# Patient Record
Sex: FEMALE | Race: WHITE | Employment: FULL TIME | URBAN - METROPOLITAN AREA
[De-identification: names, ages, dates, MRNs, and addresses within clinical notes are randomized per-mention and may not be internally consistent; named-entity substitution may affect disease eponyms.]

---

## 2017-03-30 ENCOUNTER — OFFICE VISIT (OUTPATIENT)
Dept: INTERNAL MEDICINE CLINIC | Age: 42
End: 2017-03-30

## 2017-03-30 VITALS
SYSTOLIC BLOOD PRESSURE: 140 MMHG | HEART RATE: 79 BPM | HEIGHT: 62 IN | WEIGHT: 250 LBS | OXYGEN SATURATION: 98 % | BODY MASS INDEX: 46.01 KG/M2 | DIASTOLIC BLOOD PRESSURE: 96 MMHG

## 2017-03-30 DIAGNOSIS — E78.2 MIXED HYPERLIPIDEMIA: ICD-10-CM

## 2017-03-30 DIAGNOSIS — Z76.89 ENCOUNTER TO ESTABLISH CARE: ICD-10-CM

## 2017-03-30 DIAGNOSIS — Z23 NEED FOR STREPTOCOCCUS PNEUMONIAE AND INFLUENZA VACCINATION: ICD-10-CM

## 2017-03-30 DIAGNOSIS — J01.11 ACUTE RECURRENT FRONTAL SINUSITIS: Primary | ICD-10-CM

## 2017-03-30 DIAGNOSIS — Z13.9 SCREENING: ICD-10-CM

## 2017-03-30 DIAGNOSIS — R32 INCONTINENCE IN FEMALE: ICD-10-CM

## 2017-03-30 PROCEDURE — 99204 OFFICE O/P NEW MOD 45 MIN: CPT | Performed by: NURSE PRACTITIONER

## 2017-03-30 PROCEDURE — 90471 IMMUNIZATION ADMIN: CPT | Performed by: NURSE PRACTITIONER

## 2017-03-30 PROCEDURE — 90732 PPSV23 VACC 2 YRS+ SUBQ/IM: CPT | Performed by: NURSE PRACTITIONER

## 2017-03-30 RX ORDER — OXCARBAZEPINE 600 MG/1
600 TABLET, FILM COATED ORAL 2 TIMES DAILY
Qty: 60 TABLET | Refills: 2 | Status: SHIPPED | OUTPATIENT
Start: 2017-03-30 | End: 2019-02-04 | Stop reason: SDUPTHER

## 2017-03-30 RX ORDER — NAPROXEN 500 MG/1
500 TABLET ORAL 2 TIMES DAILY WITH MEALS
Qty: 30 TABLET | Refills: 0 | Status: SHIPPED | OUTPATIENT
Start: 2017-03-30 | End: 2017-05-02 | Stop reason: ALTCHOICE

## 2017-03-30 RX ORDER — DIVALPROEX SODIUM 500 MG/1
500 TABLET, EXTENDED RELEASE ORAL DAILY
Qty: 30 TABLET | Status: CANCELLED | OUTPATIENT
Start: 2017-03-30

## 2017-03-30 RX ORDER — LANOLIN ALCOHOL/MO/W.PET/CERES
3 CREAM (GRAM) TOPICAL DAILY
Qty: 30 TABLET | Refills: 1 | Status: SHIPPED | OUTPATIENT
Start: 2017-03-30 | End: 2017-04-10 | Stop reason: DRUGHIGH

## 2017-03-30 RX ORDER — OXCARBAZEPINE 600 MG/1
600 TABLET, FILM COATED ORAL 2 TIMES DAILY
COMMUNITY
End: 2017-03-30 | Stop reason: SDUPTHER

## 2017-03-30 RX ORDER — OXYBUTYNIN CHLORIDE 10 MG/1
10 TABLET, EXTENDED RELEASE ORAL DAILY
Qty: 30 TABLET | Refills: 2 | Status: SHIPPED | OUTPATIENT
Start: 2017-03-30 | End: 2018-09-13 | Stop reason: SDUPTHER

## 2017-03-30 RX ORDER — AZITHROMYCIN 250 MG/1
TABLET, FILM COATED ORAL
Qty: 1 PACKET | Refills: 0 | Status: SHIPPED | OUTPATIENT
Start: 2017-03-30 | End: 2017-04-09

## 2017-03-30 RX ORDER — SIMVASTATIN 20 MG
20 TABLET ORAL NIGHTLY
Qty: 30 TABLET | Refills: 0 | Status: SHIPPED | OUTPATIENT
Start: 2017-03-30 | End: 2017-04-27 | Stop reason: SDUPTHER

## 2017-03-30 RX ORDER — RISPERIDONE 4 MG/1
TABLET, FILM COATED ORAL
Qty: 30 TABLET | Refills: 3 | Status: SHIPPED | OUTPATIENT
Start: 2017-03-30 | End: 2019-02-04 | Stop reason: SDUPTHER

## 2017-03-30 ASSESSMENT — ENCOUNTER SYMPTOMS
SINUS COMPLAINT: 1
SINUS PRESSURE: 1
BACK PAIN: 1

## 2017-03-31 DIAGNOSIS — Z13.9 SCREENING: ICD-10-CM

## 2017-03-31 LAB
ALBUMIN SERPL-MCNC: 3.9 G/DL (ref 3.4–5)
ANION GAP SERPL CALCULATED.3IONS-SCNC: 16 MMOL/L (ref 3–16)
BUN BLDV-MCNC: 5 MG/DL (ref 7–20)
CALCIUM SERPL-MCNC: 8.9 MG/DL (ref 8.3–10.6)
CHLORIDE BLD-SCNC: 90 MMOL/L (ref 99–110)
CHOLESTEROL, TOTAL: 206 MG/DL (ref 0–199)
CO2: 23 MMOL/L (ref 21–32)
CREAT SERPL-MCNC: <0.5 MG/DL (ref 0.6–1.1)
GFR AFRICAN AMERICAN: >60
GFR NON-AFRICAN AMERICAN: >60
GLUCOSE BLD-MCNC: 80 MG/DL (ref 70–99)
HAV IGM SER IA-ACNC: NORMAL
HDLC SERPL-MCNC: 59 MG/DL (ref 40–60)
LDL CHOLESTEROL CALCULATED: 122 MG/DL
PHOSPHORUS: 3.8 MG/DL (ref 2.5–4.9)
POTASSIUM SERPL-SCNC: 4.4 MMOL/L (ref 3.5–5.1)
SODIUM BLD-SCNC: 129 MMOL/L (ref 136–145)
TRIGL SERPL-MCNC: 124 MG/DL (ref 0–150)
VITAMIN D 25-HYDROXY: 10 NG/ML
VLDLC SERPL CALC-MCNC: 25 MG/DL

## 2017-04-01 LAB
HEPATITIS B CORE IGM ANTIBODY: NORMAL
HEPATITIS C ANTIBODY INTERPRETATION: NORMAL

## 2017-04-03 DIAGNOSIS — E55.9 VITAMIN D DEFICIENCY: Primary | ICD-10-CM

## 2017-04-03 LAB — HIV-1 AND HIV-2 ANTIBODIES: NORMAL

## 2017-04-03 RX ORDER — ERGOCALCIFEROL (VITAMIN D2) 1250 MCG
50000 CAPSULE ORAL WEEKLY
Qty: 4 CAPSULE | Refills: 3 | Status: SHIPPED | OUTPATIENT
Start: 2017-04-03 | End: 2017-08-16 | Stop reason: SDUPTHER

## 2017-04-10 ENCOUNTER — OFFICE VISIT (OUTPATIENT)
Dept: INTERNAL MEDICINE CLINIC | Age: 42
End: 2017-04-10

## 2017-04-10 VITALS
SYSTOLIC BLOOD PRESSURE: 130 MMHG | TEMPERATURE: 98.5 F | BODY MASS INDEX: 46.27 KG/M2 | HEART RATE: 76 BPM | OXYGEN SATURATION: 98 % | DIASTOLIC BLOOD PRESSURE: 60 MMHG | RESPIRATION RATE: 18 BRPM | WEIGHT: 253 LBS

## 2017-04-10 DIAGNOSIS — J34.89 SINUS DRAINAGE: Primary | ICD-10-CM

## 2017-04-10 DIAGNOSIS — G47.00 INSOMNIA, UNSPECIFIED TYPE: ICD-10-CM

## 2017-04-10 DIAGNOSIS — J20.9 ACUTE BRONCHITIS, UNSPECIFIED ORGANISM: ICD-10-CM

## 2017-04-10 DIAGNOSIS — K21.9 GASTROESOPHAGEAL REFLUX DISEASE, ESOPHAGITIS PRESENCE NOT SPECIFIED: ICD-10-CM

## 2017-04-10 PROCEDURE — 99213 OFFICE O/P EST LOW 20 MIN: CPT | Performed by: FAMILY MEDICINE

## 2017-04-10 RX ORDER — PROMETHAZINE HYDROCHLORIDE AND CODEINE PHOSPHATE 6.25; 1 MG/5ML; MG/5ML
5 SYRUP ORAL NIGHTLY PRN
Qty: 240 ML | Refills: 0 | Status: SHIPPED | OUTPATIENT
Start: 2017-04-10 | End: 2017-04-17

## 2017-04-10 RX ORDER — INHALER, ASSIST DEVICES
SPACER (EA) MISCELLANEOUS
Qty: 1 EACH | Refills: 0 | Status: SHIPPED | OUTPATIENT
Start: 2017-04-10 | End: 2017-04-26 | Stop reason: ALTCHOICE

## 2017-04-10 RX ORDER — SAW/PYGEUM/BETA/HERB/D3/B6/ZN 30 MG-25MG
CAPSULE ORAL
Qty: 30 TABLET | Refills: 5 | Status: SHIPPED | OUTPATIENT
Start: 2017-04-10 | End: 2017-08-07 | Stop reason: SDUPTHER

## 2017-04-10 RX ORDER — OMEPRAZOLE 20 MG/1
20 CAPSULE, DELAYED RELEASE ORAL
Qty: 30 CAPSULE | Refills: 2 | Status: SHIPPED | OUTPATIENT
Start: 2017-04-10 | End: 2017-07-18 | Stop reason: SDUPTHER

## 2017-04-10 RX ORDER — CETIRIZINE HYDROCHLORIDE, PSEUDOEPHEDRINE HYDROCHLORIDE 5; 120 MG/1; MG/1
1 TABLET, FILM COATED, EXTENDED RELEASE ORAL 2 TIMES DAILY
Qty: 30 TABLET | Refills: 0 | Status: SHIPPED | OUTPATIENT
Start: 2017-04-10 | End: 2017-04-17

## 2017-04-10 RX ORDER — ALBUTEROL SULFATE 90 UG/1
2 AEROSOL, METERED RESPIRATORY (INHALATION) EVERY 6 HOURS PRN
Qty: 1 INHALER | Refills: 3 | Status: SHIPPED | OUTPATIENT
Start: 2017-04-10 | End: 2017-04-26 | Stop reason: ALTCHOICE

## 2017-04-13 ENCOUNTER — TELEPHONE (OUTPATIENT)
Dept: INTERNAL MEDICINE CLINIC | Age: 42
End: 2017-04-13

## 2017-04-16 ASSESSMENT — ENCOUNTER SYMPTOMS
SHORTNESS OF BREATH: 0
COUGH: 1

## 2017-04-20 ENCOUNTER — OFFICE VISIT (OUTPATIENT)
Dept: ENT CLINIC | Age: 42
End: 2017-04-20

## 2017-04-20 VITALS
DIASTOLIC BLOOD PRESSURE: 78 MMHG | HEART RATE: 78 BPM | SYSTOLIC BLOOD PRESSURE: 119 MMHG | HEIGHT: 62 IN | WEIGHT: 253 LBS | BODY MASS INDEX: 46.56 KG/M2

## 2017-04-20 DIAGNOSIS — J32.9 CHRONIC SINUSITIS, UNSPECIFIED LOCATION: Primary | ICD-10-CM

## 2017-04-20 PROCEDURE — 99203 OFFICE O/P NEW LOW 30 MIN: CPT | Performed by: OTOLARYNGOLOGY

## 2017-04-26 ENCOUNTER — HOSPITAL ENCOUNTER (OUTPATIENT)
Dept: OTHER | Age: 42
Discharge: HOME | End: 2017-04-26
Attending: OBSTETRICS & GYNECOLOGY | Admitting: OBSTETRICS & GYNECOLOGY

## 2017-04-26 VITALS
BODY MASS INDEX: 46.74 KG/M2 | HEIGHT: 62 IN | RESPIRATION RATE: 18 BRPM | HEART RATE: 86 BPM | TEMPERATURE: 97.5 F | WEIGHT: 254 LBS | SYSTOLIC BLOOD PRESSURE: 139 MMHG | DIASTOLIC BLOOD PRESSURE: 73 MMHG

## 2017-04-26 DIAGNOSIS — N39.41 URGE INCONTINENCE: Primary | ICD-10-CM

## 2017-04-26 RX ORDER — CITALOPRAM 40 MG/1
40 TABLET ORAL DAILY
COMMUNITY
End: 2019-02-04 | Stop reason: SDUPTHER

## 2017-04-26 RX ORDER — OXYBUTYNIN CHLORIDE 5 MG/1
15 TABLET, EXTENDED RELEASE ORAL DAILY
Qty: 30 TABLET | Refills: 3 | Status: SHIPPED | OUTPATIENT
Start: 2017-04-26 | End: 2017-08-07 | Stop reason: DRUGHIGH

## 2017-04-26 RX ORDER — OXYBUTYNIN CHLORIDE 5 MG/1
15 TABLET, EXTENDED RELEASE ORAL DAILY
Qty: 30 TABLET | Refills: 3 | Status: SHIPPED | OUTPATIENT
Start: 2017-04-26 | End: 2017-05-01 | Stop reason: SDUPTHER

## 2017-04-26 RX ORDER — OXYBUTYNIN CHLORIDE 5 MG/1
5 TABLET, EXTENDED RELEASE ORAL NIGHTLY
Status: DISCONTINUED | OUTPATIENT
Start: 2017-04-26 | End: 2017-04-26 | Stop reason: CLARIF

## 2017-04-27 DIAGNOSIS — E78.2 MIXED HYPERLIPIDEMIA: ICD-10-CM

## 2017-05-01 ENCOUNTER — OFFICE VISIT (OUTPATIENT)
Dept: ENT CLINIC | Age: 42
End: 2017-05-01

## 2017-05-01 ENCOUNTER — OFFICE VISIT (OUTPATIENT)
Dept: INTERNAL MEDICINE CLINIC | Age: 42
End: 2017-05-01

## 2017-05-01 VITALS
HEIGHT: 62 IN | DIASTOLIC BLOOD PRESSURE: 86 MMHG | SYSTOLIC BLOOD PRESSURE: 122 MMHG | BODY MASS INDEX: 46.19 KG/M2 | HEART RATE: 72 BPM | WEIGHT: 251 LBS

## 2017-05-01 VITALS
TEMPERATURE: 97.9 F | BODY MASS INDEX: 45.91 KG/M2 | OXYGEN SATURATION: 98 % | SYSTOLIC BLOOD PRESSURE: 118 MMHG | WEIGHT: 251 LBS | DIASTOLIC BLOOD PRESSURE: 80 MMHG | HEART RATE: 71 BPM

## 2017-05-01 DIAGNOSIS — R05.9 COUGH: Primary | ICD-10-CM

## 2017-05-01 DIAGNOSIS — J32.8 OTHER CHRONIC SINUSITIS: Primary | ICD-10-CM

## 2017-05-01 PROCEDURE — 99214 OFFICE O/P EST MOD 30 MIN: CPT | Performed by: NURSE PRACTITIONER

## 2017-05-01 PROCEDURE — 99213 OFFICE O/P EST LOW 20 MIN: CPT | Performed by: OTOLARYNGOLOGY

## 2017-05-01 RX ORDER — SIMVASTATIN 20 MG
TABLET ORAL
Qty: 30 TABLET | Refills: 0 | Status: SHIPPED | OUTPATIENT
Start: 2017-05-01 | End: 2017-05-29 | Stop reason: SDUPTHER

## 2017-05-01 ASSESSMENT — ENCOUNTER SYMPTOMS
COUGH: 1
SINUS PRESSURE: 1
SINUS COMPLAINT: 1

## 2017-05-02 ENCOUNTER — HOSPITAL ENCOUNTER (OUTPATIENT)
Dept: OTHER | Age: 42
Discharge: HOME | End: 2017-05-02
Attending: OBSTETRICS & GYNECOLOGY | Admitting: OBSTETRICS & GYNECOLOGY

## 2017-05-12 ENCOUNTER — HOSPITAL ENCOUNTER (OUTPATIENT)
Dept: OTHER | Age: 42
Discharge: HOME | End: 2017-05-12
Attending: NURSE PRACTITIONER | Admitting: NURSE PRACTITIONER

## 2017-05-12 DIAGNOSIS — R05.9 COUGH: ICD-10-CM

## 2017-05-13 ENCOUNTER — TELEPHONE (OUTPATIENT)
Dept: INTERNAL MEDICINE CLINIC | Age: 42
End: 2017-05-13

## 2017-05-13 DIAGNOSIS — J18.9 PNEUMONIA OF RIGHT LOWER LOBE DUE TO INFECTIOUS ORGANISM: Primary | ICD-10-CM

## 2017-05-13 RX ORDER — AZITHROMYCIN 250 MG/1
TABLET, FILM COATED ORAL
Qty: 6 TABLET | Refills: 0 | Status: SHIPPED | OUTPATIENT
Start: 2017-05-13 | End: 2017-05-17

## 2017-05-22 ENCOUNTER — HOSPITAL ENCOUNTER (OUTPATIENT)
Dept: OTHER | Age: 42
Discharge: HOME | End: 2017-05-22
Attending: FAMILY MEDICINE | Admitting: FAMILY MEDICINE

## 2017-05-22 ENCOUNTER — OFFICE VISIT (OUTPATIENT)
Dept: INTERNAL MEDICINE CLINIC | Age: 42
End: 2017-05-22

## 2017-05-22 ENCOUNTER — TELEPHONE (OUTPATIENT)
Dept: INTERNAL MEDICINE CLINIC | Age: 42
End: 2017-05-22

## 2017-05-22 VITALS
RESPIRATION RATE: 16 BRPM | BODY MASS INDEX: 45.18 KG/M2 | TEMPERATURE: 99.1 F | OXYGEN SATURATION: 96 % | HEART RATE: 75 BPM | DIASTOLIC BLOOD PRESSURE: 70 MMHG | SYSTOLIC BLOOD PRESSURE: 110 MMHG | HEIGHT: 63 IN | WEIGHT: 255 LBS

## 2017-05-22 DIAGNOSIS — Z23 NEED FOR TDAP VACCINATION: ICD-10-CM

## 2017-05-22 DIAGNOSIS — Z87.01 HISTORY OF PNEUMONIA: ICD-10-CM

## 2017-05-22 DIAGNOSIS — R06.02 SHORTNESS OF BREATH: Primary | ICD-10-CM

## 2017-05-22 DIAGNOSIS — R60.0 BILATERAL EDEMA OF LOWER EXTREMITY: ICD-10-CM

## 2017-05-22 PROCEDURE — 90471 IMMUNIZATION ADMIN: CPT | Performed by: FAMILY MEDICINE

## 2017-05-22 PROCEDURE — 99214 OFFICE O/P EST MOD 30 MIN: CPT | Performed by: FAMILY MEDICINE

## 2017-05-22 PROCEDURE — 90715 TDAP VACCINE 7 YRS/> IM: CPT | Performed by: FAMILY MEDICINE

## 2017-05-22 RX ORDER — FUROSEMIDE 20 MG/1
20 TABLET ORAL DAILY
Qty: 30 TABLET | Refills: 2 | Status: SHIPPED | OUTPATIENT
Start: 2017-05-22 | End: 2017-06-28 | Stop reason: DRUGHIGH

## 2017-05-22 ASSESSMENT — ENCOUNTER SYMPTOMS
COUGH: 1
SHORTNESS OF BREATH: 1

## 2017-05-23 ENCOUNTER — HOSPITAL ENCOUNTER (OUTPATIENT)
Dept: PULMONOLOGY | Age: 42
Discharge: HOME | End: 2017-05-23
Attending: FAMILY MEDICINE | Admitting: FAMILY MEDICINE

## 2017-05-23 VITALS — HEART RATE: 73 BPM | OXYGEN SATURATION: 98 %

## 2017-05-23 DIAGNOSIS — R06.02 SHORTNESS OF BREATH: ICD-10-CM

## 2017-05-23 RX ORDER — ALBUTEROL SULFATE 90 UG/1
4 AEROSOL, METERED RESPIRATORY (INHALATION) ONCE
Status: CANCELLED | OUTPATIENT
Start: 2017-05-23

## 2017-05-24 ENCOUNTER — TELEPHONE (OUTPATIENT)
Dept: INTERNAL MEDICINE CLINIC | Age: 42
End: 2017-05-24

## 2017-05-29 DIAGNOSIS — E78.2 MIXED HYPERLIPIDEMIA: ICD-10-CM

## 2017-05-30 ENCOUNTER — OFFICE VISIT (OUTPATIENT)
Dept: INTERNAL MEDICINE CLINIC | Age: 42
End: 2017-05-30

## 2017-05-30 ENCOUNTER — OFFICE VISIT (OUTPATIENT)
Dept: ENT CLINIC | Age: 42
End: 2017-05-30

## 2017-05-30 ENCOUNTER — HOSPITAL ENCOUNTER (OUTPATIENT)
Dept: MAMMOGRAPHY | Age: 42
Discharge: HOME | End: 2017-05-30

## 2017-05-30 VITALS
SYSTOLIC BLOOD PRESSURE: 121 MMHG | WEIGHT: 255 LBS | DIASTOLIC BLOOD PRESSURE: 71 MMHG | HEART RATE: 79 BPM | BODY MASS INDEX: 45.18 KG/M2 | HEIGHT: 63 IN

## 2017-05-30 VITALS
SYSTOLIC BLOOD PRESSURE: 130 MMHG | WEIGHT: 255 LBS | OXYGEN SATURATION: 99 % | HEART RATE: 64 BPM | DIASTOLIC BLOOD PRESSURE: 88 MMHG | BODY MASS INDEX: 45.47 KG/M2

## 2017-05-30 DIAGNOSIS — J32.4 CHRONIC PANSINUSITIS: Primary | ICD-10-CM

## 2017-05-30 DIAGNOSIS — R60.0 EDEMA EXTREMITIES: ICD-10-CM

## 2017-05-30 DIAGNOSIS — Z12.31 ENCOUNTER FOR SCREENING MAMMOGRAM FOR BREAST CANCER: ICD-10-CM

## 2017-05-30 DIAGNOSIS — Z01.419 ENCOUNTER FOR ROUTINE GYNECOLOGICAL EXAMINATION WITH PAPANICOLAOU SMEAR OF CERVIX: Primary | ICD-10-CM

## 2017-05-30 PROCEDURE — 99213 OFFICE O/P EST LOW 20 MIN: CPT | Performed by: OTOLARYNGOLOGY

## 2017-05-30 PROCEDURE — S0612 ANNUAL GYNECOLOGICAL EXAMINA: HCPCS | Performed by: NURSE PRACTITIONER

## 2017-05-30 PROCEDURE — 92567 TYMPANOMETRY: CPT | Performed by: AUDIOLOGIST

## 2017-05-30 RX ORDER — SIMVASTATIN 20 MG
TABLET ORAL
Qty: 30 TABLET | Refills: 0 | Status: SHIPPED | OUTPATIENT
Start: 2017-05-30 | End: 2017-07-11 | Stop reason: SDUPTHER

## 2017-06-05 ENCOUNTER — HOSPITAL ENCOUNTER (OUTPATIENT)
Dept: NON INVASIVE DIAGNOSTICS | Age: 42
Discharge: HOME | End: 2017-06-05
Attending: FAMILY MEDICINE | Admitting: FAMILY MEDICINE

## 2017-06-05 DIAGNOSIS — R06.02 SHORTNESS OF BREATH: ICD-10-CM

## 2017-06-05 LAB
LEFT VENTRICULAR EJECTION FRACTION HIGH VALUE: 55 %
LEFT VENTRICULAR EJECTION FRACTION MODE: NORMAL
LV EF: 55 %
LVEF MODALITY: NORMAL

## 2017-06-16 ENCOUNTER — TELEPHONE (OUTPATIENT)
Dept: INTERNAL MEDICINE CLINIC | Age: 42
End: 2017-06-16

## 2017-06-19 ENCOUNTER — HOSPITAL ENCOUNTER (OUTPATIENT)
Dept: CT IMAGING | Age: 42
Discharge: HOME | End: 2017-06-19
Attending: OTOLARYNGOLOGY | Admitting: OTOLARYNGOLOGY

## 2017-06-19 DIAGNOSIS — J32.4 PANSINUSITIS, UNSPECIFIED CHRONICITY: ICD-10-CM

## 2017-06-19 DIAGNOSIS — J32.4 CHRONIC PANSINUSITIS: ICD-10-CM

## 2017-06-20 ENCOUNTER — TELEPHONE (OUTPATIENT)
Dept: ENT CLINIC | Age: 42
End: 2017-06-20

## 2017-06-28 ENCOUNTER — OFFICE VISIT (OUTPATIENT)
Dept: INTERNAL MEDICINE CLINIC | Age: 42
End: 2017-06-28

## 2017-06-28 VITALS
SYSTOLIC BLOOD PRESSURE: 114 MMHG | BODY MASS INDEX: 45.72 KG/M2 | DIASTOLIC BLOOD PRESSURE: 80 MMHG | WEIGHT: 254 LBS | HEART RATE: 74 BPM | OXYGEN SATURATION: 98 %

## 2017-06-28 DIAGNOSIS — R60.1 GENERALIZED EDEMA: Primary | ICD-10-CM

## 2017-06-28 PROCEDURE — 99213 OFFICE O/P EST LOW 20 MIN: CPT | Performed by: NURSE PRACTITIONER

## 2017-06-28 RX ORDER — FUROSEMIDE 20 MG/1
20 TABLET ORAL 2 TIMES DAILY
Qty: 60 TABLET | Refills: 3 | Status: SHIPPED | OUTPATIENT
Start: 2017-06-28 | End: 2017-10-06

## 2017-06-28 ASSESSMENT — PATIENT HEALTH QUESTIONNAIRE - PHQ9
1. LITTLE INTEREST OR PLEASURE IN DOING THINGS: 0
SUM OF ALL RESPONSES TO PHQ9 QUESTIONS 1 & 2: 0
SUM OF ALL RESPONSES TO PHQ QUESTIONS 1-9: 0
2. FEELING DOWN, DEPRESSED OR HOPELESS: 0

## 2017-06-29 DIAGNOSIS — R60.1 GENERALIZED EDEMA: ICD-10-CM

## 2017-06-30 LAB
ALBUMIN SERPL-MCNC: 4.1 G/DL (ref 3.4–5)
ANION GAP SERPL CALCULATED.3IONS-SCNC: 17 MMOL/L (ref 3–16)
BUN BLDV-MCNC: 10 MG/DL (ref 7–20)
CALCIUM SERPL-MCNC: 9.1 MG/DL (ref 8.3–10.6)
CHLORIDE BLD-SCNC: 88 MMOL/L (ref 99–110)
CO2: 24 MMOL/L (ref 21–32)
CREAT SERPL-MCNC: <0.5 MG/DL (ref 0.6–1.1)
GFR AFRICAN AMERICAN: >60
GFR NON-AFRICAN AMERICAN: >60
GLUCOSE BLD-MCNC: 83 MG/DL (ref 70–99)
PHOSPHORUS: 4 MG/DL (ref 2.5–4.9)
POTASSIUM SERPL-SCNC: 4.3 MMOL/L (ref 3.5–5.1)
SODIUM BLD-SCNC: 129 MMOL/L (ref 136–145)

## 2017-07-11 DIAGNOSIS — E78.2 MIXED HYPERLIPIDEMIA: ICD-10-CM

## 2017-07-11 RX ORDER — SIMVASTATIN 20 MG
TABLET ORAL
Qty: 30 TABLET | Refills: 1 | Status: SHIPPED | OUTPATIENT
Start: 2017-07-11 | End: 2018-03-26 | Stop reason: SDUPTHER

## 2017-08-07 ENCOUNTER — OFFICE VISIT (OUTPATIENT)
Dept: INTERNAL MEDICINE CLINIC | Age: 42
End: 2017-08-07

## 2017-08-07 VITALS
OXYGEN SATURATION: 98 % | WEIGHT: 244.8 LBS | HEART RATE: 72 BPM | BODY MASS INDEX: 44.06 KG/M2 | TEMPERATURE: 98.4 F | DIASTOLIC BLOOD PRESSURE: 100 MMHG | SYSTOLIC BLOOD PRESSURE: 160 MMHG

## 2017-08-07 DIAGNOSIS — J34.89 SINUS DRAINAGE: ICD-10-CM

## 2017-08-07 DIAGNOSIS — G47.00 INSOMNIA, UNSPECIFIED TYPE: ICD-10-CM

## 2017-08-07 DIAGNOSIS — I10 ESSENTIAL HYPERTENSION: ICD-10-CM

## 2017-08-07 DIAGNOSIS — J32.4 CHRONIC PANSINUSITIS: Primary | ICD-10-CM

## 2017-08-07 PROCEDURE — 99213 OFFICE O/P EST LOW 20 MIN: CPT | Performed by: FAMILY MEDICINE

## 2017-08-07 RX ORDER — LISINOPRIL AND HYDROCHLOROTHIAZIDE 20; 12.5 MG/1; MG/1
1 TABLET ORAL DAILY
Qty: 30 TABLET | Refills: 2 | Status: SHIPPED | OUTPATIENT
Start: 2017-08-07 | End: 2017-11-01 | Stop reason: SDUPTHER

## 2017-08-07 RX ORDER — CHOLECALCIFEROL (VITAMIN D3) 125 MCG
2 CAPSULE ORAL DAILY
Qty: 60 TABLET | Refills: 5 | Status: SHIPPED | OUTPATIENT
Start: 2017-08-07 | End: 2017-08-16 | Stop reason: SDUPTHER

## 2017-08-07 RX ORDER — FLUTICASONE PROPIONATE 50 MCG
1 SPRAY, SUSPENSION (ML) NASAL DAILY
Qty: 1 BOTTLE | Refills: 5 | Status: SHIPPED | OUTPATIENT
Start: 2017-08-07 | End: 2018-05-25 | Stop reason: SDUPTHER

## 2017-08-07 RX ORDER — CETIRIZINE HYDROCHLORIDE 10 MG/1
10 TABLET ORAL DAILY
Qty: 30 TABLET | Refills: 2 | Status: SHIPPED | OUTPATIENT
Start: 2017-08-07 | End: 2017-10-06 | Stop reason: SINTOL

## 2017-08-07 RX ORDER — CETIRIZINE HYDROCHLORIDE, PSEUDOEPHEDRINE HYDROCHLORIDE 5; 120 MG/1; MG/1
1 TABLET, FILM COATED, EXTENDED RELEASE ORAL 2 TIMES DAILY
Qty: 60 TABLET | Refills: 0 | Status: SHIPPED | OUTPATIENT
Start: 2017-08-07 | End: 2017-08-07 | Stop reason: ALTCHOICE

## 2017-08-07 RX ORDER — SAW/PYGEUM/BETA/HERB/D3/B6/ZN 30 MG-25MG
CAPSULE ORAL
Qty: 30 TABLET | Refills: 5 | Status: SHIPPED | OUTPATIENT
Start: 2017-08-07 | End: 2017-08-07 | Stop reason: ALTCHOICE

## 2017-08-07 ASSESSMENT — ENCOUNTER SYMPTOMS: SINUS PRESSURE: 1

## 2017-08-09 ENCOUNTER — TELEPHONE (OUTPATIENT)
Dept: INTERNAL MEDICINE CLINIC | Age: 42
End: 2017-08-09

## 2017-08-09 DIAGNOSIS — R92.8 ABNORMAL MAMMOGRAM: Primary | ICD-10-CM

## 2017-08-09 NOTE — TELEPHONE ENCOUNTER
Pt's screening mammogram was abnormal. I saw her on Monday and forgot to discuss this with her. She is in need of further testing. Please call her to let her know. Orders have been placed.

## 2017-08-16 ENCOUNTER — OFFICE VISIT (OUTPATIENT)
Dept: INTERNAL MEDICINE CLINIC | Age: 42
End: 2017-08-16

## 2017-08-16 ENCOUNTER — TELEPHONE (OUTPATIENT)
Dept: INTERNAL MEDICINE CLINIC | Age: 42
End: 2017-08-16

## 2017-08-16 VITALS
WEIGHT: 238 LBS | TEMPERATURE: 98.8 F | HEIGHT: 62 IN | HEART RATE: 86 BPM | DIASTOLIC BLOOD PRESSURE: 70 MMHG | RESPIRATION RATE: 16 BRPM | OXYGEN SATURATION: 97 % | BODY MASS INDEX: 43.79 KG/M2 | SYSTOLIC BLOOD PRESSURE: 110 MMHG

## 2017-08-16 DIAGNOSIS — J01.00 SUBACUTE MAXILLARY SINUSITIS: Primary | ICD-10-CM

## 2017-08-16 DIAGNOSIS — R68.2 DRY MOUTH: ICD-10-CM

## 2017-08-16 DIAGNOSIS — G47.00 INSOMNIA, UNSPECIFIED TYPE: ICD-10-CM

## 2017-08-16 DIAGNOSIS — T50.2X5A DIURETICS CAUSING ADVERSE EFFECT IN THERAPEUTIC USE, INITIAL ENCOUNTER: ICD-10-CM

## 2017-08-16 DIAGNOSIS — E55.9 VITAMIN D DEFICIENCY: ICD-10-CM

## 2017-08-16 LAB
ANION GAP SERPL CALCULATED.3IONS-SCNC: 19 MMOL/L (ref 3–16)
BUN BLDV-MCNC: 5 MG/DL (ref 7–20)
CALCIUM SERPL-MCNC: 9.8 MG/DL (ref 8.3–10.6)
CHLORIDE BLD-SCNC: 91 MMOL/L (ref 99–110)
CO2: 25 MMOL/L (ref 21–32)
CREAT SERPL-MCNC: <0.5 MG/DL (ref 0.6–1.1)
GFR AFRICAN AMERICAN: >60
GFR NON-AFRICAN AMERICAN: >60
GLUCOSE BLD-MCNC: 86 MG/DL (ref 70–99)
POTASSIUM SERPL-SCNC: 4.1 MMOL/L (ref 3.5–5.1)
SODIUM BLD-SCNC: 135 MMOL/L (ref 136–145)

## 2017-08-16 PROCEDURE — 99213 OFFICE O/P EST LOW 20 MIN: CPT | Performed by: FAMILY MEDICINE

## 2017-08-16 RX ORDER — AMOXICILLIN AND CLAVULANATE POTASSIUM 875; 125 MG/1; MG/1
1 TABLET, FILM COATED ORAL 2 TIMES DAILY
Qty: 20 TABLET | Refills: 0 | Status: SHIPPED | OUTPATIENT
Start: 2017-08-16 | End: 2017-08-26

## 2017-08-16 RX ORDER — CHOLECALCIFEROL (VITAMIN D3) 125 MCG
2 CAPSULE ORAL DAILY
Qty: 60 TABLET | Refills: 5 | Status: SHIPPED | OUTPATIENT
Start: 2017-08-16 | End: 2017-10-06

## 2017-08-16 RX ORDER — ERGOCALCIFEROL (VITAMIN D2) 1250 MCG
50000 CAPSULE ORAL WEEKLY
Qty: 4 CAPSULE | Refills: 3 | Status: SHIPPED | OUTPATIENT
Start: 2017-08-16 | End: 2017-12-25 | Stop reason: SDUPTHER

## 2017-08-16 ASSESSMENT — ENCOUNTER SYMPTOMS
BACK PAIN: 1
SINUS PRESSURE: 1
COUGH: 1

## 2017-08-18 ENCOUNTER — TELEPHONE (OUTPATIENT)
Dept: INTERNAL MEDICINE CLINIC | Age: 42
End: 2017-08-18

## 2017-08-22 ENCOUNTER — HOSPITAL ENCOUNTER (OUTPATIENT)
Dept: MAMMOGRAPHY | Age: 42
Discharge: HOME | End: 2017-08-22
Attending: FAMILY MEDICINE | Admitting: FAMILY MEDICINE

## 2017-08-22 DIAGNOSIS — N63.10 BREAST MASS, RIGHT: ICD-10-CM

## 2017-08-22 DIAGNOSIS — R92.8 ABNORMAL MAMMOGRAM, UNSPECIFIED: ICD-10-CM

## 2017-08-22 DIAGNOSIS — R92.8 OTHER ABNORMAL AND INCONCLUSIVE FINDINGS ON DIAGNOSTIC IMAGING OF BREAST: ICD-10-CM

## 2017-09-07 ENCOUNTER — TELEPHONE (OUTPATIENT)
Dept: INTERNAL MEDICINE CLINIC | Age: 42
End: 2017-09-07

## 2017-09-13 ENCOUNTER — TELEPHONE (OUTPATIENT)
Dept: INTERNAL MEDICINE CLINIC | Age: 42
End: 2017-09-13

## 2017-09-13 ENCOUNTER — OFFICE VISIT (OUTPATIENT)
Dept: INTERNAL MEDICINE CLINIC | Age: 42
End: 2017-09-13

## 2017-09-13 VITALS
WEIGHT: 251 LBS | RESPIRATION RATE: 16 BRPM | SYSTOLIC BLOOD PRESSURE: 102 MMHG | TEMPERATURE: 98.1 F | OXYGEN SATURATION: 96 % | HEIGHT: 62 IN | HEART RATE: 75 BPM | BODY MASS INDEX: 46.19 KG/M2 | DIASTOLIC BLOOD PRESSURE: 60 MMHG

## 2017-09-13 DIAGNOSIS — R07.89 CHEST TIGHTNESS: ICD-10-CM

## 2017-09-13 DIAGNOSIS — J32.4 CHRONIC PANSINUSITIS: Primary | ICD-10-CM

## 2017-09-13 DIAGNOSIS — R68.2 DRY MOUTH: ICD-10-CM

## 2017-09-13 PROCEDURE — 99213 OFFICE O/P EST LOW 20 MIN: CPT | Performed by: FAMILY MEDICINE

## 2017-10-06 ENCOUNTER — OFFICE VISIT (OUTPATIENT)
Dept: ENT CLINIC | Age: 42
End: 2017-10-06

## 2017-10-06 VITALS
BODY MASS INDEX: 45.12 KG/M2 | HEART RATE: 83 BPM | DIASTOLIC BLOOD PRESSURE: 52 MMHG | WEIGHT: 245.2 LBS | HEIGHT: 62 IN | SYSTOLIC BLOOD PRESSURE: 90 MMHG

## 2017-10-06 DIAGNOSIS — J30.89 CHRONIC NONSEASONAL ALLERGIC RHINITIS DUE TO POLLEN: ICD-10-CM

## 2017-10-06 DIAGNOSIS — J32.4 CHRONIC PANSINUSITIS: Primary | ICD-10-CM

## 2017-10-06 PROCEDURE — G8427 DOCREV CUR MEDS BY ELIG CLIN: HCPCS | Performed by: OTOLARYNGOLOGY

## 2017-10-06 PROCEDURE — G8417 CALC BMI ABV UP PARAM F/U: HCPCS | Performed by: OTOLARYNGOLOGY

## 2017-10-06 PROCEDURE — 99214 OFFICE O/P EST MOD 30 MIN: CPT | Performed by: OTOLARYNGOLOGY

## 2017-10-06 PROCEDURE — 4004F PT TOBACCO SCREEN RCVD TLK: CPT | Performed by: OTOLARYNGOLOGY

## 2017-10-06 PROCEDURE — G8484 FLU IMMUNIZE NO ADMIN: HCPCS | Performed by: OTOLARYNGOLOGY

## 2017-10-06 RX ORDER — HYDROXYZINE PAMOATE 25 MG/1
CAPSULE ORAL
Refills: 3 | COMMUNITY
Start: 2017-09-21 | End: 2019-04-19

## 2017-10-06 RX ORDER — FEXOFENADINE HCL 180 MG/1
180 TABLET ORAL DAILY
Qty: 30 TABLET | Refills: 1 | Status: SHIPPED | OUTPATIENT
Start: 2017-10-06 | End: 2018-01-09

## 2017-10-06 RX ORDER — CIPROFLOXACIN 500 MG/1
500 TABLET, FILM COATED ORAL 2 TIMES DAILY
Qty: 42 TABLET | Refills: 0 | Status: SHIPPED | OUTPATIENT
Start: 2017-10-06 | End: 2017-10-27

## 2017-10-06 RX ORDER — METHYLPREDNISOLONE 4 MG/1
TABLET ORAL
Qty: 1 KIT | Refills: 0 | Status: SHIPPED | OUTPATIENT
Start: 2017-10-06 | End: 2018-01-09

## 2017-10-06 RX ORDER — M-VIT,TX,IRON,MINS/CALC/FOLIC 27MG-0.4MG
1 TABLET ORAL DAILY
COMMUNITY
End: 2019-12-09

## 2017-10-06 NOTE — PATIENT INSTRUCTIONS
HOME INSTRUCTIONS - RHINOSINUSITIS CARE  · Take Probiotic as prescribed while you are taking antibiotics, to prevent diarrhea, stomach upset, pseudomembranous colitis, and C. difficile diarrhea. This may be obtained at your pharmacy or health food store. Alternatively, you may eat one cup of yogurt with active or live cultures twice daily while taking the antibiotic. Continue for two to three days after completion of the antibiotic. · Use a 12 hour decongestant spray, oxymetazoline (e.g. Afrin, Duration, 4-Way). Spray each nostril twice three times a day for three days, then two times a day for 2 days, and then stop for two days and then repeat the cycle once. · Take one Mucinex-D (red orange box) maximum strength tablet each morning and one Mucinex (blue box) maximum strength tablet each evening, about 12 hours later, daily for the next ten days. · A steam inhaler mask device may be helpful. These can be purchased at your pharmacy. · Use a saline nasal/sinus irrigation system, e.g. NeilMed sinus rinse, twice daily to help to clear secretions and drainage. Use distilled water; DO NOT USE TAP WATER! This is because of the possibility of amoeba or other microorganisms in tap water, which can result in a fatal disease. Alternatively, you could use a blue bulb syringe and solution of 1/4 tsp of table salt in 8 ounces (one cup or 240 ml) of distilled water. · Use fluticasone as prescribed daily. · It may take several days to several weeks for your sinusitis to clear up. It is important to take all your medications as prescribed. Please continue your antibiotics as prescribed.     · Please call the office if your condition worsens or if symptoms persist after treatment is completed.        ===================================================================      ADVERSE AND SIDE EFFECTS OF MEDICATIONS:    Please be aware of the following possible adverse reactions, side effects, and complications of the

## 2017-10-06 NOTE — MR AVS SNAPSHOT
continue your antibiotics as prescribed. · Please call the office if your condition worsens or if symptoms persist after treatment is completed.        ===================================================================      ADVERSE AND SIDE EFFECTS OF MEDICATIONS:    Please be aware of the following possible adverse reactions, side effects, and complications of the following medications, including, but not limited to: allergic reaction, interactions with other medications, nausea, headache, diarrhea, persistent symptoms, failure to improve, and the following:     Cipro (antibiotic):  Achilles or shoulder or other tendon rupture, tendonitis, muscle and joint aches (I advised no exercise or strenuous physical exertion while taking Avelox),    diarrhea, colitis (severe infection and inflammation of the large intestine), pseudomembranous colitis (severe infection and inflammation of the colon, usually due to C. difficile bacteria)  yeast or fungal  infections, Moser-Ty syndrome (very rare necrotic skin reaction with peeling of skin, blisters and arthritis), persistent symptoms/infection, and failure to improve. Fluticasone:  nosebleed, burning sensation, atrophy of nasal mucosa, septal ulceration or perforation, nasal irritation, nasal yeast infection,  drowsiness, bad taste. Taking Probiotic while you are taking antibiotics, may help to prevent diarrhea, stomach upset, pseudomembranous colitis, and C. difficile diarrhea. This may be obtained at your pharmacy or health food store. Alternatively, you may eat one cup of yogurt with active or live cultures twice daily while taking the antibiotic. Continue for two to three days after completion of the antibiotic.    ~~~>>> Also please read all information given to you by the pharmacist.                 Today's Medication Changes          These changes are accurate as of: 10/6/17  4:39 PM.  If you have any questions, ask your nurse or doctor. fluticasone (FLONASE) 50 MCG/ACT nasal spray 1 spray by Nasal route daily    lisinopril-hydrochlorothiazide (PRINZIDE;ZESTORETIC) 20-12.5 MG per tablet Take 1 tablet by mouth daily    omeprazole (PRILOSEC) 20 MG delayed release capsule TAKE 1 CAPSULE BY MOUTH ONE TIME A DAY WITH BREAKFAST.     simvastatin (ZOCOR) 20 MG tablet TAKE 1 TABLET BY MOUTH AT BEDTIME    Compression Stockings MISC by Does not apply route Wear daily for leg swelling. Take off prior to going to bed.    citalopram (CELEXA) 40 MG tablet Take 40 mg by mouth daily    risperiDONE (RISPERDAL) 4 MG tablet One tab nightly    oxybutynin (DITROPAN-XL) 10 MG extended release tablet Take 1 tablet by mouth daily    OXcarbazepine (TRILEPTAL) 600 MG tablet Take 1 tablet by mouth 2 times daily    hydrOXYzine (VISTARIL) 25 MG capsule TAKE 1 CAPSULE BY MOUTH AT BEDTIME AS NEEDED 45 minutes before bedtime    discontinue melatonin and begin this medication      Allergies              Wellbutrin [Bupropion]     \"get high as a kite, then crash\"         Additional Information        Basic Information     Date Of Birth Sex Race Ethnicity Preferred Language    1975 Female White Non-/Non  English      Problem List as of 10/6/2017  Date Reviewed: 10/6/2017                Chronic pansinusitis    Chronic sinusitis    Smoking (Chronic)    Obesity due to excess calories (Chronic)    Major depressive disorder (Chronic)    Hypercholesterolemia    Seizures (HCC) (Chronic)    Closed fracture of distal end of radius      Immunizations as of 10/6/2017     Name Date    Influenza Whole 10/28/2015    Pneumococcal Polysaccharide ([id number]) 3/30/2017    Tdap (Boostrix, Adacel) 5/22/2017      Preventive Care        Date Due    Yearly Flu Vaccine (1) 9/1/2017    Pap Smear 5/30/2020    Cholesterol Screening 3/31/2022    Tetanus Combination Vaccine (2 - Td) 5/22/2027            MyChart Signup           Our records indicate that you have an active MyChart account. You can view your After Visit Summary by going to [email or web address] and logging in with your [employer] username and password. If you don't have a [employer] username and password but a parent or guardian has access to your record, the parent or guardian should login with their own [employer] username and password and access your record to view the After Visit Summary. Additional Information  If you have questions, please contact the physician practice where you receive care. Remember, [employer] is NOT to be used for urgent needs. For medical emergencies, dial 911. For questions regarding your [employer] account call [phone]. If you have a clinical question, please call your doctor's office.
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
## 2017-10-07 ENCOUNTER — HOSPITAL ENCOUNTER (OUTPATIENT)
Dept: OTHER | Age: 42
Discharge: HOME | End: 2017-10-07
Attending: OTOLARYNGOLOGY | Admitting: OTOLARYNGOLOGY

## 2017-10-09 LAB
2000687N OAK TREE IGE: <0.1 KU/L
ALLERGEN ASPERGILLUS ALTERNATA IGE: <0.1 KU/L
ALLERGEN ASPERGILLUS FUMIGATUS IGE: <0.1 KU/L
ALLERGEN BERMUDA GRASS IGE: <0.1 KU/L
ALLERGEN BIRCH IGE: <0.1 KU/L
ALLERGEN CAT DANDER IGE: <0.1 KU/L
ALLERGEN CLAMS IGE: <0.1 KU/L
ALLERGEN CODFISH IGE: <0.1 KU/L
ALLERGEN COMMON SHORT RAGWEED IGE: <0.1 KU/L
ALLERGEN CORN IGE: <0.1 KU/L
ALLERGEN COTTONWOOD: <0.1 KU/L
ALLERGEN COW MILK IGE: <0.1 KU/L
ALLERGEN DOG DANDER IGE: <0.1 KU/L
ALLERGEN EGG WHITE IGE: <0.1 KU/L
ALLERGEN ELM IGE: <0.1 KU/L
ALLERGEN FUNGI/MOLD M.RACEMOSUS IGE: <0.1 KU/L
ALLERGEN GERMAN COCKROACH IGE: <0.1 KU/L
ALLERGEN HORMODENDRUM HORDEI IGE: <0.1 KU/L
ALLERGEN MAPLE/BOX ELDER IGE: <0.1 KU/L
ALLERGEN MITE DUST FARINAE IGE: <0.1 KU/L
ALLERGEN MITE DUST PTERONYSSINUS IGE: <0.1 KU/L
ALLERGEN MOUNTAIN CEDAR: <0.1 KU/L
ALLERGEN MOUSE EPITHELIA IGE: <0.1 KU/L
ALLERGEN PEANUT (F13) IGE: <0.1 KU/L
ALLERGEN PECAN TREE IGE: <0.1 KU/L
ALLERGEN PENICILLIUM NOTATUM: <0.1 KU/L
ALLERGEN ROUGH PIGWEED (W14) IGE: <0.1 KU/L
ALLERGEN RUSSIAN THISTLE IGE: <0.1 KU/L
ALLERGEN SCALLOP IGE: <0.1 KU/L
ALLERGEN SEE NOTE: NORMAL
ALLERGEN SHEEP SORREL (W18) IGE: <0.1 KU/L
ALLERGEN SHRIMP IGE: <0.1 KU/L
ALLERGEN SOYBEAN IGE: <0.1 KU/L
ALLERGEN TIMOTHY GRASS: <0.1 KU/L
ALLERGEN TREE SYCAMORE: <0.1 KU/L
ALLERGEN WALNUT IGE: <0.1 KU/L
ALLERGEN WALNUT TREE IGE: <0.1 KU/L
ALLERGEN WHEAT IGE: <0.1 KU/L
ALLERGEN WHITE MULBERRY TREE, IGE: <0.1 KU/L
ALLERGEN, TREE, WHITE ASH IGE: <0.1 KU/L
IGE: 19 KU/L

## 2017-11-14 ENCOUNTER — OFFICE VISIT (OUTPATIENT)
Dept: INTERNAL MEDICINE CLINIC | Age: 42
End: 2017-11-14

## 2017-11-14 VITALS
DIASTOLIC BLOOD PRESSURE: 78 MMHG | SYSTOLIC BLOOD PRESSURE: 124 MMHG | WEIGHT: 257 LBS | OXYGEN SATURATION: 98 % | HEART RATE: 79 BPM | TEMPERATURE: 97.5 F | BODY MASS INDEX: 47.01 KG/M2

## 2017-11-14 DIAGNOSIS — J32.4 CHRONIC PANSINUSITIS: Primary | ICD-10-CM

## 2017-11-14 PROCEDURE — G8484 FLU IMMUNIZE NO ADMIN: HCPCS | Performed by: NURSE PRACTITIONER

## 2017-11-14 PROCEDURE — G8417 CALC BMI ABV UP PARAM F/U: HCPCS | Performed by: NURSE PRACTITIONER

## 2017-11-14 PROCEDURE — 99213 OFFICE O/P EST LOW 20 MIN: CPT | Performed by: NURSE PRACTITIONER

## 2017-11-14 PROCEDURE — 4004F PT TOBACCO SCREEN RCVD TLK: CPT | Performed by: NURSE PRACTITIONER

## 2017-11-14 PROCEDURE — G8427 DOCREV CUR MEDS BY ELIG CLIN: HCPCS | Performed by: NURSE PRACTITIONER

## 2017-11-14 RX ORDER — AZITHROMYCIN 250 MG/1
TABLET, FILM COATED ORAL
Qty: 1 PACKET | Refills: 0 | Status: SHIPPED | OUTPATIENT
Start: 2017-11-14 | End: 2017-11-24

## 2017-11-14 ASSESSMENT — ENCOUNTER SYMPTOMS: COUGH: 1

## 2017-11-14 NOTE — PATIENT INSTRUCTIONS
change your smoking behavior. However, do not smoke more cigarettes, inhale them more often or more deeply, or place your fingertips over the holes in the filters. All of these actions will increase your nicotine intake, and the idea is to get your body used to functioning without nicotine. Cut Down the Number of Cigarettes You Smoke   Smoke only half of each cigarette. Each day, postpone the lighting of your first cigarette by one hour. Decide you'll only smoke during odd or even hours of the day. Decide beforehand how many cigarettes you'll smoke during the day. For each additional cigarette, give a dollar to your favorite danilo. Change your eating habits to help you cut down. For example, drink milk, which many people consider incompatible with smoking. End meals or snacks with something that won't lead to a cigarette. Reach for a glass of juice instead of a cigarette for a \"pick-me-up. \"   Remember: Cutting down can help you quit, but it's not a substitute for quitting. If you're down to about seven cigarettes a day, it's time to set your target quit date, and get ready to stick to it. Don't Smoke \"Automatically\"   Smoke only those cigarettes you really want. Catch yourself before you light up a cigarette out of pure habit. Don't empty your ashtrays. This will remind you of how many cigarettes you've smoked each day, and the sight and the smell of stale cigarettes butts will be very unpleasant. Make yourself aware of each cigarette by using the opposite hand or putting cigarettes in an unfamiliar location or a different pocket to break the automatic reach. If you light up many times during the day without even thinking about it, try to look in a mirror each time you put a match to your cigarette. You may decide you don't need it. Make Smoking Inconvenient   Stop buying cigarettes by the carton. Wait until one pack is empty before you buy another.    Stop carrying cigarettes with you at home or at work. Make them difficult to get to. Make Smoking Unpleasant   Smoke only under circumstances that aren't especially pleasurable for you. If you like to smoke with others, smoke alone. Turn your chair to an empty corner and focus only on the cigarette you are smoking and all its many negative effects. Collect all your cigarette butts in one large glass container as a visual reminder of the filth made by smoking. Just Before Quitting   Practice going without cigarettes. Don't think of never smoking again. Think of quitting in terms of one day at a time . Tell yourself you won't smoke today, and then don't. Clean your clothes to rid them of the cigarette smell, which can linger a long time. On the Day You Quit   Throw away all your cigarettes and matches. Hide your lighters and ashtrays. Visit the dentist and have your teeth cleaned to get rid of tobacco stains. Notice how nice they look and resolve to keep them that way. Make a list of things you'd like to buy for yourself or someone else. Estimate the cost in terms of packs of cigarettes, and put the money aside to buy these presents. Keep very busy on the big day. Go to the movies, exercise, take long walks, or go bike riding. Remind your family and friends that this is your quit date, and ask them to help you over the rough spots of the first couple of days and weeks. Buy yourself a treat or do something special to celebrate. Telephone and Internet Support   Telephone, web-, and computer-based programs can offer you the support that you need to quit and to stay smoke-free. You can find many programs online, like the American Lung Association's [place] from Smoking . Immediately After Quitting   Develop a clean, fresh, nonsmoking environment around yourselfat work and at home. Buy yourself flowersyou may be surprised how much you can enjoy their scent now.    The first few days after you quit, spend as much free time as possible in places where smoking isn't allowed, such as [address] [place] [address], museums, theaters, department stores, and churches. Drink large quantities of water and fruit juice (but avoid sodas that contain caffeine). Try to avoid alcohol, coffee, and other beverages that you associate with cigarette smoking. Strike up conversation instead of a match for a cigarette. If you miss the sensation of having a cigarette in your hand, play with something elsea pencil, a paper clip, a marble. If you miss having something in your mouth, try toothpicks or a fake cigarette. Here are some useful phone numbers and resources for you to help your smoking cessation. [address] [place] [address] Street: [phone]  Smoking cessation programs in [facility]: [phone]  \"Freshstart\" a 4-session program for smoking cessation - group sessions    [facility] Use Prevention and Control Foundation: 1800-QUIT-NOW     [facility]: 193-851-XUVC  \"Freshstart' - 4-session program for smoking cessation - group sessions    [facility]: [phone]  Personal Smoking cessation consultations - teens and adults  [place] By appointment $807    [facility]: [phone]  Offers individual hypnosis, behavior modifications, and counseling in this program. Three sessions over 2-week period  $155 (total cost)    Nicotine Anonymous: [phone]  Open group cessation - everyone welcome     American Cancer Society: 223-616-0955    American Lung Association: 1-800-LUNG-[place]    On-line help:  www.cancer. org  www.quitnet. org  www. whyquit. com  www.stopsmokingsuppport. com  www. ffsonline. org

## 2017-11-15 ENCOUNTER — HOSPITAL ENCOUNTER (OUTPATIENT)
Dept: CT IMAGING | Age: 42
Discharge: HOME | End: 2017-11-15

## 2017-11-15 DIAGNOSIS — J32.4 CHRONIC PANSINUSITIS: ICD-10-CM

## 2017-11-28 ENCOUNTER — OFFICE VISIT (OUTPATIENT)
Dept: ENT CLINIC | Age: 42
End: 2017-11-28

## 2017-11-28 VITALS
BODY MASS INDEX: 47.84 KG/M2 | DIASTOLIC BLOOD PRESSURE: 74 MMHG | SYSTOLIC BLOOD PRESSURE: 121 MMHG | WEIGHT: 260 LBS | HEART RATE: 66 BPM | HEIGHT: 62 IN

## 2017-11-28 DIAGNOSIS — J30.0 CHRONIC VASOMOTOR RHINITIS: ICD-10-CM

## 2017-11-28 DIAGNOSIS — J01.01 ACUTE RECURRENT MAXILLARY SINUSITIS: Primary | ICD-10-CM

## 2017-11-28 PROCEDURE — 31231 NASAL ENDOSCOPY DX: CPT | Performed by: OTOLARYNGOLOGY

## 2017-11-28 RX ORDER — AZELASTINE 1 MG/ML
SPRAY, METERED NASAL
Qty: 1 BOTTLE | Refills: 2 | Status: SHIPPED | OUTPATIENT
Start: 2017-11-28 | End: 2018-02-10 | Stop reason: SDUPTHER

## 2017-11-28 RX ORDER — ASCORBIC ACID 500 MG
500 TABLET ORAL DAILY
COMMUNITY
End: 2019-05-08

## 2017-11-28 NOTE — PROGRESS NOTES
PROVIDED HISTORY: Chronic pansinusitis   TECHNOLOGIST PROVIDED HISTORY:   Technologist Provided Reason for Exam: Chronic pansinusitis   Acuity: Chronic   Type of Encounter: Ongoing       FINDINGS:   SINUSES:  The maxillary, sphenoid, ethmoid and frontal sinuses are clear. The bilateral ostiomeatal units are patent.  Ethmoid roofs are symmetric.       MASTOIDS:  The visualized mastoid air cells are well aerated.       SOFT TISSUES:  Visualized soft tissues demonstrate no acute abnormality.  The   visualized portion of the intracranial contents demonstrate no gross acute   abnormality.           Impression   No evidence of sinusitis.                 LABORATORY  All invitro allergy testing was negative. See lab report. IMPRESSION / Josse Perez / China Veras / PROCEDURES:       Martin Taylor was seen today for sinusitis. Diagnoses and all orders for this visit:    Acute recurrent maxillary sinusitis    Chronic vasomotor rhinitis  -     azelastine (ASTELIN) 0.1 % nasal spray; Use 2 sprays in each nostril 2 times daily. Use fluticasone 15 minutes after the morning dose of astelin. RECOMMENDATIONS / PLAN:             1. Balloon sinuplasty of all 3 sinuses bilaterally. Patient stated she will proceed if approved by Jessica Patel but if not she will continue treatment as needed. 2. Return in about 6 weeks (around 1/9/2018) for recheck/follow-up, and sooner if condition worsens.

## 2018-01-09 ENCOUNTER — OFFICE VISIT (OUTPATIENT)
Dept: ENT CLINIC | Age: 43
End: 2018-01-09

## 2018-01-09 VITALS
WEIGHT: 265 LBS | BODY MASS INDEX: 48.76 KG/M2 | DIASTOLIC BLOOD PRESSURE: 75 MMHG | SYSTOLIC BLOOD PRESSURE: 122 MMHG | HEIGHT: 62 IN | HEART RATE: 76 BPM

## 2018-01-09 DIAGNOSIS — J01.01 ACUTE RECURRENT MAXILLARY SINUSITIS: Primary | ICD-10-CM

## 2018-01-09 DIAGNOSIS — J30.0 CHRONIC VASOMOTOR RHINITIS: ICD-10-CM

## 2018-01-09 DIAGNOSIS — J01.11 ACUTE RECURRENT FRONTAL SINUSITIS: ICD-10-CM

## 2018-01-09 PROCEDURE — G8417 CALC BMI ABV UP PARAM F/U: HCPCS | Performed by: OTOLARYNGOLOGY

## 2018-01-09 PROCEDURE — 99214 OFFICE O/P EST MOD 30 MIN: CPT | Performed by: OTOLARYNGOLOGY

## 2018-01-09 PROCEDURE — G8427 DOCREV CUR MEDS BY ELIG CLIN: HCPCS | Performed by: OTOLARYNGOLOGY

## 2018-01-09 PROCEDURE — G8484 FLU IMMUNIZE NO ADMIN: HCPCS | Performed by: OTOLARYNGOLOGY

## 2018-01-09 PROCEDURE — 4004F PT TOBACCO SCREEN RCVD TLK: CPT | Performed by: OTOLARYNGOLOGY

## 2018-01-09 NOTE — PROGRESS NOTES
[address] [place] [address] ENT          PCP:  No primary care provider on file. CHIEF COMPLAINT:  Chief Complaint   Patient presents with    Sinusitis       HISTORY OF PRESENT ILLNESS:   Krista Hurley is a 36 y.o. female here for recheck and follow-up of sinusitis. Since the last visit here, she has been stable with no worsening of or onset of new ENT symptoms. \"I have been better ever since you put the metal tube up my nose, it feels like you opened them up. I have not have major sinus problems since you did that. A few minor sinus problems, and a little dryness still. Now I'm able to blow my nose so that very much appreciated. \"        REVIEW OF SYSTEMS:   CONSTITUTIONAL:  Denied fever and chills. EARS, NOSE, THROAT:  (+) nasal dyspnea, ears still feel a little stopped up. Denied pain, drainage, otorrhea, otalgia, hearing loss, tinnitus,  rhinorrhea, sore throat and chronic hoarseness. Current Outpatient Prescriptions   Medication Sig Dispense Refill    vitamin D (ERGOCALCIFEROL) 01884 units CAPS capsule TAKE 1 CAPSULE BY MOUTH ONE TIME A WEEK  4 capsule 0    azelastine (ASTELIN) 0.1 % nasal spray Use 2 sprays in each nostril 2 times daily. Use fluticasone 15 minutes after the morning dose of astelin.  1 Bottle 2    vitamin C (ASCORBIC ACID) 500 MG tablet Take 500 mg by mouth daily      omeprazole (PRILOSEC) 20 MG delayed release capsule TAKE 1 CAPSULE BY MOUTH ONE TIME A DAY with breakfast 30 capsule 5    lisinopril-hydrochlorothiazide (PRINZIDE;ZESTORETIC) 20-12.5 MG per tablet TAKE 1 TABLET BY MOUTH ONE TIME A DAY  30 tablet 5    hydrOXYzine (VISTARIL) 25 MG capsule TAKE 1 CAPSULE BY MOUTH AT BEDTIME AS NEEDED 45 minutes before bedtime    discontinue melatonin and begin this medication  3    Multiple Vitamins-Minerals (THERAPEUTIC MULTIVITAMIN-MINERALS) tablet Take 1 tablet by mouth daily      Glucosamine HCl (GLUCOSAMINE PO) Take by mouth      fluticasone (FLONASE) allergy testing:  Negative for Respiratory panel and for food allergen profile. [employer] COUNSELING FOR ENDOSCOPIC SINUS SURGERY  I counseled Sammyal Starla for the procedure of BALLOON SINUPLASTY OF BILATERAL MAXILLARY, FRONTAL, AND POSSIBLY SPHENOID SINUSES. I advised that the purpose/goal of this surgery is for the treatment of and the attempt to improve chronic and/or recurrent sinusitis. I advised that the goal and potential benefits of surgery, include, but are not limited to:  Decreased frequency of sinus infections, resolution of chronic sinusitis. I advised that certain expected conditions may occur after this surgery, usually temporary, including, but not limited to, bleeding, nasal drainage and/or crusting, post op sinus or facial or teeth pain and discomfort, and headache. I advised Veronica of the attendant RISKS AND POTENTIAL COMPLICATIONS, including, but not limited to:  excessive bleeding, infection, persistent or recurrent sinusitis, persistent nasal/post-nasal drainage, excessive crusting in nose, persistent nasal obstruction and/or difficulty breathing and/or snoring, persistence of headache,  of  or    chronic pain, atrophic rhinitis (dry nose), decreased (partial or total) sense of smell or taste, need for further surgery or secondary procedure, adverse reactions to medications, and the risks and complications of anesthesia. I advised of certain rare complications, including, but not limited to:  central retinal artery occlusion, disturbance or loss of vision, cerebrospinal fluid leak, meningitis, brain abscess/infection, pulmonary embolism, and complications of anesthesia, including cardiac arrest, stroke, heart attack, and death. I discussed the alternatives of therapy, including, but not limited to: Further observation and medical treatment.   Potential risk, possible consequences, and adverse effects of not undergoing the surgery, including, but not limited to: continued recurrent sinusitis at

## 2018-01-09 NOTE — PATIENT INSTRUCTIONS
doses of aspirin, excedrin, or other medications containing aspirin, or NSAIDS such as ibuprofen (Motrin, Advil), or naprosyn (Aleve),  you must not take these medications, for three days prior to surgery. Stop daily aspirin only if approved by your primary care physician and cardiologist to be off. Please notify me if you are not able to be off your aspirin therapy. · You will need someone to drive you home after surgery, as you may not drive an automobile for 24 to 48 hours after surgery. · Please read through the preoperative information packet that you were given today. · Nadine Jacinto, our surgery coordinator, will work with you in the scheduling and coordination of your surgery. · If you have any further questions regarding your surgery, please call the office at [phone].

## 2018-01-11 ENCOUNTER — TELEPHONE (OUTPATIENT)
Dept: ENT CLINIC | Age: 43
End: 2018-01-11

## 2018-01-19 DIAGNOSIS — E55.9 VITAMIN D DEFICIENCY: ICD-10-CM

## 2018-01-21 PROBLEM — J01.11 ACUTE RECURRENT FRONTAL SINUSITIS: Status: ACTIVE | Noted: 2018-01-21

## 2018-01-21 PROBLEM — J30.0 CHRONIC VASOMOTOR RHINITIS: Status: ACTIVE | Noted: 2018-01-21

## 2018-01-21 PROBLEM — J01.01 ACUTE RECURRENT MAXILLARY SINUSITIS: Status: ACTIVE | Noted: 2018-01-21

## 2018-01-22 RX ORDER — ERGOCALCIFEROL 1.25 MG/1
CAPSULE ORAL
Qty: 4 CAPSULE | Refills: 0 | Status: SHIPPED | OUTPATIENT
Start: 2018-01-22 | End: 2018-02-27 | Stop reason: SDUPTHER

## 2018-01-25 ENCOUNTER — OFFICE VISIT (OUTPATIENT)
Dept: INTERNAL MEDICINE CLINIC | Age: 43
End: 2018-01-25

## 2018-01-25 VITALS
SYSTOLIC BLOOD PRESSURE: 126 MMHG | OXYGEN SATURATION: 97 % | DIASTOLIC BLOOD PRESSURE: 68 MMHG | HEIGHT: 63 IN | TEMPERATURE: 97.5 F | HEART RATE: 110 BPM | BODY MASS INDEX: 45.54 KG/M2 | WEIGHT: 257 LBS

## 2018-01-25 DIAGNOSIS — R05.9 COUGH: ICD-10-CM

## 2018-01-25 DIAGNOSIS — Z01.818 PRE-OP EXAM: Primary | ICD-10-CM

## 2018-01-25 PROCEDURE — G8417 CALC BMI ABV UP PARAM F/U: HCPCS | Performed by: NURSE PRACTITIONER

## 2018-01-25 PROCEDURE — G8427 DOCREV CUR MEDS BY ELIG CLIN: HCPCS | Performed by: NURSE PRACTITIONER

## 2018-01-25 PROCEDURE — G8484 FLU IMMUNIZE NO ADMIN: HCPCS | Performed by: NURSE PRACTITIONER

## 2018-01-25 PROCEDURE — 99243 OFF/OP CNSLTJ NEW/EST LOW 30: CPT | Performed by: NURSE PRACTITIONER

## 2018-01-25 PROCEDURE — 93000 ELECTROCARDIOGRAM COMPLETE: CPT | Performed by: NURSE PRACTITIONER

## 2018-01-25 RX ORDER — GUAIFENESIN 600 MG/1
600 TABLET, EXTENDED RELEASE ORAL 2 TIMES DAILY
Qty: 20 TABLET | Refills: 0 | Status: SHIPPED | OUTPATIENT
Start: 2018-01-25 | End: 2019-05-08

## 2018-01-25 NOTE — PATIENT INSTRUCTIONS
Medicine-flavored cough drops are no better than candy-flavored drops or hard candy. · Do not smoke. Avoid secondhand smoke. If you need help quitting, talk to your doctor about stop-smoking programs and medicines. These can increase your chances of quitting for good. When should you call for help? Call 911 anytime you think you may need emergency care. For example, call if:  ? · You have severe trouble breathing. ?Call your doctor now or seek immediate medical care if:  ? · You cough up blood. ? · You have new or worse trouble breathing. ? · You have a new or higher fever. ? · You have a new rash. ? Watch closely for changes in your health, and be sure to contact your doctor if:  ? · You cough more deeply or more often, especially if you notice more mucus or a change in the color of your mucus. ? · You have new symptoms, such as a sore throat, an earache, or sinus pain. ? · You do not get better as expected. Where can you learn more? Go to https://[employer].Echo it. org and sign in to your [employer] account. Enter D279 in the PASSNFLY box to learn more about \"Cough: Care Instructions. \"     If you do not have an account, please click on the \"Sign Up Now\" link. Current as of: May 12, 2017  Content Version: 11.5  © 4953-1490 Healthwise, Incorporated. Care instructions adapted under license by [facility] ([facility]). If you have questions about a medical condition or this instruction, always ask your healthcare professional. [place] [address] any warranty or liability for your use of this information.
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
## 2018-01-30 ENCOUNTER — HOSPITAL ENCOUNTER (OUTPATIENT)
Dept: SURGERY | Age: 43
Discharge: HOME | End: 2018-01-30
Attending: OTOLARYNGOLOGY | Admitting: OTOLARYNGOLOGY

## 2018-01-30 VITALS
HEART RATE: 76 BPM | DIASTOLIC BLOOD PRESSURE: 80 MMHG | WEIGHT: 256.44 LBS | RESPIRATION RATE: 17 BRPM | SYSTOLIC BLOOD PRESSURE: 156 MMHG | TEMPERATURE: 97 F | OXYGEN SATURATION: 97 % | BODY MASS INDEX: 45.44 KG/M2 | HEIGHT: 63 IN

## 2018-01-30 DIAGNOSIS — G89.18 POST-OPERATIVE PAIN: Primary | ICD-10-CM

## 2018-01-30 DIAGNOSIS — J32.1 CHRONIC FRONTAL SINUSITIS: Chronic | ICD-10-CM

## 2018-01-30 PROBLEM — J32.0 CHRONIC MAXILLARY SINUSITIS: Chronic | Status: ACTIVE | Noted: 2017-04-20

## 2018-01-30 LAB
A/G RATIO: 1.7 (ref 1.1–2.2)
ALBUMIN SERPL-MCNC: 4.3 G/DL (ref 3.4–5)
ALP BLD-CCNC: 78 U/L (ref 40–129)
ALT SERPL-CCNC: 18 U/L (ref 10–40)
ANION GAP SERPL CALCULATED.3IONS-SCNC: 13 MMOL/L (ref 3–16)
AST SERPL-CCNC: 15 U/L (ref 15–37)
BILIRUB SERPL-MCNC: 0.3 MG/DL (ref 0–1)
BUN BLDV-MCNC: 7 MG/DL (ref 7–20)
CALCIUM SERPL-MCNC: 9.3 MG/DL (ref 8.3–10.6)
CHLORIDE BLD-SCNC: 97 MMOL/L (ref 99–110)
CO2: 28 MMOL/L (ref 21–32)
CREAT SERPL-MCNC: <0.5 MG/DL (ref 0.6–1.1)
GFR AFRICAN AMERICAN: >60
GFR NON-AFRICAN AMERICAN: >60
GLOBULIN: 2.6 G/DL
GLUCOSE BLD-MCNC: 101 MG/DL (ref 70–99)
HCT VFR BLD CALC: 44.4 % (ref 36–48)
HEMOGLOBIN: 15.4 G/DL (ref 12–16)
MCH RBC QN AUTO: 32.3 PG (ref 26–34)
MCHC RBC AUTO-ENTMCNC: 34.7 G/DL (ref 31–36)
MCV RBC AUTO: 93.1 FL (ref 80–100)
PDW BLD-RTO: 13.5 % (ref 12.4–15.4)
PLATELET # BLD: 238 K/UL (ref 135–450)
PMV BLD AUTO: 8.5 FL (ref 5–10.5)
POTASSIUM SERPL-SCNC: 4.2 MMOL/L (ref 3.5–5.1)
RBC # BLD: 4.77 M/UL (ref 4–5.2)
SODIUM BLD-SCNC: 138 MMOL/L (ref 136–145)
TOTAL PROTEIN: 6.9 G/DL (ref 6.4–8.2)
WBC # BLD: 11.8 K/UL (ref 4–11)

## 2018-01-30 PROCEDURE — 31298 NSL/SINS NDSC SURG FRNT&SPHN: CPT | Performed by: OTOLARYNGOLOGY

## 2018-01-30 PROCEDURE — 31295 NSL/SINS NDSC SURG MAX SINS: CPT | Performed by: OTOLARYNGOLOGY

## 2018-01-30 RX ORDER — OXYMETAZOLINE HYDROCHLORIDE 0.05 G/100ML
1 SPRAY NASAL
Status: COMPLETED | OUTPATIENT
Start: 2018-01-30 | End: 2018-01-30

## 2018-01-30 RX ORDER — FENTANYL CITRATE 50 UG/ML
50 INJECTION, SOLUTION INTRAMUSCULAR; INTRAVENOUS EVERY 5 MIN PRN
Status: DISCONTINUED | OUTPATIENT
Start: 2018-01-30 | End: 2018-01-31 | Stop reason: HOSPADM

## 2018-01-30 RX ORDER — SODIUM CHLORIDE, SODIUM LACTATE, POTASSIUM CHLORIDE, CALCIUM CHLORIDE 600; 310; 30; 20 MG/100ML; MG/100ML; MG/100ML; MG/100ML
INJECTION, SOLUTION INTRAVENOUS CONTINUOUS
Status: DISCONTINUED | OUTPATIENT
Start: 2018-01-30 | End: 2018-01-31 | Stop reason: HOSPADM

## 2018-01-30 RX ORDER — MEPERIDINE HYDROCHLORIDE 25 MG/ML
12.5 INJECTION INTRAMUSCULAR; INTRAVENOUS; SUBCUTANEOUS EVERY 5 MIN PRN
Status: DISCONTINUED | OUTPATIENT
Start: 2018-01-30 | End: 2018-01-31 | Stop reason: HOSPADM

## 2018-01-30 RX ORDER — LIDOCAINE HYDROCHLORIDE 10 MG/ML
0.5 INJECTION, SOLUTION EPIDURAL; INFILTRATION; INTRACAUDAL; PERINEURAL ONCE
Status: DISCONTINUED | OUTPATIENT
Start: 2018-01-30 | End: 2018-01-31 | Stop reason: HOSPADM

## 2018-01-30 RX ORDER — SODIUM CHLORIDE 0.9 % (FLUSH) 0.9 %
10 SYRINGE (ML) INJECTION PRN
Status: DISCONTINUED | OUTPATIENT
Start: 2018-01-30 | End: 2018-01-31 | Stop reason: HOSPADM

## 2018-01-30 RX ORDER — HYDROCODONE BITARTRATE AND ACETAMINOPHEN 5; 325 MG/1; MG/1
1 TABLET ORAL EVERY 4 HOURS PRN
Qty: 42 TABLET | Refills: 0 | Status: SHIPPED | OUTPATIENT
Start: 2018-01-30 | End: 2018-02-06

## 2018-01-30 RX ORDER — FENTANYL CITRATE 50 UG/ML
25 INJECTION, SOLUTION INTRAMUSCULAR; INTRAVENOUS EVERY 5 MIN PRN
Status: DISCONTINUED | OUTPATIENT
Start: 2018-01-30 | End: 2018-01-31 | Stop reason: HOSPADM

## 2018-01-30 RX ORDER — OXYCODONE HYDROCHLORIDE 5 MG/1
5 TABLET ORAL PRN
Status: COMPLETED | OUTPATIENT
Start: 2018-01-30 | End: 2018-01-30

## 2018-01-30 RX ORDER — ONDANSETRON 2 MG/ML
4 INJECTION INTRAMUSCULAR; INTRAVENOUS
Status: ACTIVE | OUTPATIENT
Start: 2018-01-30 | End: 2018-01-30

## 2018-01-30 RX ORDER — ONDANSETRON 2 MG/ML
4 INJECTION INTRAMUSCULAR; INTRAVENOUS PRN
Status: DISCONTINUED | OUTPATIENT
Start: 2018-01-30 | End: 2018-01-31 | Stop reason: HOSPADM

## 2018-01-30 RX ORDER — OXYCODONE HYDROCHLORIDE 5 MG/1
10 TABLET ORAL PRN
Status: COMPLETED | OUTPATIENT
Start: 2018-01-30 | End: 2018-01-30

## 2018-01-30 RX ORDER — LIDOCAINE HYDROCHLORIDE 10 MG/ML
1 INJECTION, SOLUTION EPIDURAL; INFILTRATION; INTRACAUDAL; PERINEURAL
Status: ACTIVE | OUTPATIENT
Start: 2018-01-30 | End: 2018-01-30

## 2018-01-30 RX ORDER — HYDROMORPHONE HCL 110MG/55ML
0.25 PATIENT CONTROLLED ANALGESIA SYRINGE INTRAVENOUS EVERY 5 MIN PRN
Status: DISCONTINUED | OUTPATIENT
Start: 2018-01-30 | End: 2018-01-31 | Stop reason: HOSPADM

## 2018-01-30 RX ORDER — SODIUM CHLORIDE 9 MG/ML
INJECTION, SOLUTION INTRAVENOUS CONTINUOUS
Status: DISCONTINUED | OUTPATIENT
Start: 2018-01-30 | End: 2018-01-31 | Stop reason: HOSPADM

## 2018-01-30 RX ORDER — CEFUROXIME AXETIL 250 MG/1
250 TABLET ORAL 2 TIMES DAILY
Qty: 14 TABLET | Refills: 0 | Status: SHIPPED | OUTPATIENT
Start: 2018-01-30 | End: 2018-02-06

## 2018-01-30 RX ORDER — HYDROMORPHONE HCL 110MG/55ML
0.5 PATIENT CONTROLLED ANALGESIA SYRINGE INTRAVENOUS EVERY 5 MIN PRN
Status: DISCONTINUED | OUTPATIENT
Start: 2018-01-30 | End: 2018-01-31 | Stop reason: HOSPADM

## 2018-01-30 RX ORDER — PROMETHAZINE HYDROCHLORIDE 25 MG/1
25 TABLET ORAL EVERY 6 HOURS PRN
Qty: 40 TABLET | Refills: 0 | Status: SHIPPED | OUTPATIENT
Start: 2018-01-30 | End: 2019-05-08

## 2018-01-30 RX ORDER — SODIUM CHLORIDE 0.9 % (FLUSH) 0.9 %
10 SYRINGE (ML) INJECTION EVERY 12 HOURS SCHEDULED
Status: DISCONTINUED | OUTPATIENT
Start: 2018-01-30 | End: 2018-01-31 | Stop reason: HOSPADM

## 2018-01-30 RX ADMIN — SODIUM CHLORIDE, SODIUM LACTATE, POTASSIUM CHLORIDE, CALCIUM CHLORIDE: 600; 310; 30; 20 INJECTION, SOLUTION INTRAVENOUS at 06:47

## 2018-01-30 RX ADMIN — OXYMETAZOLINE HYDROCHLORIDE 1 SPRAY: 0.05 SPRAY NASAL at 06:48

## 2018-01-30 RX ADMIN — OXYCODONE HYDROCHLORIDE 5 MG: 5 TABLET ORAL at 10:14

## 2018-01-30 ASSESSMENT — PAIN SCALES - GENERAL
PAINLEVEL_OUTOF10: 6
PAINLEVEL_OUTOF10: 4

## 2018-01-30 ASSESSMENT — PAIN DESCRIPTION - PAIN TYPE
TYPE: SURGICAL PAIN
TYPE: SURGICAL PAIN

## 2018-01-30 ASSESSMENT — PAIN - FUNCTIONAL ASSESSMENT: PAIN_FUNCTIONAL_ASSESSMENT: 0-10

## 2018-01-30 ASSESSMENT — PAIN DESCRIPTION - LOCATION
LOCATION: NOSE
LOCATION: NOSE
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
## 2018-01-30 NOTE — BRIEF OP NOTE
Brief Postoperative Note    Dorsie Severance  YOB: 1975  [id number]    Pre-operative Diagnosis:  Recurrent sinusitis, frontal and maxillary     Post-operative Diagnosis:  Same    Procedure:  Balloon sinuplasty bilateral frontal, bilateral maxillary and left sphenoid sinuses     Anesthesia:  General    Surgeons/Assistants:  Abel Puente MD    Estimated Blood Loss:  less than 50 ml    Complications:  None    Specimens:  Was Not Obtained    Findings:  nasal septal deviation to the right. Normal middle meatus and lateral nasal wall.       Electronically signed by Abel Puente MD on 1/30/2018 at 9:24 AM

## 2018-01-30 NOTE — PROGRESS NOTES
Patient awake and alert. Oxygen saturation 95% on room air. NSR on the monitor. VSS. No drainage noted from bilateral nares. Patient rating pain 4/10; tolerable for patient. Patient tolerating PO, denies nausea. Patient meets criteria to be discharged from phase 1.  Will prepare for discharge home in phase 2    Electronically signed by Doreen Betts RN on 1/30/2018 at 1000

## 2018-01-31 NOTE — OP NOTE
the patient was  identified and the procedure, site and side of procedures confirmed. The  right and left nasal cavities were then packed with three surgical  cottonoids moistened with 0.05% oxymetazoline solution. These were left in  place for approximately 15 minutes. During this time, the patient was  positioned and draped in the sterile manner. The cottonoids were then  removed. The lateral nasal wall and anterior septum was infiltrated with  1% lidocaine with 1:1000 epinephrine bilaterally in the usual manner. Cottonoids were then replaced and left in place for additional 5 minutes  for additional vasoconstrictive effect and then removed confirming the  cottonoid count correct. Procedure began on the right. The middle turbinate was deflected medially. Middle meatus was examined with the 0 degree rigid nasal telescope and the  camera. The Acclarent device was then readied and placed in the middle meatus. It was then directed to the frontal recess. The wire was then passed into  the frontal sinus confirmed by the white dot on the anterior sinus wall. Then, the balloon was passed and balloon dilation of the right frontal  sinus ostium performed. Then, the balloon was partially withdrawn and a  second dilation performed. Balloon was then removed. The balloon was then  inserted into the left side and identical procedure with left frontal sinus  balloon sinuplasty and dilation was performed. Then, the device was  removed. The tip for the maxillary sinus was then attached. Then, the  device was placed into the middle meatus and around the uncinate process. The maxillary sinus ostium was then cannulated and confirmed with the light  dot on the anterior wall of the maxillary sinus. Then, the balloon was  passed and balloon dilation of the maxillary sinus ostia performed. The  balloon was deflated.   The balloon was then partially withdrawn and a  second dilation performed to balloon out the

## 2018-02-10 DIAGNOSIS — J30.0 CHRONIC VASOMOTOR RHINITIS: ICD-10-CM

## 2018-02-12 RX ORDER — AZELASTINE 1 MG/ML
SPRAY, METERED NASAL
Qty: 30 ML | Refills: 1 | Status: SHIPPED | OUTPATIENT
Start: 2018-02-12 | End: 2019-05-08

## 2018-03-26 DIAGNOSIS — E78.2 MIXED HYPERLIPIDEMIA: ICD-10-CM

## 2018-03-26 RX ORDER — SIMVASTATIN 20 MG
TABLET ORAL
Qty: 30 TABLET | Refills: 0 | Status: SHIPPED | OUTPATIENT
Start: 2018-03-26 | End: 2018-05-05 | Stop reason: SDUPTHER

## 2018-04-19 DIAGNOSIS — E55.9 VITAMIN D DEFICIENCY: ICD-10-CM

## 2018-04-23 RX ORDER — ERGOCALCIFEROL 1.25 MG/1
CAPSULE ORAL
Qty: 4 CAPSULE | Refills: 0 | Status: SHIPPED | OUTPATIENT
Start: 2018-04-23 | End: 2018-05-22 | Stop reason: SDUPTHER

## 2018-05-05 DIAGNOSIS — E78.2 MIXED HYPERLIPIDEMIA: ICD-10-CM

## 2018-05-07 RX ORDER — SIMVASTATIN 20 MG
TABLET ORAL
Qty: 30 TABLET | Refills: 0 | Status: SHIPPED | OUTPATIENT
Start: 2018-05-07 | End: 2018-06-16 | Stop reason: SDUPTHER

## 2018-05-14 DIAGNOSIS — I10 ESSENTIAL HYPERTENSION: ICD-10-CM

## 2018-05-15 RX ORDER — LISINOPRIL AND HYDROCHLOROTHIAZIDE 20; 12.5 MG/1; MG/1
TABLET ORAL
Qty: 30 TABLET | Refills: 4 | Status: SHIPPED | OUTPATIENT
Start: 2018-05-15 | End: 2018-10-23 | Stop reason: SDUPTHER

## 2018-05-25 DIAGNOSIS — J32.4 CHRONIC PANSINUSITIS: ICD-10-CM

## 2018-05-25 DIAGNOSIS — J34.89 SINUS DRAINAGE: ICD-10-CM

## 2018-05-29 RX ORDER — FLUTICASONE PROPIONATE 50 MCG
SPRAY, SUSPENSION (ML) NASAL
Qty: 16 G | Refills: 4 | Status: SHIPPED | OUTPATIENT
Start: 2018-05-29 | End: 2019-05-03 | Stop reason: SDUPTHER

## 2018-05-29 RX ORDER — FLUTICASONE PROPIONATE 50 MCG
SPRAY, SUSPENSION (ML) NASAL
Qty: 16 G | Refills: 4 | Status: SHIPPED | OUTPATIENT
Start: 2018-05-29 | End: 2018-05-29 | Stop reason: SDUPTHER

## 2018-06-10 DIAGNOSIS — K21.9 GASTROESOPHAGEAL REFLUX DISEASE, ESOPHAGITIS PRESENCE NOT SPECIFIED: ICD-10-CM

## 2018-06-13 RX ORDER — OMEPRAZOLE 20 MG/1
CAPSULE, DELAYED RELEASE ORAL
Qty: 10 CAPSULE | Refills: 4 | Status: SHIPPED | OUTPATIENT
Start: 2018-06-13 | End: 2018-10-23 | Stop reason: SDUPTHER

## 2018-08-03 ENCOUNTER — OFFICE VISIT (OUTPATIENT)
Dept: ORTHOPEDIC SURGERY | Age: 43
End: 2018-08-03

## 2018-08-03 VITALS
DIASTOLIC BLOOD PRESSURE: 61 MMHG | WEIGHT: 262 LBS | HEIGHT: 62 IN | BODY MASS INDEX: 48.21 KG/M2 | HEART RATE: 91 BPM | SYSTOLIC BLOOD PRESSURE: 94 MMHG

## 2018-08-03 DIAGNOSIS — M22.42 CHONDROMALACIA OF LEFT PATELLA: Primary | ICD-10-CM

## 2018-08-03 PROCEDURE — 99213 OFFICE O/P EST LOW 20 MIN: CPT | Performed by: ORTHOPAEDIC SURGERY

## 2018-08-03 PROCEDURE — G8427 DOCREV CUR MEDS BY ELIG CLIN: HCPCS | Performed by: ORTHOPAEDIC SURGERY

## 2018-08-03 PROCEDURE — G8417 CALC BMI ABV UP PARAM F/U: HCPCS | Performed by: ORTHOPAEDIC SURGERY

## 2018-08-03 PROCEDURE — APPNB15 APP NON BILLABLE TIME 0-15 MINS: Performed by: PHYSICIAN ASSISTANT

## 2018-08-03 PROCEDURE — 4004F PT TOBACCO SCREEN RCVD TLK: CPT | Performed by: ORTHOPAEDIC SURGERY

## 2018-08-03 RX ORDER — IBUPROFEN 600 MG/1
600 TABLET ORAL EVERY 8 HOURS PRN
Qty: 50 TABLET | Refills: 1 | Status: SHIPPED | OUTPATIENT
Start: 2018-08-03 | End: 2019-05-08

## 2018-08-03 NOTE — PROGRESS NOTES
Procedure Laterality Date    APPENDECTOMY      HYSTERECTOMY      LAPAROSCOPY      abdomen    OTHER SURGICAL HISTORY  01/30/2018    Balloon sinuplasty bilateral frontal, bilateral maxillary and left sphenoid sinuses     SINUS SURGERY      THROAT SURGERY      multiple    TONSILLECTOMY      WRIST FRACTURE SURGERY Left 06/23/2016     OPEN REDUCTION INTERNAL FIXATION LEFT DISTAL RADIUS       Current Outpatient Prescriptions   Medication Sig Dispense Refill    ibuprofen (IBU) 600 MG tablet Take 1 tablet by mouth every 8 hours as needed for Pain 50 tablet 1    simvastatin (ZOCOR) 20 MG tablet TAKE 1 TABLET BY MOUTH AT BEDTIME  30 tablet 3    omeprazole (PRILOSEC) 20 MG delayed release capsule TAKE 1 CAPSULE BY MOUTH ONE TIME A DAY with breakfast  10 capsule 4    fluticasone (FLONASE) 50 MCG/ACT nasal spray INHALE 1 SPRAY in each nostril ONE TIME A DAY 16 g 4    vitamin D (ERGOCALCIFEROL) 08926 units CAPS capsule TAKE 1 CAPSULE BY MOUTH ONE TIME A WEEK  4 capsule 3    lisinopril-hydrochlorothiazide (PRINZIDE;ZESTORETIC) 20-12.5 MG per tablet TAKE 1 TABLET BY MOUTH ONE TIME A DAY  30 tablet 4    azelastine (ASTELIN) 0.1 % nasal spray USE 2 SPRAYS INTO EACH NOSTRIL 2 TIMES A DAY.  USE FLUTICASONE 15 MINUTES AFTER THE MORNING DOSE OF ASTELIN 30 mL 1    promethazine (PHENERGAN) 25 MG tablet Take 1 tablet by mouth every 6 hours as needed for Nausea 40 tablet 0    guaiFENesin (MUCINEX) 600 MG extended release tablet Take 1 tablet by mouth 2 times daily 20 tablet 0    vitamin C (ASCORBIC ACID) 500 MG tablet Take 500 mg by mouth daily      hydrOXYzine (VISTARIL) 25 MG capsule TAKE 1 CAPSULE BY MOUTH AT BEDTIME AS NEEDED 45 minutes before bedtime    discontinue melatonin and begin this medication  3    Multiple Vitamins-Minerals (THERAPEUTIC MULTIVITAMIN-MINERALS) tablet Take 1 tablet by mouth daily      Glucosamine HCl (GLUCOSAMINE PO) Take by mouth      Compression Stockings MISC by Does not apply route Wear daily for leg swelling. Take off prior to going to bed. 1 each 0    citalopram (CELEXA) 40 MG tablet Take 40 mg by mouth daily      risperiDONE (RISPERDAL) 4 MG tablet One tab nightly 30 tablet 3    oxybutynin (DITROPAN-XL) 10 MG extended release tablet Take 1 tablet by mouth daily 30 tablet 2    OXcarbazepine (TRILEPTAL) 600 MG tablet Take 1 tablet by mouth 2 times daily 60 tablet 2     No current facility-administered medications for this visit. Allergies, social and family histories were reviewed and updated as appropriate. Review of Systems:  Relevant review of systems reviewed and available in the patient's chart under the 'MEDIA' tab. Vital Signs:  BP 94/61   Pulse 91   Ht 5' 2\" (1.575 m)   Wt 262 lb (118.8 kg)   BMI 47.92 kg/m²     General Exam:   Constitutional: Patient is adequately groomed with no evidence of malnutrition  Mental Status: The patient is oriented to time, place and person. The patient's mood and affect are appropriate. Lymphatic: The lymphatic examination bilaterally reveals all areas to be without enlargement or induration. Neurological: The patient has good coordination and balance. There is no focal weakness or sensory deficit. Left Knee Examination:    Inspection: Normal muscle contours and no significant limb length discrepancy. No gross atrophy in any particular myotome. There is no obvious swelling or joint effusion of the knee. There are no abrasions, lacerations, contusions, hematomas or ecchymosis. There is no erythema, induration or warmth to suggest an infectious process. Palpation: The patient has tenderness on palpation of the medial aspect of the patella. There is mild patellofemoral crepitus. She denies significant tenderness on palpation of the medial or lateral joint lines. She denies tenderness on palpation of the posterior knee. Range of Motion:    Flexion: 115°   Extension: 0°    Strength:  Quad 4+ / 5  ; Hamstrings 4+ / 5.   Socrates Robison motor to hip and ankle intact    Special Tests:    Lachman (ACL) - negative   Posterior Lachman (PCL) - negative    Anterior Drawer (ACL) - negative   Posterior Drawer (PCL) - negative   Pivot shift (ACL) - negative    Posterior sag (PCL) - negative   Chris's Test (Meniscus) - negative   Homans Test (Thrombophlebitis)- negative   Does not open to valgus or varus stress at 0 or 30° (MCL/LCL)    Skin: There are no rashes, ulcerations or lesions. Sensation to light touch intact. Circulation: The limb is warm and well perfused. Distal pulses intact. Capillary refill is intact. Edema: none. Venous stasis changes: none. Gait: Patient with normal tandem gait without limp. Normal strides     Radiology:  X-rays obtained today and reviewed in office:  Views: 2 view left knee completed in the emergency department including AP and lateral as well as 4 view left knee completed today with AP, tunnel, sunrise and lateral.   Impression: No evidence for acute fracture or subluxation / dislocation. There are no loose bodies appreciated. There is no evidence of tibiofemoral subluxation. Patient has very minimal arthritis with good joint spacing. Impression:  Encounter Diagnosis   Name Primary?  Chondromalacia of left patella Yes       Office Procedures:  Orders Placed This Encounter   Procedures    XR KNEE LEFT (3 VIEWS)     3V Lt Knee AP Standing     Order Specific Question:   Reason for exam:     Answer:   Knee Pain    XR Knee Bilateral Standing     Order Specific Question:   Reason for exam:     Answer:   Knee Pain       Treatment Plan:          I believe patient clinically has chondromalacia patella. She could have a mild plica syndrome. She is ambulating without abnormal coordination or gait. Her x-rays are unremarkable. She will be given a prescription for ibuprofen 600 mg as her insurance does not cover Naprosyn. She is given a handout on knee exercises. She will follow-up as needed.   If pain does not

## 2018-09-12 DIAGNOSIS — R32 INCONTINENCE IN FEMALE: ICD-10-CM

## 2018-09-13 RX ORDER — OXYBUTYNIN CHLORIDE 10 MG/1
10 TABLET, EXTENDED RELEASE ORAL DAILY
Qty: 30 TABLET | Refills: 2 | Status: SHIPPED | OUTPATIENT
Start: 2018-09-13 | End: 2018-10-23 | Stop reason: SDUPTHER

## 2018-10-23 ENCOUNTER — OFFICE VISIT (OUTPATIENT)
Dept: INTERNAL MEDICINE CLINIC | Age: 43
End: 2018-10-23
Payer: MEDICAID

## 2018-10-23 VITALS
OXYGEN SATURATION: 96 % | DIASTOLIC BLOOD PRESSURE: 80 MMHG | SYSTOLIC BLOOD PRESSURE: 128 MMHG | BODY MASS INDEX: 47.01 KG/M2 | HEART RATE: 83 BPM | WEIGHT: 257 LBS

## 2018-10-23 DIAGNOSIS — E55.9 VITAMIN D DEFICIENCY: ICD-10-CM

## 2018-10-23 DIAGNOSIS — E66.01 CLASS 3 SEVERE OBESITY DUE TO EXCESS CALORIES WITHOUT SERIOUS COMORBIDITY WITH BODY MASS INDEX (BMI) OF 45.0 TO 49.9 IN ADULT (HCC): ICD-10-CM

## 2018-10-23 DIAGNOSIS — E78.2 MIXED HYPERLIPIDEMIA: ICD-10-CM

## 2018-10-23 DIAGNOSIS — K21.9 GASTROESOPHAGEAL REFLUX DISEASE, ESOPHAGITIS PRESENCE NOT SPECIFIED: ICD-10-CM

## 2018-10-23 DIAGNOSIS — Z79.899 MEDICATION MANAGEMENT: ICD-10-CM

## 2018-10-23 DIAGNOSIS — I10 ESSENTIAL HYPERTENSION: Primary | ICD-10-CM

## 2018-10-23 DIAGNOSIS — R32 INCONTINENCE IN FEMALE: ICD-10-CM

## 2018-10-23 PROCEDURE — 99213 OFFICE O/P EST LOW 20 MIN: CPT | Performed by: NURSE PRACTITIONER

## 2018-10-23 RX ORDER — SIMVASTATIN 20 MG
TABLET ORAL
Qty: 90 TABLET | Refills: 1 | Status: SHIPPED | OUTPATIENT
Start: 2018-10-23 | End: 2019-05-08 | Stop reason: SDUPTHER

## 2018-10-23 RX ORDER — ERGOCALCIFEROL 1.25 MG/1
CAPSULE ORAL
Qty: 12 CAPSULE | Refills: 1 | Status: SHIPPED | OUTPATIENT
Start: 2018-10-23 | End: 2019-05-08 | Stop reason: SDUPTHER

## 2018-10-23 RX ORDER — LISINOPRIL AND HYDROCHLOROTHIAZIDE 20; 12.5 MG/1; MG/1
TABLET ORAL
Qty: 90 TABLET | Refills: 1 | Status: SHIPPED | OUTPATIENT
Start: 2018-10-23 | End: 2019-04-27 | Stop reason: SDUPTHER

## 2018-10-23 RX ORDER — OMEPRAZOLE 20 MG/1
CAPSULE, DELAYED RELEASE ORAL
Qty: 90 CAPSULE | Refills: 1 | Status: SHIPPED | OUTPATIENT
Start: 2018-10-23 | End: 2019-05-29 | Stop reason: SDUPTHER

## 2018-10-23 RX ORDER — OXYBUTYNIN CHLORIDE 10 MG/1
10 TABLET, EXTENDED RELEASE ORAL DAILY
Qty: 90 TABLET | Refills: 0 | Status: SHIPPED | OUTPATIENT
Start: 2018-10-23 | End: 2019-03-20 | Stop reason: SDUPTHER

## 2018-10-23 ASSESSMENT — PATIENT HEALTH QUESTIONNAIRE - PHQ9
SUM OF ALL RESPONSES TO PHQ9 QUESTIONS 1 & 2: 0
SUM OF ALL RESPONSES TO PHQ QUESTIONS 1-9: 0
SUM OF ALL RESPONSES TO PHQ QUESTIONS 1-9: 0
1. LITTLE INTEREST OR PLEASURE IN DOING THINGS: 0
2. FEELING DOWN, DEPRESSED OR HOPELESS: 0

## 2018-10-23 NOTE — PATIENT INSTRUCTIONS
Hospital: [address] [place] [address]  \"Freshstart' - 4-session program for smoking cessation - group sessions    [facility]: [phone]  Personal Smoking cessation consultations - teens and adults  [facility] By appointment $880    [facility]: [phone]  Offers individual hypnosis, behavior modifications, and counseling in this program. Three sessions over 2-week period  $155 (total cost)    Nicotine Anonymous: [phone]  Open group cessation - everyone welcome     American Cancer Society: [phone]    American Lung Association: 1-800-LUNG-[place]    On-line help:  www.cancer. org  www.quitnet. org  www. whyquit. com  www.stopsmokingsuppport. com  www. ffsonline. org

## 2018-10-26 DIAGNOSIS — E55.9 VITAMIN D DEFICIENCY: ICD-10-CM

## 2018-10-26 DIAGNOSIS — E78.2 MIXED HYPERLIPIDEMIA: ICD-10-CM

## 2018-10-26 DIAGNOSIS — E66.01 CLASS 3 SEVERE OBESITY DUE TO EXCESS CALORIES WITHOUT SERIOUS COMORBIDITY WITH BODY MASS INDEX (BMI) OF 45.0 TO 49.9 IN ADULT (HCC): ICD-10-CM

## 2018-10-26 DIAGNOSIS — I10 ESSENTIAL HYPERTENSION: ICD-10-CM

## 2018-10-26 LAB
ALBUMIN SERPL-MCNC: 4.4 G/DL (ref 3.4–5)
ANION GAP SERPL CALCULATED.3IONS-SCNC: 16 MMOL/L (ref 3–16)
BUN BLDV-MCNC: 6 MG/DL (ref 7–20)
CALCIUM SERPL-MCNC: 9.8 MG/DL (ref 8.3–10.6)
CHLORIDE BLD-SCNC: 96 MMOL/L (ref 99–110)
CHOLESTEROL, FASTING: 203 MG/DL (ref 0–199)
CO2: 23 MMOL/L (ref 21–32)
CREAT SERPL-MCNC: <0.5 MG/DL (ref 0.6–1.1)
GFR AFRICAN AMERICAN: >60
GFR NON-AFRICAN AMERICAN: >60
GLUCOSE BLD-MCNC: 95 MG/DL (ref 70–99)
HDLC SERPL-MCNC: 60 MG/DL (ref 40–60)
LDL CHOLESTEROL CALCULATED: 118 MG/DL
PHOSPHORUS: 4.2 MG/DL (ref 2.5–4.9)
POTASSIUM SERPL-SCNC: 4.5 MMOL/L (ref 3.5–5.1)
SODIUM BLD-SCNC: 135 MMOL/L (ref 136–145)
TRIGLYCERIDE, FASTING: 127 MG/DL (ref 0–150)
VITAMIN D 25-HYDROXY: 27.7 NG/ML
VLDLC SERPL CALC-MCNC: 25 MG/DL

## 2018-10-30 ENCOUNTER — OFFICE VISIT (OUTPATIENT)
Dept: INTERNAL MEDICINE CLINIC | Age: 43
End: 2018-10-30
Payer: MEDICAID

## 2018-10-30 VITALS
WEIGHT: 257 LBS | HEIGHT: 63 IN | OXYGEN SATURATION: 96 % | HEART RATE: 90 BPM | SYSTOLIC BLOOD PRESSURE: 134 MMHG | DIASTOLIC BLOOD PRESSURE: 76 MMHG | BODY MASS INDEX: 45.54 KG/M2

## 2018-10-30 DIAGNOSIS — Z23 NEED FOR INFLUENZA VACCINATION: ICD-10-CM

## 2018-10-30 DIAGNOSIS — Z00.00 WELL ADULT EXAM: Primary | ICD-10-CM

## 2018-10-30 PROCEDURE — 90686 IIV4 VACC NO PRSV 0.5 ML IM: CPT | Performed by: NURSE PRACTITIONER

## 2018-10-30 PROCEDURE — 90471 IMMUNIZATION ADMIN: CPT | Performed by: NURSE PRACTITIONER

## 2018-10-30 PROCEDURE — 99396 PREV VISIT EST AGE 40-64: CPT | Performed by: NURSE PRACTITIONER

## 2018-10-30 PROCEDURE — G8482 FLU IMMUNIZE ORDER/ADMIN: HCPCS | Performed by: NURSE PRACTITIONER

## 2018-10-30 ASSESSMENT — ENCOUNTER SYMPTOMS: RESPIRATORY NEGATIVE: 1
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
## 2019-01-24 ENCOUNTER — OFFICE VISIT (OUTPATIENT)
Dept: INTERNAL MEDICINE CLINIC | Age: 44
End: 2019-01-24
Payer: COMMERCIAL

## 2019-01-24 VITALS
DIASTOLIC BLOOD PRESSURE: 98 MMHG | BODY MASS INDEX: 46.8 KG/M2 | TEMPERATURE: 97.5 F | SYSTOLIC BLOOD PRESSURE: 136 MMHG | WEIGHT: 260 LBS

## 2019-01-24 DIAGNOSIS — Z72.0 TOBACCO ABUSE: ICD-10-CM

## 2019-01-24 DIAGNOSIS — B35.4 TINEA CORPORIS: ICD-10-CM

## 2019-01-24 DIAGNOSIS — H65.193 OTHER ACUTE NONSUPPURATIVE OTITIS MEDIA OF BOTH EARS, RECURRENCE NOT SPECIFIED: Primary | ICD-10-CM

## 2019-01-24 PROCEDURE — 99213 OFFICE O/P EST LOW 20 MIN: CPT | Performed by: NURSE PRACTITIONER

## 2019-01-24 RX ORDER — AMOXICILLIN AND CLAVULANATE POTASSIUM 875; 125 MG/1; MG/1
1 TABLET, FILM COATED ORAL 2 TIMES DAILY
Qty: 14 TABLET | Refills: 0 | Status: SHIPPED | OUTPATIENT
Start: 2019-01-24 | End: 2019-01-31

## 2019-01-24 RX ORDER — CLOTRIMAZOLE 1 %
CREAM (GRAM) TOPICAL
Qty: 12 G | Refills: 1 | Status: SHIPPED | OUTPATIENT
Start: 2019-01-24 | End: 2019-01-31

## 2019-01-24 RX ORDER — NICOTINE 21 MG/24HR
1 PATCH, TRANSDERMAL 24 HOURS TRANSDERMAL DAILY
Qty: 14 PATCH | Refills: 5 | Status: SHIPPED | OUTPATIENT
Start: 2019-01-24 | End: 2019-05-08

## 2019-01-24 ASSESSMENT — ENCOUNTER SYMPTOMS
GASTROINTESTINAL NEGATIVE: 1
EYES NEGATIVE: 1
COUGH: 1
SINUS PRESSURE: 1

## 2019-02-04 ENCOUNTER — TELEPHONE (OUTPATIENT)
Dept: INTERNAL MEDICINE CLINIC | Age: 44
End: 2019-02-04

## 2019-02-06 RX ORDER — CITALOPRAM 40 MG/1
40 TABLET ORAL DAILY
Qty: 30 TABLET | Refills: 1 | Status: SHIPPED | OUTPATIENT
Start: 2019-02-06 | End: 2019-05-08 | Stop reason: SDUPTHER

## 2019-02-06 RX ORDER — OXCARBAZEPINE 600 MG/1
600 TABLET, FILM COATED ORAL 2 TIMES DAILY
Qty: 60 TABLET | Refills: 2 | Status: SHIPPED | OUTPATIENT
Start: 2019-02-06 | End: 2019-05-08 | Stop reason: SDUPTHER

## 2019-02-06 RX ORDER — RISPERIDONE 4 MG/1
TABLET, FILM COATED ORAL
Qty: 30 TABLET | Refills: 3 | Status: SHIPPED | OUTPATIENT
Start: 2019-02-06 | End: 2019-08-19 | Stop reason: SDUPTHER

## 2019-02-11 ENCOUNTER — OFFICE VISIT (OUTPATIENT)
Dept: INTERNAL MEDICINE CLINIC | Age: 44
End: 2019-02-11
Payer: COMMERCIAL

## 2019-02-11 VITALS
SYSTOLIC BLOOD PRESSURE: 134 MMHG | DIASTOLIC BLOOD PRESSURE: 70 MMHG | OXYGEN SATURATION: 98 % | HEART RATE: 97 BPM | WEIGHT: 256 LBS | BODY MASS INDEX: 46.08 KG/M2

## 2019-02-11 DIAGNOSIS — Z72.0 TOBACCO ABUSE DISORDER: ICD-10-CM

## 2019-02-11 DIAGNOSIS — L30.9 ECZEMA, UNSPECIFIED TYPE: Primary | ICD-10-CM

## 2019-02-11 DIAGNOSIS — B35.1 ONYCHOMYCOSIS OF GREAT TOE: ICD-10-CM

## 2019-02-11 PROCEDURE — 99213 OFFICE O/P EST LOW 20 MIN: CPT | Performed by: NURSE PRACTITIONER

## 2019-02-11 RX ORDER — TRIAMCINOLONE ACETONIDE 0.25 MG/G
OINTMENT TOPICAL
Qty: 80 G | Refills: 1 | Status: SHIPPED | OUTPATIENT
Start: 2019-02-11 | End: 2020-03-03

## 2019-02-11 RX ORDER — BETAMETHASONE DIPROPIONATE 0.05 %
OINTMENT (GRAM) TOPICAL
Qty: 45 G | Refills: 1 | Status: SHIPPED | OUTPATIENT
Start: 2019-02-11 | End: 2019-02-11

## 2019-02-11 ASSESSMENT — ENCOUNTER SYMPTOMS
EYES NEGATIVE: 1
GASTROINTESTINAL NEGATIVE: 1
WHEEZING: 1

## 2019-03-20 DIAGNOSIS — R32 INCONTINENCE IN FEMALE: ICD-10-CM

## 2019-03-25 RX ORDER — OXYBUTYNIN CHLORIDE 10 MG/1
TABLET, EXTENDED RELEASE ORAL
Qty: 30 TABLET | Refills: 0 | Status: SHIPPED | OUTPATIENT
Start: 2019-03-25 | End: 2019-04-27 | Stop reason: SDUPTHER

## 2019-04-19 ENCOUNTER — OFFICE VISIT (OUTPATIENT)
Dept: INTERNAL MEDICINE CLINIC | Age: 44
End: 2019-04-19
Payer: MEDICAID

## 2019-04-19 ENCOUNTER — NURSE TRIAGE (OUTPATIENT)
Dept: OTHER | Facility: CLINIC | Age: 44
End: 2019-04-19

## 2019-04-19 VITALS
SYSTOLIC BLOOD PRESSURE: 126 MMHG | OXYGEN SATURATION: 97 % | HEART RATE: 83 BPM | DIASTOLIC BLOOD PRESSURE: 88 MMHG | BODY MASS INDEX: 47.16 KG/M2 | WEIGHT: 262 LBS

## 2019-04-19 DIAGNOSIS — B35.1 ONYCHOMYCOSIS: Primary | ICD-10-CM

## 2019-04-19 DIAGNOSIS — R60.0 PEDAL EDEMA: ICD-10-CM

## 2019-04-19 PROCEDURE — 4004F PT TOBACCO SCREEN RCVD TLK: CPT | Performed by: INTERNAL MEDICINE

## 2019-04-19 PROCEDURE — 99214 OFFICE O/P EST MOD 30 MIN: CPT | Performed by: INTERNAL MEDICINE

## 2019-04-19 PROCEDURE — G8427 DOCREV CUR MEDS BY ELIG CLIN: HCPCS | Performed by: INTERNAL MEDICINE

## 2019-04-19 PROCEDURE — G8417 CALC BMI ABV UP PARAM F/U: HCPCS | Performed by: INTERNAL MEDICINE

## 2019-04-19 RX ORDER — PREDNISONE 50 MG/1
50 TABLET ORAL DAILY
Qty: 5 TABLET | Refills: 0 | Status: SHIPPED | OUTPATIENT
Start: 2019-04-19 | End: 2019-04-24

## 2019-04-19 RX ORDER — FUROSEMIDE 20 MG/1
20 TABLET ORAL DAILY
Qty: 7 TABLET | Refills: 3 | Status: SHIPPED | OUTPATIENT
Start: 2019-04-19 | End: 2020-12-10

## 2019-04-19 RX ORDER — TERBINAFINE HYDROCHLORIDE 250 MG/1
250 TABLET ORAL DAILY
Qty: 30 TABLET | Refills: 0 | Status: SHIPPED | OUTPATIENT
Start: 2019-04-19 | End: 2019-05-08 | Stop reason: SDUPTHER

## 2019-04-19 NOTE — LETTER
[address] [facility] [address]  [address]   Phone: [phone]  Fax: [phone]    Sheron Parmar MD        April 19, 2019     Patient: Mary Mckenzie   YOB: 1975   Date of Visit: 4/19/2019       To Whom it May Concern:    Benny Louis was seen in my clinic on 4/19/2019. If you have any questions or concerns, please don't hesitate to call.     Sincerely,         Sheron Parmar MD

## 2019-04-21 NOTE — PROGRESS NOTES
2019     Desi Guerrero (:  1975) is a 38 y.o. female, here for evaluation of the following medical concerns:    HPI  Patient comes in for follow-up of pedal edema. She states that her symptoms are better. She has been taking lasix and notes some improvement. She also is concerned about thickened nails. She has tried some antifungal creams without any benefit. Review of Systems    Prior to Visit Medications    Medication Sig Taking?  Authorizing Provider   terbinafine (LAMISIL) 250 MG tablet Take 1 tablet by mouth daily Yes Rosa M Moreira MD   predniSONE (DELTASONE) 50 MG tablet Take 1 tablet by mouth daily for 5 days Yes Rosa M Moreira MD   furosemide (LASIX) 20 MG tablet Take 1 tablet by mouth daily Yes Rosa M Moreira MD   oxybutynin (DITROPAN-XL) 10 MG extended release tablet TAKE 1 TABLET BY MOUTH ONCE DAILY Yes Charissa Nones, APRN - CNP   citalopram (CELEXA) 40 MG tablet Take 1 tablet by mouth daily Yes HARRISON Yanez - CNP   risperiDONE (RISPERDAL) 4 MG tablet One tab nightly Yes HARRISON Rogers CNP   OXcarbazepine (TRILEPTAL) 600 MG tablet Take 1 tablet by mouth 2 times daily Yes HARRISON Rogers CNP   lisinopril-hydrochlorothiazide (PRINZIDE;ZESTORETIC) 20-12.5 MG per tablet TAKE 1 TABLET BY MOUTH ONE TIME A DAY Yes HARRISON Rogers CNP   vitamin D (ERGOCALCIFEROL) 89281 units CAPS capsule TAKE 1 CAPSULE BY MOUTH ONE TIME A WEEK Yes HARRISON Rogers - CNP   omeprazole (PRILOSEC) 20 MG delayed release capsule TAKE 1 CAPSULE BY MOUTH ONE TIME A DAY with breakfast Yes HARRISON Rogers CNP   simvastatin (ZOCOR) 20 MG tablet TAKE 1 TABLET BY MOUTH AT BEDTIME Yes HARRISON Rogers - CNP   ibuprofen (IBU) 600 MG tablet Take 1 tablet by mouth every 8 hours as needed for Pain Yes Austin Dawn PA-C   fluticasone (FLONASE) 50 MCG/ACT nasal spray INHALE 1 SPRAY in each nostril ONE TIME A DAY Yes HARRISON Vergara CNP   azelastine (ASTELIN) 0.1 % nasal spray USE 2 SPRAYS INTO EACH NOSTRIL 2 TIMES A DAY. USE FLUTICASONE 15 MINUTES AFTER THE MORNING DOSE OF ASTELIN Yes Alvino Granados MD   promethazine (PHENERGAN) 25 MG tablet Take 1 tablet by mouth every 6 hours as needed for Nausea Yes Alvino Granados MD   guaiFENesin (MUCINEX) 600 MG extended release tablet Take 1 tablet by mouth 2 times daily Yes HARRISON Hall CNP   vitamin C (ASCORBIC ACID) 500 MG tablet Take 500 mg by mouth daily Yes Historical Provider, MD   Multiple Vitamins-Minerals (THERAPEUTIC MULTIVITAMIN-MINERALS) tablet Take 1 tablet by mouth daily Yes Historical Provider, MD   Glucosamine HCl (GLUCOSAMINE PO) Take by mouth Yes Historical Provider, MD   Compression Stockings MISC by Does not apply route Wear daily for leg swelling. Take off prior to going to bed. Yes Lilliam Forbes DO   nicotine (NICODERM CQ) 14 MG/24HR Place 1 patch onto the skin daily for 14 days  HARRISON Evangelista CNP        Social History     Tobacco Use    Smoking status: Current Every Day Smoker     Packs/day: 1.50     Years: 31.00     Pack years: 46.50     Types: Cigarettes     Start date: 26    Smokeless tobacco: Never Used   Substance Use Topics    Alcohol use: Yes     Comment: social        Vitals:    04/19/19 1620   BP: 126/88   Site: Right Upper Arm   Position: Sitting   Cuff Size: Large Adult   Pulse: 83   SpO2: 97%   Weight: 262 lb (118.8 kg)     Estimated body mass index is 47.16 kg/m² as calculated from the following:    Height as of 10/30/18: 5' 2.5\" (1.588 m). Weight as of this encounter: 262 lb (118.8 kg). Physical Exam   Constitutional: She appears well-nourished. HENT:   Head: Normocephalic. Right Ear: External ear normal.   Left Ear: External ear normal.   Cardiovascular: Normal rate, regular rhythm and normal heart sounds. Exam reveals no friction rub.    No murmur heard.  Musculoskeletal: She exhibits edema (1+ bilaterally). Feet:      Lab Results   Component Value Date    ALT 22 10/26/2018    AST 16 10/26/2018    ALKPHOS 99 10/26/2018    BILITOT 0.4 10/26/2018        ASSESSMENT/PLAN:  1. Onychomycosis  new  - terbinafine (LAMISIL) 250 MG tablet; Take 1 tablet by mouth daily  Dispense: 30 tablet; Refill: 0    2. Pedal edema  stable  - furosemide (LASIX) 20 MG tablet; Take 1 tablet by mouth daily  Dispense: 7 tablet; Refill: 3      Return in about 2 weeks (around 5/3/2019) for f/u toenail fungus / pedal edema 30 min. An electronic signature was used to authenticate this note.     --Baldo Florence MD on 4/20/2019 at 10:12 PM

## 2019-05-08 ENCOUNTER — OFFICE VISIT (OUTPATIENT)
Dept: INTERNAL MEDICINE CLINIC | Age: 44
End: 2019-05-08
Payer: MEDICAID

## 2019-05-08 VITALS
HEART RATE: 86 BPM | WEIGHT: 271 LBS | BODY MASS INDEX: 48.78 KG/M2 | OXYGEN SATURATION: 94 % | DIASTOLIC BLOOD PRESSURE: 62 MMHG | SYSTOLIC BLOOD PRESSURE: 100 MMHG

## 2019-05-08 DIAGNOSIS — I10 ESSENTIAL HYPERTENSION: ICD-10-CM

## 2019-05-08 DIAGNOSIS — E78.2 MIXED HYPERLIPIDEMIA: ICD-10-CM

## 2019-05-08 DIAGNOSIS — E55.9 VITAMIN D DEFICIENCY: ICD-10-CM

## 2019-05-08 DIAGNOSIS — B35.1 ONYCHOMYCOSIS: Primary | ICD-10-CM

## 2019-05-08 PROCEDURE — 4004F PT TOBACCO SCREEN RCVD TLK: CPT | Performed by: NURSE PRACTITIONER

## 2019-05-08 PROCEDURE — 99213 OFFICE O/P EST LOW 20 MIN: CPT | Performed by: NURSE PRACTITIONER

## 2019-05-08 PROCEDURE — G8427 DOCREV CUR MEDS BY ELIG CLIN: HCPCS | Performed by: NURSE PRACTITIONER

## 2019-05-08 PROCEDURE — G8417 CALC BMI ABV UP PARAM F/U: HCPCS | Performed by: NURSE PRACTITIONER

## 2019-05-08 RX ORDER — OXCARBAZEPINE 600 MG/1
600 TABLET, FILM COATED ORAL 2 TIMES DAILY
Qty: 60 TABLET | Refills: 2 | Status: SHIPPED | OUTPATIENT
Start: 2019-05-08 | End: 2019-11-18 | Stop reason: SDUPTHER

## 2019-05-08 RX ORDER — TERBINAFINE HYDROCHLORIDE 250 MG/1
250 TABLET ORAL DAILY
Qty: 30 TABLET | Refills: 0 | Status: SHIPPED | OUTPATIENT
Start: 2019-05-08 | End: 2019-06-19 | Stop reason: SDUPTHER

## 2019-05-08 RX ORDER — ERGOCALCIFEROL 1.25 MG/1
CAPSULE ORAL
Qty: 12 CAPSULE | Refills: 1 | Status: SHIPPED | OUTPATIENT
Start: 2019-05-08 | End: 2019-11-06 | Stop reason: SDUPTHER

## 2019-05-08 RX ORDER — CHLORTHALIDONE 25 MG/1
25 TABLET ORAL DAILY
Qty: 30 TABLET | Refills: 3 | Status: SHIPPED | OUTPATIENT
Start: 2019-05-08 | End: 2019-08-19 | Stop reason: SDUPTHER

## 2019-05-08 RX ORDER — SIMVASTATIN 20 MG
TABLET ORAL
Qty: 90 TABLET | Refills: 1 | Status: SHIPPED | OUTPATIENT
Start: 2019-05-08 | End: 2019-12-01 | Stop reason: SDUPTHER

## 2019-05-08 RX ORDER — LISINOPRIL 20 MG/1
20 TABLET ORAL DAILY
Qty: 90 TABLET | Refills: 1 | Status: SHIPPED | OUTPATIENT
Start: 2019-05-08 | End: 2019-07-22 | Stop reason: SDUPTHER

## 2019-05-08 RX ORDER — CITALOPRAM 40 MG/1
40 TABLET ORAL DAILY
Qty: 30 TABLET | Refills: 1 | Status: SHIPPED | OUTPATIENT
Start: 2019-05-08 | End: 2019-07-22 | Stop reason: SDUPTHER

## 2019-05-08 SDOH — HEALTH STABILITY: MENTAL HEALTH: HOW MANY STANDARD DRINKS CONTAINING ALCOHOL DO YOU HAVE ON A TYPICAL DAY?: 1 OR 2

## 2019-05-08 SDOH — HEALTH STABILITY: MENTAL HEALTH: HOW OFTEN DO YOU HAVE A DRINK CONTAINING ALCOHOL?: MONTHLY OR LESS

## 2019-05-08 NOTE — PROGRESS NOTES
Subjective:      Patient ID: Smita Quinones is a 38 y.o. female. Presents today for follow up onchomycosisand edema in lower extremities. Believes present diuretic is not working      Review of Systems   Constitutional: Negative. HENT: Negative. Eyes: Negative. Cardiovascular: Positive for leg swelling. Endocrine: Negative. Genitourinary: Negative. Skin: Positive for rash. Neurological: Negative. Hematological: Negative. Psychiatric/Behavioral: Negative. Vitals:    05/08/19 1228   BP: 100/62   Pulse: 86   SpO2: 94%     BP Readings from Last 3 Encounters:   05/08/19 100/62   04/19/19 126/88   02/11/19 134/70     Wt Readings from Last 3 Encounters:   05/08/19 271 lb (122.9 kg)   04/19/19 262 lb (118.8 kg)   02/11/19 256 lb (116.1 kg)     Objective:   Physical Exam   Constitutional: She is oriented to person, place, and time. She appears well-developed and well-nourished. Cardiovascular: Normal rate, regular rhythm and normal heart sounds. Pulmonary/Chest: Effort normal. She has wheezes in the right upper field and the left upper field. Musculoskeletal:        Legs:       Feet:    Neurological: She is alert and oriented to person, place, and time.        Assessment:      Onchomycosis  Edema      Plan:      Eat a banana every day, small  Continue to drink plenty of water  Stop smoking        Praveena Mayfield, APRN - CNP

## 2019-05-09 LAB
ALBUMIN SERPL-MCNC: 3.9 G/DL (ref 3.4–5)
ALP BLD-CCNC: 90 U/L (ref 40–129)
ALT SERPL-CCNC: 20 U/L (ref 10–40)
AST SERPL-CCNC: 13 U/L (ref 15–37)
BILIRUB SERPL-MCNC: 0.3 MG/DL (ref 0–1)
BILIRUBIN DIRECT: <0.2 MG/DL (ref 0–0.3)
BILIRUBIN, INDIRECT: ABNORMAL MG/DL (ref 0–1)
TOTAL PROTEIN: 6.2 G/DL (ref 6.4–8.2)

## 2019-05-09 ASSESSMENT — ENCOUNTER SYMPTOMS: EYES NEGATIVE: 1

## 2019-05-29 DIAGNOSIS — K21.9 GASTROESOPHAGEAL REFLUX DISEASE, ESOPHAGITIS PRESENCE NOT SPECIFIED: ICD-10-CM

## 2019-06-04 RX ORDER — OMEPRAZOLE 20 MG/1
CAPSULE, DELAYED RELEASE ORAL
Qty: 120 CAPSULE | Refills: 0 | Status: SHIPPED | OUTPATIENT
Start: 2019-06-04 | End: 2019-11-06 | Stop reason: SDUPTHER

## 2019-06-19 DIAGNOSIS — B35.1 ONYCHOMYCOSIS: ICD-10-CM

## 2019-06-20 RX ORDER — TERBINAFINE HYDROCHLORIDE 250 MG/1
TABLET ORAL
Qty: 30 TABLET | Refills: 0 | Status: SHIPPED | OUTPATIENT
Start: 2019-06-20 | End: 2019-07-22 | Stop reason: SDUPTHER

## 2019-07-05 ENCOUNTER — OFFICE VISIT (OUTPATIENT)
Dept: INTERNAL MEDICINE CLINIC | Age: 44
End: 2019-07-05
Payer: COMMERCIAL

## 2019-07-05 ENCOUNTER — NURSE TRIAGE (OUTPATIENT)
Dept: OTHER | Facility: CLINIC | Age: 44
End: 2019-07-05

## 2019-07-05 VITALS
SYSTOLIC BLOOD PRESSURE: 106 MMHG | TEMPERATURE: 98.1 F | HEART RATE: 68 BPM | WEIGHT: 277 LBS | BODY MASS INDEX: 49.86 KG/M2 | DIASTOLIC BLOOD PRESSURE: 72 MMHG

## 2019-07-05 DIAGNOSIS — J01.01 ACUTE RECURRENT MAXILLARY SINUSITIS: Primary | ICD-10-CM

## 2019-07-05 DIAGNOSIS — F17.200 SMOKING: Chronic | ICD-10-CM

## 2019-07-05 PROCEDURE — 99213 OFFICE O/P EST LOW 20 MIN: CPT | Performed by: INTERNAL MEDICINE

## 2019-07-05 RX ORDER — AZITHROMYCIN 250 MG/1
TABLET, FILM COATED ORAL
Qty: 1 PACKET | Refills: 0 | Status: SHIPPED | OUTPATIENT
Start: 2019-07-05 | End: 2019-08-19 | Stop reason: SDUPTHER

## 2019-07-05 ASSESSMENT — ENCOUNTER SYMPTOMS
SINUS PRESSURE: 1
RHINORRHEA: 1
COUGH: 1
SORE THROAT: 1

## 2019-07-05 NOTE — TELEPHONE ENCOUNTER
Reason for Disposition   Lightheadedness (dizziness) present now, after 2 hours of rest and fluids    Protocols used: DIZZINESS-ADULT-[place]    Received call from [address]. Pt calling c/o uri and sinus issues. Is also feeling dizzy. Does have appt scheduled for today at 2:30. Pt states every time she gets a sinus infection she gets dizziness. No fainting, no cp, no sob. Pt states she wants to keep 2:30 appt.

## 2019-07-05 NOTE — LETTER
[address] [facility] [address]  [address]   Phone: [phone]  Fax: [phone]    Moises Lizarraga MD        July 5, 2019     Patient: Marilyn Barr   YOB: 1975   Date of Visit: 7/5/2019       To Whom It May Concern:    Please excuse J Carlos Royal from work on 7/4/19 and 7/5/19 . If you have any questions or concerns, please don't hesitate to call.     Sincerely,             Moises Lizarraga MD

## 2019-07-22 ENCOUNTER — OFFICE VISIT (OUTPATIENT)
Dept: INTERNAL MEDICINE CLINIC | Age: 44
End: 2019-07-22
Payer: COMMERCIAL

## 2019-07-22 VITALS
BODY MASS INDEX: 50.4 KG/M2 | SYSTOLIC BLOOD PRESSURE: 132 MMHG | DIASTOLIC BLOOD PRESSURE: 76 MMHG | OXYGEN SATURATION: 100 % | HEART RATE: 79 BPM | WEIGHT: 280 LBS

## 2019-07-22 DIAGNOSIS — B35.1 ONYCHOMYCOSIS: ICD-10-CM

## 2019-07-22 DIAGNOSIS — J01.01 ACUTE RECURRENT MAXILLARY SINUSITIS: ICD-10-CM

## 2019-07-22 DIAGNOSIS — J32.4 CHRONIC PANSINUSITIS: ICD-10-CM

## 2019-07-22 DIAGNOSIS — I10 ESSENTIAL HYPERTENSION: ICD-10-CM

## 2019-07-22 DIAGNOSIS — Z00.00 WELL ADULT EXAM: Primary | ICD-10-CM

## 2019-07-22 DIAGNOSIS — J34.89 SINUS DRAINAGE: ICD-10-CM

## 2019-07-22 PROCEDURE — 99396 PREV VISIT EST AGE 40-64: CPT | Performed by: NURSE PRACTITIONER

## 2019-07-22 RX ORDER — TERBINAFINE HYDROCHLORIDE 250 MG/1
TABLET ORAL
Qty: 30 TABLET | Refills: 1 | Status: SHIPPED | OUTPATIENT
Start: 2019-07-22 | End: 2019-09-23 | Stop reason: SDUPTHER

## 2019-07-22 RX ORDER — CITALOPRAM 40 MG/1
40 TABLET ORAL DAILY
Qty: 30 TABLET | Refills: 1 | Status: SHIPPED | OUTPATIENT
Start: 2019-07-22 | End: 2019-09-17 | Stop reason: SDUPTHER

## 2019-07-22 RX ORDER — LISINOPRIL 20 MG/1
20 TABLET ORAL DAILY
Qty: 90 TABLET | Refills: 1 | Status: SHIPPED | OUTPATIENT
Start: 2019-07-22 | End: 2019-12-09 | Stop reason: SDUPTHER

## 2019-07-22 RX ORDER — TERBINAFINE HYDROCHLORIDE 250 MG/1
TABLET ORAL
Qty: 30 TABLET | Refills: 0 | OUTPATIENT
Start: 2019-07-22

## 2019-08-19 ENCOUNTER — OFFICE VISIT (OUTPATIENT)
Dept: INTERNAL MEDICINE CLINIC | Age: 44
End: 2019-08-19
Payer: COMMERCIAL

## 2019-08-19 VITALS
OXYGEN SATURATION: 94 % | SYSTOLIC BLOOD PRESSURE: 118 MMHG | WEIGHT: 274 LBS | BODY MASS INDEX: 49.32 KG/M2 | TEMPERATURE: 98.3 F | DIASTOLIC BLOOD PRESSURE: 72 MMHG | HEART RATE: 78 BPM

## 2019-08-19 DIAGNOSIS — J01.01 ACUTE RECURRENT MAXILLARY SINUSITIS: Primary | ICD-10-CM

## 2019-08-19 DIAGNOSIS — I10 ESSENTIAL HYPERTENSION: ICD-10-CM

## 2019-08-19 DIAGNOSIS — J30.2 SEASONAL ALLERGIES: ICD-10-CM

## 2019-08-19 PROCEDURE — 99213 OFFICE O/P EST LOW 20 MIN: CPT | Performed by: NURSE PRACTITIONER

## 2019-08-19 RX ORDER — CHLORTHALIDONE 25 MG/1
25 TABLET ORAL DAILY
Qty: 30 TABLET | Refills: 3 | Status: SHIPPED | OUTPATIENT
Start: 2019-08-19 | End: 2019-12-09 | Stop reason: SDUPTHER

## 2019-08-19 RX ORDER — RISPERIDONE 4 MG/1
TABLET, FILM COATED ORAL
Qty: 30 TABLET | Refills: 3 | Status: SHIPPED | OUTPATIENT
Start: 2019-08-19 | End: 2019-12-09 | Stop reason: SDUPTHER

## 2019-08-19 RX ORDER — CETIRIZINE HYDROCHLORIDE 10 MG/1
10 TABLET ORAL DAILY
Qty: 90 TABLET | Refills: 0 | Status: SHIPPED | OUTPATIENT
Start: 2019-08-19 | End: 2019-09-18

## 2019-08-19 RX ORDER — AZITHROMYCIN 250 MG/1
TABLET, FILM COATED ORAL
Qty: 1 PACKET | Refills: 0 | Status: SHIPPED | OUTPATIENT
Start: 2019-08-19 | End: 2019-12-09

## 2019-08-19 ASSESSMENT — ENCOUNTER SYMPTOMS
SINUS PRESSURE: 1
ALLERGIC/IMMUNOLOGIC NEGATIVE: 1
COUGH: 1

## 2019-08-19 NOTE — PATIENT INSTRUCTIONS
Gargle with warm salt and water after inhaling hot steam for 5 minutes  Stop smoking  Take allergy medicine at night  Warm compress to cheeks for pain  Continue ton exercise  Take allergy pill at night

## 2019-08-19 NOTE — PROGRESS NOTES
Subjective:      Patient ID: Regina Francis is a 38 y.o. female. Presents today for potential sinus infection    Sinusitis   This is a recurrent problem. The current episode started in the past 7 days. There has been no fever (did not check). Associated symptoms include chills, congestion, coughing, ear pain and sinus pressure. Past treatments include spray decongestants. The treatment provided mild relief. Review of Systems   Constitutional: Positive for chills. HENT: Positive for congestion, ear pain and sinus pressure. Respiratory: Positive for cough. Cardiovascular: Negative. Genitourinary: Negative. Allergic/Immunologic: Negative. Neurological: Negative. Hematological: Negative. Psychiatric/Behavioral: Negative. Vitals:    08/19/19 0937   BP: 118/72   Pulse: 78   Temp: 98.3 °F (36.8 °C)   SpO2: 94%     BP Readings from Last 3 Encounters:   08/19/19 118/72   07/22/19 132/76   07/05/19 106/72     Wt Readings from Last 3 Encounters:   08/19/19 274 lb (124.3 kg)   07/22/19 280 lb (127 kg)   07/05/19 277 lb (125.6 kg)     Objective:   Physical Exam   Constitutional: She appears well-developed and well-nourished. HENT:   Head: Normocephalic. Right Ear: Hearing and external ear normal. Tympanic membrane is bulging. Left Ear: Hearing and external ear normal. Tympanic membrane is bulging. Nose: Rhinorrhea present. Right sinus exhibits maxillary sinus tenderness. Left sinus exhibits maxillary sinus tenderness. Mouth/Throat: Uvula is midline. Oropharyngeal exudate present. Bilateral canal redness   Neck: Normal range of motion and full passive range of motion without pain. Cardiovascular: Normal rate, regular rhythm and normal heart sounds. Pulmonary/Chest: Effort normal and breath sounds normal.   Lymphadenopathy:     She has cervical adenopathy. Left cervical: Posterior cervical adenopathy present.        Assessment:      Sinusitis  Seasonal and environmental allergies      Plan:     Sinusitis    Gargle with warm salt and water after inhaling hot steam for 5 minutes  Stop smoking  Warm compress to cheeks for pain  Continue to exercise    Seasonal and environmental allergies    Stop smoking  Take allergy pill at night          Praveena Caballero, APRN - CNP

## 2019-08-23 DIAGNOSIS — J32.4 CHRONIC PANSINUSITIS: ICD-10-CM

## 2019-08-23 DIAGNOSIS — J34.89 SINUS DRAINAGE: ICD-10-CM

## 2019-08-27 RX ORDER — FLUTICASONE PROPIONATE 50 MCG
SPRAY, SUSPENSION (ML) NASAL
Qty: 9.9 G | Refills: 2 | Status: SHIPPED | OUTPATIENT
Start: 2019-08-27 | End: 2019-12-09 | Stop reason: SDUPTHER

## 2019-09-16 ENCOUNTER — TELEPHONE (OUTPATIENT)
Dept: INTERNAL MEDICINE CLINIC | Age: 44
End: 2019-09-16

## 2019-09-17 RX ORDER — CITALOPRAM 40 MG/1
40 TABLET ORAL DAILY
Qty: 30 TABLET | Refills: 2 | Status: SHIPPED | OUTPATIENT
Start: 2019-09-17 | End: 2019-11-28 | Stop reason: SDUPTHER

## 2019-09-18 ENCOUNTER — TELEPHONE (OUTPATIENT)
Dept: FAMILY MEDICINE CLINIC | Age: 44
End: 2019-09-18

## 2019-09-23 DIAGNOSIS — B35.1 ONYCHOMYCOSIS: ICD-10-CM

## 2019-09-23 DIAGNOSIS — R32 INCONTINENCE IN FEMALE: ICD-10-CM

## 2019-09-25 RX ORDER — OXYBUTYNIN CHLORIDE 10 MG/1
TABLET, EXTENDED RELEASE ORAL
Qty: 30 TABLET | Refills: 3 | Status: SHIPPED | OUTPATIENT
Start: 2019-09-25 | End: 2020-02-12

## 2019-09-25 RX ORDER — TERBINAFINE HYDROCHLORIDE 250 MG/1
TABLET ORAL
Qty: 30 TABLET | Refills: 1 | Status: SHIPPED | OUTPATIENT
Start: 2019-09-25 | End: 2019-12-01 | Stop reason: SDUPTHER

## 2019-11-06 DIAGNOSIS — K21.9 GASTROESOPHAGEAL REFLUX DISEASE, ESOPHAGITIS PRESENCE NOT SPECIFIED: ICD-10-CM

## 2019-11-06 DIAGNOSIS — E55.9 VITAMIN D DEFICIENCY: ICD-10-CM

## 2019-11-18 RX ORDER — OXCARBAZEPINE 600 MG/1
600 TABLET, FILM COATED ORAL 2 TIMES DAILY
Qty: 60 TABLET | Refills: 2 | Status: SHIPPED | OUTPATIENT
Start: 2019-11-18 | End: 2020-02-13

## 2019-11-22 RX ORDER — OMEPRAZOLE 20 MG/1
CAPSULE, DELAYED RELEASE ORAL
Qty: 120 CAPSULE | Refills: 0 | Status: SHIPPED | OUTPATIENT
Start: 2019-11-22 | End: 2020-04-07

## 2019-11-22 RX ORDER — ERGOCALCIFEROL 1.25 MG/1
CAPSULE ORAL
Qty: 12 CAPSULE | Refills: 1 | Status: SHIPPED | OUTPATIENT
Start: 2019-11-22 | End: 2020-12-10 | Stop reason: SDUPTHER

## 2019-12-01 DIAGNOSIS — B35.1 ONYCHOMYCOSIS: ICD-10-CM

## 2019-12-01 DIAGNOSIS — E78.2 MIXED HYPERLIPIDEMIA: ICD-10-CM

## 2019-12-04 RX ORDER — TERBINAFINE HYDROCHLORIDE 250 MG/1
TABLET ORAL
Qty: 30 TABLET | Refills: 1 | Status: SHIPPED | OUTPATIENT
Start: 2019-12-04 | End: 2019-12-09

## 2019-12-04 RX ORDER — SIMVASTATIN 20 MG
TABLET ORAL
Qty: 90 TABLET | Refills: 1 | Status: SHIPPED | OUTPATIENT
Start: 2019-12-04 | End: 2020-07-16

## 2019-12-04 RX ORDER — CITALOPRAM 40 MG/1
TABLET ORAL
Qty: 90 TABLET | Refills: 0 | Status: SHIPPED | OUTPATIENT
Start: 2019-12-04 | End: 2020-05-29 | Stop reason: SDUPTHER

## 2019-12-09 ENCOUNTER — OFFICE VISIT (OUTPATIENT)
Dept: INTERNAL MEDICINE CLINIC | Age: 44
End: 2019-12-09
Payer: COMMERCIAL

## 2019-12-09 VITALS
DIASTOLIC BLOOD PRESSURE: 78 MMHG | OXYGEN SATURATION: 98 % | SYSTOLIC BLOOD PRESSURE: 116 MMHG | TEMPERATURE: 98.6 F | HEIGHT: 63 IN | BODY MASS INDEX: 46.42 KG/M2 | HEART RATE: 87 BPM | WEIGHT: 262 LBS

## 2019-12-09 DIAGNOSIS — J32.4 CHRONIC PANSINUSITIS: ICD-10-CM

## 2019-12-09 DIAGNOSIS — R73.9 HYPERGLYCEMIA: ICD-10-CM

## 2019-12-09 DIAGNOSIS — E78.00 HYPERCHOLESTEROLEMIA: Primary | ICD-10-CM

## 2019-12-09 DIAGNOSIS — I10 ESSENTIAL HYPERTENSION: ICD-10-CM

## 2019-12-09 DIAGNOSIS — F32.1 CURRENT MODERATE EPISODE OF MAJOR DEPRESSIVE DISORDER WITHOUT PRIOR EPISODE (HCC): ICD-10-CM

## 2019-12-09 DIAGNOSIS — J34.89 SINUS DRAINAGE: ICD-10-CM

## 2019-12-09 DIAGNOSIS — Z23 FLU VACCINE NEED: ICD-10-CM

## 2019-12-09 LAB
A/G RATIO: 2.1 (ref 1.1–2.2)
ALBUMIN SERPL-MCNC: 4.4 G/DL (ref 3.4–5)
ALP BLD-CCNC: 93 U/L (ref 40–129)
ALT SERPL-CCNC: 22 U/L (ref 10–40)
ANION GAP SERPL CALCULATED.3IONS-SCNC: 16 MMOL/L (ref 3–16)
AST SERPL-CCNC: 18 U/L (ref 15–37)
BILIRUB SERPL-MCNC: 0.4 MG/DL (ref 0–1)
BUN BLDV-MCNC: 7 MG/DL (ref 7–20)
CALCIUM SERPL-MCNC: 9.6 MG/DL (ref 8.3–10.6)
CHLORIDE BLD-SCNC: 93 MMOL/L (ref 99–110)
CHOLESTEROL, TOTAL: 227 MG/DL (ref 0–199)
CO2: 24 MMOL/L (ref 21–32)
CREAT SERPL-MCNC: <0.5 MG/DL (ref 0.6–1.1)
GFR AFRICAN AMERICAN: >60
GFR NON-AFRICAN AMERICAN: >60
GLOBULIN: 2.1 G/DL
GLUCOSE BLD-MCNC: 91 MG/DL (ref 70–99)
HDLC SERPL-MCNC: 72 MG/DL (ref 40–60)
LDL CHOLESTEROL CALCULATED: 132 MG/DL
POTASSIUM SERPL-SCNC: 4.6 MMOL/L (ref 3.5–5.1)
SODIUM BLD-SCNC: 133 MMOL/L (ref 136–145)
TOTAL PROTEIN: 6.5 G/DL (ref 6.4–8.2)
TRIGL SERPL-MCNC: 116 MG/DL (ref 0–150)
VLDLC SERPL CALC-MCNC: 23 MG/DL

## 2019-12-09 PROCEDURE — 90686 IIV4 VACC NO PRSV 0.5 ML IM: CPT | Performed by: INTERNAL MEDICINE

## 2019-12-09 PROCEDURE — 90471 IMMUNIZATION ADMIN: CPT | Performed by: INTERNAL MEDICINE

## 2019-12-09 PROCEDURE — 99214 OFFICE O/P EST MOD 30 MIN: CPT | Performed by: INTERNAL MEDICINE

## 2019-12-09 RX ORDER — CHLORTHALIDONE 25 MG/1
25 TABLET ORAL DAILY
Qty: 90 TABLET | Refills: 1 | Status: SHIPPED | OUTPATIENT
Start: 2019-12-09 | End: 2020-12-10 | Stop reason: SDUPTHER

## 2019-12-09 RX ORDER — LISINOPRIL 20 MG/1
20 TABLET ORAL DAILY
Qty: 90 TABLET | Refills: 1 | Status: SHIPPED | OUTPATIENT
Start: 2019-12-09 | End: 2020-12-29

## 2019-12-09 RX ORDER — FLUTICASONE PROPIONATE 50 MCG
1 SPRAY, SUSPENSION (ML) NASAL DAILY
Qty: 16 G | Refills: 5 | Status: SHIPPED | OUTPATIENT
Start: 2019-12-09 | End: 2020-12-10 | Stop reason: ALTCHOICE

## 2019-12-09 RX ORDER — RISPERIDONE 4 MG/1
TABLET, FILM COATED ORAL
Qty: 30 TABLET | Refills: 3 | Status: SHIPPED | OUTPATIENT
Start: 2019-12-09 | End: 2020-05-13

## 2019-12-09 ASSESSMENT — ENCOUNTER SYMPTOMS
CHOKING: 0
FACIAL SWELLING: 0
EYE PAIN: 0
COUGH: 0
EYE ITCHING: 0

## 2019-12-10 LAB
ESTIMATED AVERAGE GLUCOSE: 116.9 MG/DL
HBA1C MFR BLD: 5.7 %

## 2020-01-13 ENCOUNTER — OFFICE VISIT (OUTPATIENT)
Dept: INTERNAL MEDICINE CLINIC | Age: 45
End: 2020-01-13
Payer: COMMERCIAL

## 2020-01-13 VITALS
HEART RATE: 78 BPM | WEIGHT: 267 LBS | DIASTOLIC BLOOD PRESSURE: 72 MMHG | SYSTOLIC BLOOD PRESSURE: 120 MMHG | BODY MASS INDEX: 48.06 KG/M2

## 2020-01-13 PROCEDURE — 99213 OFFICE O/P EST LOW 20 MIN: CPT | Performed by: INTERNAL MEDICINE

## 2020-01-13 RX ORDER — AZITHROMYCIN 250 MG/1
TABLET, FILM COATED ORAL
Qty: 1 PACKET | Refills: 0 | Status: SHIPPED | OUTPATIENT
Start: 2020-01-13 | End: 2020-12-10 | Stop reason: ALTCHOICE

## 2020-01-13 RX ORDER — CETIRIZINE HYDROCHLORIDE, PSEUDOEPHEDRINE HYDROCHLORIDE 5; 120 MG/1; MG/1
1 TABLET, FILM COATED, EXTENDED RELEASE ORAL 2 TIMES DAILY
Qty: 60 TABLET | Refills: 0 | Status: SHIPPED | OUTPATIENT
Start: 2020-01-13 | End: 2020-02-12

## 2020-01-13 NOTE — LETTER
[address] [address] [address]   Phone: [phone]  Fax: [phone]    Sarah Zazueta MD        January 13, 2020     Patient: Lacy Frazier   YOB: 1975   Date of Visit: 1/13/2020       To Whom It May Concern: It is my medical opinion that Justus Mansfield has been off of work on January 6, January 8 and 9 and  should remain out of work until 1/16. If you have any questions or concerns, please don't hesitate to call.     Sincerely,          Sarah Zazueta MD

## 2020-02-12 RX ORDER — OXYBUTYNIN CHLORIDE 10 MG/1
TABLET, EXTENDED RELEASE ORAL
Qty: 30 TABLET | Refills: 0 | Status: SHIPPED | OUTPATIENT
Start: 2020-02-12 | End: 2020-03-18

## 2020-02-13 RX ORDER — OXCARBAZEPINE 600 MG/1
TABLET, FILM COATED ORAL
Qty: 60 TABLET | Refills: 0 | Status: SHIPPED | OUTPATIENT
Start: 2020-02-13 | End: 2020-03-16

## 2020-03-03 RX ORDER — TRIAMCINOLONE ACETONIDE 0.25 MG/G
OINTMENT TOPICAL
Qty: 80 G | Refills: 0 | Status: SHIPPED | OUTPATIENT
Start: 2020-03-03 | End: 2020-12-10 | Stop reason: SDUPTHER

## 2020-03-16 RX ORDER — TERBINAFINE HYDROCHLORIDE 250 MG/1
TABLET ORAL
Qty: 30 TABLET | Refills: 0 | Status: SHIPPED | OUTPATIENT
Start: 2020-03-16 | End: 2020-04-21

## 2020-03-16 RX ORDER — OXCARBAZEPINE 600 MG/1
TABLET, FILM COATED ORAL
Qty: 60 TABLET | Refills: 0 | Status: SHIPPED | OUTPATIENT
Start: 2020-03-16 | End: 2020-06-15

## 2020-03-30 ENCOUNTER — TELEPHONE (OUTPATIENT)
Dept: INTERNAL MEDICINE CLINIC | Age: 45
End: 2020-03-30

## 2020-03-30 NOTE — TELEPHONE ENCOUNTER
Pt has a sinus infection. Pt wants appointment malik. Please advise pt on if she will need to come into the office or not.  [phone]

## 2020-03-31 ENCOUNTER — VIRTUAL VISIT (OUTPATIENT)
Dept: INTERNAL MEDICINE CLINIC | Age: 45
End: 2020-03-31
Payer: COMMERCIAL

## 2020-03-31 ENCOUNTER — TELEPHONE (OUTPATIENT)
Dept: INTERNAL MEDICINE CLINIC | Age: 45
End: 2020-03-31

## 2020-03-31 PROCEDURE — 99213 OFFICE O/P EST LOW 20 MIN: CPT | Performed by: NURSE PRACTITIONER

## 2020-03-31 RX ORDER — CETIRIZINE HYDROCHLORIDE 10 MG/1
10 TABLET ORAL DAILY
Qty: 30 TABLET | Refills: 0 | Status: SHIPPED | OUTPATIENT
Start: 2020-03-31 | End: 2020-04-30

## 2020-03-31 RX ORDER — SULFAMETHOXAZOLE AND TRIMETHOPRIM 800; 160 MG/1; MG/1
1 TABLET ORAL 2 TIMES DAILY
Qty: 14 TABLET | Refills: 0 | Status: SHIPPED | OUTPATIENT
Start: 2020-03-31 | End: 2020-05-29 | Stop reason: SDUPTHER

## 2020-03-31 ASSESSMENT — ENCOUNTER SYMPTOMS
SHORTNESS OF BREATH: 0
RHINORRHEA: 1
SINUS PRESSURE: 1
WHEEZING: 0
SORE THROAT: 1
COUGH: 1
SINUS PAIN: 1
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
## 2020-04-07 RX ORDER — OMEPRAZOLE 20 MG/1
CAPSULE, DELAYED RELEASE ORAL
Qty: 120 CAPSULE | Refills: 0 | Status: SHIPPED | OUTPATIENT
Start: 2020-04-07 | End: 2020-07-09 | Stop reason: SDUPTHER

## 2020-05-29 ENCOUNTER — CLINICAL DOCUMENTATION (OUTPATIENT)
Dept: INTERNAL MEDICINE CLINIC | Age: 45
End: 2020-05-29

## 2020-05-29 ENCOUNTER — NURSE TRIAGE (OUTPATIENT)
Dept: OTHER | Facility: CLINIC | Age: 45
End: 2020-05-29

## 2020-05-29 RX ORDER — SULFAMETHOXAZOLE AND TRIMETHOPRIM 800; 160 MG/1; MG/1
1 TABLET ORAL 2 TIMES DAILY
Qty: 14 TABLET | Refills: 0 | Status: SHIPPED | OUTPATIENT
Start: 2020-05-29 | End: 2020-06-05

## 2020-05-29 RX ORDER — CITALOPRAM 40 MG/1
TABLET ORAL
Qty: 90 TABLET | Refills: 0 | Status: SHIPPED | OUTPATIENT
Start: 2020-05-29 | End: 2020-06-25

## 2020-05-29 NOTE — PROGRESS NOTES
Complaints of severe headache, congestion, sinus pain,l cough. Does not have thermometer, but believes she also has an intermittent fever. Treated for sinusitis last beginning of May. Does not smoke now, but has old carpet in her apartment and has a pet cat. Discussed need to refer to ENT if another episode recurs . Take antibioiitc with food, eat yogurt daily, vacuum and dust daily. I fpossible restrict pet roaming about house, remove old carpet from apartment.

## 2020-06-25 RX ORDER — CITALOPRAM 40 MG/1
TABLET ORAL
Qty: 90 TABLET | Refills: 0 | Status: SHIPPED | OUTPATIENT
Start: 2020-06-25 | End: 2020-09-11

## 2020-06-25 RX ORDER — CITALOPRAM 40 MG/1
TABLET ORAL
Qty: 90 TABLET | Refills: 0 | Status: SHIPPED | OUTPATIENT
Start: 2020-06-25 | End: 2020-06-25 | Stop reason: SDUPTHER

## 2020-07-16 RX ORDER — OMEPRAZOLE 20 MG/1
CAPSULE, DELAYED RELEASE ORAL
Qty: 120 CAPSULE | Refills: 0 | Status: SHIPPED | OUTPATIENT
Start: 2020-07-16 | End: 2020-12-10 | Stop reason: SDUPTHER

## 2020-07-16 RX ORDER — SIMVASTATIN 20 MG
TABLET ORAL
Qty: 90 TABLET | Refills: 0 | Status: SHIPPED | OUTPATIENT
Start: 2020-07-16 | End: 2020-12-10 | Stop reason: SDUPTHER

## 2020-08-04 RX ORDER — SIMVASTATIN 20 MG
TABLET ORAL
Qty: 90 TABLET | Refills: 0 | OUTPATIENT
Start: 2020-08-04

## 2020-09-11 RX ORDER — CITALOPRAM 40 MG/1
TABLET ORAL
Qty: 90 TABLET | Refills: 0 | Status: SHIPPED | OUTPATIENT
Start: 2020-09-11 | End: 2020-12-10 | Stop reason: SDUPTHER

## 2020-10-23 RX ORDER — OXCARBAZEPINE 600 MG/1
TABLET, FILM COATED ORAL
Qty: 60 TABLET | Refills: 0 | Status: SHIPPED | OUTPATIENT
Start: 2020-10-23 | End: 2020-11-30

## 2020-10-27 RX ORDER — RISPERIDONE 4 MG/1
TABLET, FILM COATED ORAL
Qty: 30 TABLET | Refills: 0 | Status: SHIPPED | OUTPATIENT
Start: 2020-10-27 | End: 2020-12-10 | Stop reason: SDUPTHER

## 2020-11-16 RX ORDER — OXYBUTYNIN CHLORIDE 10 MG/1
TABLET, EXTENDED RELEASE ORAL
Qty: 30 TABLET | Refills: 0 | Status: SHIPPED | OUTPATIENT
Start: 2020-11-16 | End: 2020-12-10 | Stop reason: SDUPTHER

## 2020-11-16 NOTE — TELEPHONE ENCOUNTER
Medication:   Requested Prescriptions     Pending Prescriptions Disp Refills    oxybutynin (DITROPAN-XL) 10 MG extended release tablet [Pharmacy Med Name: Oxybutynin Chloride ER 10 MG Oral Tablet Extended Release 24 Hour] 30 tablet 0     Sig: Take 1 tablet by mouth once daily     Last Filled:  8/24/20    Last appt: 3/31/2020   Next appt: Visit date not found    Last Lipid:   Lab Results   Component Value Date    CHOL 227 12/09/2019    TRIG 116 12/09/2019    HDL 72 12/09/2019    LDLCALC 132 12/09/2019

## 2020-11-21 ENCOUNTER — NURSE TRIAGE (OUTPATIENT)
Dept: OTHER | Facility: CLINIC | Age: 45
End: 2020-11-21

## 2020-11-21 NOTE — TELEPHONE ENCOUNTER
Nose bleeds for 2 days about 3 a day     Reason for Disposition   Has nasal packing (inserted by health care provider to control bleeding)    Answer Assessment - Initial Assessment Questions  1. AMOUNT OF BLEEDING: \"How bad is the bleeding? \" \"How much blood was lost?\" \"Has the bleeding stopped? \"    - MILD: needed a couple tissues    - MODERATE: needed many tissues    - SEVERE: large blood clots, soaked many tissues, lasted more than 30 minutes       Moderate     2. ONSET: \"When did the nosebleed start? \"       Two days ago     3. FREQUENCY: \"How many nosebleeds have you had in the last 24 hours? \"       3     4. RECURRENT SYMPTOMS: \"Have there been other recent nosebleeds? \" If so, ask: \"How long did it take you to stop the bleeding? \" \"What worked best?\"       5 minutes and shoved a towel up nose     5. CAUSE: \"What do you think caused this nosebleed? \"      Starts when she is sitting outside     6. LOCAL FACTORS: \"Do you have any cold symptoms? \", \"Have you been rubbing or picking at your nose? \"      Always     7. SYSTEMIC FACTORS: \"Do you have high blood pressure or any bleeding problems? \"      Yes high blood pressure     8. BLOOD THINNERS: \"Do you take any blood thinners? \" (e.g., coumadin, heparin, aspirin, Plavix)      No     9. OTHER SYMPTOMS: \"Do you have any other symptoms? \" (e.g., lightheadedness)      lightheaded the other day that was only once usually she is sitting out on the deck and her nose starts pouring blood     10. PREGNANCY: \"Is there any chance you are pregnant? \" \"When was your last menstrual period? \"        Na    Protocols used: NOSEBLEED-ADULT-AH    Patient called pre-service [facility] with red flag complaint. Brief description of triage: nose bleeds     Triage indicates for patient to see pcp     Care advice provided, patient verbalizes understanding; denies any other questions or concerns; instructed to call back for any new or worsening symptoms.     Writer provided warm transfer to [facility] for appointment scheduling. Attention Provider: Thank you for allowing me to participate in the care of your patient. The patient was connected to triage in response to information provided to the E[facility]. Please do not respond through this encounter as the response is not directed to a shared pool.

## 2020-11-23 ENCOUNTER — HOSPITAL ENCOUNTER (EMERGENCY)
Age: 45
Discharge: HOME | End: 2020-11-23
Attending: EMERGENCY MEDICINE
Payer: COMMERCIAL

## 2020-11-23 ENCOUNTER — TELEPHONE (OUTPATIENT)
Dept: INTERNAL MEDICINE CLINIC | Age: 45
End: 2020-11-23

## 2020-11-23 VITALS
OXYGEN SATURATION: 98 % | WEIGHT: 267 LBS | BODY MASS INDEX: 49.13 KG/M2 | TEMPERATURE: 98 F | RESPIRATION RATE: 16 BRPM | SYSTOLIC BLOOD PRESSURE: 162 MMHG | HEIGHT: 62 IN | DIASTOLIC BLOOD PRESSURE: 54 MMHG | HEART RATE: 80 BPM

## 2020-11-23 LAB
A/G RATIO: 1.4 (ref 1.1–2.2)
ALBUMIN SERPL-MCNC: 3.9 G/DL (ref 3.4–5)
ALP BLD-CCNC: 96 U/L (ref 40–129)
ALT SERPL-CCNC: 14 U/L (ref 10–40)
ANION GAP SERPL CALCULATED.3IONS-SCNC: 8 MMOL/L (ref 3–16)
AST SERPL-CCNC: 14 U/L (ref 15–37)
BASOPHILS ABSOLUTE: 0.1 K/UL (ref 0–0.2)
BASOPHILS RELATIVE PERCENT: 0.7 %
BILIRUB SERPL-MCNC: <0.2 MG/DL (ref 0–1)
BUN BLDV-MCNC: 6 MG/DL (ref 7–20)
CALCIUM SERPL-MCNC: 9.2 MG/DL (ref 8.3–10.6)
CHLORIDE BLD-SCNC: 95 MMOL/L (ref 99–110)
CO2: 25 MMOL/L (ref 21–32)
CREAT SERPL-MCNC: <0.5 MG/DL (ref 0.6–1.1)
EOSINOPHILS ABSOLUTE: 0.1 K/UL (ref 0–0.6)
EOSINOPHILS RELATIVE PERCENT: 1.4 %
GFR AFRICAN AMERICAN: >60
GFR NON-AFRICAN AMERICAN: >60
GLOBULIN: 2.7 G/DL
GLUCOSE BLD-MCNC: 110 MG/DL (ref 70–99)
HCT VFR BLD CALC: 45.1 % (ref 36–48)
HEMOGLOBIN: 15.8 G/DL (ref 12–16)
LYMPHOCYTES ABSOLUTE: 2.7 K/UL (ref 1–5.1)
LYMPHOCYTES RELATIVE PERCENT: 31.1 %
MCH RBC QN AUTO: 33.1 PG (ref 26–34)
MCHC RBC AUTO-ENTMCNC: 34.9 G/DL (ref 31–36)
MCV RBC AUTO: 94.7 FL (ref 80–100)
MONOCYTES ABSOLUTE: 0.6 K/UL (ref 0–1.3)
MONOCYTES RELATIVE PERCENT: 7 %
NEUTROPHILS ABSOLUTE: 5.1 K/UL (ref 1.7–7.7)
NEUTROPHILS RELATIVE PERCENT: 59.8 %
PDW BLD-RTO: 13.3 % (ref 12.4–15.4)
PLATELET # BLD: 186 K/UL (ref 135–450)
PMV BLD AUTO: 8.8 FL (ref 5–10.5)
POTASSIUM REFLEX MAGNESIUM: 4.6 MMOL/L (ref 3.5–5.1)
RBC # BLD: 4.77 M/UL (ref 4–5.2)
SODIUM BLD-SCNC: 128 MMOL/L (ref 136–145)
TOTAL PROTEIN: 6.6 G/DL (ref 6.4–8.2)
WBC # BLD: 8.5 K/UL (ref 4–11)

## 2020-11-23 PROCEDURE — 85025 COMPLETE CBC W/AUTO DIFF WBC: CPT

## 2020-11-23 PROCEDURE — 2580000003 HC RX 258: Performed by: EMERGENCY MEDICINE

## 2020-11-23 PROCEDURE — 80053 COMPREHEN METABOLIC PANEL: CPT

## 2020-11-23 PROCEDURE — 99283 EMERGENCY DEPT VISIT LOW MDM: CPT

## 2020-11-23 RX ORDER — OXYMETAZOLINE HYDROCHLORIDE 0.05 G/100ML
2 SPRAY NASAL ONCE
Status: DISCONTINUED | OUTPATIENT
Start: 2020-11-23 | End: 2020-11-23 | Stop reason: HOSPADM

## 2020-11-23 RX ORDER — 0.9 % SODIUM CHLORIDE 0.9 %
1000 INTRAVENOUS SOLUTION INTRAVENOUS ONCE
Status: COMPLETED | OUTPATIENT
Start: 2020-11-23 | End: 2020-11-23

## 2020-11-23 RX ADMIN — SODIUM CHLORIDE 1000 ML: 9 INJECTION, SOLUTION INTRAVENOUS at 16:53

## 2020-11-23 ASSESSMENT — ENCOUNTER SYMPTOMS
WHEEZING: 0
FACIAL SWELLING: 0
CONSTIPATION: 0
VOMITING: 0
VOICE CHANGE: 0
DIARRHEA: 0
ABDOMINAL PAIN: 0
SINUS PAIN: 0
BACK PAIN: 0
RHINORRHEA: 0
STRIDOR: 0
COLOR CHANGE: 0
TROUBLE SWALLOWING: 0
NAUSEA: 0
SHORTNESS OF BREATH: 0
SINUS PRESSURE: 0
ABDOMINAL DISTENTION: 0
SORE THROAT: 0
COUGH: 0

## 2020-11-23 NOTE — ED NOTES
Iv inserted. Medicated per orders.  Pt given warm blanket at request.     Geoff Dolan RN  11/23/20 [id number]

## 2020-11-23 NOTE — ED NOTES
Pt discharged in stable condition, VSS, no signs of distress, discharge instructions and meds reviewed. Pt verbalizes understanding or concerns unaddressed.         Bhupinder Holbrook RN  11/23/20 [id number]
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
## 2020-11-23 NOTE — ED NOTES
Bed: 01  Expected date:   Expected time:   Means of arrival: Walk In  Comments:     Efe Gar RN  11/23/20 9100

## 2020-11-23 NOTE — ED PROVIDER NOTES
I independently performed a history and physical on [address]. All diagnostic, treatment, and disposition decisions were made by myself in conjunction with the advanced practice provider. I have participated in the medical decision making and directed the treatment plan and disposition of the patient. For further details of Veronica Zepeda's emergency department encounter, please see the advanced practice provider's documentation. CHIEF COMPLAINT  Chief Complaint   Patient presents with    Epistaxis     pt states has had 19 nosebleeds since Friday- states typically last for 10 minutes. - none currently      Briefly, Priscila S [address] Ge is a 54 y.o. female  who presents to the ED complaining of recurrent nostril epistaxis - reports 19 of them that are self-limited at home and 17x R sided and 2x L sided. Reports no anticoagulation. No trauma. No lightheadedness. They usually last 10 minutes or so and she packs her nose at home and then gets control of it. Ongoing issue since Friday. No CP cough or SOB. No fevers. FOCUSED PHYSICAL EXAMINATION  BP (!) 162/54   Pulse 76   Temp 98 °F (36.7 °C) (Oral)   Resp 22   Ht 5' 2\" (1.575 m)   Wt 267 lb (121.1 kg)   SpO2 97%   BMI 48.83 kg/m²    Focused physical examination notable for no acute distress, well-appearing, well-nourished, normal speech and mentation without obvious facial droop, no obvious rash. No obvious cranial nerve deficits on my initial exam. Oropharynx clear. R side of septum of nose has a small scab on it but no active bleeding, visualized vessel, hematoma, or deformity. L nare and septum are normal.  RRR CTAB. MDM:  ED course was notable for epistaxis, recurrent but not actively bleeding. Afrin and nose clamp given to pt with instructions on how to use at home for epistaxis control if it recurs. Hgb stable. Incidental low sodium -- can be f/u as outpt and IVF given here.   HTN addressed, advised better control of this with her PCP as this may contribute. Additionally, smoking cessation advised. ENT referral.  Hydration of nares advised with vaseline or saline spray. During the patient's ED course, the patient was given:  Medications   oxymetazoline (AFRIN) 0.05 % nasal spray 2 spray (has no administration in time range)   0.9 % sodium chloride IV bolus 1,000 mL (1,000 mLs Intravenous New Bag 11/23/20 7308)        CLINICAL IMPRESSION  1. Epistaxis    2. Essential hypertension    3. Cigarette smoker    4. Hyponatremia        DISPOSITION  Palma Hernadnez was discharged to home in stable condition. I have discussed the findings of today's workup with the patient and addressed the patient's questions and concerns. Important warning signs as well as new or worsening symptoms which would necessitate immediate return to the ED were discussed. The plan is to discharge from the ED at this time, and the patient is in stable condition. The patient acknowledged understanding is agreeable with this plan. Follow-up with:  Chuy Osuna, APRN - CNP  [address] [place] [address]  [phone]    Schedule an appointment as soon as possible for a visit in 1 week  For symptom re-evaluation of your sodium and blood pressure    [facility] Emergency Department  [address] [facility]  [phone]  Go to   If symptoms worsen    [address] [facility] [phone]    Call in 1 day  For symptom re-evaluation      This chart was created using Dragon dictation software. Efforts were made by me to ensure accuracy, however some errors may be present due to limitations of this technology.             Duran Woods MD  11/23/20 4652

## 2020-11-23 NOTE — TELEPHONE ENCOUNTER
Pt sent from nurse triage for same day appt. She has had 19 nose bleeds since Friday, 11/20/2020. I advised pt to go to ER. She may have an actively bleeding nasal polyp that may require treatment not available in the office. Pt agreed and will go today.

## 2020-11-25 ENCOUNTER — OFFICE VISIT (OUTPATIENT)
Dept: ENT CLINIC | Age: 45
End: 2020-11-25
Payer: COMMERCIAL

## 2020-11-25 VITALS
OXYGEN SATURATION: 97 % | DIASTOLIC BLOOD PRESSURE: 87 MMHG | TEMPERATURE: 96.5 F | HEIGHT: 62 IN | SYSTOLIC BLOOD PRESSURE: 140 MMHG | RESPIRATION RATE: 16 BRPM | WEIGHT: 267 LBS | BODY MASS INDEX: 49.13 KG/M2 | HEART RATE: 83 BPM

## 2020-11-25 PROCEDURE — 99214 OFFICE O/P EST MOD 30 MIN: CPT | Performed by: OTOLARYNGOLOGY

## 2020-11-25 RX ORDER — OXYMETAZOLINE HYDROCHLORIDE 0.05 G/100ML
2 SPRAY NASAL 2 TIMES DAILY
COMMUNITY
End: 2020-12-10 | Stop reason: ALTCHOICE
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
## 2020-11-25 NOTE — PATIENT INSTRUCTIONS
NASAL CARE FOR NOSEBLEED      Do not pull crusts out of nose, refrain from \"nose-picking. \"    You may blow your nose gently, but do not move your nose around when blowing. Over the next ten days, do the followin. Use a 12 hour decongestant spray, such as 0.05% oxymetazoline nasal spray (eg. Afrin). This is available \"over the counter\" at your pharmacy. [place] two sprays in each nostril three times a day for three days, then two times a day for 2 days, and then stop for two days, and then repeat the cycle once. 2. Use a saline (salt water) nasal spray/mist, (eg. Ocean nasal saline mist, AYR nasal spray). This is available \"over the counter\" at your pharmacy. [place] two sprays in each nostril every two to three hours while you are awake, for two weeks. 3. Use a bedside humidifier, at night, while sleeping. 4. Use polysporin ointment in each nostril at bedtime. This is available \"over the counter\" at your pharmacy. [place] the ointment onto the lateral wall of each nostril, gently, with a Q-tip applicator, then gently pinch and rub the nostrils as instructed. 5. If bleeding occurs, pinch your nostrils together in the soft part of the nose and push gently toward your face, and hold for ten minutes. If bleeding persists, then repeat. If bleeding still persists, dampen a cotton ball with Afrin and insert into your nostril and repeat the previous pinch maneuver. If nose bleed remains uncontrolled, go to emergency room. For future prevention of nose bleeds, use a saline (salt water) nasal spray/mist, (eg. Ocean nasal saline mist, AYR nasal spray). This is available \"over the counter\" at your pharmacy. [place] two sprays in each nostril two to three times daily, while you are awake.

## 2020-11-30 RX ORDER — OXCARBAZEPINE 600 MG/1
TABLET, FILM COATED ORAL
Qty: 60 TABLET | Refills: 0 | Status: SHIPPED | OUTPATIENT
Start: 2020-11-30 | End: 2020-12-10 | Stop reason: SDUPTHER

## 2020-12-02 RX ORDER — RISPERIDONE 4 MG/1
TABLET, FILM COATED ORAL
Qty: 30 TABLET | Refills: 0 | OUTPATIENT
Start: 2020-12-02

## 2020-12-02 RX ORDER — SIMVASTATIN 20 MG
TABLET ORAL
Qty: 90 TABLET | Refills: 0 | OUTPATIENT
Start: 2020-12-02

## 2020-12-07 ENCOUNTER — OFFICE VISIT (OUTPATIENT)
Dept: ENT CLINIC | Age: 45
End: 2020-12-07
Payer: COMMERCIAL

## 2020-12-07 VITALS
DIASTOLIC BLOOD PRESSURE: 85 MMHG | SYSTOLIC BLOOD PRESSURE: 138 MMHG | BODY MASS INDEX: 48.1 KG/M2 | TEMPERATURE: 97.7 F | WEIGHT: 263 LBS | HEART RATE: 92 BPM

## 2020-12-07 PROCEDURE — 99213 OFFICE O/P EST LOW 20 MIN: CPT | Performed by: OTOLARYNGOLOGY

## 2020-12-07 NOTE — PROGRESS NOTES
[facility]         PCP:  Matt Aquino, APRN - CNP      CHIEF COMPLAINT:  Chief Complaint   Patient presents with    Epistaxis     has only had one in the last 10 days maybe due to cough, no other concerns        HISTORY OF PRESENT ILLNESS:   Zeus Soliz is a 54 y.o. female here for recheck and follow-up of a problem located in the nose. She stated that she has had only 1 episode of nosebleed in the last 10 days, which occurred two day after last visit and none since. She feels that may have been due to a cough. That nosebleed stopped after about 5 min       REVIEW OF SYSTEMS:   EARS, NOSE, THROAT:  (+) nasal dyspnea, \"in general, I don't normally breath though my nose, I'm a mouth breather. \"  Denied otorrhea, otalgia, hearing loss, tinnitus, rhinorrhea, chronic hoarseness longer than 6 weeks, current hoarseness, and sore throat. EXAMINATION:      Vitals:    12/07/20 0901   BP: 138/85   Site: Left Lower Arm   Position: Sitting   Cuff Size: Large Adult   Pulse: 92   Temp: 97.7 °F (36.5 °C)   Weight: 263 lb (119.3 kg)        VITALS SIGNS were reviewed. GENERAL APPEARANCE:  Well developed, well nourished, no apparent distress, no apparent deformities. ABILITY TO COMMUNICATE/QUALITY OF VOICE:  Communicated without difficulty. Normal voice. No hot potato voice. No hoarseness. EXTERNAL EAR/NOSE:  Normal pinnae and mastoids. Normal external nose. EARS, OTOSCOPY: The TMs and EACs appeared to be normal bilaterally. (+) NOSE:  The nasal septum was deviated to the left. There was no active bleeding and no intranasal lesions detected. The inferior turbinates were normal.  The nasal mucosa and secretions were normal.  No pus or polyps were seen. OROPHARYNX/ORAL CAVITY:  Oral mucosa, hard and soft palates, tongue, and pharynx were normal.      NECK:  No masses or tenderness.  The laryngeal cartilages and hyoid bone were normal, with normal laryngeal crepitus. No abnormal appearance, asymmetry or abnormal tracheal position. PALPATION OF LYMPH NODES, CERVICAL, FACIAL AND SUPRACLAVICULAR:  No lymphadenopathy. EYES:  Normal primary gaze alignment. Sclera and conjunctiva clear bilaterally. SALIVARY GLANDS:  The parotid, submandibular, and sublingual glands were normal bilaterally. SINUSES:  The maxillary and frontal sinuses were nontender, bilaterally. LIPS, TEETH, AND GUMS:   Normal.           IMPRESSION / Rollo Dafne / Shawn Halsted:       King Matilde was seen today for epistaxis. Diagnoses and all orders for this visit:    Epistaxis           RECOMMENDATIONS / PLAN:    Return if nose bleeds recur or, for any further ENT or sinus problems or symptoms. Patient Instructions     NASAL CARE FOR NOSEBLEED      For prevention of nose bleeds, use a saline (salt water) nasal spray/mist, (eg. [place] nasal saline mist, AYR nasal spray). This is available \"over the counter\" at your pharmacy. [place] two sprays in each nostril two to three times daily, and as needed for dryness or excessive mucus crusting, while you are awake. Use polysporin ointment in each nostril at bedtime. This is available \"over the counter\" at your pharmacy. [place] the ointment onto the lateral wall of each nostril, gently, with a Q-tip applicator, then gently pinch and rub the nostrils as instructed. Do not pull crusts out of nose, refrain from \"nose-picking. \"    You may blow your nose gently, but do not move your nose around when blowing. If you have begin to have recurrent nosebleeds again, do the followin. Use a 12 hour decongestant spray, such as 0.05% oxymetazoline nasal spray (eg. Afrin). This is available \"over the counter\" at your pharmacy. [place] two sprays in each nostril three times a day for three days, then two times a day for 2 days, and then stop for two days, and then repeat the cycle once.         2. Use a saline

## 2020-12-10 ENCOUNTER — OFFICE VISIT (OUTPATIENT)
Dept: INTERNAL MEDICINE CLINIC | Age: 45
End: 2020-12-10
Payer: COMMERCIAL

## 2020-12-10 VITALS — DIASTOLIC BLOOD PRESSURE: 80 MMHG | BODY MASS INDEX: 47.19 KG/M2 | WEIGHT: 258 LBS | SYSTOLIC BLOOD PRESSURE: 120 MMHG

## 2020-12-10 PROCEDURE — 99214 OFFICE O/P EST MOD 30 MIN: CPT | Performed by: NURSE PRACTITIONER

## 2020-12-10 PROCEDURE — 90686 IIV4 VACC NO PRSV 0.5 ML IM: CPT | Performed by: NURSE PRACTITIONER

## 2020-12-10 PROCEDURE — 90471 IMMUNIZATION ADMIN: CPT | Performed by: NURSE PRACTITIONER

## 2020-12-10 RX ORDER — CHLORTHALIDONE 25 MG/1
25 TABLET ORAL DAILY
Qty: 90 TABLET | Refills: 1 | Status: SHIPPED | OUTPATIENT
Start: 2020-12-10 | End: 2021-01-06 | Stop reason: SDUPTHER

## 2020-12-10 RX ORDER — OMEPRAZOLE 20 MG/1
CAPSULE, DELAYED RELEASE ORAL
Qty: 120 CAPSULE | Refills: 0 | Status: SHIPPED | OUTPATIENT
Start: 2020-12-10 | End: 2021-01-06 | Stop reason: SDUPTHER

## 2020-12-10 RX ORDER — VARENICLINE TARTRATE 1 MG/1
1 TABLET, FILM COATED ORAL 2 TIMES DAILY
Qty: 60 TABLET | Refills: 2 | Status: SHIPPED | OUTPATIENT
Start: 2020-12-10 | End: 2021-01-20 | Stop reason: ALTCHOICE

## 2020-12-10 RX ORDER — OXCARBAZEPINE 600 MG/1
TABLET, FILM COATED ORAL
Qty: 180 TABLET | Refills: 0 | Status: SHIPPED | OUTPATIENT
Start: 2020-12-10 | End: 2021-01-06 | Stop reason: SDUPTHER

## 2020-12-10 RX ORDER — OXYBUTYNIN CHLORIDE 10 MG/1
TABLET, EXTENDED RELEASE ORAL
Qty: 90 TABLET | Refills: 0 | Status: SHIPPED | OUTPATIENT
Start: 2020-12-10 | End: 2021-01-06 | Stop reason: SDUPTHER

## 2020-12-10 RX ORDER — VARENICLINE TARTRATE
KIT
Qty: 1 BOX | Refills: 0 | Status: SHIPPED | OUTPATIENT
Start: 2020-12-10 | End: 2021-01-20 | Stop reason: ALTCHOICE

## 2020-12-10 RX ORDER — SIMVASTATIN 20 MG
TABLET ORAL
Qty: 90 TABLET | Refills: 0 | Status: SHIPPED | OUTPATIENT
Start: 2020-12-10 | End: 2021-01-06 | Stop reason: SDUPTHER

## 2020-12-10 RX ORDER — ALBUTEROL SULFATE 90 UG/1
2 AEROSOL, METERED RESPIRATORY (INHALATION) EVERY 6 HOURS PRN
Qty: 1 INHALER | Refills: 3 | Status: SHIPPED | OUTPATIENT
Start: 2020-12-10 | End: 2021-01-06 | Stop reason: SDUPTHER

## 2020-12-10 RX ORDER — ERGOCALCIFEROL 1.25 MG/1
CAPSULE ORAL
Qty: 12 CAPSULE | Refills: 1 | Status: SHIPPED | OUTPATIENT
Start: 2020-12-10 | End: 2021-01-06 | Stop reason: SDUPTHER

## 2020-12-10 RX ORDER — RISPERIDONE 4 MG/1
TABLET, FILM COATED ORAL
Qty: 90 TABLET | Refills: 0 | Status: SHIPPED | OUTPATIENT
Start: 2020-12-10 | End: 2021-01-06 | Stop reason: SDUPTHER

## 2020-12-10 RX ORDER — CITALOPRAM 40 MG/1
TABLET ORAL
Qty: 90 TABLET | Refills: 0 | Status: SHIPPED | OUTPATIENT
Start: 2020-12-10 | End: 2021-01-06 | Stop reason: SDUPTHER

## 2020-12-10 RX ORDER — TRIAMCINOLONE ACETONIDE 0.25 MG/G
OINTMENT TOPICAL
Qty: 80 G | Refills: 0 | Status: SHIPPED | OUTPATIENT
Start: 2020-12-10 | End: 2021-01-06 | Stop reason: SDUPTHER

## 2020-12-10 ASSESSMENT — ENCOUNTER SYMPTOMS
GASTROINTESTINAL NEGATIVE: 1
ALLERGIC/IMMUNOLOGIC NEGATIVE: 1
EYES NEGATIVE: 1
RESPIRATORY NEGATIVE: 1

## 2020-12-10 ASSESSMENT — PATIENT HEALTH QUESTIONNAIRE - PHQ9
SUM OF ALL RESPONSES TO PHQ QUESTIONS 1-9: 0
1. LITTLE INTEREST OR PLEASURE IN DOING THINGS: 0
SUM OF ALL RESPONSES TO PHQ9 QUESTIONS 1 & 2: 0
2. FEELING DOWN, DEPRESSED OR HOPELESS: 0

## 2020-12-10 NOTE — PATIENT INSTRUCTIONS
Stop smoking, take chantix as directed.   Have car detailed to remove cigarette smoke  Resume water pill every am  Continue to exercise
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
## 2020-12-11 PROBLEM — R04.0 EPISTAXIS: Status: ACTIVE | Noted: 2020-12-11

## 2020-12-28 ENCOUNTER — TELEPHONE (OUTPATIENT)
Dept: INTERNAL MEDICINE CLINIC | Age: 45
End: 2020-12-28

## 2020-12-28 DIAGNOSIS — Z13.1 SCREENING FOR DIABETES MELLITUS: ICD-10-CM

## 2020-12-28 DIAGNOSIS — E78.2 MIXED HYPERLIPIDEMIA: ICD-10-CM

## 2020-12-28 DIAGNOSIS — I10 ESSENTIAL HYPERTENSION: ICD-10-CM

## 2020-12-28 LAB
CHOLESTEROL, FASTING: 175 MG/DL (ref 0–199)
HDLC SERPL-MCNC: 60 MG/DL (ref 40–60)
LDL CHOLESTEROL CALCULATED: 93 MG/DL
REASON FOR REJECTION: NORMAL
REJECTED TEST: NORMAL
TRIGLYCERIDE, FASTING: 110 MG/DL (ref 0–150)
VLDLC SERPL CALC-MCNC: 22 MG/DL

## 2020-12-28 NOTE — TELEPHONE ENCOUNTER
Dread Cronin w/[facility] calling to report a specimen rejection for the A1c drawn today. Tube was unlabeled.

## 2020-12-29 RX ORDER — LISINOPRIL 20 MG/1
TABLET ORAL
Qty: 90 TABLET | Refills: 0 | Status: SHIPPED | OUTPATIENT
Start: 2020-12-29 | End: 2021-01-06 | Stop reason: SDUPTHER

## 2020-12-31 LAB
ESTIMATED AVERAGE GLUCOSE: 119.8 MG/DL
HBA1C MFR BLD: 5.8 %

## 2021-01-06 DIAGNOSIS — I10 ESSENTIAL HYPERTENSION: ICD-10-CM

## 2021-01-06 DIAGNOSIS — R06.2 WHEEZING ON INSPIRATION: ICD-10-CM

## 2021-01-06 DIAGNOSIS — E78.2 MIXED HYPERLIPIDEMIA: ICD-10-CM

## 2021-01-06 DIAGNOSIS — E55.9 VITAMIN D DEFICIENCY: ICD-10-CM

## 2021-01-06 DIAGNOSIS — R32 INCONTINENCE IN FEMALE: ICD-10-CM

## 2021-01-06 DIAGNOSIS — L30.9 ECZEMA, UNSPECIFIED TYPE: ICD-10-CM

## 2021-01-06 RX ORDER — OMEPRAZOLE 20 MG/1
CAPSULE, DELAYED RELEASE ORAL
Qty: 120 CAPSULE | Refills: 0 | Status: SHIPPED | OUTPATIENT
Start: 2021-01-06 | End: 2021-03-10 | Stop reason: SDUPTHER

## 2021-01-06 RX ORDER — OXCARBAZEPINE 600 MG/1
TABLET, FILM COATED ORAL
Qty: 180 TABLET | Refills: 0 | Status: SHIPPED | OUTPATIENT
Start: 2021-01-06 | End: 2021-03-10 | Stop reason: SDUPTHER

## 2021-01-06 RX ORDER — SIMVASTATIN 20 MG
TABLET ORAL
Qty: 90 TABLET | Refills: 0 | Status: SHIPPED | OUTPATIENT
Start: 2021-01-06 | End: 2021-03-10 | Stop reason: SDUPTHER

## 2021-01-06 RX ORDER — CITALOPRAM 40 MG/1
TABLET ORAL
Qty: 90 TABLET | Refills: 0 | Status: SHIPPED | OUTPATIENT
Start: 2021-01-06 | End: 2021-02-01

## 2021-01-06 RX ORDER — TRIAMCINOLONE ACETONIDE 0.25 MG/G
OINTMENT TOPICAL
Qty: 80 G | Refills: 0 | Status: SHIPPED | OUTPATIENT
Start: 2021-01-06 | End: 2021-01-20 | Stop reason: ALTCHOICE

## 2021-01-06 RX ORDER — RISPERIDONE 4 MG/1
TABLET, FILM COATED ORAL
Qty: 90 TABLET | Refills: 0 | Status: SHIPPED | OUTPATIENT
Start: 2021-01-06 | End: 2021-03-10 | Stop reason: SDUPTHER

## 2021-01-06 RX ORDER — LISINOPRIL 20 MG/1
TABLET ORAL
Qty: 90 TABLET | Refills: 0 | Status: SHIPPED | OUTPATIENT
Start: 2021-01-06 | End: 2021-03-10 | Stop reason: SDUPTHER

## 2021-01-06 RX ORDER — ERGOCALCIFEROL 1.25 MG/1
CAPSULE ORAL
Qty: 12 CAPSULE | Refills: 1 | Status: SHIPPED | OUTPATIENT
Start: 2021-01-06 | End: 2021-07-12

## 2021-01-06 RX ORDER — ALBUTEROL SULFATE 90 UG/1
2 AEROSOL, METERED RESPIRATORY (INHALATION) EVERY 6 HOURS PRN
Qty: 1 INHALER | Refills: 3 | Status: SHIPPED | OUTPATIENT
Start: 2021-01-06 | End: 2021-03-19

## 2021-01-06 RX ORDER — CHLORTHALIDONE 25 MG/1
25 TABLET ORAL DAILY
Qty: 90 TABLET | Refills: 1 | Status: ON HOLD | OUTPATIENT
Start: 2021-01-06 | End: 2021-01-23 | Stop reason: HOSPADM

## 2021-01-06 RX ORDER — OXYBUTYNIN CHLORIDE 10 MG/1
TABLET, EXTENDED RELEASE ORAL
Qty: 90 TABLET | Refills: 0 | Status: SHIPPED | OUTPATIENT
Start: 2021-01-06 | End: 2021-03-10 | Stop reason: SDUPTHER

## 2021-01-20 ENCOUNTER — APPOINTMENT (OUTPATIENT)
Dept: GENERAL RADIOLOGY | Age: 46
DRG: 493 | End: 2021-01-20
Payer: COMMERCIAL

## 2021-01-20 ENCOUNTER — HOSPITAL ENCOUNTER (INPATIENT)
Age: 46
LOS: 3 days | Discharge: HOME | DRG: 493 | End: 2021-01-23
Attending: EMERGENCY MEDICINE | Admitting: INTERNAL MEDICINE
Payer: COMMERCIAL

## 2021-01-20 ENCOUNTER — APPOINTMENT (OUTPATIENT)
Dept: CT IMAGING | Age: 46
DRG: 493 | End: 2021-01-20
Payer: COMMERCIAL

## 2021-01-20 DIAGNOSIS — R29.6 MULTIPLE FALLS: Primary | ICD-10-CM

## 2021-01-20 DIAGNOSIS — S42.292A OTHER CLOSED DISPLACED FRACTURE OF PROXIMAL END OF LEFT HUMERUS, INITIAL ENCOUNTER: ICD-10-CM

## 2021-01-20 DIAGNOSIS — E87.1 HYPONATREMIA: ICD-10-CM

## 2021-01-20 LAB
A/G RATIO: 1.4 (ref 1.1–2.2)
ALBUMIN SERPL-MCNC: 4.6 G/DL (ref 3.4–5)
ALP BLD-CCNC: 104 U/L (ref 40–129)
ALT SERPL-CCNC: 20 U/L (ref 10–40)
ANION GAP SERPL CALCULATED.3IONS-SCNC: 12 MMOL/L (ref 3–16)
AST SERPL-CCNC: 18 U/L (ref 15–37)
BASOPHILS ABSOLUTE: 0.1 K/UL (ref 0–0.2)
BASOPHILS RELATIVE PERCENT: 0.6 %
BILIRUB SERPL-MCNC: 0.4 MG/DL (ref 0–1)
BILIRUBIN URINE: NEGATIVE
BLOOD, URINE: ABNORMAL
BUN BLDV-MCNC: 8 MG/DL (ref 7–20)
CALCIUM SERPL-MCNC: 10.1 MG/DL (ref 8.3–10.6)
CHLORIDE BLD-SCNC: 85 MMOL/L (ref 99–110)
CLARITY: CLEAR
CO2: 26 MMOL/L (ref 21–32)
COLOR: YELLOW
CREAT SERPL-MCNC: <0.5 MG/DL (ref 0.6–1.1)
EKG ATRIAL RATE: 85 BPM
EKG DIAGNOSIS: NORMAL
EKG P AXIS: 52 DEGREES
EKG P-R INTERVAL: 174 MS
EKG Q-T INTERVAL: 396 MS
EKG QRS DURATION: 84 MS
EKG QTC CALCULATION (BAZETT): 471 MS
EKG R AXIS: -3 DEGREES
EKG T AXIS: 45 DEGREES
EKG VENTRICULAR RATE: 85 BPM
EOSINOPHILS ABSOLUTE: 0 K/UL (ref 0–0.6)
EOSINOPHILS RELATIVE PERCENT: 0.1 %
EPITHELIAL CELLS, UA: 1 /HPF (ref 0–5)
GFR AFRICAN AMERICAN: >60
GFR NON-AFRICAN AMERICAN: >60
GLOBULIN: 3.4 G/DL
GLUCOSE BLD-MCNC: 130 MG/DL (ref 70–99)
GLUCOSE URINE: NEGATIVE MG/DL
HCT VFR BLD CALC: 48.6 % (ref 36–48)
HEMOGLOBIN: 16.8 G/DL (ref 12–16)
HYALINE CASTS: 1 /LPF (ref 0–8)
KETONES, URINE: NEGATIVE MG/DL
LEUKOCYTE ESTERASE, URINE: NEGATIVE
LYMPHOCYTES ABSOLUTE: 1.5 K/UL (ref 1–5.1)
LYMPHOCYTES RELATIVE PERCENT: 8.5 %
MCH RBC QN AUTO: 31.7 PG (ref 26–34)
MCHC RBC AUTO-ENTMCNC: 34.5 G/DL (ref 31–36)
MCV RBC AUTO: 91.9 FL (ref 80–100)
MICROSCOPIC EXAMINATION: YES
MONOCYTES ABSOLUTE: 0.7 K/UL (ref 0–1.3)
MONOCYTES RELATIVE PERCENT: 4.1 %
NEUTROPHILS ABSOLUTE: 14.9 K/UL (ref 1.7–7.7)
NEUTROPHILS RELATIVE PERCENT: 86.7 %
NITRITE, URINE: NEGATIVE
PDW BLD-RTO: 13.1 % (ref 12.4–15.4)
PH UA: 7 (ref 5–8)
PLATELET # BLD: 287 K/UL (ref 135–450)
PMV BLD AUTO: 7.9 FL (ref 5–10.5)
POTASSIUM REFLEX MAGNESIUM: 4.2 MMOL/L (ref 3.5–5.1)
PRO-BNP: 110 PG/ML (ref 0–124)
PROTEIN UA: NEGATIVE MG/DL
RBC # BLD: 5.29 M/UL (ref 4–5.2)
RBC UA: 4 /HPF (ref 0–4)
SARS-COV-2, NAAT: NOT DETECTED
SODIUM BLD-SCNC: 123 MMOL/L (ref 136–145)
SODIUM BLD-SCNC: 123 MMOL/L (ref 136–145)
SPECIFIC GRAVITY UA: 1.01 (ref 1–1.03)
TOTAL PROTEIN: 8 G/DL (ref 6.4–8.2)
TROPONIN: <0.01 NG/ML
URIC ACID, SERUM: 3.5 MG/DL (ref 2.6–6)
URINE REFLEX TO CULTURE: ABNORMAL
URINE TYPE: ABNORMAL
UROBILINOGEN, URINE: 0.2 E.U./DL
WBC # BLD: 17.2 K/UL (ref 4–11)
WBC UA: 1 /HPF (ref 0–5)

## 2021-01-20 PROCEDURE — U0002 COVID-19 LAB TEST NON-CDC: HCPCS

## 2021-01-20 PROCEDURE — 6370000000 HC RX 637 (ALT 250 FOR IP): Performed by: EMERGENCY MEDICINE

## 2021-01-20 PROCEDURE — 99254 IP/OBS CNSLTJ NEW/EST MOD 60: CPT | Performed by: NURSE PRACTITIONER

## 2021-01-20 PROCEDURE — 84133 ASSAY OF URINE POTASSIUM: CPT

## 2021-01-20 PROCEDURE — 96374 THER/PROPH/DIAG INJ IV PUSH: CPT

## 2021-01-20 PROCEDURE — 6370000000 HC RX 637 (ALT 250 FOR IP): Performed by: INTERNAL MEDICINE

## 2021-01-20 PROCEDURE — 70450 CT HEAD/BRAIN W/O DYE: CPT

## 2021-01-20 PROCEDURE — 99283 EMERGENCY DEPT VISIT LOW MDM: CPT

## 2021-01-20 PROCEDURE — 6360000002 HC RX W HCPCS: Performed by: INTERNAL MEDICINE

## 2021-01-20 PROCEDURE — 83930 ASSAY OF BLOOD OSMOLALITY: CPT

## 2021-01-20 PROCEDURE — 2580000003 HC RX 258: Performed by: INTERNAL MEDICINE

## 2021-01-20 PROCEDURE — 83935 ASSAY OF URINE OSMOLALITY: CPT

## 2021-01-20 PROCEDURE — 84443 ASSAY THYROID STIM HORMONE: CPT

## 2021-01-20 PROCEDURE — 72125 CT NECK SPINE W/O DYE: CPT

## 2021-01-20 PROCEDURE — 93010 ELECTROCARDIOGRAM REPORT: CPT | Performed by: INTERNAL MEDICINE

## 2021-01-20 PROCEDURE — 36415 COLL VENOUS BLD VENIPUNCTURE: CPT

## 2021-01-20 PROCEDURE — 1200000000 HC SEMI PRIVATE

## 2021-01-20 PROCEDURE — 85025 COMPLETE CBC W/AUTO DIFF WBC: CPT

## 2021-01-20 PROCEDURE — 84484 ASSAY OF TROPONIN QUANT: CPT

## 2021-01-20 PROCEDURE — 84550 ASSAY OF BLOOD/URIC ACID: CPT

## 2021-01-20 PROCEDURE — 71045 X-RAY EXAM CHEST 1 VIEW: CPT

## 2021-01-20 PROCEDURE — 2580000003 HC RX 258: Performed by: PHYSICIAN ASSISTANT

## 2021-01-20 PROCEDURE — 82570 ASSAY OF URINE CREATININE: CPT

## 2021-01-20 PROCEDURE — 84295 ASSAY OF SERUM SODIUM: CPT

## 2021-01-20 PROCEDURE — 96375 TX/PRO/DX INJ NEW DRUG ADDON: CPT

## 2021-01-20 PROCEDURE — 73060 X-RAY EXAM OF HUMERUS: CPT

## 2021-01-20 PROCEDURE — 83880 ASSAY OF NATRIURETIC PEPTIDE: CPT

## 2021-01-20 PROCEDURE — 82436 ASSAY OF URINE CHLORIDE: CPT

## 2021-01-20 PROCEDURE — 94761 N-INVAS EAR/PLS OXIMETRY MLT: CPT

## 2021-01-20 PROCEDURE — 93005 ELECTROCARDIOGRAM TRACING: CPT | Performed by: PHYSICIAN ASSISTANT

## 2021-01-20 PROCEDURE — 80053 COMPREHEN METABOLIC PANEL: CPT

## 2021-01-20 PROCEDURE — 84300 ASSAY OF URINE SODIUM: CPT

## 2021-01-20 PROCEDURE — 6360000002 HC RX W HCPCS: Performed by: PHYSICIAN ASSISTANT

## 2021-01-20 PROCEDURE — 81001 URINALYSIS AUTO W/SCOPE: CPT

## 2021-01-20 RX ORDER — ACETAMINOPHEN 650 MG/1
650 SUPPOSITORY RECTAL EVERY 6 HOURS PRN
Status: DISCONTINUED | OUTPATIENT
Start: 2021-01-20 | End: 2021-01-23 | Stop reason: HOSPADM

## 2021-01-20 RX ORDER — MORPHINE SULFATE 2 MG/ML
2 INJECTION, SOLUTION INTRAMUSCULAR; INTRAVENOUS EVERY 4 HOURS PRN
Status: DISCONTINUED | OUTPATIENT
Start: 2021-01-20 | End: 2021-01-21

## 2021-01-20 RX ORDER — ONDANSETRON 2 MG/ML
4 INJECTION INTRAMUSCULAR; INTRAVENOUS EVERY 6 HOURS PRN
Status: DISCONTINUED | OUTPATIENT
Start: 2021-01-20 | End: 2021-01-23 | Stop reason: HOSPADM

## 2021-01-20 RX ORDER — ACETAMINOPHEN 325 MG/1
650 TABLET ORAL EVERY 6 HOURS PRN
Status: DISCONTINUED | OUTPATIENT
Start: 2021-01-20 | End: 2021-01-23 | Stop reason: HOSPADM

## 2021-01-20 RX ORDER — MORPHINE SULFATE 4 MG/ML
4 INJECTION, SOLUTION INTRAMUSCULAR; INTRAVENOUS ONCE
Status: COMPLETED | OUTPATIENT
Start: 2021-01-20 | End: 2021-01-20

## 2021-01-20 RX ORDER — MORPHINE SULFATE 4 MG/ML
4 INJECTION, SOLUTION INTRAMUSCULAR; INTRAVENOUS ONCE
Status: DISCONTINUED | OUTPATIENT
Start: 2021-01-20 | End: 2021-01-23 | Stop reason: HOSPADM

## 2021-01-20 RX ORDER — PANTOPRAZOLE SODIUM 40 MG/1
40 TABLET, DELAYED RELEASE ORAL
Status: DISCONTINUED | OUTPATIENT
Start: 2021-01-21 | End: 2021-01-23 | Stop reason: HOSPADM

## 2021-01-20 RX ORDER — ATORVASTATIN CALCIUM 10 MG/1
10 TABLET, FILM COATED ORAL NIGHTLY
Status: DISCONTINUED | OUTPATIENT
Start: 2021-01-20 | End: 2021-01-23 | Stop reason: HOSPADM

## 2021-01-20 RX ORDER — HYDROMORPHONE HYDROCHLORIDE 1 MG/ML
0.5 INJECTION, SOLUTION INTRAMUSCULAR; INTRAVENOUS; SUBCUTANEOUS ONCE
Status: COMPLETED | OUTPATIENT
Start: 2021-01-20 | End: 2021-01-20

## 2021-01-20 RX ORDER — ERGOCALCIFEROL 1.25 MG/1
50000 CAPSULE ORAL WEEKLY
Status: DISCONTINUED | OUTPATIENT
Start: 2021-01-24 | End: 2021-01-23 | Stop reason: HOSPADM

## 2021-01-20 RX ORDER — TRIAMCINOLONE ACETONIDE 0.25 MG/G
OINTMENT TOPICAL DAILY
COMMUNITY
End: 2021-03-10 | Stop reason: SDUPTHER

## 2021-01-20 RX ORDER — LISINOPRIL 10 MG/1
10 TABLET ORAL DAILY
Status: DISCONTINUED | OUTPATIENT
Start: 2021-01-20 | End: 2021-01-23 | Stop reason: HOSPADM

## 2021-01-20 RX ORDER — OXYCODONE HYDROCHLORIDE 5 MG/1
5 TABLET ORAL EVERY 6 HOURS PRN
Status: DISCONTINUED | OUTPATIENT
Start: 2021-01-20 | End: 2021-01-21 | Stop reason: DRUGHIGH

## 2021-01-20 RX ORDER — ALBUTEROL SULFATE 90 UG/1
2 AEROSOL, METERED RESPIRATORY (INHALATION) EVERY 6 HOURS PRN
Status: DISCONTINUED | OUTPATIENT
Start: 2021-01-20 | End: 2021-01-23 | Stop reason: HOSPADM

## 2021-01-20 RX ORDER — SODIUM CHLORIDE 0.9 % (FLUSH) 0.9 %
10 SYRINGE (ML) INJECTION PRN
Status: DISCONTINUED | OUTPATIENT
Start: 2021-01-20 | End: 2021-01-23 | Stop reason: HOSPADM

## 2021-01-20 RX ORDER — SODIUM CHLORIDE 0.9 % (FLUSH) 0.9 %
10 SYRINGE (ML) INJECTION EVERY 12 HOURS SCHEDULED
Status: DISCONTINUED | OUTPATIENT
Start: 2021-01-20 | End: 2021-01-23 | Stop reason: HOSPADM

## 2021-01-20 RX ORDER — ONDANSETRON 2 MG/ML
4 INJECTION INTRAMUSCULAR; INTRAVENOUS ONCE
Status: COMPLETED | OUTPATIENT
Start: 2021-01-20 | End: 2021-01-20

## 2021-01-20 RX ORDER — OXYBUTYNIN CHLORIDE 5 MG/1
10 TABLET, EXTENDED RELEASE ORAL NIGHTLY
Status: DISCONTINUED | OUTPATIENT
Start: 2021-01-20 | End: 2021-01-23 | Stop reason: HOSPADM

## 2021-01-20 RX ORDER — NICOTINE 21 MG/24HR
1 PATCH, TRANSDERMAL 24 HOURS TRANSDERMAL DAILY
Status: DISCONTINUED | OUTPATIENT
Start: 2021-01-20 | End: 2021-01-23 | Stop reason: HOSPADM

## 2021-01-20 RX ORDER — RISPERIDONE 1 MG/1
4 TABLET, FILM COATED ORAL NIGHTLY
Status: DISCONTINUED | OUTPATIENT
Start: 2021-01-20 | End: 2021-01-23 | Stop reason: HOSPADM

## 2021-01-20 RX ORDER — PROMETHAZINE HYDROCHLORIDE 25 MG/1
12.5 TABLET ORAL EVERY 6 HOURS PRN
Status: DISCONTINUED | OUTPATIENT
Start: 2021-01-20 | End: 2021-01-23 | Stop reason: HOSPADM

## 2021-01-20 RX ORDER — 0.9 % SODIUM CHLORIDE 0.9 %
1000 INTRAVENOUS SOLUTION INTRAVENOUS ONCE
Status: COMPLETED | OUTPATIENT
Start: 2021-01-20 | End: 2021-01-20

## 2021-01-20 RX ORDER — SODIUM CHLORIDE 9 MG/ML
INJECTION, SOLUTION INTRAVENOUS CONTINUOUS
Status: DISCONTINUED | OUTPATIENT
Start: 2021-01-20 | End: 2021-01-21 | Stop reason: ALTCHOICE

## 2021-01-20 RX ORDER — HYDRALAZINE HYDROCHLORIDE 20 MG/ML
5 INJECTION INTRAMUSCULAR; INTRAVENOUS EVERY 6 HOURS PRN
Status: DISCONTINUED | OUTPATIENT
Start: 2021-01-20 | End: 2021-01-23 | Stop reason: HOSPADM

## 2021-01-20 RX ORDER — OXCARBAZEPINE 300 MG/1
600 TABLET, FILM COATED ORAL 2 TIMES DAILY
Status: DISCONTINUED | OUTPATIENT
Start: 2021-01-20 | End: 2021-01-23 | Stop reason: HOSPADM

## 2021-01-20 RX ORDER — POLYETHYLENE GLYCOL 3350 17 G/17G
17 POWDER, FOR SOLUTION ORAL DAILY PRN
Status: DISCONTINUED | OUTPATIENT
Start: 2021-01-20 | End: 2021-01-23 | Stop reason: HOSPADM

## 2021-01-20 RX ADMIN — HYDROMORPHONE HYDROCHLORIDE 0.5 MG: 1 INJECTION, SOLUTION INTRAMUSCULAR; INTRAVENOUS; SUBCUTANEOUS at 12:58

## 2021-01-20 RX ADMIN — RISPERIDONE 4 MG: 1 TABLET ORAL at 20:34

## 2021-01-20 RX ADMIN — MORPHINE SULFATE 4 MG: 4 INJECTION, SOLUTION INTRAMUSCULAR; INTRAVENOUS at 11:28

## 2021-01-20 RX ADMIN — HYDRALAZINE HYDROCHLORIDE 5 MG: 20 INJECTION INTRAMUSCULAR; INTRAVENOUS at 15:10

## 2021-01-20 RX ADMIN — Medication 10 ML: at 20:36

## 2021-01-20 RX ADMIN — OXCARBAZEPINE 600 MG: 300 TABLET, FILM COATED ORAL at 20:35

## 2021-01-20 RX ADMIN — OXYCODONE 5 MG: 5 TABLET ORAL at 20:35

## 2021-01-20 RX ADMIN — OXYBUTYNIN CHLORIDE 10 MG: 5 TABLET, EXTENDED RELEASE ORAL at 20:35

## 2021-01-20 RX ADMIN — ONDANSETRON 4 MG: 2 INJECTION INTRAMUSCULAR; INTRAVENOUS at 11:28

## 2021-01-20 RX ADMIN — ATORVASTATIN CALCIUM 10 MG: 10 TABLET, FILM COATED ORAL at 20:35

## 2021-01-20 RX ADMIN — OXYCODONE 5 MG: 5 TABLET ORAL at 13:58

## 2021-01-20 RX ADMIN — ENOXAPARIN SODIUM 40 MG: 40 INJECTION SUBCUTANEOUS at 20:35

## 2021-01-20 RX ADMIN — MORPHINE SULFATE 2 MG: 2 INJECTION, SOLUTION INTRAMUSCULAR; INTRAVENOUS at 22:02

## 2021-01-20 RX ADMIN — SODIUM CHLORIDE: 9 INJECTION, SOLUTION INTRAVENOUS at 14:02

## 2021-01-20 RX ADMIN — SODIUM CHLORIDE 1000 ML: 9 INJECTION, SOLUTION INTRAVENOUS at 13:07

## 2021-01-20 RX ADMIN — LISINOPRIL 10 MG: 10 TABLET ORAL at 17:53

## 2021-01-20 RX ADMIN — MORPHINE SULFATE 2 MG: 2 INJECTION, SOLUTION INTRAMUSCULAR; INTRAVENOUS at 17:53

## 2021-01-20 ASSESSMENT — ENCOUNTER SYMPTOMS
PHOTOPHOBIA: 0
DIARRHEA: 0
BACK PAIN: 0
COLOR CHANGE: 0
ABDOMINAL PAIN: 0
VOMITING: 0
CONSTIPATION: 0
COUGH: 0
RESPIRATORY NEGATIVE: 1
CHEST TIGHTNESS: 0
NAUSEA: 0
SHORTNESS OF BREATH: 0

## 2021-01-20 ASSESSMENT — PAIN DESCRIPTION - ORIENTATION
ORIENTATION: LEFT;UPPER
ORIENTATION: UPPER;LEFT
ORIENTATION: LEFT;UPPER

## 2021-01-20 ASSESSMENT — PAIN DESCRIPTION - LOCATION: LOCATION: ARM

## 2021-01-20 ASSESSMENT — PAIN SCALES - GENERAL
PAINLEVEL_OUTOF10: 7
PAINLEVEL_OUTOF10: 10
PAINLEVEL_OUTOF10: 9
PAINLEVEL_OUTOF10: 7
PAINLEVEL_OUTOF10: 7
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
## 2021-01-20 NOTE — PROGRESS NOTES
Pt seen in  ED, admission completed. Pt is alert and oriented x 4. Pt lives at home with her family and is being admitted for left humerus fracture, displaced, and hyponatremia. Pt c/o 2 falls at home with left arm pain. Plan of care updated, all questions answered.

## 2021-01-20 NOTE — ED PROVIDER NOTES
This patient was seen by the Mid-Level Provider. I have seen and examined the patient, agree with the workup, evaluation, management and diagnosis. Care plan has been discussed. My assessment reveals a 26-year-old female who presents with left arm pain. This is a 26-year-old female who had a mechanical fall x2 this morning. The patient apparently lost her footing and fell down 3 steps and 4 steps the second time. The patient presents with pain to her left upper arm. She denies any neck pain or head pain or head injury. She denies any other complaints. Radiology results:    XR CHEST PORTABLE   Final Result   No x-ray evidence of acute trauma to the chest or acute cardiopulmonary   disease. CT CERVICAL SPINE WO CONTRAST   Final Result   No acute abnormality of the cervical spine. CT HEAD WO CONTRAST   Final Result   No acute intracranial abnormality.          XR HUMERUS LEFT (MIN 2 VIEWS)   Final Result   Fracture of the proximal humerus               LABS:    Labs Reviewed   CBC WITH AUTO DIFFERENTIAL - Abnormal; Notable for the following components:       Result Value    WBC 17.2 (*)     RBC 5.29 (*)     Hemoglobin 16.8 (*)     Hematocrit 48.6 (*)     Neutrophils Absolute 14.9 (*)     All other components within normal limits    Narrative:     Performed at:  [facility] [address],  [place]   Phone [phone]   COMPREHENSIVE METABOLIC PANEL W/ REFLEX TO MG FOR LOW K - Abnormal; Notable for the following components:    Sodium 123 (*)     Chloride 85 (*)     Glucose 130 (*)     CREATININE <0.5 (*)     All other components within normal limits    Narrative:     Performed at:  [facility] [address],  [place]   Phone [phone]   TROPONIN    Narrative:     Performed at:  [facility] [address],  [place]   Phone [phone] BRAIN NATRIURETIC PEPTIDE    Narrative:     Performed at:  [facility] [address] [address] [address], [address]   Phone [phone]   URINE RT REFLEX TO CULTURE   OSMOLALITY, URINE   SODIUM, URINE, RANDOM   OSMOLALITY   TSH WITH REFLEX   SODIUM   SODIUM   SODIUM           EKG:    Sinus rhythm at a rate of 85 beats a minute with no acute ST elevations or depressions or pathologic Q waves. Exam:    Well-nourished female, in pain but no acute distress. She was neurologically intact with no focal findings. Patient had good pulses of her left upper extremity. Medical decision makin-year-old female presents after a fall. The patient's work-up today was consistent with significant hyponatremia and a proximal left humerus fracture. Orthopedic surgery has been consulted and the patient be admitted to our medical service for further care and IV hydration for her electrolyte abnormality. The patient is in stable condition. FINAL IMPRESSION:    1. Multiple falls    2. Hyponatremia    3.  Other closed displaced fracture of proximal end of left humerus, initial encounter           Isabel Grubbs MD  21 [id number]
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
## 2021-01-20 NOTE — PROGRESS NOTES
MD Edison Leon MD Coy Setters, MD                  Office: [phone]                 Fax: [phone]         [address] [place] [address]                            NEPHROLOGY UPDATE NOTE:     PATIENT NAME: Supriya Almodovar  : 1975  MRN: [id number]      Pt now on the 5T from ER, did not get repeat Na at 4PM.   I requested a floor nurse to get that STAT and page me. Till then continue reduced NS + other plan as per earlier note. Thank you for allowing me to participate in this patient's care. Please do not hesitate to contact me for any questions/concerns. We will follow along with you.      Matthew Maki MD  [facility] [address]   Phone: [phone] or [employer] [place] [employer]  Fax: [phone]

## 2021-01-20 NOTE — ED NOTES
Bed: 21  Expected date:   Expected time:   Means of arrival: Walk In  Comments:     Vinicio Jauregui RN  01/20/21 1049

## 2021-01-20 NOTE — ED NOTES
Report given to 5T RN. Pt alert and oriented and shows no signs of distress at time of transfer to  56. Pt taken to room by [facility] in [place] [address].. NS continuous IVF infusing at time of transport.        Allison Arriaza RN  01/20/21 3384
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
## 2021-01-21 PROBLEM — Z87.820 H/O TRAUMATIC BRAIN INJURY: Status: ACTIVE | Noted: 2021-01-21

## 2021-01-21 PROBLEM — S42.202A CLOSED FRACTURE OF LEFT PROXIMAL HUMERUS: Status: ACTIVE | Noted: 2021-01-21

## 2021-01-21 PROBLEM — E66.01 MORBID OBESITY WITH BMI OF 40.0-44.9, ADULT (HCC): Status: ACTIVE | Noted: 2021-01-21

## 2021-01-21 PROBLEM — Z72.0 TOBACCO ABUSE DISORDER: Status: ACTIVE | Noted: 2021-01-21

## 2021-01-21 PROBLEM — E86.0 DEHYDRATION: Status: ACTIVE | Noted: 2021-01-21

## 2021-01-21 PROBLEM — D72.829 LEUKOCYTOSIS: Status: ACTIVE | Noted: 2021-01-21

## 2021-01-21 PROBLEM — G47.33 OSA (OBSTRUCTIVE SLEEP APNEA): Status: ACTIVE | Noted: 2021-01-21

## 2021-01-21 PROBLEM — K21.9 GERD (GASTROESOPHAGEAL REFLUX DISEASE): Status: ACTIVE | Noted: 2021-01-21

## 2021-01-21 LAB
A/G RATIO: 1.3 (ref 1.1–2.2)
ALBUMIN SERPL-MCNC: 3.4 G/DL (ref 3.4–5)
ALP BLD-CCNC: 79 U/L (ref 40–129)
ALT SERPL-CCNC: 12 U/L (ref 10–40)
ANION GAP SERPL CALCULATED.3IONS-SCNC: 11 MMOL/L (ref 3–16)
AST SERPL-CCNC: 10 U/L (ref 15–37)
BASOPHILS ABSOLUTE: 0 K/UL (ref 0–0.2)
BASOPHILS RELATIVE PERCENT: 0.2 %
BILIRUB SERPL-MCNC: 0.4 MG/DL (ref 0–1)
BUN BLDV-MCNC: 5 MG/DL (ref 7–20)
CALCIUM SERPL-MCNC: 8.7 MG/DL (ref 8.3–10.6)
CHLORIDE BLD-SCNC: 94 MMOL/L (ref 99–110)
CHLORIDE URINE RANDOM: 24 MMOL/L
CO2: 26 MMOL/L (ref 21–32)
CREAT SERPL-MCNC: <0.5 MG/DL (ref 0.6–1.1)
CREATININE URINE: 41.4 MG/DL (ref 28–259)
EOSINOPHILS ABSOLUTE: 0 K/UL (ref 0–0.6)
EOSINOPHILS RELATIVE PERCENT: 0.2 %
GFR AFRICAN AMERICAN: >60
GFR NON-AFRICAN AMERICAN: >60
GLOBULIN: 2.7 G/DL
GLUCOSE BLD-MCNC: 113 MG/DL (ref 70–99)
HCT VFR BLD CALC: 39.4 % (ref 36–48)
HEMOGLOBIN: 13.4 G/DL (ref 12–16)
LYMPHOCYTES ABSOLUTE: 1.8 K/UL (ref 1–5.1)
LYMPHOCYTES RELATIVE PERCENT: 17.3 %
MAGNESIUM: 1.9 MG/DL (ref 1.8–2.4)
MCH RBC QN AUTO: 31.2 PG (ref 26–34)
MCHC RBC AUTO-ENTMCNC: 33.9 G/DL (ref 31–36)
MCV RBC AUTO: 92 FL (ref 80–100)
MONOCYTES ABSOLUTE: 0.8 K/UL (ref 0–1.3)
MONOCYTES RELATIVE PERCENT: 7.2 %
NEUTROPHILS ABSOLUTE: 7.9 K/UL (ref 1.7–7.7)
NEUTROPHILS RELATIVE PERCENT: 75.1 %
OSMOLALITY URINE: 225 MOSM/KG (ref 390–1070)
OSMOLALITY: 261 MOSM/KG (ref 275–295)
PDW BLD-RTO: 13.1 % (ref 12.4–15.4)
PHOSPHORUS: 4 MG/DL (ref 2.5–4.9)
PLATELET # BLD: 220 K/UL (ref 135–450)
PMV BLD AUTO: 8.3 FL (ref 5–10.5)
POTASSIUM REFLEX MAGNESIUM: 3.5 MMOL/L (ref 3.5–5.1)
POTASSIUM, UR: 28.3 MMOL/L
RBC # BLD: 4.28 M/UL (ref 4–5.2)
SODIUM BLD-SCNC: 127 MMOL/L (ref 136–145)
SODIUM BLD-SCNC: 128 MMOL/L (ref 136–145)
SODIUM BLD-SCNC: 130 MMOL/L (ref 136–145)
SODIUM BLD-SCNC: 131 MMOL/L (ref 136–145)
SODIUM BLD-SCNC: 133 MMOL/L (ref 136–145)
SODIUM URINE: 22 MMOL/L
TOTAL PROTEIN: 6.1 G/DL (ref 6.4–8.2)
TSH REFLEX: 1 UIU/ML (ref 0.27–4.2)
WBC # BLD: 10.5 K/UL (ref 4–11)

## 2021-01-21 PROCEDURE — 2580000003 HC RX 258: Performed by: INTERNAL MEDICINE

## 2021-01-21 PROCEDURE — 6370000000 HC RX 637 (ALT 250 FOR IP): Performed by: NURSE PRACTITIONER

## 2021-01-21 PROCEDURE — 84295 ASSAY OF SERUM SODIUM: CPT

## 2021-01-21 PROCEDURE — 85025 COMPLETE CBC W/AUTO DIFF WBC: CPT

## 2021-01-21 PROCEDURE — 6360000002 HC RX W HCPCS: Performed by: INTERNAL MEDICINE

## 2021-01-21 PROCEDURE — APPNB45 APP NON BILLABLE 31-45 MINUTES: Performed by: NURSE PRACTITIONER

## 2021-01-21 PROCEDURE — 99222 1ST HOSP IP/OBS MODERATE 55: CPT | Performed by: ORTHOPAEDIC SURGERY

## 2021-01-21 PROCEDURE — 6370000000 HC RX 637 (ALT 250 FOR IP): Performed by: EMERGENCY MEDICINE

## 2021-01-21 PROCEDURE — 84100 ASSAY OF PHOSPHORUS: CPT

## 2021-01-21 PROCEDURE — 6370000000 HC RX 637 (ALT 250 FOR IP): Performed by: INTERNAL MEDICINE

## 2021-01-21 PROCEDURE — 83735 ASSAY OF MAGNESIUM: CPT

## 2021-01-21 PROCEDURE — 51702 INSERT TEMP BLADDER CATH: CPT

## 2021-01-21 PROCEDURE — 80053 COMPREHEN METABOLIC PANEL: CPT

## 2021-01-21 PROCEDURE — 36415 COLL VENOUS BLD VENIPUNCTURE: CPT

## 2021-01-21 PROCEDURE — 1200000000 HC SEMI PRIVATE

## 2021-01-21 RX ORDER — MORPHINE SULFATE 4 MG/ML
4 INJECTION, SOLUTION INTRAMUSCULAR; INTRAVENOUS EVERY 4 HOURS PRN
Status: DISPENSED | OUTPATIENT
Start: 2021-01-21 | End: 2021-01-23

## 2021-01-21 RX ORDER — LORAZEPAM 2 MG/ML
0.5 INJECTION INTRAMUSCULAR EVERY 4 HOURS PRN
Status: DISCONTINUED | OUTPATIENT
Start: 2021-01-21 | End: 2021-01-23 | Stop reason: HOSPADM

## 2021-01-21 RX ORDER — OXYCODONE HYDROCHLORIDE 5 MG/1
5 TABLET ORAL EVERY 4 HOURS PRN
Status: DISCONTINUED | OUTPATIENT
Start: 2021-01-21 | End: 2021-01-23 | Stop reason: HOSPADM

## 2021-01-21 RX ORDER — OXYCODONE HYDROCHLORIDE 5 MG/1
10 TABLET ORAL EVERY 4 HOURS PRN
Status: DISCONTINUED | OUTPATIENT
Start: 2021-01-21 | End: 2021-01-23 | Stop reason: HOSPADM

## 2021-01-21 RX ORDER — CEFAZOLIN SODIUM 1 G/3ML
3000 INJECTION, POWDER, FOR SOLUTION INTRAMUSCULAR; INTRAVENOUS
Status: DISCONTINUED | OUTPATIENT
Start: 2021-01-22 | End: 2021-01-22 | Stop reason: ALTCHOICE

## 2021-01-21 RX ADMIN — MORPHINE SULFATE 2 MG: 2 INJECTION, SOLUTION INTRAMUSCULAR; INTRAVENOUS at 11:21

## 2021-01-21 RX ADMIN — MORPHINE SULFATE 4 MG: 4 INJECTION, SOLUTION INTRAMUSCULAR; INTRAVENOUS at 18:40

## 2021-01-21 RX ADMIN — LISINOPRIL 10 MG: 10 TABLET ORAL at 09:53

## 2021-01-21 RX ADMIN — OXYCODONE 10 MG: 5 TABLET ORAL at 09:55

## 2021-01-21 RX ADMIN — OXYCODONE 10 MG: 5 TABLET ORAL at 17:16

## 2021-01-21 RX ADMIN — OXCARBAZEPINE 600 MG: 300 TABLET, FILM COATED ORAL at 09:53

## 2021-01-21 RX ADMIN — ENOXAPARIN SODIUM 40 MG: 40 INJECTION SUBCUTANEOUS at 20:55

## 2021-01-21 RX ADMIN — OXYCODONE 10 MG: 5 TABLET ORAL at 13:52

## 2021-01-21 RX ADMIN — MORPHINE SULFATE 2 MG: 2 INJECTION, SOLUTION INTRAMUSCULAR; INTRAVENOUS at 02:31

## 2021-01-21 RX ADMIN — Medication 10 ML: at 09:00

## 2021-01-21 RX ADMIN — OXCARBAZEPINE 600 MG: 300 TABLET, FILM COATED ORAL at 20:54

## 2021-01-21 RX ADMIN — RISPERIDONE 4 MG: 1 TABLET ORAL at 20:54

## 2021-01-21 RX ADMIN — MORPHINE SULFATE 2 MG: 2 INJECTION, SOLUTION INTRAMUSCULAR; INTRAVENOUS at 06:40

## 2021-01-21 RX ADMIN — OXYCODONE 5 MG: 5 TABLET ORAL at 20:54

## 2021-01-21 RX ADMIN — ATORVASTATIN CALCIUM 10 MG: 10 TABLET, FILM COATED ORAL at 20:55

## 2021-01-21 RX ADMIN — Medication 10 ML: at 20:55

## 2021-01-21 RX ADMIN — OXYBUTYNIN CHLORIDE 10 MG: 5 TABLET, EXTENDED RELEASE ORAL at 20:54

## 2021-01-21 ASSESSMENT — PAIN SCALES - GENERAL
PAINLEVEL_OUTOF10: 7
PAINLEVEL_OUTOF10: 8

## 2021-01-21 ASSESSMENT — PAIN DESCRIPTION - DESCRIPTORS: DESCRIPTORS: ACHING

## 2021-01-21 ASSESSMENT — PAIN DESCRIPTION - LOCATION: LOCATION: ARM

## 2021-01-21 NOTE — PROGRESS NOTES
[facility] PROGRESS NOTE    1/21/2021 4:04 PM        Name: Manuelito Lowe . Admitted: 1/20/2021  Primary Care Provider: HARRISON Palmer CNP (Tel: [phone])    Brief Course:  55 yo F with history of TBI with seizure disorder, depression, morbid obesity, ARYA, GERD, hyperlipidemia, OA came to ER with falls. Admitted as inpatient for acute on chronic hyponatremia, dehydration, leukocytosis and L proximal humerus fracture. Followed by Ortho and Renal.      CC:  Falls    Subjective:  . Patient with pain in L arm. No CP, SOB, HA or fevers. To OR tomorrow.     Reviewed interval ancillary notes    Current Medications      oxyCODONE (ROXICODONE) immediate release tablet 5 mg, Q4H PRN    Or      oxyCODONE (ROXICODONE) immediate release tablet 10 mg, Q4H PRN      morphine injection 4 mg, Q4H PRN      LORazepam (ATIVAN) injection 0.5 mg, Q4H PRN      nicotine (NICODERM CQ) 14 MG/24HR 1 patch, Daily      morphine injection 4 mg, Once      albuterol sulfate  (90 Base) MCG/ACT inhaler 2 puff, Q6H PRN      lisinopril (PRINIVIL;ZESTRIL) tablet 10 mg, Daily      pantoprazole (PROTONIX) tablet 40 mg, QAM AC      OXcarbazepine (TRILEPTAL) tablet 600 mg, BID      oxybutynin (DITROPAN-XL) extended release tablet 10 mg, Nightly      risperiDONE (RISPERDAL) tablet 4 mg, Nightly      atorvastatin (LIPITOR) tablet 10 mg, Nightly      [START ON 1/24/2021] vitamin D (ERGOCALCIFEROL) capsule 50,000 Units, Weekly      sodium chloride flush 0.9 % injection 10 mL, 2 times per day      sodium chloride flush 0.9 % injection 10 mL, PRN      enoxaparin (LOVENOX) injection 40 mg, Daily      promethazine (PHENERGAN) tablet 12.5 mg, Q6H PRN    Or      ondansetron (ZOFRAN) injection 4 mg, Q6H PRN      polyethylene glycol (GLYCOLAX) packet 17 g, Daily PRN   acetaminophen (TYLENOL) tablet 650 mg, Q6H PRN    Or      acetaminophen (TYLENOL) suppository 650 mg, Q6H PRN      hydrALAZINE (APRESOLINE) injection 5 mg, Q6H PRN        Objective:  BP (!) 125/93   Pulse 102   Temp 98.1 °F (36.7 °C) (Oral)   Resp 20   Wt 265 lb 4.8 oz (120.3 kg)   SpO2 91%   BMI 48.52 kg/m²     Intake/Output Summary (Last 24 hours) at 1/21/2021 1604  Last data filed at 1/21/2021 1309  Gross per 24 hour   Intake 720 ml   Output 2000 ml   Net -1280 ml      Wt Readings from Last 3 Encounters:   01/21/21 265 lb 4.8 oz (120.3 kg)   12/10/20 258 lb (117 kg)   12/07/20 263 lb (119.3 kg)       General appearance:  Appears uncomfortable, morbidly obese, pleasant, in bed  Eyes: Sclera clear. Pupils equal.  ENT: Moist oral mucosa. Trachea midline, no adenopathy. Cardiovascular: Regular rhythm, normal S1, S2. No murmur. No edema in lower extremities  Respiratory: Not using accessory muscles. Good inspiratory effort. Clear to auscultation bilaterally, no wheeze or crackles. GI: Abdomen soft, obese, no tenderness, not distended, normal bowel sounds  Musculoskeletal: L arm in sling  Neurology: Grossly intact. No speech or motor deficits  Psych: Normal affect. Alert and oriented in time, place and person  Skin: Warm, dry, normal turgor  Extremity exam shows brisk capillary refill. Peripheral pulses are palpable in lower extremities     Labs and Tests:  CBC:   Recent Labs     01/20/21  1129 01/21/21  0444   WBC 17.2* 10.5   HGB 16.8* 13.4    220     BMP:    Recent Labs     01/20/21  1129 01/20/21  1129 01/21/21  0444 01/21/21  0747 01/21/21  1415   *   < > 131* 133* 130*   K 4.2  --  3.5  --   --    CL 85*  --  94*  --   --    CO2 26  --  26  --   --    BUN 8  --  5*  --   --    CREATININE <0.5*  --  <0.5*  --   --    GLUCOSE 130*  --  113*  --   --     < > = values in this interval not displayed.      Hepatic:   Recent Labs     01/20/21  1129 01/21/21  0444   AST 18 10*   ALT 20 12

## 2021-01-21 NOTE — PROGRESS NOTES
Shift assessment complete. VSS. Patient NPO since midnight. Bed alarm on. Call light within reach. Will monitor.    Vitals:    01/21/21 0831   BP: 128/67   Pulse: 100   Resp: 18   Temp: 98.4 °F (36.9 °C)   SpO2: 91%

## 2021-01-21 NOTE — PROGRESS NOTES
Patient up to bathroom with assistance from this RN. Patient screaming in pain. Patient asking for a bustos catheter. Message sent to Debra Vaz MD. Waiting for response.

## 2021-01-21 NOTE — PROGRESS NOTES
[facility] Orthopedic Surgery   Progress Note    CHIEF COMPLAINT/DIAGNOSIS: LEFT proximal humerus fracture    OBJECTIVE  Physical    VITALS:  /67   Pulse 100   Temp 98.4 °F (36.9 °C) (Oral)   Resp 18   Wt 265 lb 4.8 oz (120.3 kg)   SpO2 91%   BMI 48.52 kg/m²     GENERAL: Alert and oriented x3, in no some obvious pain. MUSCULOSKELETAL: Able to flex and extend the wrist without issue. ROM: left shoulder ROM deferred; in sling. Sensory:  SILT in axillary, PIN, IO, radial, ulnar distributions. Vascular:   2+ radial pulses with brisk cap refill. Data    ALL MEDICATIONS HAVE BEEN REVIEWED    CBC:   Recent Labs     01/20/21  1129 01/21/21  0444   WBC 17.2* 10.5   HGB 16.8* 13.4   HCT 48.6* 39.4    220     BMP:   Recent Labs     01/20/21  1129 01/20/21  1129 01/20/21  2353 01/21/21  0444 01/21/21  0747   *   < > 127* 131* 133*   K 4.2  --   --  3.5  --    CL 85*  --   --  94*  --    CO2 26  --   --  26  --    PHOS  --   --   --  4.0  --    BUN 8  --   --  5*  --    CREATININE <0.5*  --   --  <0.5*  --     < > = values in this interval not displayed. INR: No results for input(s): INR in the last 72 hours. ASSESSMENT:  LEFT proximal humerus fracture  Acute on chronic hyponatremia  PTSD; Hx TBI  Obesity    PLAN:   Plan for surgery is tomorrow, 1/22/21 (likely noon or later). - WB status:  NO weight bearing through LEFT arm.  - DVT prophylaxis: Lovenox, hold tomorrow.   - PT/OT  - D/C Plan: pending post-op therapy eval.  - Pain Control: percocet increased to 1-2 tabs q 4 hrs. Due to orthopaedic surgical procedure/condition, patient may require pain medication for up to 6-8 weeks.   - Renal:  Hyponatremia; improving to 133    HARRISON Eid-CNP  1/21/2021  9:16 AM

## 2021-01-21 NOTE — PROGRESS NOTES
Shift assessment completed. Routine vitals stable. Pt weaned off oxygen. SpO2 stable. Scheduled medications given. PRN medication given for 7/10 arm pain. Patient is awake, alert and oriented. Respirations are easy and unlabored. Patient does not appear to be in distress. Call light within reach. MD notified of sodium level, fluids increased per order.

## 2021-01-21 NOTE — CARE COORDINATION
Discharge Planning Assessment  Readmission risk score 16%  RN discharge planner met with patient/ (and family member) to discuss reason for admission, current living situation, and potential needs at the time of discharge    Demographics/Insurance verified Yes address and insurance verified    Current type of dwelling:[facility] [place] [facility]    Patient from ECF/SW confirmed with:n/a    Living arrangements:with     Level of function/Support:generally independent    PCP:Yarelis    Last Visit to PCP: 12/10/2020    DME:none    Active with any community resources/agencies/skilled home care:none    Medication compliance issues:no concerns, uses the [facility] on [employer] [employer] issues that could impact healthcare:[employer]      Tentative discharge plan:azucena    *Discussed and provided facilities of choice if transition to a skilled nursing facility is required at the time of discharge      *Discussed with patient and/or family that on the day of discharge home tentative time of discharge will be between 10 AM and noon.     Transportation at the time of discharge:family    LIZ KimbleN, CCM, RN  [facility]  [phone]

## 2021-01-21 NOTE — PROGRESS NOTES
Routine vitals stable. Scheduled medications given. Pt denies any further needs at this time. Call light within reach. Will continue to monitor.    Vitals:    01/21/21 1716   BP: 124/78   Pulse: 106   Resp: 18   Temp: 97.9 °F (36.6 °C)   SpO2: 92%

## 2021-01-21 NOTE — PROGRESS NOTES
MD Cornell Ziegler MD Alix Mccune, MD               Office: [phone]                      Fax: [phone]             [facility]                   NEPHROLOGY INPATIENT PROGRESS NOTE:     PATIENT NAME: Catina Ji  : 1975  MRN: [id number]     Examined face to face (w/ full PPE), as it would affect my plan. RECOMMENDATIONS:    - Monitor Serum Na Q 6 hr x2 today   - Goal Serum Na low 130, by next 24 hrs    - stop NS w/ slight overcorrection   - holding Chlorthalidone,    - ok to restart decreased Celexa dose,    - active Smoker -> will need to r/o Lung CA -w/ low dose CT scan (will do outp)   - ongoing pain control to reduce nonosmotic release of ADH    - Salt tablets - hold w/ BP uncontrolled,  - Fluid restriction - not needed      - Strict I/O with daily weights.  - at higher risk for decompensation, needing closer monitoring    --- Call us urgently if any/worsening neurological findings. D/C plan from renal stand point: tomorrow   - if NA low 130s,       IMPRESSION:       Hyponatremia : : Acute on chronic < vs < worsening Chronic,   - Severe on admission 123: mildly symptomatic:  - No Reported Severe symptoms: seizures, obtundation, coma, and respiratory arrest.   - no need for Hypertonic saline  - H/O Chronic: lowest 128, ~2 months ago, running low 130s chronically,  - Risk factors:    - hypovolemia,     - meds: Chlorthalidone,  Celexa ,   - Smoker -> will need to r/o Lung CA    -Fall, pain, causing nonosmotic release of ADH  - Patient is at   risk of developing Osmotic Demyelination Syndrome.    - Call us urgently if any/worsening neurological findings. Work up: suggesting for ? SIADH underlying for chronic cause, but hypovolemia this time. - renal function - is fine   - BP not lower, ?mild hypovolemia    - f/u TSH , no need for cortisol level         Other problems: Management per primary and other consulting teams. Hospital Problems           Last Modified POA    Hyponatremia 1/20/2021 Yes        : other supportive care :   - Check daily renal function panel with electrolytes-phosphorus  - Strict monitoring of I/Os, daily weight  - non-Renal feeds/diet  - Current medications reviewed. - Nephrotoxic medications have been discontinued. - Dose adjusted and appropriate. Please refer to the orders. High Complexity. Multiple complex problems. Discussed with patient, and treatment team   Thank you for allowing me to participate in this patient's care. Please do not hesitate to contact me with any questions/concerns. We will follow along with you. Patricia Pena MD  [facility] [address] [place]: [phone] or [address] [employer]  Fax: [phone]        ========================================================   ========================================================     Subjective:   Patient was seen comfortably sitting up in the bed,   Reported no active complaints,   Past medical, Surgical, Social, Family medical history reviewed by me. MEDICATIONS: reviewed by me. Medications Prior to Admission:  No current facility-administered medications on file prior to encounter. Current Outpatient Medications on File Prior to Encounter   Medication Sig Dispense Refill    triamcinolone (KENALOG) 0.025 % ointment Apply topically daily Apply topically daily.       lisinopril (PRINIVIL;ZESTRIL) 20 MG tablet Take 1 tablet by mouth once daily 90 tablet 0    OXcarbazepine (TRILEPTAL) 600 MG tablet Take 1 tablet by mouth twice daily 180 tablet 0    oxybutynin (DITROPAN-XL) 10 MG extended release tablet Take 1 tablet by mouth once daily 90 tablet 0    risperiDONE (RISPERDAL) 4 MG tablet One tab nightly 90 tablet 0    citalopram (CELEXA) 40 MG tablet One tab every am 90 tablet 0    simvastatin (ZOCOR) 20 MG tablet One tab every day 90 tablet 0  omeprazole (PRILOSEC) 20 MG delayed release capsule qd 120 capsule 0    vitamin D (ERGOCALCIFEROL) 1.25 MG (05355 UT) CAPS capsule TAKE 1 CAPSULE BY MOUTH ONCE A WEEK 12 capsule 1    chlorthalidone (HYGROTON) 25 MG tablet Take 1 tablet by mouth daily 90 tablet 1    albuterol sulfate HFA (PROVENTIL HFA) 108 (90 Base) MCG/ACT inhaler Inhale 2 puffs into the lungs every 6 hours as needed for Wheezing 1 Inhaler 3    Handicap Placard MISC by Does not apply route 1 each 0         Current Facility-Administered Medications:     oxyCODONE (ROXICODONE) immediate release tablet 5 mg, 5 mg, Oral, Q4H PRN **OR** oxyCODONE (ROXICODONE) immediate release tablet 10 mg, 10 mg, Oral, Q4H PRN, HARRISON Lomeli CNP, 10 mg at 01/21/21 0955    nicotine (NICODERM CQ) 14 MG/24HR 1 patch, 1 patch, Transdermal, Daily, Nestor Hyde MD, 1 patch at 01/20/21 1358    morphine injection 4 mg, 4 mg, Intravenous, Once, Nestor Hyde MD, Stopped at 01/20/21 1326    morphine (PF) injection 2 mg, 2 mg, Intravenous, Q4H PRN, Andrea Bee MD, 2 mg at 01/21/21 0640    albuterol sulfate  (90 Base) MCG/ACT inhaler 2 puff, 2 puff, Inhalation, Q6H PRN, Andrea Bee MD    lisinopril (PRINIVIL;ZESTRIL) tablet 10 mg, 10 mg, Oral, Daily, Lelia Mcdonald MD, 10 mg at 01/21/21 0953    pantoprazole (PROTONIX) tablet 40 mg, 40 mg, Oral, QAM AC, Andrea Bee MD, Stopped at 01/21/21 0528    OXcarbazepine (TRILEPTAL) tablet 600 mg, 600 mg, Oral, BID, Lelia Mcdonald MD, 600 mg at 01/21/21 0953    oxybutynin (DITROPAN-XL) extended release tablet 10 mg, 10 mg, Oral, Nightly, Lelia Mcdonald MD, 10 mg at 01/20/21 2035    risperiDONE (RISPERDAL) tablet 4 mg, 4 mg, Oral, Nightly, Lelia Mcdonald MD, 4 mg at 01/20/21 2034    atorvastatin (LIPITOR) tablet 10 mg, 10 mg, Oral, Nightly, Lelia Mcdonald MD, 10 mg at 01/20/21 2035   [START ON 1/24/2021] vitamin D (ERGOCALCIFEROL) capsule 50,000 Units, 50,000 Units, Oral, Weekly, Kristine Campbell MD    sodium chloride flush 0.9 % injection 10 mL, 10 mL, Intravenous, 2 times per day, Kristine Campbell MD, 10 mL at 01/21/21 0900    sodium chloride flush 0.9 % injection 10 mL, 10 mL, Intravenous, PRN, Kristine Campbell MD    enoxaparin (LOVENOX) injection 40 mg, 40 mg, Subcutaneous, Daily, Kristine Campbell MD, 40 mg at 01/20/21 2035    promethazine (PHENERGAN) tablet 12.5 mg, 12.5 mg, Oral, Q6H PRN **OR** ondansetron (ZOFRAN) injection 4 mg, 4 mg, Intravenous, Q6H PRN, Kristine Campbell MD    polyethylene glycol (GLYCOLAX) packet 17 g, 17 g, Oral, Daily PRN, Kristine Campbell MD    acetaminophen (TYLENOL) tablet 650 mg, 650 mg, Oral, Q6H PRN **OR** acetaminophen (TYLENOL) suppository 650 mg, 650 mg, Rectal, Q6H PRN, Kristine Campbell MD    hydrALAZINE (APRESOLINE) injection 5 mg, 5 mg, Intravenous, Q6H PRN, Matthew Maki MD, 5 mg at 01/20/21 1510    REVIEW OF SYSTEMS:  As mentioned in chief complaint and HPI , Subjective     PHYSICAL EXAM:  Recent vital signs and recent I/Os reviewed by me.      Wt Readings from Last 3 Encounters:   01/21/21 265 lb 4.8 oz (120.3 kg)   12/10/20 258 lb (117 kg)   12/07/20 263 lb (119.3 kg)     BP Readings from Last 3 Encounters:   01/21/21 128/67   12/10/20 120/80   12/07/20 138/85     Patient Vitals for the past 24 hrs:   BP Temp Temp src Pulse Resp SpO2 Weight   01/21/21 0831 128/67 98.4 °F (36.9 °C) Oral 100 18 91 %    01/21/21 0315 131/70 99.2 °F (37.3 °C) Oral 105 18 93 % 265 lb 4.8 oz (120.3 kg)   01/20/21 2354 128/85 98.4 °F (36.9 °C) Oral 102 18 93 %    01/20/21 2056      94 %    01/20/21 2044     18 95 %    01/20/21 2026 (!) 148/81 98.9 °F (37.2 °C) Oral 101 18 95 %    01/20/21 1744 (!) 172/84 98.2 °F (36.8 °C) Oral 93 18 95 %    01/20/21 1559      95 %    01/20/21 1558 (!) 191/63 98.3 °F (36.8 °C) Oral 86 18 94 %  01/20/21 1530 (!) 207/78     95 %    01/20/21 1500 (!) 214/77     96 %        Intake/Output Summary (Last 24 hours) at 1/21/2021 1029  Last data filed at 1/21/2021 1017  Gross per 24 hour   Intake 240 ml   Output 2000 ml   Net -1760 ml       Physical Exam  Vitals signs reviewed. Constitutional:       General: She is not in acute distress. Comments: Obese body habitus   HENT:      Head: Normocephalic and atraumatic. Right Ear: External ear normal.      Left Ear: External ear normal.      Mouth/Throat:      Mouth: Mucous membranes are not dry. Eyes:      General: No scleral icterus. Conjunctiva/sclera: Conjunctivae normal.   Neck:      Musculoskeletal: Neck supple. Thyroid: No thyroid mass. Vascular: No JVD. Trachea: Trachea normal.   Cardiovascular:      Rate and Rhythm: Normal rate and regular rhythm. Pulmonary:      Effort: Pulmonary effort is normal.      Breath sounds: Normal breath sounds. Abdominal:      General: Bowel sounds are normal.      Palpations: Abdomen is soft. Musculoskeletal:         General: No deformity. Right lower leg: Edema present. Left lower leg: Edema present. Comments: Trace    Skin:     General: Skin is warm and dry. Neurological:      Mental Status: She is alert and oriented to person, place, and time.    Psychiatric:         Behavior: Behavior normal.          DATA:  Diagnostic tests reviewed by me for today's visit:    Recent Labs     01/20/21  1129 01/21/21  0444   WBC 17.2* 10.5   HCT 48.6* 39.4    220     Iron Saturation:  No components found for: PERCENTFE  FERRITIN:  No results found for: FERRITIN  IRON:  No results found for: IRON  TIBC:  No results found for: TIBC    Recent Labs     01/20/21  1129 01/20/21  1934 01/20/21  2353 01/21/21  0444 01/21/21  0747   * 123* 127* 131* 133*   K 4.2  --   --  3.5  --    CL 85*  --   --  94*  --    CO2 26  --   --  26  --    BUN 8  --   --  5*  -- Xr Humerus Left (min 2 Views)    Result Date: 1/20/2021  EXAMINATION: TWO XRAY VIEWS OF THE LEFT HUMERUS 1/20/2021 10:41 am COMPARISON: None. HISTORY: ORDERING SYSTEM PROVIDED HISTORY: pain TECHNOLOGIST PROVIDED HISTORY: Reason for exam:->pain Reason for Exam: Fall (Pt had two mechanical falls on the steps this morning. Lost her footing. Kenneth Chaves three steps the first fall and four the second. Pain to left humerus. able to move fingers, but unable to lift arm) Acuity: Acute Type of Exam: Initial FINDINGS: There is a comminuted displaced fracture of the proximal humeral shaft at the level of the surgical neck. There is extension into the tuberosities.  Butterfly fragment displaced nearly 90 degrees     Fracture of the proximal humerus     Ct Head Wo Contrast    Result Date: 1/20/2021 EXAMINATION: CT OF THE HEAD WITHOUT CONTRAST  1/20/2021 11:38 am TECHNIQUE: CT of the head was performed without the administration of intravenous contrast. Dose modulation, iterative reconstruction, and/or weight based adjustment of the mA/kV was utilized to reduce the radiation dose to as low as reasonably achievable. COMPARISON: None. HISTORY: ORDERING SYSTEM PROVIDED HISTORY: fall TECHNOLOGIST PROVIDED HISTORY: Has a \"code stroke\" or \"stroke alert\" been called? ->No Reason for exam:->fall Is the patient pregnant?->No Reason for Exam: Fall (Pt had two mechanical falls on the steps this morning. Lost her footing. Angel Dubois three steps the first fall and four the second. Pain to left humerus. able to move fingers, but unable to lift arm) Acuity: Unknown Type of Exam: Unknown FINDINGS: BRAIN/VENTRICLES: There is no acute intracranial hemorrhage, mass effect or midline shift. No abnormal extra-axial fluid collection. The gray-white differentiation is maintained without evidence of an acute infarct. There is no evidence of hydrocephalus. ORBITS: The visualized portion of the orbits demonstrate no acute abnormality. SINUSES: The visualized paranasal sinuses and mastoid air cells demonstrate no acute abnormality. SOFT TISSUES/SKULL:  No acute abnormality of the visualized skull or soft tissues. No acute intracranial abnormality.      Ct Cervical Spine Wo Contrast    Result Date: 1/20/2021 EXAMINATION: CT OF THE CERVICAL SPINE WITHOUT CONTRAST 1/20/2021 11:38 am TECHNIQUE: CT of the cervical spine was performed without the administration of intravenous contrast. Multiplanar reformatted images are provided for review. Dose modulation, iterative reconstruction, and/or weight based adjustment of the mA/kV was utilized to reduce the radiation dose to as low as reasonably achievable. COMPARISON: None. HISTORY: ORDERING SYSTEM PROVIDED HISTORY: fall TECHNOLOGIST PROVIDED HISTORY: Reason for exam:->fall Decision Support Exception->Emergency Medical Condition (MA) Is the patient pregnant?->No Reason for Exam: Fall (Pt had two mechanical falls on the steps this morning. Lost her footing. Tejal Coles three steps the first fall and four the second. Pain to left humerus. able to move fingers, but unable to lift arm) Acuity: Unknown Type of Exam: Unknown FINDINGS: BONES/ALIGNMENT: There is no acute fracture or traumatic malalignment. DEGENERATIVE CHANGES: There are mild-to-moderate degenerative changes in the lower cervical spine. SOFT TISSUES: There is no prevertebral soft tissue swelling. No acute abnormality of the cervical spine. Xr Chest Portable    Result Date: 1/20/2021  EXAMINATION: ONE XRAY VIEW OF THE CHEST 1/20/2021 11:59 am COMPARISON: May 22, 2017 HISTORY: ORDERING SYSTEM PROVIDED HISTORY: fall TECHNOLOGIST PROVIDED HISTORY: Reason for exam:->fall Reason for Exam: Fall (Pt had two mechanical falls on the steps this morning. Lost her footing. Tejal Coles three steps the first fall and four the second. Pain to left humerus. able to move fingers, but unable to lift arm) Acuity: Acute Type of Exam: Initial FINDINGS: No evidence of traumatic process of the lungs. No evidence of pneumothorax or pleural effusion. No evidence of acute traumatic process involving the cardiac/mediastinal structures. No acute fracture demonstrated. No x-ray evidence of acute trauma to the chest or acute cardiopulmonary disease.

## 2021-01-22 ENCOUNTER — APPOINTMENT (OUTPATIENT)
Dept: GENERAL RADIOLOGY | Age: 46
DRG: 493 | End: 2021-01-22
Payer: COMMERCIAL

## 2021-01-22 ENCOUNTER — ANESTHESIA EVENT (OUTPATIENT)
Dept: OPERATING ROOM | Age: 46
DRG: 493 | End: 2021-01-22
Payer: COMMERCIAL

## 2021-01-22 ENCOUNTER — ANESTHESIA (OUTPATIENT)
Dept: OPERATING ROOM | Age: 46
DRG: 493 | End: 2021-01-22
Payer: COMMERCIAL

## 2021-01-22 VITALS
OXYGEN SATURATION: 97 % | SYSTOLIC BLOOD PRESSURE: 129 MMHG | RESPIRATION RATE: 8 BRPM | DIASTOLIC BLOOD PRESSURE: 60 MMHG

## 2021-01-22 LAB
ALBUMIN SERPL-MCNC: 3.5 G/DL (ref 3.4–5)
ANION GAP SERPL CALCULATED.3IONS-SCNC: 9 MMOL/L (ref 3–16)
BASOPHILS ABSOLUTE: 0 K/UL (ref 0–0.2)
BASOPHILS RELATIVE PERCENT: 0.4 %
BUN BLDV-MCNC: 6 MG/DL (ref 7–20)
CALCIUM SERPL-MCNC: 9.1 MG/DL (ref 8.3–10.6)
CHLORIDE BLD-SCNC: 96 MMOL/L (ref 99–110)
CO2: 26 MMOL/L (ref 21–32)
CREAT SERPL-MCNC: <0.5 MG/DL (ref 0.6–1.1)
EOSINOPHILS ABSOLUTE: 0.1 K/UL (ref 0–0.6)
EOSINOPHILS RELATIVE PERCENT: 0.6 %
GFR AFRICAN AMERICAN: >60
GFR NON-AFRICAN AMERICAN: >60
GLUCOSE BLD-MCNC: 98 MG/DL (ref 70–99)
HCT VFR BLD CALC: 38.3 % (ref 36–48)
HEMOGLOBIN: 12.8 G/DL (ref 12–16)
LYMPHOCYTES ABSOLUTE: 1.8 K/UL (ref 1–5.1)
LYMPHOCYTES RELATIVE PERCENT: 15 %
MCH RBC QN AUTO: 31.3 PG (ref 26–34)
MCHC RBC AUTO-ENTMCNC: 33.4 G/DL (ref 31–36)
MCV RBC AUTO: 93.7 FL (ref 80–100)
MONOCYTES ABSOLUTE: 0.9 K/UL (ref 0–1.3)
MONOCYTES RELATIVE PERCENT: 7.6 %
NEUTROPHILS ABSOLUTE: 9.2 K/UL (ref 1.7–7.7)
NEUTROPHILS RELATIVE PERCENT: 76.4 %
PDW BLD-RTO: 13.3 % (ref 12.4–15.4)
PHOSPHORUS: 3.6 MG/DL (ref 2.5–4.9)
PLATELET # BLD: 203 K/UL (ref 135–450)
PMV BLD AUTO: 8.4 FL (ref 5–10.5)
POTASSIUM SERPL-SCNC: 3.8 MMOL/L (ref 3.5–5.1)
RBC # BLD: 4.09 M/UL (ref 4–5.2)
SODIUM BLD-SCNC: 129 MMOL/L (ref 136–145)
SODIUM BLD-SCNC: 131 MMOL/L (ref 136–145)
URIC ACID, SERUM: 3.3 MG/DL (ref 2.6–6)
WBC # BLD: 12 K/UL (ref 4–11)

## 2021-01-22 PROCEDURE — 2500000003 HC RX 250 WO HCPCS: Performed by: NURSE ANESTHETIST, CERTIFIED REGISTERED

## 2021-01-22 PROCEDURE — 2580000003 HC RX 258: Performed by: ORTHOPAEDIC SURGERY

## 2021-01-22 PROCEDURE — 2580000003 HC RX 258: Performed by: NURSE ANESTHETIST, CERTIFIED REGISTERED

## 2021-01-22 PROCEDURE — 2720000010 HC SURG SUPPLY STERILE: Performed by: ORTHOPAEDIC SURGERY

## 2021-01-22 PROCEDURE — 3600000014 HC SURGERY LEVEL 4 ADDTL 15MIN: Performed by: ORTHOPAEDIC SURGERY

## 2021-01-22 PROCEDURE — 84295 ASSAY OF SERUM SODIUM: CPT

## 2021-01-22 PROCEDURE — C1769 GUIDE WIRE: HCPCS | Performed by: ORTHOPAEDIC SURGERY

## 2021-01-22 PROCEDURE — 6360000002 HC RX W HCPCS: Performed by: ORTHOPAEDIC SURGERY

## 2021-01-22 PROCEDURE — 6370000000 HC RX 637 (ALT 250 FOR IP): Performed by: ORTHOPAEDIC SURGERY

## 2021-01-22 PROCEDURE — 6360000002 HC RX W HCPCS: Performed by: ANESTHESIOLOGY

## 2021-01-22 PROCEDURE — 80069 RENAL FUNCTION PANEL: CPT

## 2021-01-22 PROCEDURE — 2580000003 HC RX 258

## 2021-01-22 PROCEDURE — 94150 VITAL CAPACITY TEST: CPT

## 2021-01-22 PROCEDURE — 94761 N-INVAS EAR/PLS OXIMETRY MLT: CPT

## 2021-01-22 PROCEDURE — 2700000000 HC OXYGEN THERAPY PER DAY

## 2021-01-22 PROCEDURE — 85025 COMPLETE CBC W/AUTO DIFF WBC: CPT

## 2021-01-22 PROCEDURE — 7100000001 HC PACU RECOVERY - ADDTL 15 MIN: Performed by: ORTHOPAEDIC SURGERY

## 2021-01-22 PROCEDURE — 23615 OPTX PROX HUMRL FX W/INT FIX: CPT | Performed by: ORTHOPAEDIC SURGERY

## 2021-01-22 PROCEDURE — 7100000000 HC PACU RECOVERY - FIRST 15 MIN: Performed by: ORTHOPAEDIC SURGERY

## 2021-01-22 PROCEDURE — 2500000003 HC RX 250 WO HCPCS: Performed by: ORTHOPAEDIC SURGERY

## 2021-01-22 PROCEDURE — 3600000004 HC SURGERY LEVEL 4 BASE: Performed by: ORTHOPAEDIC SURGERY

## 2021-01-22 PROCEDURE — 84550 ASSAY OF BLOOD/URIC ACID: CPT

## 2021-01-22 PROCEDURE — 1200000000 HC SEMI PRIVATE

## 2021-01-22 PROCEDURE — 6360000002 HC RX W HCPCS: Performed by: INTERNAL MEDICINE

## 2021-01-22 PROCEDURE — 6360000002 HC RX W HCPCS: Performed by: NURSE ANESTHETIST, CERTIFIED REGISTERED

## 2021-01-22 PROCEDURE — 36415 COLL VENOUS BLD VENIPUNCTURE: CPT

## 2021-01-22 PROCEDURE — 3700000000 HC ANESTHESIA ATTENDED CARE: Performed by: ORTHOPAEDIC SURGERY

## 2021-01-22 PROCEDURE — 6370000000 HC RX 637 (ALT 250 FOR IP): Performed by: EMERGENCY MEDICINE

## 2021-01-22 PROCEDURE — C1713 ANCHOR/SCREW BN/BN,TIS/BN: HCPCS | Performed by: ORTHOPAEDIC SURGERY

## 2021-01-22 PROCEDURE — 2580000003 HC RX 258: Performed by: INTERNAL MEDICINE

## 2021-01-22 PROCEDURE — 2709999900 HC NON-CHARGEABLE SUPPLY: Performed by: ORTHOPAEDIC SURGERY

## 2021-01-22 PROCEDURE — 3700000001 HC ADD 15 MINUTES (ANESTHESIA): Performed by: ORTHOPAEDIC SURGERY

## 2021-01-22 PROCEDURE — 73060 X-RAY EXAM OF HUMERUS: CPT

## 2021-01-22 PROCEDURE — 0PSD04Z REPOSITION LEFT HUMERAL HEAD WITH INTERNAL FIXATION DEVICE, OPEN APPROACH: ICD-10-PCS | Performed by: ORTHOPAEDIC SURGERY

## 2021-01-22 DEVICE — CORTEX SCREW
Type: IMPLANTABLE DEVICE | Site: HUMERUS | Status: FUNCTIONAL
Brand: AXSOS

## 2021-01-22 DEVICE — LOCKING SCREW
Type: IMPLANTABLE DEVICE | Site: HUMERUS | Status: FUNCTIONAL
Brand: AXSOS

## 2021-01-22 RX ORDER — VECURONIUM BROMIDE 1 MG/ML
INJECTION, POWDER, LYOPHILIZED, FOR SOLUTION INTRAVENOUS PRN
Status: DISCONTINUED | OUTPATIENT
Start: 2021-01-22 | End: 2021-01-22 | Stop reason: SDUPTHER

## 2021-01-22 RX ORDER — SUCCINYLCHOLINE/SOD CL,ISO/PF 200MG/10ML
SYRINGE (ML) INTRAVENOUS PRN
Status: DISCONTINUED | OUTPATIENT
Start: 2021-01-22 | End: 2021-01-22 | Stop reason: SDUPTHER

## 2021-01-22 RX ORDER — FENTANYL CITRATE 50 UG/ML
INJECTION, SOLUTION INTRAMUSCULAR; INTRAVENOUS PRN
Status: DISCONTINUED | OUTPATIENT
Start: 2021-01-22 | End: 2021-01-22 | Stop reason: SDUPTHER

## 2021-01-22 RX ORDER — ONDANSETRON 2 MG/ML
4 INJECTION INTRAMUSCULAR; INTRAVENOUS
Status: DISCONTINUED | OUTPATIENT
Start: 2021-01-22 | End: 2021-01-22

## 2021-01-22 RX ORDER — DEXAMETHASONE SODIUM PHOSPHATE 4 MG/ML
INJECTION, SOLUTION INTRA-ARTICULAR; INTRALESIONAL; INTRAMUSCULAR; INTRAVENOUS; SOFT TISSUE PRN
Status: DISCONTINUED | OUTPATIENT
Start: 2021-01-22 | End: 2021-01-22 | Stop reason: SDUPTHER

## 2021-01-22 RX ORDER — SODIUM CHLORIDE 9 MG/ML
INJECTION, SOLUTION INTRAVENOUS CONTINUOUS
Status: DISCONTINUED | OUTPATIENT
Start: 2021-01-22 | End: 2021-01-22

## 2021-01-22 RX ORDER — LABETALOL HYDROCHLORIDE 5 MG/ML
5 INJECTION, SOLUTION INTRAVENOUS EVERY 10 MIN PRN
Status: DISCONTINUED | OUTPATIENT
Start: 2021-01-22 | End: 2021-01-22

## 2021-01-22 RX ORDER — SODIUM CHLORIDE 9 MG/ML
INJECTION, SOLUTION INTRAVENOUS CONTINUOUS PRN
Status: DISCONTINUED | OUTPATIENT
Start: 2021-01-22 | End: 2021-01-22 | Stop reason: SDUPTHER

## 2021-01-22 RX ORDER — BUPIVACAINE HYDROCHLORIDE 5 MG/ML
INJECTION, SOLUTION EPIDURAL; INTRACAUDAL
Status: COMPLETED | OUTPATIENT
Start: 2021-01-22 | End: 2021-01-22

## 2021-01-22 RX ORDER — MEPERIDINE HYDROCHLORIDE 25 MG/ML
12.5 INJECTION INTRAMUSCULAR; INTRAVENOUS; SUBCUTANEOUS EVERY 5 MIN PRN
Status: DISCONTINUED | OUTPATIENT
Start: 2021-01-22 | End: 2021-01-22

## 2021-01-22 RX ORDER — SODIUM CHLORIDE 9 MG/ML
INJECTION, SOLUTION INTRAVENOUS
Status: COMPLETED
Start: 2021-01-22 | End: 2021-01-22

## 2021-01-22 RX ORDER — ONDANSETRON 2 MG/ML
INJECTION INTRAMUSCULAR; INTRAVENOUS PRN
Status: DISCONTINUED | OUTPATIENT
Start: 2021-01-22 | End: 2021-01-22 | Stop reason: SDUPTHER

## 2021-01-22 RX ORDER — PROPOFOL 10 MG/ML
INJECTION, EMULSION INTRAVENOUS PRN
Status: DISCONTINUED | OUTPATIENT
Start: 2021-01-22 | End: 2021-01-22 | Stop reason: SDUPTHER

## 2021-01-22 RX ORDER — SODIUM CHLORIDE 0.9 % (FLUSH) 0.9 %
10 SYRINGE (ML) INJECTION PRN
Status: DISCONTINUED | OUTPATIENT
Start: 2021-01-22 | End: 2021-01-23 | Stop reason: HOSPADM

## 2021-01-22 RX ORDER — OXYCODONE HYDROCHLORIDE AND ACETAMINOPHEN 5; 325 MG/1; MG/1
1 TABLET ORAL
Status: DISCONTINUED | OUTPATIENT
Start: 2021-01-22 | End: 2021-01-22

## 2021-01-22 RX ORDER — HYDROMORPHONE HCL 110MG/55ML
0.5 PATIENT CONTROLLED ANALGESIA SYRINGE INTRAVENOUS EVERY 5 MIN PRN
Status: DISCONTINUED | OUTPATIENT
Start: 2021-01-22 | End: 2021-01-22

## 2021-01-22 RX ORDER — SENNA AND DOCUSATE SODIUM 50; 8.6 MG/1; MG/1
1 TABLET, FILM COATED ORAL 2 TIMES DAILY
Status: DISCONTINUED | OUTPATIENT
Start: 2021-01-22 | End: 2021-01-23 | Stop reason: HOSPADM

## 2021-01-22 RX ORDER — PHENYLEPHRINE HYDROCHLORIDE 10 MG/ML
INJECTION INTRAVENOUS PRN
Status: DISCONTINUED | OUTPATIENT
Start: 2021-01-22 | End: 2021-01-22 | Stop reason: SDUPTHER

## 2021-01-22 RX ORDER — SODIUM CHLORIDE 0.9 % (FLUSH) 0.9 %
10 SYRINGE (ML) INJECTION EVERY 12 HOURS SCHEDULED
Status: DISCONTINUED | OUTPATIENT
Start: 2021-01-22 | End: 2021-01-23 | Stop reason: HOSPADM

## 2021-01-22 RX ORDER — LIDOCAINE HYDROCHLORIDE 20 MG/ML
INJECTION, SOLUTION EPIDURAL; INFILTRATION; INTRACAUDAL; PERINEURAL PRN
Status: DISCONTINUED | OUTPATIENT
Start: 2021-01-22 | End: 2021-01-22 | Stop reason: SDUPTHER

## 2021-01-22 RX ORDER — SODIUM CHLORIDE 450 MG/100ML
INJECTION, SOLUTION INTRAVENOUS CONTINUOUS
Status: DISCONTINUED | OUTPATIENT
Start: 2021-01-22 | End: 2021-01-22

## 2021-01-22 RX ORDER — HYDRALAZINE HYDROCHLORIDE 20 MG/ML
5 INJECTION INTRAMUSCULAR; INTRAVENOUS EVERY 10 MIN PRN
Status: DISCONTINUED | OUTPATIENT
Start: 2021-01-22 | End: 2021-01-22

## 2021-01-22 RX ADMIN — SUGAMMADEX 200 MG: 100 INJECTION, SOLUTION INTRAVENOUS at 14:24

## 2021-01-22 RX ADMIN — VECURONIUM BROMIDE 10 MG: 1 INJECTION, POWDER, LYOPHILIZED, FOR SOLUTION INTRAVENOUS at 12:40

## 2021-01-22 RX ADMIN — ATORVASTATIN CALCIUM 10 MG: 10 TABLET, FILM COATED ORAL at 21:15

## 2021-01-22 RX ADMIN — Medication 10 ML: at 22:37

## 2021-01-22 RX ADMIN — OXYCODONE 10 MG: 5 TABLET ORAL at 21:14

## 2021-01-22 RX ADMIN — PHENYLEPHRINE HYDROCHLORIDE 300 MCG: 10 INJECTION INTRAVENOUS at 12:47

## 2021-01-22 RX ADMIN — HYDROMORPHONE HYDROCHLORIDE 0.5 MG: 2 INJECTION, SOLUTION INTRAMUSCULAR; INTRAVENOUS; SUBCUTANEOUS at 15:22

## 2021-01-22 RX ADMIN — FENTANYL CITRATE 50 MCG: 50 INJECTION, SOLUTION INTRAMUSCULAR; INTRAVENOUS at 14:38

## 2021-01-22 RX ADMIN — SODIUM CHLORIDE 1000 ML: 9 INJECTION, SOLUTION INTRAVENOUS at 17:31

## 2021-01-22 RX ADMIN — OXCARBAZEPINE 600 MG: 300 TABLET, FILM COATED ORAL at 21:14

## 2021-01-22 RX ADMIN — ENOXAPARIN SODIUM 40 MG: 40 INJECTION SUBCUTANEOUS at 21:12

## 2021-01-22 RX ADMIN — OXYBUTYNIN CHLORIDE 10 MG: 5 TABLET, EXTENDED RELEASE ORAL at 21:15

## 2021-01-22 RX ADMIN — Medication 190 MG: at 12:32

## 2021-01-22 RX ADMIN — SODIUM CHLORIDE: 9 INJECTION, SOLUTION INTRAVENOUS at 11:29

## 2021-01-22 RX ADMIN — MORPHINE SULFATE 4 MG: 4 INJECTION, SOLUTION INTRAMUSCULAR; INTRAVENOUS at 08:49

## 2021-01-22 RX ADMIN — FENTANYL CITRATE 50 MCG: 50 INJECTION, SOLUTION INTRAMUSCULAR; INTRAVENOUS at 12:32

## 2021-01-22 RX ADMIN — CEFAZOLIN SODIUM 3000 MG: 10 INJECTION, POWDER, FOR SOLUTION INTRAVENOUS at 22:04

## 2021-01-22 RX ADMIN — LIDOCAINE HYDROCHLORIDE 100 MG: 20 INJECTION, SOLUTION EPIDURAL; INFILTRATION; INTRACAUDAL; PERINEURAL at 12:32

## 2021-01-22 RX ADMIN — MORPHINE SULFATE 4 MG: 4 INJECTION, SOLUTION INTRAMUSCULAR; INTRAVENOUS at 03:03

## 2021-01-22 RX ADMIN — Medication 10 ML: at 08:49

## 2021-01-22 RX ADMIN — CEFAZOLIN SODIUM 3000 MG: 10 INJECTION, POWDER, FOR SOLUTION INTRAVENOUS at 12:19

## 2021-01-22 RX ADMIN — PHENYLEPHRINE HYDROCHLORIDE 200 MCG: 10 INJECTION INTRAVENOUS at 12:35

## 2021-01-22 RX ADMIN — SODIUM CHLORIDE: 4.5 INJECTION, SOLUTION INTRAVENOUS at 17:32

## 2021-01-22 RX ADMIN — RISPERIDONE 4 MG: 1 TABLET ORAL at 21:14

## 2021-01-22 RX ADMIN — ONDANSETRON 4 MG: 2 INJECTION INTRAMUSCULAR; INTRAVENOUS at 12:40

## 2021-01-22 RX ADMIN — PHENYLEPHRINE HYDROCHLORIDE 100 MCG: 10 INJECTION INTRAVENOUS at 13:13

## 2021-01-22 RX ADMIN — MORPHINE SULFATE 4 MG: 4 INJECTION, SOLUTION INTRAMUSCULAR; INTRAVENOUS at 17:33

## 2021-01-22 RX ADMIN — HYDROMORPHONE HYDROCHLORIDE 0.5 MG: 2 INJECTION, SOLUTION INTRAMUSCULAR; INTRAVENOUS; SUBCUTANEOUS at 15:29

## 2021-01-22 RX ADMIN — DEXAMETHASONE SODIUM PHOSPHATE 4 MG: 4 INJECTION, SOLUTION INTRAMUSCULAR; INTRAVENOUS at 12:40

## 2021-01-22 RX ADMIN — SODIUM CHLORIDE: 9 INJECTION, SOLUTION INTRAVENOUS at 12:22

## 2021-01-22 RX ADMIN — ASPIRIN 325 MG: 325 TABLET, COATED ORAL at 21:15

## 2021-01-22 RX ADMIN — PROPOFOL 200 MG: 10 INJECTION, EMULSION INTRAVENOUS at 12:32

## 2021-01-22 ASSESSMENT — PULMONARY FUNCTION TESTS
PIF_VALUE: 2
PIF_VALUE: 20
PIF_VALUE: 18
PIF_VALUE: 24
PIF_VALUE: 21
PIF_VALUE: 20
PIF_VALUE: 29
PIF_VALUE: 0
PIF_VALUE: 21
PIF_VALUE: 20
PIF_VALUE: 21
PIF_VALUE: 28
PIF_VALUE: 2
PIF_VALUE: 1
PIF_VALUE: 19
PIF_VALUE: 20
PIF_VALUE: 1
PIF_VALUE: 20
PIF_VALUE: 21
PIF_VALUE: 21
PIF_VALUE: 1
PIF_VALUE: 2
PIF_VALUE: 2
PIF_VALUE: 15
PIF_VALUE: 25
PIF_VALUE: 21
PIF_VALUE: 20
PIF_VALUE: 20
PIF_VALUE: 1
PIF_VALUE: 34
PIF_VALUE: 20
PIF_VALUE: 20
PIF_VALUE: 15
PIF_VALUE: 20
PIF_VALUE: 19
PIF_VALUE: 20
PIF_VALUE: 21
PIF_VALUE: 0
PIF_VALUE: 21
PIF_VALUE: 21
PIF_VALUE: 4
PIF_VALUE: 21
PIF_VALUE: 20
PIF_VALUE: 21
PIF_VALUE: 20
PIF_VALUE: 22
PIF_VALUE: 21
PIF_VALUE: 3
PIF_VALUE: 24
PIF_VALUE: 20
PIF_VALUE: 19
PIF_VALUE: 20
PIF_VALUE: 21
PIF_VALUE: 20
PIF_VALUE: 21
PIF_VALUE: 18
PIF_VALUE: 19
PIF_VALUE: 21
PIF_VALUE: 22
PIF_VALUE: 20
PIF_VALUE: 7
PIF_VALUE: 20
PIF_VALUE: 21
PIF_VALUE: 0
PIF_VALUE: 21
PIF_VALUE: 2
PIF_VALUE: 24
PIF_VALUE: 21
PIF_VALUE: 22
PIF_VALUE: 20
PIF_VALUE: 20
PIF_VALUE: 2
PIF_VALUE: 20
PIF_VALUE: 22
PIF_VALUE: 20
PIF_VALUE: 20
PIF_VALUE: 22

## 2021-01-22 ASSESSMENT — PAIN DESCRIPTION - LOCATION
LOCATION: ARM

## 2021-01-22 ASSESSMENT — PAIN SCALES - GENERAL
PAINLEVEL_OUTOF10: 7

## 2021-01-22 ASSESSMENT — PAIN DESCRIPTION - ORIENTATION
ORIENTATION: UPPER;LEFT
ORIENTATION: LEFT
ORIENTATION: LEFT

## 2021-01-22 ASSESSMENT — PAIN DESCRIPTION - ONSET: ONSET: ON-GOING

## 2021-01-22 ASSESSMENT — PAIN DESCRIPTION - FREQUENCY: FREQUENCY: INTERMITTENT

## 2021-01-22 ASSESSMENT — LIFESTYLE VARIABLES: SMOKING_STATUS: 1

## 2021-01-22 NOTE — ANESTHESIA POSTPROCEDURE EVALUATION
Department of Anesthesiology  Postprocedure Note    Patient: Shreya Velazquez  MRN: [id number]  YOB: 1975  Date of evaluation: 1/22/2021  Time:  3:12 PM     Procedure Summary     Date: 01/22/21 Room / Location: [address]    Anesthesia Start: 0618 Anesthesia Stop: 6402    Procedure: OPEN REDUCTION INTERNAL FIXATION LEFT PROXIMAL HUMERUS - LUDY (Left Arm Upper) Diagnosis: (LEFT PROXIMAL HUMERUS FRACTURE)    Surgeons: Jaiden Rodriguez MD Responsible Provider: Gisela Jose MD    Anesthesia Type: general ASA Status: 3          Anesthesia Type: general    Mariela Phase I: Mariela Score: 8    Mariela Phase II:      Last vitals: Reviewed and per EMR flowsheets.        Anesthesia Post Evaluation    Patient location during evaluation: PACU  Patient participation: complete - patient participated  Level of consciousness: awake and alert  Airway patency: patent  Nausea & Vomiting: no vomiting and no nausea  Complications: no  Cardiovascular status: hemodynamically stable  Respiratory status: acceptable  Hydration status: stable

## 2021-01-22 NOTE — PROGRESS NOTES
Shift assessment complete. VSS. Scheduled medications given. C/O pain 7/10, PRN pain medication given at this time. Patient resting in bed with eyes closed at this time and does not appear to be in distress. Bed alarm engaged, call light in reach. Will monitor.

## 2021-01-22 NOTE — PROGRESS NOTES
Other problems: Management per primary and other consulting teams. Hospital Problems           Last Modified POA    * (Principal) Hyponatremia 1/21/2021 Yes    Major depressive disorder (Chronic) 1/21/2021 Yes    Seizures ([address] (Chronic) 1/21/2021 Yes    ARYA (obstructive sleep apnea) 1/21/2021 Yes    Morbid obesity with BMI of 40.0-44.9, adult ([address] 1/21/2021 Yes    Tobacco abuse disorder 1/21/2021 Yes    GERD (gastroesophageal reflux disease) 1/21/2021 Yes    H/O traumatic brain injury 1/21/2021 Yes    Leukocytosis 1/21/2021 Yes    Dehydration 1/21/2021 Yes    Closed fracture of left proximal humerus 1/21/2021 Yes        : other supportive care :   - Check daily renal function panel with electrolytes-phosphorus  - Strict monitoring of I/Os, daily weight  - non-Renal feeds/diet  - Current medications reviewed. - Nephrotoxic medications have been discontinued. - Dose adjusted and appropriate. Please refer to the orders. High Complexity. Multiple complex problems. Discussed with patient, and treatment team   Thank you for allowing me to participate in this patient's care. Please do not hesitate to contact me with any questions/concerns. We will follow along with you. Johann Marshall MD  [facility] [address] [address]: [phone] or [address]  Fax: [phone]        ========================================================   ========================================================     Subjective:   Seen resting in bed, no active complaints   Past medical, Surgical, Social, Family medical history reviewed by me. MEDICATIONS: reviewed by me. Medications Prior to Admission:  No current facility-administered medications on file prior to encounter. Current Outpatient Medications on File Prior to Encounter   Medication Sig Dispense Refill    triamcinolone (KENALOG) 0.025 % ointment Apply topically daily Apply topically daily.  lisinopril (PRINIVIL;ZESTRIL) 20 MG tablet Take 1 tablet by mouth once daily 90 tablet 0    OXcarbazepine (TRILEPTAL) 600 MG tablet Take 1 tablet by mouth twice daily 180 tablet 0    oxybutynin (DITROPAN-XL) 10 MG extended release tablet Take 1 tablet by mouth once daily 90 tablet 0    risperiDONE (RISPERDAL) 4 MG tablet One tab nightly 90 tablet 0    citalopram (CELEXA) 40 MG tablet One tab every am 90 tablet 0    simvastatin (ZOCOR) 20 MG tablet One tab every day 90 tablet 0    omeprazole (PRILOSEC) 20 MG delayed release capsule qd 120 capsule 0    vitamin D (ERGOCALCIFEROL) 1.25 MG (21860 UT) CAPS capsule TAKE 1 CAPSULE BY MOUTH ONCE A WEEK 12 capsule 1    chlorthalidone (HYGROTON) 25 MG tablet Take 1 tablet by mouth daily 90 tablet 1    albuterol sulfate HFA (PROVENTIL HFA) 108 (90 Base) MCG/ACT inhaler Inhale 2 puffs into the lungs every 6 hours as needed for Wheezing 1 Inhaler 3    Handicap Placard MISC by Does not apply route 1 each 0         Current Facility-Administered Medications:     0.9 % sodium chloride infusion, , Intravenous, Continuous, Andrew Hinton MD, Last Rate: 100 mL/hr at 01/22/21 1129, New Bag at 01/22/21 1129    meperidine (DEMEROL) injection 12.5 mg, 12.5 mg, Intravenous, Q5 Min PRN, Lucas Pedroza MD    HYDROmorphone (DILAUDID) injection 0.5 mg, 0.5 mg, Intravenous, Q5 Min PRN, Lucas Pedroza MD    oxyCODONE-acetaminophen (PERCOCET) 5-325 MG per tablet 1 tablet, 1 tablet, Oral, Once PRN, Lucas Pedroza MD    ondansetron [facility]) injection 4 mg, 4 mg, Intravenous, Once PRN, Lucas Pedroza MD    labetalol (NORMODYNE;TRANDATE) injection 5 mg, 5 mg, Intravenous, Q10 Min PRN, Lucas Pedroza MD    hydrALAZINE (APRESOLINE) injection 5 mg, 5 mg, Intravenous, Q10 Min PRN, Lucas Pedroza MD   oxyCODONE (ROXICODONE) immediate release tablet 5 mg, 5 mg, Oral, Q4H PRN, 5 mg at 01/21/21 2054 **OR** oxyCODONE (ROXICODONE) immediate release tablet 10 mg, 10 mg, Oral, Q4H PRN, Shashankkeith Chapman, APRN - CNP, 10 mg at 01/21/21 1716    morphine injection 4 mg, 4 mg, Intravenous, Q4H PRN, Melvin Conley MD, 4 mg at 01/22/21 0849    LORazepam (ATIVAN) injection 0.5 mg, 0.5 mg, Intravenous, Q4H PRN, Melvin Conley MD    nicotine (NICODERM CQ) 14 MG/24HR 1 patch, 1 patch, Transdermal, Daily, Venessa Hammans, MD, 1 patch at 01/22/21 0850    morphine injection 4 mg, 4 mg, Intravenous, Once, Venessa Hammans, MD, Stopped at 01/20/21 1326    albuterol sulfate  (90 Base) MCG/ACT inhaler 2 puff, 2 puff, Inhalation, Q6H PRN, Victorino Johnson MD    lisinopril (PRINIVIL;ZESTRIL) tablet 10 mg, 10 mg, Oral, Daily, Victorino Johnson MD, 10 mg at 01/21/21 0953    pantoprazole (PROTONIX) tablet 40 mg, 40 mg, Oral, QAM AC, Victorino Johnson MD, Stopped at 01/21/21 0528    OXcarbazepine (TRILEPTAL) tablet 600 mg, 600 mg, Oral, BID, Victorino Johnson MD, 600 mg at 01/21/21 2054    oxybutynin (DITROPAN-XL) extended release tablet 10 mg, 10 mg, Oral, Nightly, Lelia Mcdonald MD, 10 mg at 01/21/21 2054    risperiDONE (RISPERDAL) tablet 4 mg, 4 mg, Oral, Nightly, Lelia Mcdonald MD, 4 mg at 01/21/21 2054    atorvastatin (LIPITOR) tablet 10 mg, 10 mg, Oral, Nightly, Lelia Mcdonald MD, 10 mg at 01/21/21 2055    [START ON 1/24/2021] vitamin D (ERGOCALCIFEROL) capsule 50,000 Units, 50,000 Units, Oral, Weekly, Victorino Johnson MD    sodium chloride flush 0.9 % injection 10 mL, 10 mL, Intravenous, 2 times per day, Victorino Johnson MD, 10 mL at 01/22/21 0849    sodium chloride flush 0.9 % injection 10 mL, 10 mL, Intravenous, PRN, Victorino Johnson MD    enoxaparin (LOVENOX) injection 40 mg, 40 mg, Subcutaneous, Daily, Victorino Johnson MD, 40 mg at 01/21/21 5735   promethazine (PHENERGAN) tablet 12.5 mg, 12.5 mg, Oral, Q6H PRN **OR** ondansetron (ZOFRAN) injection 4 mg, 4 mg, Intravenous, Q6H PRN, Tina Carballo MD    polyethylene glycol (GLYCOLAX) packet 17 g, 17 g, Oral, Daily PRN, Tina Carballo MD    acetaminophen (TYLENOL) tablet 650 mg, 650 mg, Oral, Q6H PRN **OR** acetaminophen (TYLENOL) suppository 650 mg, 650 mg, Rectal, Q6H PRN, Tina Carballo MD    hydrALAZINE (APRESOLINE) injection 5 mg, 5 mg, Intravenous, Q6H PRN, Malorie Begum MD, 5 mg at 01/20/21 1510    Facility-Administered Medications Ordered in Other Encounters:     fentaNYL (SUBLIMAZE) injection, , , PRN, Rheba Joann, APRN - CRNA, 50 mcg at 01/22/21 1232    lidocaine PF 2 % injection, , , PRN, Rheba Joann, APRN - CRNA, 100 mg at 01/22/21 1232    succinylcholine (ANECTINE) injection, , , PRN, Rheba Joann, APRN - CRNA, 190 mg at 01/22/21 1232    propofol injection, , , PRN, Rheba Joann, APRN - CRNA, 200 mg at 01/22/21 1232    ondansetron (ZOFRAN) injection, , , PRN, Rheba Joann, APRN - CRNA, 4 mg at 01/22/21 1240    Dexamethasone Sodium Phosphate injection, , , PRN, Rheba Joann, APRN - CRNA, 4 mg at 01/22/21 1240    vecuronium (NORCURON) injection, , , PRN, Rheba Joann, APRN - CRNA, 10 mg at 01/22/21 1240    phenylephrine (VAZCULEP) injection, , , PRN, Rheba Joann, APRN - CRNA, 100 mcg at 01/22/21 1313    0.9 % sodium chloride infusion, , , Continuous PRN, Rheba Joann, APRN - CRNA, New Bag at 01/22/21 1222    REVIEW OF SYSTEMS:  As mentioned in chief complaint and HPI , Subjective     PHYSICAL EXAM:  Recent vital signs and recent I/Os reviewed by me.      Wt Readings from Last 3 Encounters:   01/22/21 266 lb 1.6 oz (120.7 kg)   12/10/20 258 lb (117 kg)   12/07/20 263 lb (119.3 kg)     BP Readings from Last 3 Encounters:   01/22/21 130/74   01/22/21 (!) 115/54   12/10/20 120/80     Patient Vitals for the past 24 hrs: BP Temp Temp src Pulse Resp SpO2 Weight   01/22/21 1111 130/74 98.3 °F (36.8 °C) Temporal 94 16 97 %    01/22/21 0857    87      01/22/21 0846 (!) 148/54 98.6 °F (37 °C) Oral 99 16 94 %    01/22/21 0537       266 lb 1.6 oz (120.7 kg)   01/22/21 0320 126/75 97.9 °F (36.6 °C) Oral 91 20 94 %    01/22/21 0052 108/64 98.7 °F (37.1 °C) Oral 111 20 92 %    01/22/21 0010      93 %    01/22/21 0005      (!) 86 %    01/21/21 2053 127/72 98.1 °F (36.7 °C) Oral 96 18     01/21/21 1716 124/78 97.9 °F (36.6 °C) Oral 106 18 92 %        Intake/Output Summary (Last 24 hours) at 1/22/2021 1347  Last data filed at 1/22/2021 0857  Gross per 24 hour   Intake    Output 1300 ml   Net -1300 ml       Physical Exam  Vitals signs reviewed. Constitutional:       General: She is not in acute distress. Comments: Obese body habitus   HENT:      Head: Normocephalic and atraumatic. Right Ear: External ear normal.      Left Ear: External ear normal.      Mouth/Throat:      Mouth: Mucous membranes are not dry. Eyes:      General: No scleral icterus. Conjunctiva/sclera: Conjunctivae normal.   Neck:      Musculoskeletal: Neck supple. Thyroid: No thyroid mass. Vascular: No JVD. Trachea: Trachea normal.   Cardiovascular:      Rate and Rhythm: Normal rate and regular rhythm. Pulmonary:      Effort: Pulmonary effort is normal.      Breath sounds: Normal breath sounds. Abdominal:      General: Bowel sounds are normal.      Palpations: Abdomen is soft. Musculoskeletal:         General: No deformity. Right lower leg: Edema present. Left lower leg: Edema present. Comments: Trace    Skin:     General: Skin is warm and dry. Neurological:      Mental Status: She is alert and oriented to person, place, and time.    Psychiatric:         Behavior: Behavior normal.          DATA:  Diagnostic tests reviewed by me for today's visit:    Recent Labs     01/20/21  1129 01/21/21 24 Hour Urine for Creatinine Clearance:  No components found for: CREAT4, UHRS10, UTV10  Urine Toxicology:  No components found for: IAMMENTA, IBARBIT, IBENZO, ICOCAINE, IMARTHC, IOPIATES, IPHENCYC    HgBA1c:    Lab Results   Component Value Date    LABA1C 5.8 12/30/2020     RPR:  No results found for: RPR  HIV:  No results found for: HIV  ARIC:  No results found for: ANATITER, ARIC  RF:  No results found for: RF  DSDNA:  No components found for: DNA  AMYLASE:  No results found for: AMYLASE  LIPASE:  No results found for: LIPASE  Fibrinogen Level:  No components found for: FIB       BELOW MENTIONED RADIOLOGY STUDY RESULTS BY ME:    Xr Humerus Left (min 2 Views)    Result Date: 1/20/2021  EXAMINATION: TWO XRAY VIEWS OF THE LEFT HUMERUS 1/20/2021 10:41 am COMPARISON: None. HISTORY: ORDERING SYSTEM PROVIDED HISTORY: pain TECHNOLOGIST PROVIDED HISTORY: Reason for exam:->pain Reason for Exam: Fall (Pt had two mechanical falls on the steps this morning. Lost her footing. Kristen Orange three steps the first fall and four the second. Pain to left humerus. able to move fingers, but unable to lift arm) Acuity: Acute Type of Exam: Initial FINDINGS: There is a comminuted displaced fracture of the proximal humeral shaft at the level of the surgical neck. There is extension into the tuberosities.  Butterfly fragment displaced nearly 90 degrees     Fracture of the proximal humerus     Ct Head Wo Contrast    Result Date: 1/20/2021 EXAMINATION: CT OF THE HEAD WITHOUT CONTRAST  1/20/2021 11:38 am TECHNIQUE: CT of the head was performed without the administration of intravenous contrast. Dose modulation, iterative reconstruction, and/or weight based adjustment of the mA/kV was utilized to reduce the radiation dose to as low as reasonably achievable. COMPARISON: None. HISTORY: ORDERING SYSTEM PROVIDED HISTORY: fall TECHNOLOGIST PROVIDED HISTORY: Has a \"code stroke\" or \"stroke alert\" been called? ->No Reason for exam:->fall Is the patient pregnant?->No Reason for Exam: Fall (Pt had two mechanical falls on the steps this morning. Lost her footing. Kvng Bunk three steps the first fall and four the second. Pain to left humerus. able to move fingers, but unable to lift arm) Acuity: Unknown Type of Exam: Unknown FINDINGS: BRAIN/VENTRICLES: There is no acute intracranial hemorrhage, mass effect or midline shift. No abnormal extra-axial fluid collection. The gray-white differentiation is maintained without evidence of an acute infarct. There is no evidence of hydrocephalus. ORBITS: The visualized portion of the orbits demonstrate no acute abnormality. SINUSES: The visualized paranasal sinuses and mastoid air cells demonstrate no acute abnormality. SOFT TISSUES/SKULL:  No acute abnormality of the visualized skull or soft tissues. No acute intracranial abnormality.      Ct Cervical Spine Wo Contrast    Result Date: 1/20/2021 No x-ray evidence of acute trauma to the chest or acute cardiopulmonary disease.

## 2021-01-22 NOTE — PROGRESS NOTES
Received from or - drowsy,nasal cannula,right proximal shoulder dressing dry and intact,ice placed,circulation good,scds,vss.

## 2021-01-22 NOTE — PROGRESS NOTES
Patients head to toe assessment completed. Vital signs WNL. Bed alarm engaged, call light within reach. Scheduled medications given per MAR. Patient complain of left upper arm pain. Rated pain 7/10. PRN oxycodone given. Patient resting in bed. Will continue to monitor.

## 2021-01-22 NOTE — CONSULTS
Medication Sig Start Date End Date Taking? Authorizing Provider   triamcinolone (KENALOG) 0.025 % ointment Apply topically daily Apply topically daily.    Yes Historical Provider, MD   lisinopril (PRINIVIL;ZESTRIL) 20 MG tablet Take 1 tablet by mouth once daily 1/6/21  Yes Armando Lima MD   OXcarbazepine (TRILEPTAL) 600 MG tablet Take 1 tablet by mouth twice daily 1/6/21  Yes Armando Lima MD   oxybutynin (DITROPAN-XL) 10 MG extended release tablet Take 1 tablet by mouth once daily 1/6/21  Yes Armando Lima MD   risperiDONE (RISPERDAL) 4 MG tablet One tab nightly 1/6/21  Yes Armando Lima MD   citalopram (CELEXA) 40 MG tablet One tab every am 1/6/21  Yes Armando Lima MD   simvastatin (ZOCOR) 20 MG tablet One tab every day 1/6/21  Yes Armando Lima MD   omeprazole (PRILOSEC) 20 MG delayed release capsule qd 1/6/21  Yes Armando Lima MD   vitamin D (ERGOCALCIFEROL) 1.25 MG (72308 UT) CAPS capsule TAKE 1 CAPSULE BY MOUTH ONCE A WEEK 1/6/21  Yes Armando Lima MD   chlorthalidone (HYGROTON) 25 MG tablet Take 1 tablet by mouth daily 1/6/21  Yes Armando Lima MD   albuterol sulfate HFA (PROVENTIL HFA) 108 (90 Base) MCG/ACT inhaler Inhale 2 puffs into the lungs every 6 hours as needed for Wheezing 1/6/21  Yes Armando Lima MD   Handicap Placard MISC by Does not apply route 12/10/20  Yes Dian Alonzo, APRN - CNP       Current Medications:   Current Facility-Administered Medications: oxyCODONE (ROXICODONE) immediate release tablet 5 mg, 5 mg, Oral, Q4H PRN **OR** oxyCODONE (ROXICODONE) immediate release tablet 10 mg, 10 mg, Oral, Q4H PRN  morphine injection 4 mg, 4 mg, Intravenous, Q4H PRN  LORazepam (ATIVAN) injection 0.5 mg, 0.5 mg, Intravenous, Q4H PRN  nicotine (NICODERM CQ) 14 MG/24HR 1 patch, 1 patch, Transdermal, Daily  morphine injection 4 mg, 4 mg, Intravenous, Once  albuterol sulfate  (90 Base) MCG/ACT inhaler 2 puff, 2 puff, Inhalation, Q6H PRN lisinopril (PRINIVIL;ZESTRIL) tablet 10 mg, 10 mg, Oral, Daily  pantoprazole (PROTONIX) tablet 40 mg, 40 mg, Oral, QAM AC  OXcarbazepine (TRILEPTAL) tablet 600 mg, 600 mg, Oral, BID  oxybutynin (DITROPAN-XL) extended release tablet 10 mg, 10 mg, Oral, Nightly  risperiDONE (RISPERDAL) tablet 4 mg, 4 mg, Oral, Nightly  atorvastatin (LIPITOR) tablet 10 mg, 10 mg, Oral, Nightly  [START ON 1/24/2021] vitamin D (ERGOCALCIFEROL) capsule 50,000 Units, 50,000 Units, Oral, Weekly  sodium chloride flush 0.9 % injection 10 mL, 10 mL, Intravenous, 2 times per day  sodium chloride flush 0.9 % injection 10 mL, 10 mL, Intravenous, PRN  enoxaparin (LOVENOX) injection 40 mg, 40 mg, Subcutaneous, Daily  promethazine (PHENERGAN) tablet 12.5 mg, 12.5 mg, Oral, Q6H PRN **OR** ondansetron (ZOFRAN) injection 4 mg, 4 mg, Intravenous, Q6H PRN  polyethylene glycol (GLYCOLAX) packet 17 g, 17 g, Oral, Daily PRN  acetaminophen (TYLENOL) tablet 650 mg, 650 mg, Oral, Q6H PRN **OR** acetaminophen (TYLENOL) suppository 650 mg, 650 mg, Rectal, Q6H PRN  hydrALAZINE (APRESOLINE) injection 5 mg, 5 mg, Intravenous, Q6H PRN    Allergies: Wellbutrin [bupropion]    Social History     Socioeconomic History    Marital status:      Spouse name: Not on file    Number of children: 0    Years of education: Not on file    Highest education level: Not on file   Occupational History    Occupation:    Social Needs    Financial resource strain: Not on file    Food insecurity     Worry: Not on file     Inability: Not on file   [employer] needs     Medical: Not on file     Non-medical: Not on file   Tobacco Use    Smoking status: Current Every Day Smoker     Packs/day: 2.00     Years: 31.00     Pack years: 62.00     Types: Cigarettes     Start date: 1987    Smokeless tobacco: Never Used   Substance and Sexual Activity    Alcohol use:  Yes     Alcohol/week: 2.0 standard drinks     Types: 2 Shots of liquor per week Frequency: Monthly or less     Drinks per session: 1 or 2     Binge frequency: Less than monthly     Comment: social    Drug use: No    Sexual activity: Yes     Partners: Male   Lifestyle    Physical activity     Days per week: Not on file     Minutes per session: Not on file    Stress: Not on file   Relationships    Social connections     Talks on phone: Not on file     Gets together: Not on file     Attends [employer] service: Not on file     Active member of club or organization: Not on file     Attends meetings of clubs or organizations: Not on file     Relationship status: Not on file    Intimate partner violence     Fear of current or ex partner: Not on file     Emotionally abused: Not on file     Physically abused: Not on file     Forced sexual activity: Not on file   Other Topics Concern    Not on file   Social History Narrative    Not on file       Family History:  Family History   Problem Relation Age of Onset    Heart Disease Mother     High Cholesterol Mother     Heart Disease Father     High Cholesterol Father     Alzheimer's Disease Paternal Grandmother     Heart Attack Paternal Grandfather     Diabetes Sister          REVIEW OF SYSTEMS:   CONSTITUTIONAL: Denies unexplained weight loss, fevers, chills or fatigue  NEUROLOGICAL: Denies unsteady gait or progressive weakness    PSYCHOLOGICAL: Denies anxiety, depression   SKIN: Denies skin changes, delayed healing, rash, itching   HEMATOLOGIC: Denies easy bleeding or bruising  ENDOCRINE: Denies excessive thirst, urination, heat/cold  RESPIRATORY: Denies current dyspnea, cough  CARDIOVASCULAR: Negative for chest pain at this time. EYES: Negative for photophobia and visual disturbance. ENT:  Negative for rhinorrhea, epistaxis, sore throat, or hearing loss. GI: Denies nausea, vomiting, diarrhea   : Denies bowel or bladder issues   MUSCULOSKELETAL:  Left shoulder pain. All other ROS reviewed in chart or with patient or family and are grossly negative. PHYSICAL EXAMINATION:  Ms. Salvador Mcneal is a very pleasant 54 y.o. female who seen today in no acute distress, awake, alert, and oriented. She is well nourished and groomed. Patient with normal affect. Body mass index is 48.52 kg/m². . Skin warm and dry. Resting respiratory rate is 16. Resp deep and easy. Pulse is with regular rate and rhythm    /78   Pulse 106   Temp 97.9 °F (36.6 °C) (Oral)   Resp 18   Wt 265 lb 4.8 oz (120.3 kg)   SpO2 92%   BMI 48.52 kg/m²        Airway is intact  Chest: chest clear, no wheezing, rales, normal symmetric air entry, no tachypnea, retractions or cyanosis  Heart: regular rate and rhythm ; heart sounds normal   Hearing intact, pupil equal and reactive bilateral  Lymphatics; No groin or axillary enlarged lymph nodes. Neck; No swelling  Abdomen; soft, non distended. MUSCULOSKELETAL:   On evaluation of her bilateral upper extremity, there is no obvious deformity left shoulder. There is moderate swelling and moderate ecchymosis. She is tender to palpation over the shoulder, and otherwise non tender over the remainder of the extremity. Range of motion is decreased secondary to pain over the left shoulder. The skin overlying the left shoulder is intact without evidence of lesion, laceration. Distal pulses are 2+ and symmetric bilaterally. Sensation is grossly intact to light touch and symmetric bilaterally.     NEUROLOGIC:   Sensory:    Touch:                     Right Upper Extremity:  normal                   Left Upper Extremity:  normal                  Right Lower Extremity:  normal                  Left Lower Extremity:  normal        DATA:    CBC:   Lab Results   Component Value Date    WBC 10.5 01/21/2021    RBC 4.28 01/21/2021    HGB 13.4 01/21/2021    HCT 39.4 01/21/2021    MCV 92.0 01/21/2021    MCH 31.2 01/21/2021    MCHC 33.9 01/21/2021    RDW 13.1 01/21/2021  01/21/2021    MPV 8.3 01/21/2021     WBC:    Lab Results   Component Value Date    WBC 10.5 01/21/2021     PT/INR:  No results found for: PROTIME, INR  PTT:  No results found for: APTT[APTT    IMAGING: Xrays dated 1/20/2021, 3 views of left shoulder were reviewed, and showed a markedly displaced comminuted proximal humerus fracture. IMPRESSION:  Left shoulder pain/ markedly displaced comminuted proximal humerus fracture. PLAN:  I discussed with Maykel Pace the overall alignment of the fracture and treatment options including both surgical and non-surgical treatment, and that my recommendation is an open reduction and internal fixation given the amount of displacement and comminution of the fracture. I discussed the risks and benefits of surgery with the patient, including but not limited to infection, bleeding, pain, injury to nerves or blood vessels failure of the surgery and need for additional surgery. All the patient's questions were answered. We discussed an expected post-operative course. She  is understanding of this and wishes to proceed. Surgery tomorrow noon if medically stable. Thank you very much for the kind consultation and allowing me to participate in this patient's care. I will continue to keep you apprised of her progress.          Guillermina Pandey MD   1/21/2021  8:30 AM

## 2021-01-22 NOTE — BRIEF OP NOTE
Brief Postoperative Note      Patient: Kylah Gan  YOB: 1975  MRN: [id number]    Date of Procedure: 1/22/2021    Pre-Op Diagnosis: LEFT PROXIMAL HUMERUS FRACTURE    Post-Op Diagnosis: Same       Procedure(s):  OPEN REDUCTION INTERNAL FIXATION LEFT PROXIMAL HUMERUS - LUDY    Surgeon(s):  Kristie Guevara MD    Assistant:  Surgical Assistant: Kiara Mancia    Anesthesia: General    Estimated Blood Loss (mL): 238     Complications: None    Specimens:   * No specimens in log *    Implants:  Implant Name Type Inv. Item Serial No.  Lot No. LRB No. Used Action   LEFT LATERAL HUMERAL PLATE    LUDY: [facility]  Left 1 Implanted   SCREW BNE L26MM DIA3.5MM JANESSA TI ALLY FULL THRD T15 DRV  SCREW BNE L26MM DIA3.5MM JANESSA TI ALLY FULL THRD T15 DRV  [employer] [facility]  Left 2 Implanted   SCREW BNE L24MM DIA3.5MM JANESSA TI ALLY FULL THRD T15 DRV  SCREW BNE L24MM DIA3.5MM JANESSA TI ALLY FULL THRD T15 DRV  [employer] [facility]  Left 1 Implanted   SCREW BNE L28MM DIA3. 5MM STD JANESSA TI ST RORY LO PROF AXSOS 3  SCREW BNE L28MM DIA3. 5MM STD JANESSA TI ST RORY LO PROF AXSOS 3  [employer] [facility]  Left 1 Implanted   SCREW BNE L30MM DIA3. 5MM STD JANESSA TI ST RORY NONCOMPLIANT LO  SCREW BNE L30MM DIA3. 5MM STD JANESSA TI ST RORY NONCOMPLIANT LO  [employer] [facility]  Left 1 Implanted   SCREW BNE L38MM DIA4MM STD CANC TI ST RORY LO PROF FOR AXSOS  SCREW BNE L38MM DIA4MM STD CANC TI ST RORY LO PROF FOR AXSOS  [employer] [facility]  Left 1 Implanted   SCREW BNE L40MM DIA4MM STD TI ST RORY LO PROF AXSOS 3  SCREW BNE L40MM DIA4MM STD TI ST RORY LO PROF AXSOS 3  [employer] [facility]  Left 1 Implanted   SCREW BNE L42MM DIA4MM STD CANC TI ST RORY LO PROF FOR AXSOS  SCREW BNE L42MM DIA4MM STD CANC TI ST RORY LO PROF FOR AXSOS  [employer] [facility]  Left 1 Implanted SCREW BNE L44MM DIA4MM STD CANC TI ST RORY LO PROF FOR AXSOS  SCREW BNE L44MM DIA4MM STD CANC TI ST RORY LO PROF FOR AXSOS  [employer] [facility]  Left 1 Implanted   SCREW BNE L46MM DIA4MM STD CANC TI ST RORY NONCOMPLIANT LO  SCREW BNE L46MM DIA4MM STD CANC TI ST RORY NONCOMPLIANT LO  [employer] [facility]  Left 1 Implanted   SCREW BNE L48MM DIA4MM TI ALLY RORY FULL THRD T15 DRV AXSOS  SCREW BNE L48MM DIA4MM TI ALLY RORY FULL THRD T15 DRV AXSOS  [employer] [facility]  Left 1 Implanted         Drains:   Urethral Catheter Double-lumen 18 fr (Active)   Catheter Indications Need for fluid management in critically ill patients in a critical care setting not able to be managed by other means such as BSC with hat, bedpan, urinal, condom catheter, or short term intermittent urethral catherization 01/22/21 1605   Site Assessment No urethral drainage 01/22/21 1605   Urine Color Ilsa 01/22/21 1605   Urine Appearance Clear 01/22/21 1605   Urine Odor Malodorous 01/22/21 1605   Output (mL) 350 mL 01/22/21 0857       [REMOVED] Urethral Catheter 16 fr (Removed)   $ Urethral catheter insertion $ Not inserted for procedure 01/21/21 1122   Catheter Indications Other (specify) 01/21/21 1122   Site Assessment Pink 01/21/21 1122       Findings: Same    Electronically signed by Kristie Guevara MD on 1/22/2021 at 5:28 PM

## 2021-01-22 NOTE — PROGRESS NOTES
[facility]  Notified,[facility].
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
## 2021-01-22 NOTE — PROGRESS NOTES
[facility] PROGRESS NOTE    1/22/2021 1:18 PM        Name: Roger Mcneal . Admitted: 1/20/2021  Primary Care Provider: HARRISON Jaeger CNP (Tel: [phone])    Brief Course:  53 yo F with history of TBI with seizure disorder, depression, morbid obesity, ARYA, GERD, hyperlipidemia, OA came to ER with falls. Admitted as inpatient for acute on chronic hyponatremia, dehydration, leukocytosis and L proximal humerus fracture. Followed by Ortho and Renal.  For ORIF L humerus on 1/22/21      CC:  Falls    Subjective:  . Patient seen in preop. Still with pain in L arm. No CP, SOB, HA or fevers. To OR now.     Reviewed interval ancillary notes    Current Medications      0.9 % sodium chloride infusion, Continuous      meperidine (DEMEROL) injection 12.5 mg, Q5 Min PRN      HYDROmorphone (DILAUDID) injection 0.5 mg, Q5 Min PRN      oxyCODONE-acetaminophen (PERCOCET) 5-325 MG per tablet 1 tablet, Once PRN      ondansetron (ZOFRAN) injection 4 mg, Once PRN      labetalol (NORMODYNE;TRANDATE) injection 5 mg, Q10 Min PRN      hydrALAZINE (APRESOLINE) injection 5 mg, Q10 Min PRN      oxyCODONE (ROXICODONE) immediate release tablet 5 mg, Q4H PRN    Or      oxyCODONE (ROXICODONE) immediate release tablet 10 mg, Q4H PRN      morphine injection 4 mg, Q4H PRN      LORazepam (ATIVAN) injection 0.5 mg, Q4H PRN      nicotine (NICODERM CQ) 14 MG/24HR 1 patch, Daily      morphine injection 4 mg, Once      albuterol sulfate  (90 Base) MCG/ACT inhaler 2 puff, Q6H PRN      lisinopril (PRINIVIL;ZESTRIL) tablet 10 mg, Daily      pantoprazole (PROTONIX) tablet 40 mg, QAM AC      OXcarbazepine (TRILEPTAL) tablet 600 mg, BID      oxybutynin (DITROPAN-XL) extended release tablet 10 mg, Nightly      risperiDONE (RISPERDAL) tablet 4 mg, Nightly      atorvastatin (LIPITOR) tablet 10 mg, Nightly Neurology: Grossly intact. No speech or motor deficits  Psych: Normal affect. Alert and oriented in time, place and person  Skin: Warm, dry, normal turgor  Extremity exam shows brisk capillary refill. Peripheral pulses are palpable in lower extremities     Labs and Tests:  CBC:   Recent Labs     01/20/21  1129 01/21/21 0444 01/22/21  0648   WBC 17.2* 10.5 12.0*   HGB 16.8* 13.4 12.8    220 203     BMP:    Recent Labs     01/20/21  1129 01/20/21  1129 01/21/21  0444 01/21/21  0444 01/21/21  1415 01/21/21  2037 01/22/21  0648   *   < > 131*   < > 130* 128* 131*   K 4.2  --  3.5  --   --   --  3.8   CL 85*  --  94*  --   --   --  96*   CO2 26  --  26  --   --   --  26   BUN 8  --  5*  --   --   --  6*   CREATININE <0.5*  --  <0.5*  --   --   --  <0.5*   GLUCOSE 130*  --  113*  --   --   --  98    < > = values in this interval not displayed. Hepatic:   Recent Labs     01/20/21  1129 01/21/21 0444   AST 18 10*   ALT 20 12   BILITOT 0.4 0.4   ALKPHOS 104 79     XR CHEST PORTABLE   Final Result   No x-ray evidence of acute trauma to the chest or acute cardiopulmonary   disease. CT CERVICAL SPINE WO CONTRAST   Final Result   No acute abnormality of the cervical spine. CT HEAD WO CONTRAST   Final Result   No acute intracranial abnormality. XR HUMERUS LEFT (MIN 2 VIEWS)   Final Result   Fracture of the proximal humerus         XR HUMERUS LEFT (MIN 2 VIEWS)    (Results Pending)         Problem List  Principal Problem:    Hyponatremia  Active Problems:    Major depressive disorder    Seizures (HCC)    ARYA (obstructive sleep apnea)    Morbid obesity with BMI of 40.0-44.9, adult (HCC)    Tobacco abuse disorder    GERD (gastroesophageal reflux disease)    H/O traumatic brain injury    Leukocytosis    Dehydration    Closed fracture of left proximal humerus  Resolved Problems:    * No resolved hospital problems. *       Assessment & Plan:   1. Serial sodium per Renal  2.  NPO for OR today 3. Cont Morphine IV PRN pain  4. Cont Ativan IV PRN anxiety  5. PT/OT eval after OR    IV Access: Peripheral  Torrez: No   Diet: Diet NPO, After Midnight  Code:Full Code  DVT PPX Lovenox  Disposition Home    Discussed with patient, Dr Neyda Martinez (Ortho), nursing and CM. OR today.       Katherine Gonzalez MD   1/22/2021 1:18 PM

## 2021-01-22 NOTE — PROGRESS NOTES
Call placed to 4T at this time and report was given to [facility]

## 2021-01-22 NOTE — PROGRESS NOTES
Incentive Spirometry education and demonstration completed by Respiratory Therapy Yes      Response to education: Very Good     Teaching Time: 5 minutes    Minimum Predicted Vital Capacity - 501 mL. Patient's Actual Vital Capacity - 750 mL. Turning over to Nursing for routine follow-up Yes.     Comments: continue spirometer q2h w/a    Electronically signed by Babita Corbett RCP on 1/22/2021 at 6:02 PM

## 2021-01-23 VITALS
TEMPERATURE: 98.9 F | RESPIRATION RATE: 16 BRPM | HEART RATE: 96 BPM | BODY MASS INDEX: 48.67 KG/M2 | DIASTOLIC BLOOD PRESSURE: 68 MMHG | OXYGEN SATURATION: 94 % | WEIGHT: 266.1 LBS | SYSTOLIC BLOOD PRESSURE: 117 MMHG

## 2021-01-23 LAB
ALBUMIN SERPL-MCNC: 3.2 G/DL (ref 3.4–5)
ANION GAP SERPL CALCULATED.3IONS-SCNC: 10 MMOL/L (ref 3–16)
BASOPHILS ABSOLUTE: 0 K/UL (ref 0–0.2)
BASOPHILS RELATIVE PERCENT: 0.4 %
BUN BLDV-MCNC: 7 MG/DL (ref 7–20)
CALCIUM SERPL-MCNC: 8.6 MG/DL (ref 8.3–10.6)
CHLORIDE BLD-SCNC: 96 MMOL/L (ref 99–110)
CHLORIDE URINE RANDOM: 32 MMOL/L
CO2: 27 MMOL/L (ref 21–32)
CREAT SERPL-MCNC: <0.5 MG/DL (ref 0.6–1.1)
CREATININE URINE: 95.5 MG/DL (ref 28–259)
EOSINOPHILS ABSOLUTE: 0.1 K/UL (ref 0–0.6)
EOSINOPHILS RELATIVE PERCENT: 0.8 %
GFR AFRICAN AMERICAN: >60
GFR NON-AFRICAN AMERICAN: >60
GLUCOSE BLD-MCNC: 102 MG/DL (ref 70–99)
HCT VFR BLD CALC: 33.5 % (ref 36–48)
HEMOGLOBIN: 11.2 G/DL (ref 12–16)
LYMPHOCYTES ABSOLUTE: 2.9 K/UL (ref 1–5.1)
LYMPHOCYTES RELATIVE PERCENT: 25.1 %
MCH RBC QN AUTO: 31.8 PG (ref 26–34)
MCHC RBC AUTO-ENTMCNC: 33.5 G/DL (ref 31–36)
MCV RBC AUTO: 94.9 FL (ref 80–100)
MONOCYTES ABSOLUTE: 0.9 K/UL (ref 0–1.3)
MONOCYTES RELATIVE PERCENT: 7.7 %
NEUTROPHILS ABSOLUTE: 7.6 K/UL (ref 1.7–7.7)
NEUTROPHILS RELATIVE PERCENT: 66 %
OSMOLALITY URINE: 464 MOSM/KG (ref 390–1070)
PDW BLD-RTO: 13.2 % (ref 12.4–15.4)
PHOSPHORUS: 4 MG/DL (ref 2.5–4.9)
PLATELET # BLD: 200 K/UL (ref 135–450)
PMV BLD AUTO: 8.1 FL (ref 5–10.5)
POTASSIUM SERPL-SCNC: 3.8 MMOL/L (ref 3.5–5.1)
POTASSIUM, UR: 40.1 MMOL/L
RBC # BLD: 3.53 M/UL (ref 4–5.2)
SODIUM BLD-SCNC: 133 MMOL/L (ref 136–145)
SODIUM URINE: <20 MMOL/L
UREA NITROGEN, UR: 617.5 MG/DL (ref 800–1666)
WBC # BLD: 11.5 K/UL (ref 4–11)

## 2021-01-23 PROCEDURE — 85025 COMPLETE CBC W/AUTO DIFF WBC: CPT

## 2021-01-23 PROCEDURE — 6370000000 HC RX 637 (ALT 250 FOR IP): Performed by: ORTHOPAEDIC SURGERY

## 2021-01-23 PROCEDURE — 97166 OT EVAL MOD COMPLEX 45 MIN: CPT

## 2021-01-23 PROCEDURE — 97161 PT EVAL LOW COMPLEX 20 MIN: CPT

## 2021-01-23 PROCEDURE — 82570 ASSAY OF URINE CREATININE: CPT

## 2021-01-23 PROCEDURE — 6360000002 HC RX W HCPCS: Performed by: ORTHOPAEDIC SURGERY

## 2021-01-23 PROCEDURE — 84540 ASSAY OF URINE/UREA-N: CPT

## 2021-01-23 PROCEDURE — 82436 ASSAY OF URINE CHLORIDE: CPT

## 2021-01-23 PROCEDURE — 2580000003 HC RX 258: Performed by: ORTHOPAEDIC SURGERY

## 2021-01-23 PROCEDURE — 97530 THERAPEUTIC ACTIVITIES: CPT

## 2021-01-23 PROCEDURE — 80069 RENAL FUNCTION PANEL: CPT

## 2021-01-23 PROCEDURE — 99024 POSTOP FOLLOW-UP VISIT: CPT | Performed by: ORTHOPAEDIC SURGERY

## 2021-01-23 PROCEDURE — 84300 ASSAY OF URINE SODIUM: CPT

## 2021-01-23 PROCEDURE — 84133 ASSAY OF URINE POTASSIUM: CPT

## 2021-01-23 PROCEDURE — 83935 ASSAY OF URINE OSMOLALITY: CPT

## 2021-01-23 RX ORDER — POLYETHYLENE GLYCOL 3350 17 G/17G
17 POWDER, FOR SOLUTION ORAL DAILY
Qty: 510 G | Refills: 0 | Status: SHIPPED | OUTPATIENT
Start: 2021-01-23 | End: 2021-02-22

## 2021-01-23 RX ORDER — OXYCODONE HYDROCHLORIDE AND ACETAMINOPHEN 5; 325 MG/1; MG/1
1 TABLET ORAL EVERY 6 HOURS PRN
Qty: 28 TABLET | Refills: 0 | Status: SHIPPED | OUTPATIENT
Start: 2021-01-23 | End: 2021-01-30

## 2021-01-23 RX ORDER — NICOTINE 21 MG/24HR
1 PATCH, TRANSDERMAL 24 HOURS TRANSDERMAL DAILY
Qty: 30 PATCH | Refills: 0 | Status: SHIPPED | OUTPATIENT
Start: 2021-01-24 | End: 2021-02-23 | Stop reason: SDUPTHER

## 2021-01-23 RX ADMIN — ASPIRIN 325 MG: 325 TABLET, COATED ORAL at 08:42

## 2021-01-23 RX ADMIN — OXCARBAZEPINE 600 MG: 300 TABLET, FILM COATED ORAL at 08:42

## 2021-01-23 RX ADMIN — Medication 10 ML: at 08:44

## 2021-01-23 RX ADMIN — OXYCODONE 10 MG: 5 TABLET ORAL at 12:53

## 2021-01-23 RX ADMIN — OXYCODONE 10 MG: 5 TABLET ORAL at 08:41

## 2021-01-23 RX ADMIN — LISINOPRIL 10 MG: 10 TABLET ORAL at 08:42

## 2021-01-23 RX ADMIN — CEFAZOLIN SODIUM 3000 MG: 10 INJECTION, POWDER, FOR SOLUTION INTRAVENOUS at 05:29

## 2021-01-23 RX ADMIN — PANTOPRAZOLE SODIUM 40 MG: 40 TABLET, DELAYED RELEASE ORAL at 05:28

## 2021-01-23 RX ADMIN — MORPHINE SULFATE 4 MG: 4 INJECTION, SOLUTION INTRAMUSCULAR; INTRAVENOUS at 05:31

## 2021-01-23 ASSESSMENT — PAIN DESCRIPTION - FREQUENCY
FREQUENCY: INTERMITTENT
FREQUENCY: CONTINUOUS

## 2021-01-23 ASSESSMENT — PAIN DESCRIPTION - LOCATION
LOCATION: ARM

## 2021-01-23 ASSESSMENT — PAIN SCALES - GENERAL
PAINLEVEL_OUTOF10: 8
PAINLEVEL_OUTOF10: 4
PAINLEVEL_OUTOF10: 6
PAINLEVEL_OUTOF10: 7

## 2021-01-23 ASSESSMENT — PAIN DESCRIPTION - PAIN TYPE
TYPE: ACUTE PAIN
TYPE: ACUTE PAIN

## 2021-01-23 ASSESSMENT — PAIN DESCRIPTION - DESCRIPTORS
DESCRIPTORS: ACHING

## 2021-01-23 ASSESSMENT — PAIN DESCRIPTION - ORIENTATION
ORIENTATION: LEFT
ORIENTATION: LEFT

## 2021-01-23 ASSESSMENT — PAIN DESCRIPTION - ONSET: ONSET: ON-GOING

## 2021-01-23 NOTE — PROGRESS NOTES
Discharge instructions given, scripts given. All patient questions answered, IV discontinued, transport called for discharge.  Linda Zamora

## 2021-01-23 NOTE — PLAN OF CARE
Problem: Falls - Risk of:  Goal: Will remain free from falls  Description: Will remain free from falls  1/23/2021 0342 by Delfina Teague RN  Outcome: Ongoing  1/23/2021 0335 by Delfina Teague RN  Outcome: Ongoing  1/23/2021 0335 by Delfina Teague RN  Outcome: Ongoing  Goal: Absence of physical injury  Description: Absence of physical injury  1/23/2021 0342 by Delfina Teague RN  Outcome: Ongoing  1/23/2021 0335 by Delfina Teague RN  Outcome: Ongoing  1/23/2021 0335 by Delfina Teague RN  Outcome: Ongoing     Problem: Pain:  Goal: Pain level will decrease  Description: Pain level will decrease  1/23/2021 0342 by Delfina Teague, RN  Outcome: Ongoing  1/23/2021 0335 by Delfina Teague RN  Outcome: Ongoing  1/23/2021 0335 by Delfina Teague RN  Outcome: Ongoing  Goal: Control of acute pain  Description: Control of acute pain  1/23/2021 0342 by Delfina Teague RN  Outcome: Ongoing  1/23/2021 0335 by Delfina Teague RN  Outcome: Ongoing  1/23/2021 0335 by Delfina eTague RN  Outcome: Ongoing  Goal: Control of chronic pain  Description: Control of chronic pain  1/23/2021 0342 by Delfina Teague RN  Outcome: Ongoing  1/23/2021 0335 by Delfina Teague RN  Outcome: Ongoing  1/23/2021 0335 by Delfina Teague RN  Outcome: Ongoing     Problem: Falls - Risk of:  Goal: Will remain free from falls  Description: Will remain free from falls  1/23/2021 0343 by Delfina Teague RN  Outcome: Ongoing  1/23/2021 0342 by Delfina Teague RN  Outcome: Ongoing  1/23/2021 0335 by Delfina Teague RN  Outcome: Ongoing  1/23/2021 0335 by Delfina Teague RN  Outcome: Ongoing  Goal: Absence of physical injury  Description: Absence of physical injury  1/23/2021 0343 by Delfina Teague RN  Outcome: Ongoing  1/23/2021 0342 by Delfina Teague RN  Outcome: Ongoing  1/23/2021 0335 by Delfina Teague RN  Outcome: Ongoing  1/23/2021 0335 by Delfina Teague RN  Outcome: Ongoing     Problem: Pain:  Goal: Pain level will decrease  Description: Pain level will decrease 1/23/2021 0343 by Priscila Castillo RN  Outcome: Ongoing  1/23/2021 0342 by Priscila Castillo RN  Outcome: Ongoing  1/23/2021 0335 by Priscila Castillo RN  Outcome: Ongoing  1/23/2021 0335 by Priscila Castillo RN  Outcome: Ongoing  Goal: Control of acute pain  Description: Control of acute pain  1/23/2021 0343 by Priscila Castillo RN  Outcome: Ongoing  1/23/2021 0342 by Priscila Castillo RN  Outcome: Ongoing  1/23/2021 0335 by Priscila Castillo RN  Outcome: Ongoing  1/23/2021 0335 by Priscila Castillo RN  Outcome: Ongoing  Goal: Control of chronic pain  Description: Control of chronic pain  1/23/2021 0343 by Priscila Castillo RN  Outcome: Ongoing  1/23/2021 0342 by Priscila Castillo RN  Outcome: Ongoing  1/23/2021 0335 by Priscila Castillo RN  Outcome: Ongoing  1/23/2021 0335 by Priscila Castillo RN  Outcome: Ongoing

## 2021-01-23 NOTE — CARE COORDINATION
SW consult noted. PT/OT recommending  PT/OT for patient. Patient has no insurance provider. Referral made to [facility] for [facility] PT[facility] services. Patient given [facility] contact information. Patient's RN informed, [phone].     Electronically signed by VALERIO Black on 1/23/2021 at 2:52 PM

## 2021-01-23 NOTE — PROGRESS NOTES
Department of Orthopedic Surgery  Attending Progress Note    POD# 1 open reduction internal fixation left proximal humerus with Dr. Franky Eddy    Subjective:  Left shoulder is \"sore\" and \"painful\" with activity    Vitals  VITALS:  /67   Pulse 99   Temp 97.5 °F (36.4 °C) (Infrared)   Resp 16   Wt 266 lb 1.6 oz (120.7 kg)   SpO2 96%   BMI 48.67 kg/m²     PHYSICAL EXAM:    Orientation:  alert and oriented to person, place and time    Left Upper Extremity    Incision:  dressing in place, clean, dry and intact    Upperr Extremity Motor :   Biceps:  3/5  Triceps:  3/5  Wrist extension:  4/5  Wrist flexion: 4/5  : 5/5  Thumb extension: 5/5    Upper Extremity Sensory to light touch:  Axillery:  intact  Radial:  intact  Median:  intact  Ulnar: intact  Musculocutaneous: intact    Pulses:    Radial:  2    LABS:    HgB:    Lab Results   Component Value Date    HGB 11.2 01/23/2021     Sodium 133  ay    ASSESSMENT AND PLAN:    Post operative day 1 status post left proximal humerus ORIF    1:  Non weight bearing LUE, sling as needed for comfort  2:  Deep venous thrombosis prophylaxis - lovenox while inpateint  3:  Continue physical therapy  4:  D/C Plan:  Per therapy guidelines and primary team  5:  Pain Control:  Percocet with morphine for breakthrough   6:  Follow up with Dr. Franky Eddy in 2 weeks for repeat x-rays and wound check

## 2021-01-23 NOTE — DISCHARGE SUMMARY
Hospital Medicine Discharge Summary    Patient: Miryam Wiggins     Gender: female  : 1975   Age: 54 y.o. MRN: [id number]    Admitting Physician: Loretta Ray MD  Discharge Physician: Sahil Duke MD     Code Status: Full Code     Admit Date: 2021   Discharge Date:   21    Disposition:  Home    Discharge Diagnoses: Active Hospital Problems    Diagnosis Date Noted    ARYA (obstructive sleep apnea) [G47.33] 2021    Morbid obesity with BMI of 40.0-44.9, adult ([facility] [address] [E66.01, Z68.41] 2021    Tobacco abuse disorder [Z72.0] 2021    GERD (gastroesophageal reflux disease) [K21.9] 2021    H/O traumatic brain injury [Z87.820] 2021    Leukocytosis [D72.829] 2021    Dehydration [E86.0] 2021    Closed fracture of left proximal humerus [S42.202A] 2021    Hyponatremia [E87.1] 2021    Major depressive disorder [F32.9] 2016    Seizures ([facility] [address] [R56.9] 2016       Follow-up appointments:  one week    Outpatient to do list: F/U with PCP, Ortho and Renal    Condition at Discharge:  Stable    Hospital Course:   55 yo F with history of TBI with seizure disorder, depression, morbid obesity, ARYA, GERD, hyperlipidemia, OA came to ER with falls. Admitted as inpatient for acute on chronic hyponatremia, dehydration, leukocytosis and L proximal humerus fracture. Followed by Ortho and Renal.  S/P    OPEN REDUCTION INTERNAL FIXATION LEFT PROXIMAL HUMERUS - LUDY on 21. Will go home with home PT/OT/VNS/SW upon DC. F/u with PCP, Renal and Ortho.     Discharge Medications:   Current Discharge Medication List      START taking these medications    Details   nicotine (NICODERM CQ) 14 MG/24HR Place 1 patch onto the skin daily  Qty: 30 patch, Refills: 0 oxyCODONE-acetaminophen (PERCOCET) 5-325 MG per tablet Take 1 tablet by mouth every 6 hours as needed for Pain for up to 7 days. Intended supply: 7 days. Take lowest dose possible to manage pain  Qty: 28 tablet, Refills: 0    Comments: Reduce doses taken as pain becomes manageable  Associated Diagnoses: Other closed displaced fracture of proximal end of left humerus, initial encounter      polyethylene glycol (GLYCOLAX) 17 GM/SCOOP powder Take 17 g by mouth daily  Qty: 510 g, Refills: 0      aspirin 325 MG EC tablet Take 1 tablet by mouth daily  Qty: 30 tablet, Refills: 0           Current Discharge Medication List        Current Discharge Medication List      CONTINUE these medications which have NOT CHANGED    Details   triamcinolone (KENALOG) 0.025 % ointment Apply topically daily Apply topically daily.       lisinopril (PRINIVIL;ZESTRIL) 20 MG tablet Take 1 tablet by mouth once daily  Qty: 90 tablet, Refills: 0    Associated Diagnoses: Essential hypertension      OXcarbazepine (TRILEPTAL) 600 MG tablet Take 1 tablet by mouth twice daily  Qty: 180 tablet, Refills: 0      oxybutynin (DITROPAN-XL) 10 MG extended release tablet Take 1 tablet by mouth once daily  Qty: 90 tablet, Refills: 0    Associated Diagnoses: Incontinence in female      risperiDONE (RISPERDAL) 4 MG tablet One tab nightly  Qty: 90 tablet, Refills: 0      citalopram (CELEXA) 40 MG tablet One tab every am  Qty: 90 tablet, Refills: 0      simvastatin (ZOCOR) 20 MG tablet One tab every day  Qty: 90 tablet, Refills: 0    Associated Diagnoses: Mixed hyperlipidemia      omeprazole (PRILOSEC) 20 MG delayed release capsule qd  Qty: 120 capsule, Refills: 0      vitamin D (ERGOCALCIFEROL) 1.25 MG (48201 UT) CAPS capsule TAKE 1 CAPSULE BY MOUTH ONCE A WEEK  Qty: 12 capsule, Refills: 1    Comments: Please consider 90 day supplies to promote better adherence  Associated Diagnoses: Vitamin D deficiency albuterol sulfate HFA (PROVENTIL HFA) 108 (90 Base) MCG/ACT inhaler Inhale 2 puffs into the lungs every 6 hours as needed for Wheezing  Qty: 1 Inhaler, Refills: 3    Associated Diagnoses: Wheezing on inspiration      Handicap Placard MISC by Does not apply route  Qty: 1 each, Refills: 0    Associated Diagnoses: Mixed hyperlipidemia; Tobacco abuse disorder; Wheezing on inspiration           Current Discharge Medication List      STOP taking these medications       chlorthalidone (HYGROTON) 25 MG tablet Comments:   Reason for Stopping:                 Discharge Exam:    /68   Pulse 96   Temp 98.9 °F (37.2 °C) (Oral)   Resp 16   Wt 266 lb 1.6 oz (120.7 kg)   SpO2 94%   BMI 48.67 kg/m²   General appearance:  Appears comfortable, morbidly obese, pleasant, in bed  Eyes: Sclera clear. Pupils equal.  ENT: Moist oral mucosa. Trachea midline, no adenopathy. Cardiovascular: Regular rhythm, normal S1, S2. No murmur. No edema in lower extremities  Respiratory: Not using accessory muscles. Good inspiratory effort. Clear to auscultation bilaterally, no wheeze or crackles. GI: Abdomen soft, obese, no tenderness, not distended, normal bowel sounds  Musculoskeletal: L arm in sling  Neurology: Grossly intact. No speech or motor deficits  Psych: Normal affect. Alert and oriented in time, place and person  Skin: Warm, dry, normal turgor  Extremity exam shows brisk capillary refill. Peripheral pulses are palpable in lower extremities     Labs:  For convenience and continuity at follow-up the following most recent labs are provided:    Lab Results   Component Value Date    WBC 11.5 01/23/2021    HGB 11.2 01/23/2021    HCT 33.5 01/23/2021    MCV 94.9 01/23/2021     01/23/2021     01/23/2021    K 3.8 01/23/2021    K 3.5 01/21/2021    CL 96 01/23/2021    CO2 27 01/23/2021    BUN 7 01/23/2021    CREATININE <0.5 01/23/2021    CALCIUM 8.6 01/23/2021    PHOS 4.0 01/23/2021    ALKPHOS 79 01/21/2021 EXAMINATION: CT OF THE HEAD WITHOUT CONTRAST  1/20/2021 11:38 am TECHNIQUE: CT of the head was performed without the administration of intravenous contrast. Dose modulation, iterative reconstruction, and/or weight based adjustment of the mA/kV was utilized to reduce the radiation dose to as low as reasonably achievable. COMPARISON: None. HISTORY: ORDERING SYSTEM PROVIDED HISTORY: fall TECHNOLOGIST PROVIDED HISTORY: Has a \"code stroke\" or \"stroke alert\" been called? ->No Reason for exam:->fall Is the patient pregnant?->No Reason for Exam: Fall (Pt had two mechanical falls on the steps this morning. Lost her footing. Olivia [place] three steps the first fall and four the second. Pain to left humerus. able to move fingers, but unable to lift arm) Acuity: Unknown Type of Exam: Unknown FINDINGS: BRAIN/VENTRICLES: There is no acute intracranial hemorrhage, mass effect or midline shift. No abnormal extra-axial fluid collection. The gray-white differentiation is maintained without evidence of an acute infarct. There is no evidence of hydrocephalus. ORBITS: The visualized portion of the orbits demonstrate no acute abnormality. SINUSES: The visualized paranasal sinuses and mastoid air cells demonstrate no acute abnormality. SOFT TISSUES/SKULL:  No acute abnormality of the visualized skull or soft tissues. No acute intracranial abnormality.      Ct Cervical Spine Wo Contrast    Result Date: 1/20/2021 No x-ray evidence of acute trauma to the chest or acute cardiopulmonary disease. The patient was seen and examined on day of discharge and this discharge summary is in conjunction with any daily progress note from day of discharge. Time Spent on discharge is 45 minutes  in the examination, evaluation, counseling and review of medications and discharge plan. Note that more than 30 minutes was spent in preparing discharge papers, discussing discharge with patient, medication review, etc.       Signed:    aSscha Crystal MD   1/23/2021      Thank you HARRISON Montano CNP for the opportunity to be involved in this patient's care.  If you have any questions or concerns please feel free to contact me at [address] [facility], [address] [facility]

## 2021-01-23 NOTE — DISCHARGE INSTR - COC
Continuity of Care Form    Patient Name: Tamia Davila   :  1975  MRN:  [id number]    Admit date:  2021  Discharge date:  2021    Code Status Order: Full Code   Advance Directives:   [address] [facility] Documentation       Date/Time Healthcare Directive Type of Healthcare Directive Copy in [facility] [address] Agent's Name Healthcare Agent's Phone Number    21 182  No, patient does not have an advance directive for healthcare treatment -- -- -- -- --    21 1758  No, patient does not have an advance directive for healthcare treatment -- -- -- -- --            Admitting Physician:  Arielle Govea MD  PCP: HARRISON Willis CNP    Discharging Nurse: [address] Unit/Room#: 9EA-9305/7300-06  Discharging Unit Phone Number: [phone]    Emergency Contact:   Extended Emergency Contact Information  Primary Emergency Contact: Kyle Fay  Address: [address] [place] [address] Phone: [phone]  Relation: Spouse  Secondary Emergency Contact: Jolynn Hernandez  Home Phone: [phone]  Relation: Aunt/Uncle    Past Surgical History:  Past Surgical History:   Procedure Laterality Date    APPENDECTOMY      HYSTERECTOMY      LAPAROSCOPY      abdomen    OTHER SURGICAL HISTORY  2018    Balloon sinuplasty bilateral frontal, bilateral maxillary and left sphenoid sinuses     SINUS SURGERY      THROAT SURGERY      multiple    TONSILLECTOMY      WRIST FRACTURE SURGERY Left 2016     OPEN REDUCTION INTERNAL FIXATION LEFT DISTAL RADIUS       Immunization History:   Immunization History   Administered Date(s) Administered    Influenza Whole 10/28/2015    Influenza, Quadv, IM, PF (6 mo and older Fluzone, Flulaval, Fluarix, and 3 yrs and older Afluria) 10/30/2018, 2019, 12/10/2020    Pneumococcal Polysaccharide ([id number]) 2017  Tdap (Boostrix, Adacel) 05/22/2017       Active Problems:  Patient Active Problem List   Diagnosis Code    Closed fracture of distal end of radius S52.509A    Smoking F17.200    Obesity due to excess calories E66.09    Major depressive disorder F32.9    Hypercholesterolemia E78.00    Seizures ([facility]) R56.9    Chronic maxillary sinusitis J32.0    Chronic pansinusitis J32.4    Acute recurrent maxillary sinusitis J01.01    Acute recurrent frontal sinusitis J01.11    Chronic vasomotor rhinitis J30.0    Chronic frontal sinusitis J32.1    Recurrent sphenoidal sinusitis J01.31    Epistaxis R04.0    Hyponatremia E87.1    ARYA (obstructive sleep apnea) G47.33    Morbid obesity with BMI of 40.0-44.9, adult ([facility]) E66.01, Z68.41    Tobacco abuse disorder Z72.0    GERD (gastroesophageal reflux disease) K21.9    H/O traumatic brain injury Z87.820    Leukocytosis D72.829    Dehydration E86.0    Closed fracture of left proximal humerus S42.202A       Isolation/Infection:   Isolation            No Isolation          Patient Infection Status       Infection Onset Added Last Indicated Last Indicated By Review Planned Expiration Resolved Resolved By    None active    Resolved    COVID-19 Rule Out 01/20/21 01/20/21 01/20/21 COVID-19 (Ordered)   01/20/21 Rule-Out Test Resulted            Nurse Assessment:  Last Vital Signs: /68   Pulse 96   Temp 98.9 °F (37.2 °C) (Oral)   Resp 16   Wt 266 lb 1.6 oz (120.7 kg)   SpO2 94%   BMI 48.67 kg/m²     Last documented pain score (0-10 scale): Pain Level: (unable to assess/sleeping)  Last Weight:   Wt Readings from Last 1 Encounters:   01/22/21 266 lb 1.6 oz (120.7 kg)     Mental Status:  oriented    IV Access:  - None    Nursing Mobility/ADLs:  Walking   Independent  Transfer  Assisted  Bathing  Assisted  Dressing  Assisted  Toileting  Assisted  Feeding  Assisted  Med Admin  Independent  Med Delivery   whole    Wound Care Documentation and Therapy: Incision 06/23/16 Wrist Left (Active)   Number of days: 1675        Elimination:  Continence:   · Bowel: No  · Bladder: No  Urinary Catheter: None   Colostomy/Ileostomy/Ileal Conduit: No       Date of Last BM: ***    Intake/Output Summary (Last 24 hours) at 1/23/2021 1029  Last data filed at 1/23/2021 0917  Gross per 24 hour   Intake 1100 ml   Output 2750 ml   Net -1650 ml     I/O last 3 completed shifts: In: 1100 [P.O.:100; I.V.:1000]  Out: 2200 [Urine:2200]    Safety Concerns:     History of Seizures, Falls    Impairments/Disabilities:      None    Nutrition Therapy:  Current Nutrition Therapy:   - Oral Diet:  General    Routes of Feeding: Oral  Liquids: No Restrictions  Daily Fluid Restriction: no  Last Modified Barium Swallow with Video (Video Swallowing Test): not done    Treatments at the Time of Hospital Discharge:   Respiratory Treatments: none  Oxygen Therapy:  is not on home oxygen therapy.   Ventilator:    - No ventilator support    Rehab Therapies: SN,PT,OT,MSW, HHA  Weight Bearing Status/Restrictions: NWB to left arm for 2 weeks  Other Medical Equipment (for information only, NOT a DME order):  ***  Other Treatments: ***    Patient's personal belongings (please select all that are sent with patient):  None    RN SIGNATURE:  Electronically signed by Huong Starkey RN on 1/23/21 at 12:45 PM EST    CASE MANAGEMENT/SOCIAL WORK SECTION    Inpatient Status Date: ***    Readmission Risk Assessment Score:  Readmission Risk              Risk of Unplanned Readmission:        19           [facility]charg[facility] Facility/ Agency   [facility]  Phone: [phone]  Fax: [phone]    Dialysis Facility (if applicable)   · Name:  · Address:  · Dialysis Schedule:  · Phone:  · Fax:    / signature: {Esignature:[id number]:::0}    PHYSICIAN SECTION    Prognosis: Good    Condition at Discharge: Stable    Rehab Potential (if transferring to Rehab): Good Recommended Labs or Other Treatments After Discharge:     Physician Certification: I certify the above information and transfer of Kaity Marin  is necessary for the continuing treatment of the diagnosis listed and that she requires Home Care for less 30 days.      Update Admission H&P: No change in H&P    PHYSICIAN SIGNATURE:  Electronically signed by Elías Preciado MD on 1/23/21 at 10:30 AM EST

## 2021-01-23 NOTE — PLAN OF CARE
Problem: Falls - Risk of:  Goal: Will remain free from falls  Description: Will remain free from falls  1/23/2021 0335 by Garrick Sosa RN  Outcome: Ongoing  1/23/2021 0335 by Garrick Sosa RN  Outcome: Ongoing  Goal: Absence of physical injury  Description: Absence of physical injury  1/23/2021 0335 by Garrick Sosa RN  Outcome: Ongoing  1/23/2021 0335 by Garrick Sosa RN  Outcome: Ongoing     Problem: Pain:  Goal: Pain level will decrease  Description: Pain level will decrease  1/23/2021 0335 by Garrick Sosa RN  Outcome: Ongoing  1/23/2021 0335 by Garrick Sosa RN  Outcome: Ongoing  Goal: Control of acute pain  Description: Control of acute pain  1/23/2021 0335 by Garrick Sosa RN  Outcome: Ongoing  1/23/2021 0335 by Garrick Sosa RN  Outcome: Ongoing  Goal: Control of chronic pain  Description: Control of chronic pain  1/23/2021 0335 by Garrick Sosa RN  Outcome: Ongoing  1/23/2021 0335 by Garrick Sosa RN  Outcome: Ongoing

## 2021-01-23 NOTE — PROGRESS NOTES
Physical Therapy    Facility/Department: [address] ORTHO/NEURO NURSING  Initial Assessment/Discharge Summary    NAME: Kylah Gan  : 1975  MRN: [id number]    Date of Service: 2021    Discharge Recommendations:  Kylah Gan scored a 21/24 on the AM-PAC short mobility form. Current research shows that an AM-PAC score of 18 or greater is typically associated with a discharge to the patient's home setting. Based on the patient's AM-PAC score and their current functional mobility deficits, it is recommended that the patient have 2-3 sessions per week of Physical Therapy at d/c to increase the patient's independence. At this time, this patient demonstrates the endurance and safety to discharge home with outpatient therapy and a follow up treatment frequency of 2-3x/wk. Please see assessment section for further patient specific details. If patient discharges prior to next session this note will serve as a discharge summary. Please see below for the latest assessment towards goals. Home with assist PRN, Outpatient PT   PT Equipment Recommendations  Equipment Needed: No    Assessment   Body structures, Functions, Activity limitations: Decreased functional mobility ; Decreased ROM; Decreased strength  Assessment: Patient was independent prior to admission. Presently, she is able to perform bed mobility, transfers, and ambulation unassisted but with SBA/CGA for safety given recent fall and surgery. Patient declined furthter ambulation or to practice stairs prior to d/c. Will sign off. No further PT needs at this time. F/u with outpatient therapy when MD deems appropriate. Treatment Diagnosis: LUE weakness/ROM deficits  Prognosis: Good  Decision Making: Low Complexity  History: As above. Exam: functional mobility assessment  Clinical Presentation: Stable. PT Education: Goals;PT Role;Plan of Care;Transfer Training;Weight-bearing Education; Functional Mobility Training Patient Education: Patient verbalized understanding. Barriers to Learning: emotional  REQUIRES PT FOLLOW UP: No  Activity Tolerance  Activity Tolerance: Patient Tolerated treatment well       Patient Diagnosis(es): The primary encounter diagnosis was Multiple falls. Diagnoses of Hyponatremia and Other closed displaced fracture of proximal end of left humerus, initial encounter were also pertinent to this visit. has a past medical history of Arthritis, Depression, Epilepsy ([address] GERD (gastroesophageal reflux disease), Hyperlipidemia, Hypertension, ARYA (obstructive sleep apnea), PTSD (post-traumatic stress disorder), Seizures ([address] and Traumatic brain injury ([facility] [address] has a past surgical history that includes Hysterectomy; Throat surgery; Appendectomy; laparoscopy; Tonsillectomy; Wrist fracture surgery (Left, 06/23/2016); other surgical history (01/30/2018); and sinus surgery. Restrictions  Restrictions/Precautions  Restrictions/Precautions: Fall Risk, Weight Bearing(high fall risk)  Required Braces or Orthoses?: Yes(LUE sling, NWB LUE)  Upper Extremity Weight Bearing Restrictions  Left Upper Extremity Weight Bearing: Non Weight Bearing  Required Braces or Orthoses  Left Upper Extremity Brace/Splint: Sling  Position Activity Restriction  Other position/activity restrictions: Patient s/p fall on stairs resulting in L humerus fx. Patient now s/p ORIF left humerus 1/22/2021. Vision/Hearing  Vision: Within Functional Limits  Hearing: Within functional limits       Subjective  General  Chart Reviewed: Yes  Patient assessed for rehabilitation services?: Yes  Additional Pertinent Hx: L radius fx (2016)  Response To Previous Treatment: Not applicable  Family / Caregiver Present: No  Diagnosis: L humerus fx s/p ORIF 1/22/2021  Follows Commands: Within Functional Limits  General Comment  Comments: Patient supine in bed. Subjective  Subjective: Patient reports pain in left arm/shoulder.   Pain Screening Patient Currently in Pain: Yes  Pain Assessment  Pain Assessment: 0-10  Pain Level: 8  Patient's Stated Pain Goal: No pain  Pain Type: Acute pain  Pain Location: Arm  Pain Orientation: Left  Pain Descriptors: Aching  Pain Frequency: Continuous  Pain Onset: On-going  Clinical Progression: Not changed  Functional Pain Assessment: Activities are not prevented  Non-Pharmaceutical Pain Intervention(s): Repositioned; Rest  Response to Pain Intervention: Patient Satisfied  Multiple Pain Sites: No  Vital Signs  Patient Currently in Pain: Yes  Pre Treatment Pain Screening  Intervention List: Patient able to continue with treatment;Nurse called to administer meds  Comments / Details: Patient received pain medication prior to movement. Orientation  Orientation  Overall Orientation Status: Within Normal Limits     Social/Functional History  Social/Functional History  Lives With: Spouse  Type of Home: House  Home Layout: Two level  Home Access: Stairs to enter with rails  Entrance Stairs - Number of Steps: 7 exterior stairs, 8 stairs to bedroom in basement  Entrance Stairs - Rails: Left  Bathroom Shower/Tub: Tub/Shower unit  Bathroom Toilet: Standard  Home Equipment: [employer]  ADL Assistance: Independent  Homemaking Assistance: Independent  Homemaking Responsibilities: Yes  Ambulation Assistance: Independent  Transfer Assistance: Independent  Active : Yes  Additional Comments: Denies falls. Objective  PROM RLE (degrees)  RLE PROM: WNL  AROM RLE (degrees)  RLE AROM: WNL  AROM LLE (degrees)  LLE AROM : WNL  AROM RUE (degrees)  RUE AROM : WNL  AROM LUE (degrees)  LUE General AROM: Sling in place. Patient refused adjustment of brace for it to be more supportive. Strength RLE  Strength RLE: WFL  Strength LLE  Strength LLE: WFL  Strength RUE  Strength RUE: WFL  Strength LUE  Comment: Not tested. Sling in place.      Bed mobility  Supine to Sit: Supervision  Sit to Supine: Contact guard assistance  Transfers Sit to Stand: Stand by assistance  Stand to sit: Stand by assistance  Bed to Chair: Stand by assistance  Stand Pivot Transfers: Stand by assistance  Ambulation  Ambulation?: Yes  More Ambulation?: No  Ambulation 1  Surface: level tile  Device: No Device  Assistance: Contact guard assistance  Gait Deviations: Slow Jaki  Distance: 10'  Comments: Patient declined further ambulation, declined need for cane to balance. Stairs/Curb  Stairs?: No(Patient declined to practice stairs, states spouse will assist her.)     Balance  Posture: Good  Sitting - Static: Good  Sitting - Dynamic: Good  Standing - Static: Good  Standing - Dynamic: Good  Exercises  Comments: Patient educated on hand/wrist mobility. Plan   Plan  Plan Comment: D/C acute PT  Safety Devices  Type of devices:  All fall risk precautions in place, Bed alarm in place, Call light within reach, Patient at risk for falls, Left in bed, Nurse notified  Restraints  Initially in place: No    AM-PAC Score  AM-PAC Inpatient Mobility Raw Score : 21 (01/23/21 1317)  AM-PAC Inpatient T-Scale Score : 50.25 (01/23/21 1317)  Mobility Inpatient CMS 0-100% Score: 28.97 (01/23/21 1317)  Mobility Inpatient CMS G-Code Modifier : CJ (01/23/21 1317)     Therapy Time   Individual Concurrent Group Co-treatment   Time In 1250         Time Out 1313         Minutes 23         Timed Code Treatment Minutes: [address], PT

## 2021-01-23 NOTE — OP NOTE
[address]                     [facility], [address]                                OPERATIVE REPORT    PATIENT NAME: Josh Olguin                  :        1975  MED REC NO:   [id number]                          ROOM:       [id number]  ACCOUNT NO:   [de-identified]                           ADMIT DATE: 2021  PROVIDER:     Yuliet Lord MD    DATE OF PROCEDURE:  2021    PRIMARY CARE PHYSICIAN:  Kaitlin Silva CNP    PREOPERATIVE DIAGNOSIS:  Left proximal humerus comminuted displaced  fracture. POSTOPERATIVE DIAGNOSIS:  Left proximal humerus comminuted displaced  fracture. OPERATION PERFORMED:  Open treatment of left proximal humerus comminuted  displaced fracture with open reduction and internal fixation. SURGEON:  Yuliet Lord MD    ASSISTANTClrancho Lord, surgical assistant. ANESTHESIA:  General anesthesia. ESTIMATED BLOOD LOSS:  300 mL. COMPLICATIONS:  None. IMPLANTS USED:  [employer] five-hole proximal humerus locking  plate and one lag screw. INDICATION:  This is a 79-year-old white female, right-hand dominant,  with a BMI of 49, who sustained a fall with a left shoulder injury. The  patient is well known to me in the past for a left distal radius  fracture. I was consulted for her injury when she came to [facility].  The patient was admitted because of hyponatremia. At this point, she is stable for surgical intervention. All risks,  benefits, and alternatives were discussed with the patient and she  agreed to proceed with surgical treatment. Given the patient's Body mass index is 48.67 kg/m². and comminuted fracture added significant challenge to the procedure. It required significant physical and mental effort. It required 200% more time for such procedure. DESCRIPTION OF PROCEDURE:  The patient's left shoulder was marked.   She received 3 gm Ancef IV preoperatively given her size. The patient was  then brought to the operating room and underwent general endotracheal  anesthesia. She was then transferred to beach table position and the  head was secured. All bony prominences were well padded. At this  point, we had the left shoulder and upper extremity prepped and draped  in a regular sterile routine fashion. A time-out was called confirming  the patient name, site, and procedure. We used an anterior deltopectoral approach. Incision was made over the  anterior aspect of the left shoulder. The cephalic vein was identified  and was protected medially throughout the procedure. At this point, we  exposed the fracture and it was found to be markedly displaced and  severely comminuted. It was significantly challenging, physically and  mentally very difficult given the patient's size and severity of the  fracture. We were carefully able to reduce the anterolateral butterfly  piece and placed anatomically and secured with a lag screw. At this  point, we elected to use a five-hole proximal humerus locking plate from  [employer], which was placed in appropriate position. We then placed one  nonlocking screw within the shaft and K-wires proximally. I did confirm  with the C-arm in two plane to be in good position. We put a total of  five proximal locking screws. We added three more nonlocking screws in  the shaft. Overall, we were very satisfied with the anatomic reduction  and position of all the screws. At this point, we irrigated the  incision copiously with 2 liters of normal saline mixed with gentamicin. We closed the deep layer with a 2-0 Vicryl, subcu with a 2-0 and 3-0  Vicryl, and skin with a 4-0 Monocryl. Steri-Strips was then applied. Dressing was then applied in the form of Mepilex. The patient tolerated the procedure well and was taken to the recovery  in stable condition. POSTOPERATIVE PLAN:  The patient will be readmitted as an inpatient. We  will start PT/OT with nonweightbearing on the left upper extremity given  the marked comminution that she have, but she can start gentle range of  motion of the left shoulder. She may need placement.         Hayley Farfan MD    D: 01/23/2021 9:30:27       T: 01/23/2021 11:18:13     [id number]  Job#: [id number]     Doc#: [id number]    CC:  Nicole Prasad Cnp

## 2021-01-23 NOTE — PROGRESS NOTES
MD Mag Darden MD Curry Garden, MD               Office: [phone]                      Fax: [phone] [facility]                   NEPHROLOGY INPATIENT PROGRESS NOTE:     PATIENT NAME: Dorcas Muhammad  : 1975  MRN: [id number]     Examined face to face (w/ full PPE), as it would affect my plan. RECOMMENDATIONS:    - Monitor Serum Na daily  - Goal Serum Na low 130, at goal    -I have stopped IV fluids, as concern with SIADH, with \"de-salination\" making hyponatremia worse,  - holding Chlorthalidone,    - ok to restart decreased Celexa dose,    - active Smoker -> will need to r/o Lung CA -w/ low dose CT scan (will do outp)   - ongoing pain control to reduce nonosmotic release of ADH    - Salt tablets - hold was BP was uncontrolled,  - Fluid restriction - not needed    - ok w/ regular diet now        D/C plan from renal stand point: Stable from renal standpoint, with current management,  : f/u w/ me at [address] in 1 week after d/c. (I will arrange.)          IMPRESSION:       Hyponatremia : : Acute on chronic < vs < worsening Chronic,   - Severe on admission 123: mildly symptomatic:  - No Reported Severe symptoms: seizures, obtundation, coma, and respiratory arrest.   - no need for Hypertonic saline  - H/O Chronic: lowest 128, ~2 months ago, running low 130s chronically,  - Risk factors:    - hypovolemia, on this admission, with   - meds: Chlorthalidone,  Celexa ,   - Smoker -> will need to r/o Lung CA    -Fall, pain, causing nonosmotic release of ADH  - Patient is at   risk of developing Osmotic Demyelination Syndrome.    - Call us urgently if any/worsening neurological findings. Work up: suggesting for ? SIADH underlying for chronic cause, but hypovolemia this time.    - renal function - is fine   - BP not lower, ?mild hypovolemia    - f/u TSH , no need for cortisol level Other problems: Management per primary and other consulting teams. Hospital Problems           Last Modified POA    * (Principal) Hyponatremia 1/21/2021 Yes    Major depressive disorder (Chronic) 1/21/2021 Yes    Seizures ([facility] (Chronic) 1/21/2021 Yes    ARYA (obstructive sleep apnea) 1/21/2021 Yes    Morbid obesity with BMI of 40.0-44.9, adult ([facility] [address] [facility] 1/21/2021 Yes    Tobacco abuse disorder 1/21/2021 Yes    GERD (gastroesophageal reflux disease) 1/21/2021 Yes    H/O traumatic brain injury 1/21/2021 Yes    Leukocytosis 1/21/2021 Yes    Dehydration 1/21/2021 Yes    Closed fracture of left proximal humerus 1/21/2021 Yes           Please refer to the orders. High Complexity. Multiple complex problems. Discussed with patient, and treatment team   Thank you for allowing me to participate in this patient's care. Please do not hesitate to contact me with any questions/concerns. We will follow along with you. Johann Marshall MD  [facility] [address] [place] [address] [place]: [phone] or Via [employer]  Fax: [phone]        ========================================================   ========================================================     Subjective:   No complaints   Past medical, Surgical, Social, Family medical history reviewed by me. MEDICATIONS: reviewed by me. Medications Prior to Admission:  No current facility-administered medications on file prior to encounter. Current Outpatient Medications on File Prior to Encounter   Medication Sig Dispense Refill    triamcinolone (KENALOG) 0.025 % ointment Apply topically daily Apply topically daily.       lisinopril (PRINIVIL;ZESTRIL) 20 MG tablet Take 1 tablet by mouth once daily 90 tablet 0    OXcarbazepine (TRILEPTAL) 600 MG tablet Take 1 tablet by mouth twice daily 180 tablet 0    oxybutynin (DITROPAN-XL) 10 MG extended release tablet Take 1 tablet by mouth once daily 90 tablet 0  risperiDONE (RISPERDAL) 4 MG tablet One tab nightly 90 tablet 0    citalopram (CELEXA) 40 MG tablet One tab every am 90 tablet 0    simvastatin (ZOCOR) 20 MG tablet One tab every day 90 tablet 0    omeprazole (PRILOSEC) 20 MG delayed release capsule qd 120 capsule 0    vitamin D (ERGOCALCIFEROL) 1.25 MG (25687 UT) CAPS capsule TAKE 1 CAPSULE BY MOUTH ONCE A WEEK 12 capsule 1    albuterol sulfate HFA (PROVENTIL HFA) 108 (90 Base) MCG/ACT inhaler Inhale 2 puffs into the lungs every 6 hours as needed for Wheezing 1 Inhaler 3    Handicap Placard MISC by Does not apply route 1 each 0         Current Facility-Administered Medications:     sodium chloride flush 0.9 % injection 10 mL, 10 mL, Intravenous, 2 times per day, Guillermina Pandey MD, 10 mL at 01/23/21 0844    sodium chloride flush 0.9 % injection 10 mL, 10 mL, Intravenous, PRN, Guillermina Pandey MD    sennosides-docusate sodium (SENOKOT-S) 8.6-50 MG tablet 1 tablet, 1 tablet, Oral, BID, Guillermina Pandey MD    magnesium hydroxide (MILK OF MAGNESIA) 400 MG/5ML suspension 30 mL, 30 mL, Oral, Daily PRN, Guillermina Pandey MD    aspirin EC tablet 325 mg, 325 mg, Oral, BID, Guillermina Pandey MD, 325 mg at 01/23/21 0364    oxyCODONE (ROXICODONE) immediate release tablet 5 mg, 5 mg, Oral, Q4H PRN, 5 mg at 01/21/21 2054 **OR** oxyCODONE (ROXICODONE) immediate release tablet 10 mg, 10 mg, Oral, Q4H PRN, Guillermina Pandey MD, 10 mg at 01/23/21 0841    morphine injection 4 mg, 4 mg, Intravenous, Q4H PRN, Guillermina Pandey MD, 4 mg at 01/23/21 0531    LORazepam (ATIVAN) injection 0.5 mg, 0.5 mg, Intravenous, Q4H PRN, Guillermina Pandey MD    nicotine (NICODERM CQ) 14 MG/24HR 1 patch, 1 patch, Transdermal, Daily, Guillermina Pandey MD, 1 patch at 01/23/21 0843    morphine injection 4 mg, 4 mg, Intravenous, Once, Guillermina Pandey MD, Stopped at 01/20/21 1326    albuterol sulfate  (90 Base) MCG/ACT inhaler 2 puff, 2 puff, Inhalation, Q6H PRN, Guillermina Pandey MD   lisinopril (PRINIVIL;ZESTRIL) tablet 10 mg, 10 mg, Oral, Daily, Kristie Guevara MD, 10 mg at 01/23/21 0842    pantoprazole (PROTONIX) tablet 40 mg, 40 mg, Oral, QAM AC, Kristie Guevara MD, 40 mg at 01/23/21 0528    OXcarbazepine (TRILEPTAL) tablet 600 mg, 600 mg, Oral, BID, Kristie Guevara MD, 600 mg at 01/23/21 1461    oxybutynin (DITROPAN-XL) extended release tablet 10 mg, 10 mg, Oral, Nightly, Kristie Guevara MD, 10 mg at 01/22/21 2115    risperiDONE (RISPERDAL) tablet 4 mg, 4 mg, Oral, Nightly, Kristie Guevara MD, 4 mg at 01/22/21 2114    atorvastatin (LIPITOR) tablet 10 mg, 10 mg, Oral, Nightly, Kristie Guevara MD, 10 mg at 01/22/21 2115    [START ON 1/24/2021] vitamin D (ERGOCALCIFEROL) capsule 50,000 Units, 50,000 Units, Oral, Weekly, Kristie Guevara MD    sodium chloride flush 0.9 % injection 10 mL, 10 mL, Intravenous, 2 times per day, Kristie Guevara MD, 10 mL at 01/22/21 2237    sodium chloride flush 0.9 % injection 10 mL, 10 mL, Intravenous, PRN, Kristie Guevara MD    enoxaparin (LOVENOX) injection 40 mg, 40 mg, Subcutaneous, Daily, Kristie Guevara MD, 40 mg at 01/22/21 2112    promethazine (PHENERGAN) tablet 12.5 mg, 12.5 mg, Oral, Q6H PRN **OR** ondansetron (ZOFRAN) injection 4 mg, 4 mg, Intravenous, Q6H PRN, Kristie Guevara MD    polyethylene glycol (GLYCOLAX) packet 17 g, 17 g, Oral, Daily PRN, Kristie Guevara MD    acetaminophen (TYLENOL) tablet 650 mg, 650 mg, Oral, Q6H PRN **OR** acetaminophen (TYLENOL) suppository 650 mg, 650 mg, Rectal, Q6H PRN, Kristie Guevara MD    hydrALAZINE (APRESOLINE) injection 5 mg, 5 mg, Intravenous, Q6H PRN, Kristie Guevara MD, 5 mg at 01/20/21 1510    REVIEW OF SYSTEMS:  As mentioned in chief complaint and HPI , Subjective     PHYSICAL EXAM:  Recent vital signs and recent I/Os reviewed by me.      Wt Readings from Last 3 Encounters:   01/22/21 266 lb 1.6 oz (120.7 kg)   12/10/20 258 lb (117 kg)   12/07/20 263 lb (119.3 kg)     BP Readings from Last 3 Encounters: 01/23/21 117/68   01/22/21 129/60   12/10/20 120/80     Patient Vitals for the past 24 hrs:   BP Temp Temp src Pulse Resp SpO2   01/23/21 0830 117/68 98.9 °F (37.2 °C) Oral 96 16 94 %   01/23/21 0515 122/67 97.5 °F (36.4 °C) Infrared 99 16 96 %   01/22/21 2344 118/64 98.6 °F (37 °C) Oral 104 16 96 %   01/22/21 2100 130/79 98.3 °F (36.8 °C) Oral 100 16 96 %   01/22/21 1730 129/84 98.1 °F (36.7 °C) Oral 89 15 97 %   01/22/21 1708      98 %   01/22/21 1700 131/82 98.2 °F (36.8 °C) Oral 87 15 98 %   01/22/21 1630 138/82 98.3 °F (36.8 °C) Oral 93 16 98 %   01/22/21 1600 133/84 98.4 °F (36.9 °C) Oral 98 16 98 %   01/22/21 1515 127/68 98.8 °F (37.1 °C) Temporal 103 15 97 %   01/22/21 1510 124/74 98.9 °F (37.2 °C) Temporal 104 16 98 %   01/22/21 1504 120/76   106 12 98 %   01/22/21 1459 120/74 98.9 °F (37.2 °C) Temporal 106 15 99 %   01/22/21 1111 130/74 98.3 °F (36.8 °C) Temporal 94 16 97 %       Intake/Output Summary (Last 24 hours) at 1/23/2021 1030  Last data filed at 1/23/2021 0917  Gross per 24 hour   Intake 1100 ml   Output 2750 ml   Net -1650 ml       Physical Exam  Vitals signs reviewed. Constitutional:       General: She is not in acute distress. Comments: Obese body habitus   HENT:      Head: Normocephalic and atraumatic. Right Ear: External ear normal.      Left Ear: External ear normal.      Mouth/Throat:      Mouth: Mucous membranes are not dry. Eyes:      General: No scleral icterus. Conjunctiva/sclera: Conjunctivae normal.   Neck:      Musculoskeletal: Neck supple. Thyroid: No thyroid mass. Vascular: No JVD. Trachea: Trachea normal.   Cardiovascular:      Rate and Rhythm: Normal rate and regular rhythm. Pulmonary:      Effort: Pulmonary effort is normal.      Breath sounds: Normal breath sounds. Abdominal:      General: Bowel sounds are normal.      Palpations: Abdomen is soft. Musculoskeletal:         General: No deformity. Right lower leg: Edema present. Left lower leg: Edema present. Comments: Trace    Skin:     General: Skin is warm and dry. Neurological:      Mental Status: She is alert and oriented to person, place, and time. Psychiatric:         Behavior: Behavior normal.          DATA:  Diagnostic tests reviewed by me for today's visit:    Recent Labs     01/20/21  1129 01/21/21 0444 01/22/21  0648 01/23/21  0610   WBC 17.2* 10.5 12.0* 11.5*   HCT 48.6* 39.4 38.3 33.5*    220 203 200     Iron Saturation:  No components found for: PERCENTFE  FERRITIN:  No results found for: FERRITIN  IRON:  No results found for: IRON  TIBC:  No results found for: TIBC    Recent Labs     01/20/21  1129 01/20/21  1129 01/21/21  0444 01/21/21  0444 01/21/21  1415 01/21/21  2037 01/22/21  0648 01/22/21  1615 01/23/21  0610   *   < > 131*   < > 130* 128* 131* 129* 133*   K 4.2  --  3.5  --   --   --  3.8  --  3.8   CL 85*  --  94*  --   --   --  96*  --  96*   CO2 26  --  26  --   --   --  26  --  27   BUN 8  --  5*  --   --   --  6*  --  7   CREATININE <0.5*  --  <0.5*  --   --   --  <0.5*  --  <0.5*    < > = values in this interval not displayed. Recent Labs     01/20/21  1129 01/21/21 0444 01/22/21  0648 01/23/21  0610   CALCIUM 10.1 8.7 9.1 8.6   MG  --  1.90  --   --    PHOS  --  4.0 3.6 4.0     No results for input(s): PH, PCO2, PO2 in the last 72 hours.     Invalid input(s): Corbin Billings    ABG:  No results found for: PH, PCO2, PO2, HCO3, BE, THGB, TCO2, O2SAT  VBG:  No results found for: PHVEN, UVA1OIL, BEVEN, [id number]    LDH:  No results found for: LDH  Uric Acid:    Lab Results   Component Value Date    LABURIC 3.3 01/22/2021       PT/INR:  No results found for: PROTIME, INR  Warfarin PT/INR:  No components found for: PTPATWAR, PTINRWAR  PTT:  No results found for: APTT, PTT[APTT}  Last 3 Troponin:    Lab Results   Component Value Date    TROPONINI <0.01 01/20/2021       U/A:    Lab Results   Component Value Date EXAMINATION: CT OF THE HEAD WITHOUT CONTRAST  1/20/2021 11:38 am TECHNIQUE: CT of the head was performed without the administration of intravenous contrast. Dose modulation, iterative reconstruction, and/or weight based adjustment of the mA/kV was utilized to reduce the radiation dose to as low as reasonably achievable. COMPARISON: None. HISTORY: ORDERING SYSTEM PROVIDED HISTORY: fall TECHNOLOGIST PROVIDED HISTORY: Has a \"code stroke\" or \"stroke alert\" been called? ->No Reason for exam:->fall Is the patient pregnant?->No Reason for Exam: Fall (Pt had two mechanical falls on the steps this morning. Lost her footing. Reida Seller three steps the first fall and four the second. Pain to left humerus. able to move fingers, but unable to lift arm) Acuity: Unknown Type of Exam: Unknown FINDINGS: BRAIN/VENTRICLES: There is no acute intracranial hemorrhage, mass effect or midline shift. No abnormal extra-axial fluid collection. The gray-white differentiation is maintained without evidence of an acute infarct. There is no evidence of hydrocephalus. ORBITS: The visualized portion of the orbits demonstrate no acute abnormality. SINUSES: The visualized paranasal sinuses and mastoid air cells demonstrate no acute abnormality. SOFT TISSUES/SKULL:  No acute abnormality of the visualized skull or soft tissues. No acute intracranial abnormality.      Ct Cervical Spine Wo Contrast    Result Date: 1/20/2021 EXAMINATION: CT OF THE CERVICAL SPINE WITHOUT CONTRAST 1/20/2021 11:38 am TECHNIQUE: CT of the cervical spine was performed without the administration of intravenous contrast. Multiplanar reformatted images are provided for review. Dose modulation, iterative reconstruction, and/or weight based adjustment of the mA/kV was utilized to reduce the radiation dose to as low as reasonably achievable. COMPARISON: None. HISTORY: ORDERING SYSTEM PROVIDED HISTORY: fall TECHNOLOGIST PROVIDED HISTORY: Reason for exam:->fall Decision Support Exception->Emergency Medical Condition (MA) Is the patient pregnant?->No Reason for Exam: Fall (Pt had two mechanical falls on the steps this morning. Lost her footing. Jeanine James three steps the first fall and four the second. Pain to left humerus. able to move fingers, but unable to lift arm) Acuity: Unknown Type of Exam: Unknown FINDINGS: BONES/ALIGNMENT: There is no acute fracture or traumatic malalignment. DEGENERATIVE CHANGES: There are mild-to-moderate degenerative changes in the lower cervical spine. SOFT TISSUES: There is no prevertebral soft tissue swelling. No acute abnormality of the cervical spine. Xr Chest Portable    Result Date: 1/20/2021  EXAMINATION: ONE XRAY VIEW OF THE CHEST 1/20/2021 11:59 am COMPARISON: May 22, 2017 HISTORY: ORDERING SYSTEM PROVIDED HISTORY: fall TECHNOLOGIST PROVIDED HISTORY: Reason for exam:->fall Reason for Exam: Fall (Pt had two mechanical falls on the steps this morning. Lost her footing. Jeanine James three steps the first fall and four the second. Pain to left humerus. able to move fingers, but unable to lift arm) Acuity: Acute Type of Exam: Initial FINDINGS: No evidence of traumatic process of the lungs. No evidence of pneumothorax or pleural effusion. No evidence of acute traumatic process involving the cardiac/mediastinal structures. No acute fracture demonstrated. No x-ray evidence of acute trauma to the chest or acute cardiopulmonary disease.

## 2021-01-23 NOTE — PROGRESS NOTES
Occupational Therapy   Occupational Therapy Initial Assessment  Date: 2021   Patient Name: Taiwo Found  MRN: [id number]     : 1975    Date of Service: 2021    Discharge Barbie Meyer scored a 12/24 on the AM-PAC ADL Inpatient form. Current research shows that an AM-PAC score of 18 or greater is typically associated with a discharge to the patient's home setting. Based on the patient's AM-PAC score, and their current ADL deficits, it is recommended that the patient have 2-3 sessions per week of Occupational Therapy at d/c to increase the patient's independence. At this time, this patient demonstrates the endurance and safety to discharge home with [address] and a follow up treatment frequency of 2-3x/wk. Please see assessment section for further patient specific details. If patient discharges prior to next session this note will serve as a discharge summary. Please see below for the latest assessment towards goals. HOME HEALTH CARE: LEVEL 1 STANDARD    - Initial home health evaluation to occur within 24-48 hours, in patient home   - Therapy to evaluate with goal of regaining prior level of functioning   - Therapy to evaluate if patient has [address] needs for personal care       OT Equipment Recommendations  Equipment Needed: Yes  Mobility Devices: ADL Assistive Devices  ADL Assistive Devices: Shower Chair with back  Assessment   Performance deficits / Impairments: Decreased functional mobility ; Decreased endurance;Decreased ADL status; Decreased high-level IADLs  Assessment: Pt presents with the above deficits impacting daily occupational performance and would benefit from continued skilled OT services. Treatment Diagnosis: Decreased mobility, ADL status, ADL transfers, endurance and high level IADL's associated with Hyponatremia an s/p fall resulting in L proximal humerus fx and s/p ORIF.   Prognosis: Good  Decision Making: Medium Complexity OT Education: OT Role;Plan of Care;Family Education;Precautions;Transfer Training;Equipment  REQUIRES OT FOLLOW UP: Yes  Activity Tolerance  Activity Tolerance: Patient Tolerated treatment well  Safety Devices  Safety Devices in place: Yes  Type of devices: All fall risk precautions in place; Left in bed;Bed alarm in place;Call light within reach;Nurse notified; Patient at risk for falls  Restraints  Initially in place: No           Patient Diagnosis(es): The primary encounter diagnosis was Multiple falls. Diagnoses of Hyponatremia and Other closed displaced fracture of proximal end of left humerus, initial encounter were also pertinent to this visit. has a past medical history of Arthritis, Depression, Epilepsy ([address] GERD (gastroesophageal reflux disease), Hyperlipidemia, Hypertension, ARYA (obstructive sleep apnea), PTSD (post-traumatic stress disorder), Seizures ([address] and Traumatic brain injury ([facility] [address] has a past surgical history that includes Hysterectomy; Throat surgery; Appendectomy; laparoscopy; Tonsillectomy; Wrist fracture surgery (Left, 06/23/2016); other surgical history (01/30/2018); and sinus surgery. Treatment Diagnosis: Decreased mobility, ADL status, ADL transfers, endurance and high level IADL's associated with Hyponatremia an s/p fall resulting in L proximal humerus fx and s/p ORIF. Restrictions  Restrictions/Precautions  Restrictions/Precautions: Fall Risk, Weight Bearing(high fall risk)  Required Braces or Orthoses?: Yes(ABDIFATAH hansen, YAHAIRA GARDINER)  Upper Extremity Weight Bearing Restrictions  Left Upper Extremity Weight Bearing: Non Weight Bearing  Required Braces or Orthoses  Left Upper Extremity Brace/Splint: Sling  Position Activity Restriction  Other position/activity restrictions: Patient s/p fall on stairs resulting in L humerus fx. Patient now s/p ORIF left humerus 1/22/2021.     Subjective   General  Chart Reviewed: Yes  Patient assessed for rehabilitation services?: Yes Family / Caregiver Present: No  Diagnosis: Hyponatremia  Subjective  Subjective: Pt supine in bed on arrival and agreeable for session. RN present for pain medication. Patient Currently in Pain: Yes  Pain Assessment  Pain Assessment: 0-10  Pain Level: 8  Patient's Stated Pain Goal: No pain  Pain Type: Acute pain  Pain Location: Arm  Pain Orientation: Left  Pain Descriptors: Aching  Pain Frequency: Continuous  Pain Onset: On-going  Clinical Progression: Not changed  Pain Intervention: Pt able to continue with session. Functional Pain Assessment: Activities are not prevented  Non-Pharmaceutical Pain Intervention(s): Repositioned; Rest  Response to Pain Intervention: Patient Satisfied  Multiple Pain Sites: No  Vital Signs  Patient Currently in Pain: Yes  Social/Functional History  Social/Functional History  Lives With: Spouse  Type of Home: House  Home Layout: Two level  Home Access: Stairs to enter with rails  Entrance Stairs - Number of Steps: 7 exterior stairs, 8 stairs to bedroom in basement  Entrance Stairs - Rails: Left  Bathroom Shower/Tub: Tub/Shower unit  Bathroom Toilet: Standard  Home Equipment: [employer]  ADL Assistance: Independent  Homemaking Assistance: Independent  Homemaking Responsibilities: Yes  Ambulation Assistance: Independent  Transfer Assistance: Independent  Active : Yes  Additional Comments: Denies falls.        Objective        Orientation  Overall Orientation Status: Within Normal Limits  Observation/Palpation  Posture: Fair  Balance  Sitting Balance: Supervision  Standing Balance: Contact guard assistance  Functional Mobility  Functional - Mobility Device: No device  Activity: To/from bathroom  Assist Level: Contact guard assistance Functional Mobility Comments: Pt ambulated in room using no AD from bed to chair. Upon seated, pt observed soiled bed linens and requested to use BR. Pt ambulated to/from BR using no AD with CGA. Toilet transfers with Supervision using grab bar on R side. Pt declined stair practice, functional ambulation in hallway and ambulation using AD.   Toilet Transfers  Toilet - Technique: Ambulating  Equipment Used: Standard toilet(grab bar on R side)  Toilet Transfer: Supervision  ADL  Feeding: Setup  Toileting: Supervision  Tone RUE  RUE Tone: Normotonic  Tone LUE  LUE Tone: Normotonic  Coordination  Movements Are Fluid And Coordinated: No  Coordination and Movement description: Left UE;Gross motor impairments     Bed mobility  Supine to Sit: Supervision  Sit to Supine: Contact guard assistance  Transfers  Stand Step Transfers: Contact guard assistance  Sit to stand: Stand by assistance  Stand to sit: Stand by assistance  Vision - Basic Assessment  Prior Vision: No visual deficits  Cognition  Overall Cognitive Status: WFL  Perception  Overall Perceptual Status: WFL     Sensation  Overall Sensation Status: WFL        RUE AROM (degrees)  RUE AROM : WFL  Right Hand AROM (degrees)  Right Hand AROM: WFL  RUE Strength  Gross RUE Strength: WFL     Hand Dominance  Hand Dominance: Right             Plan   Plan  Times per week: 7x/week  Times per day: Daily  Current Treatment Recommendations: Gait Training, Patient/Caregiver Education & Training, Safety Education & Training, Self-Care / ADL, Pain Management, Balance Training, Equipment Evaluation, Education, & procurement      AM-PAC Score        AM-PAC Inpatient Daily Activity Raw Score: 12 (01/23/21 1332)  AM-PAC Inpatient ADL T-Scale Score : 30.6 (01/23/21 1332)  ADL Inpatient CMS 0-100% Score: 66.57 (01/23/21 1332)  ADL Inpatient CMS G-Code Modifier : CL (01/23/21 1332)    Goals  Short term goals  Time Frame for Short term goals: Discharge Short term goal 1: Mod [place] bed mobility  Short term goal 2: Mod Independent toileting  Short term goal 3: Mod [place] ADL transfers to/from bed, chair and toilet  Short term goal 4: Mod Assist UB/LB ADL's  Short term goal 5: Supervision functional mobility using RW or AAD for ADL's/functional activity  Long term goals  Time Frame for Long term goals : LTG=STG       Therapy Time   Individual Concurrent Group Co-treatment   Time In 1250         Time Out 1315         Minutes 25               Timed Code Treatment Minutes:  8 Minutes    Total Treatment Minutes:  25 minutes    SHANELL  [address], [address], OTR/L [id number]

## 2021-01-23 NOTE — PROGRESS NOTES
.Shift assessment complete. Meds given per MAR. Pt alert and orientated x4  Dressing: to left shoulder clean dry and intact  VS: WNL  Neurovascular Assessment: WNL  Nausea/vomiting: Pt denies at this time. Drains: Torrez remains in place  Pain: see ,Mar  Patient expressed no other needs at this time, will continue to educate patient as needed.        Fall precautions in place, hourly rounding, call light and belongings in reach, bed in lowest position, wheels locked in place, side rails up x 2, walkways free of clutter

## 2021-01-25 ENCOUNTER — TELEPHONE (OUTPATIENT)
Dept: INTERNAL MEDICINE CLINIC | Age: 46
End: 2021-01-25

## 2021-01-25 NOTE — TELEPHONE ENCOUNTER
[address] Transitions Initial Follow Up Call    Outreach made within 2 business days of discharge: Yes    Patient: Supriya Almodovar Patient : 1975   MRN: [id number]  Reason for Admission: There are no discharge diagnoses documented for the most recent discharge. Discharge Date: 21       Spoke with: Dee Dee Durham    Discharge department/facility: [facility]    TCM Interactive Patient Contact:  Was patient able to fill all prescriptions: Yes  Was patient instructed to bring all medications to the follow-up visit:   Is patient taking all medications as directed in the discharge summary? Yes  Does patient understand their discharge instructions: Yes  Does patient have questions or concerns that need addressed prior to 7-14 day follow up office visit: yes - patient states on discharge paperwork to stop using her Kenalog ointment   Patient does not understand why and would like a phone call from the office.      Scheduled appointment with PCP within 7-14 days    Follow Up  Future Appointments   Date Time Provider Dallas Dupree   3/10/2021 11:40 AM HARRISON Turner - HELENA Saleh MA

## 2021-01-26 ENCOUNTER — HOSPITAL ENCOUNTER (OUTPATIENT)
Age: 46
Discharge: HOME | End: 2021-01-26
Payer: COMMERCIAL

## 2021-01-26 DIAGNOSIS — N18.9 ANEMIA IN CHRONIC KIDNEY DISEASE, UNSPECIFIED CKD STAGE: ICD-10-CM

## 2021-01-26 DIAGNOSIS — D63.1 ANEMIA IN CHRONIC KIDNEY DISEASE, UNSPECIFIED CKD STAGE: ICD-10-CM

## 2021-01-26 LAB
ALBUMIN SERPL-MCNC: 3.6 G/DL (ref 3.4–5)
ANION GAP SERPL CALCULATED.3IONS-SCNC: 13 MMOL/L (ref 3–16)
BACTERIA: ABNORMAL /HPF
BILIRUBIN URINE: NEGATIVE
BLOOD, URINE: NEGATIVE
BUN BLDV-MCNC: 8 MG/DL (ref 7–20)
CALCIUM SERPL-MCNC: 9.2 MG/DL (ref 8.3–10.6)
CHLORIDE BLD-SCNC: 93 MMOL/L (ref 99–110)
CLARITY: ABNORMAL
CO2: 25 MMOL/L (ref 21–32)
COLOR: YELLOW
COMMENT UA: ABNORMAL
CREAT SERPL-MCNC: <0.5 MG/DL (ref 0.6–1.1)
CREATININE URINE: 105.5 MG/DL (ref 28–259)
EPITHELIAL CELLS, UA: 2 /HPF (ref 0–5)
GFR AFRICAN AMERICAN: >60
GFR NON-AFRICAN AMERICAN: >60
GLUCOSE BLD-MCNC: 84 MG/DL (ref 70–99)
GLUCOSE URINE: NEGATIVE MG/DL
HCT VFR BLD CALC: 32.2 % (ref 36–48)
HEMOGLOBIN: 11.2 G/DL (ref 12–16)
HYALINE CASTS: 1 /LPF (ref 0–8)
KETONES, URINE: NEGATIVE MG/DL
LEUKOCYTE ESTERASE, URINE: NEGATIVE
MCH RBC QN AUTO: 32.4 PG (ref 26–34)
MCHC RBC AUTO-ENTMCNC: 34.7 G/DL (ref 31–36)
MCV RBC AUTO: 93.4 FL (ref 80–100)
MICROALBUMIN UR-MCNC: <1.2 MG/DL
MICROALBUMIN/CREAT UR-RTO: NORMAL MG/G (ref 0–30)
MICROSCOPIC EXAMINATION: YES
NITRITE, URINE: NEGATIVE
PDW BLD-RTO: 13 % (ref 12.4–15.4)
PH UA: 6.5 (ref 5–8)
PHOSPHORUS: 4.8 MG/DL (ref 2.5–4.9)
PLATELET # BLD: 262 K/UL (ref 135–450)
PMV BLD AUTO: 8.6 FL (ref 5–10.5)
POTASSIUM SERPL-SCNC: 4.1 MMOL/L (ref 3.5–5.1)
PROTEIN PROTEIN: 14 MG/DL
PROTEIN UA: NEGATIVE MG/DL
RBC # BLD: 3.45 M/UL (ref 4–5.2)
RBC UA: 2 /HPF (ref 0–4)
SODIUM BLD-SCNC: 131 MMOL/L (ref 136–145)
SPECIFIC GRAVITY UA: 1.02 (ref 1–1.03)
URINE TYPE: ABNORMAL
UROBILINOGEN, URINE: 0.2 E.U./DL
WBC # BLD: 10.5 K/UL (ref 4–11)
WBC UA: 1 /HPF (ref 0–5)

## 2021-01-26 PROCEDURE — 85027 COMPLETE CBC AUTOMATED: CPT

## 2021-01-26 PROCEDURE — 81001 URINALYSIS AUTO W/SCOPE: CPT

## 2021-01-26 PROCEDURE — 82570 ASSAY OF URINE CREATININE: CPT

## 2021-01-26 PROCEDURE — 36415 COLL VENOUS BLD VENIPUNCTURE: CPT

## 2021-01-26 PROCEDURE — 80069 RENAL FUNCTION PANEL: CPT

## 2021-01-26 PROCEDURE — 82043 UR ALBUMIN QUANTITATIVE: CPT

## 2021-01-26 PROCEDURE — 84156 ASSAY OF PROTEIN URINE: CPT

## 2021-01-27 ENCOUNTER — TELEPHONE (OUTPATIENT)
Dept: ORTHOPEDIC SURGERY | Age: 46
End: 2021-01-27

## 2021-01-27 NOTE — TELEPHONE ENCOUNTER
Silvana Trejo from [facility] called to ask if  will sign [facility]. order  [phone]

## 2021-02-01 ENCOUNTER — OFFICE VISIT (OUTPATIENT)
Dept: INTERNAL MEDICINE CLINIC | Age: 46
End: 2021-02-01
Payer: COMMERCIAL

## 2021-02-01 ENCOUNTER — HOSPITAL ENCOUNTER (OUTPATIENT)
Age: 46
Discharge: HOME | End: 2021-02-01
Payer: COMMERCIAL

## 2021-02-01 ENCOUNTER — HOSPITAL ENCOUNTER (OUTPATIENT)
Dept: GENERAL RADIOLOGY | Age: 46
Discharge: HOME | End: 2021-02-01
Payer: COMMERCIAL

## 2021-02-01 VITALS
WEIGHT: 261 LBS | TEMPERATURE: 97.3 F | DIASTOLIC BLOOD PRESSURE: 72 MMHG | OXYGEN SATURATION: 99 % | HEART RATE: 84 BPM | SYSTOLIC BLOOD PRESSURE: 112 MMHG | BODY MASS INDEX: 47.74 KG/M2

## 2021-02-01 DIAGNOSIS — E87.1 CHRONIC HYPONATREMIA: Primary | ICD-10-CM

## 2021-02-01 DIAGNOSIS — E87.1 CHRONIC HYPONATREMIA: ICD-10-CM

## 2021-02-01 DIAGNOSIS — E22.2 SIADH (SYNDROME OF INAPPROPRIATE ADH PRODUCTION) (HCC): ICD-10-CM

## 2021-02-01 DIAGNOSIS — M25.551 RIGHT HIP PAIN: ICD-10-CM

## 2021-02-01 DIAGNOSIS — E87.1 HYPONATREMIA: ICD-10-CM

## 2021-02-01 DIAGNOSIS — Z09 HOSPITAL DISCHARGE FOLLOW-UP: ICD-10-CM

## 2021-02-01 DIAGNOSIS — S42.202A CLOSED FRACTURE OF PROXIMAL END OF LEFT HUMERUS, UNSPECIFIED FRACTURE MORPHOLOGY, INITIAL ENCOUNTER: ICD-10-CM

## 2021-02-01 DIAGNOSIS — G40.909 SEIZURE DISORDER (HCC): ICD-10-CM

## 2021-02-01 DIAGNOSIS — I10 ESSENTIAL HYPERTENSION: ICD-10-CM

## 2021-02-01 DIAGNOSIS — F17.200 SMOKING: ICD-10-CM

## 2021-02-01 DIAGNOSIS — R60.0 EDEMA LEG: ICD-10-CM

## 2021-02-01 LAB
ALBUMIN SERPL-MCNC: 4.1 G/DL (ref 3.4–5)
ANION GAP SERPL CALCULATED.3IONS-SCNC: 14 MMOL/L (ref 3–16)
BUN BLDV-MCNC: 8 MG/DL (ref 7–20)
CALCIUM SERPL-MCNC: 9.6 MG/DL (ref 8.3–10.6)
CHLORIDE BLD-SCNC: 94 MMOL/L (ref 99–110)
CO2: 25 MMOL/L (ref 21–32)
CREAT SERPL-MCNC: <0.5 MG/DL (ref 0.6–1.1)
CREATININE URINE: 53.2 MG/DL (ref 28–259)
GFR AFRICAN AMERICAN: >60
GFR NON-AFRICAN AMERICAN: >60
GLUCOSE BLD-MCNC: 75 MG/DL (ref 70–99)
OSMOLALITY URINE: 369 MOSM/KG (ref 390–1070)
OSMOLALITY: 276 MOSM/KG (ref 275–295)
PHOSPHORUS: 4.9 MG/DL (ref 2.5–4.9)
POTASSIUM SERPL-SCNC: 4.1 MMOL/L (ref 3.5–5.1)
SODIUM BLD-SCNC: 133 MMOL/L (ref 136–145)
SODIUM URINE: 73 MMOL/L
TSH SERPL DL<=0.05 MIU/L-ACNC: 1.9 UIU/ML (ref 0.27–4.2)
URIC ACID, SERUM: 4.3 MG/DL (ref 2.6–6)

## 2021-02-01 PROCEDURE — 84300 ASSAY OF URINE SODIUM: CPT

## 2021-02-01 PROCEDURE — 84443 ASSAY THYROID STIM HORMONE: CPT

## 2021-02-01 PROCEDURE — 36415 COLL VENOUS BLD VENIPUNCTURE: CPT

## 2021-02-01 PROCEDURE — 1111F DSCHRG MED/CURRENT MED MERGE: CPT | Performed by: INTERNAL MEDICINE

## 2021-02-01 PROCEDURE — 83930 ASSAY OF BLOOD OSMOLALITY: CPT

## 2021-02-01 PROCEDURE — 82570 ASSAY OF URINE CREATININE: CPT

## 2021-02-01 PROCEDURE — 73502 X-RAY EXAM HIP UNI 2-3 VIEWS: CPT

## 2021-02-01 PROCEDURE — 84550 ASSAY OF BLOOD/URIC ACID: CPT

## 2021-02-01 PROCEDURE — 99495 TRANSJ CARE MGMT MOD F2F 14D: CPT | Performed by: INTERNAL MEDICINE

## 2021-02-01 PROCEDURE — 96372 THER/PROPH/DIAG INJ SC/IM: CPT | Performed by: INTERNAL MEDICINE

## 2021-02-01 PROCEDURE — 80069 RENAL FUNCTION PANEL: CPT

## 2021-02-01 PROCEDURE — 83935 ASSAY OF URINE OSMOLALITY: CPT

## 2021-02-01 PROCEDURE — 71045 X-RAY EXAM CHEST 1 VIEW: CPT

## 2021-02-01 RX ORDER — KETOROLAC TROMETHAMINE 30 MG/ML
60 INJECTION, SOLUTION INTRAMUSCULAR; INTRAVENOUS ONCE
Status: COMPLETED | OUTPATIENT
Start: 2021-02-01 | End: 2021-02-01

## 2021-02-01 RX ORDER — KETOROLAC TROMETHAMINE 30 MG/ML
60 INJECTION, SOLUTION INTRAMUSCULAR; INTRAVENOUS ONCE
Qty: 2 ML | Refills: 0
Start: 2021-02-01 | End: 2021-06-28

## 2021-02-01 RX ORDER — TRAMADOL HYDROCHLORIDE 50 MG/1
50 TABLET ORAL EVERY 4 HOURS PRN
Qty: 42 TABLET | Refills: 0 | Status: SHIPPED | OUTPATIENT
Start: 2021-02-01 | End: 2021-02-08

## 2021-02-01 RX ADMIN — KETOROLAC TROMETHAMINE 60 MG: 30 INJECTION, SOLUTION INTRAMUSCULAR; INTRAVENOUS at 15:39

## 2021-02-01 ASSESSMENT — PATIENT HEALTH QUESTIONNAIRE - PHQ9
SUM OF ALL RESPONSES TO PHQ QUESTIONS 1-9: 2
SUM OF ALL RESPONSES TO PHQ QUESTIONS 1-9: 2
2. FEELING DOWN, DEPRESSED OR HOPELESS: 1
1. LITTLE INTEREST OR PLEASURE IN DOING THINGS: 1
SUM OF ALL RESPONSES TO PHQ QUESTIONS 1-9: 2
SUM OF ALL RESPONSES TO PHQ9 QUESTIONS 1 & 2: 2

## 2021-02-01 NOTE — PROGRESS NOTES
Post-Discharge Transitional Care Management Services or Hospital Follow Up      Kaity Marin   YOB: 1975    Date of Office Visit:  2/1/2021  Date of Hospital Admission: 1/20/21  Date of Hospital Discharge: 1/23/21  Readmission Risk Score(high >=14%.  Medium >=10%):Readmission Risk Score: 20      Care management risk score Rising risk (score 2-5) and Complex Care (Scores >=6): 4     Non face to face  following discharge, date last encounter closed (first attempt may have been earlier): 1/25/2021  1:56 PM 1/25/2021  1:56 PM    Call initiated 2 business days of discharge: Yes     Patient Active Problem List   Diagnosis    Closed fracture of distal end of radius    Smoking    Obesity due to excess calories    Major depressive disorder    Hypercholesterolemia    Seizures (HCC)    Chronic maxillary sinusitis    Chronic pansinusitis    Acute recurrent maxillary sinusitis    Acute recurrent frontal sinusitis    Chronic vasomotor rhinitis    Chronic frontal sinusitis    Recurrent sphenoidal sinusitis    Epistaxis    Hyponatremia    ARYA (obstructive sleep apnea)    Morbid obesity with BMI of 40.0-44.9, adult (HCC)    Tobacco abuse disorder    GERD (gastroesophageal reflux disease)    H/O traumatic brain injury    Leukocytosis    Dehydration    Closed fracture of left proximal humerus       Allergies   Allergen Reactions    Wellbutrin [Bupropion]      \"get high as a kite, then crash\"       Medications listed as ordered at the time of discharge from hospital   Veronica David   Home Medication Instructions LENNIE:    Printed on:02/01/21 1500   Medication Information                      Acetaminophen (TYLENOL PO)  Take by mouth             albuterol sulfate HFA (PROVENTIL HFA) 108 (90 Base) MCG/ACT inhaler  Inhale 2 puffs into the lungs every 6 hours as needed for Wheezing             aspirin 325 MG EC tablet  Take 1 tablet by mouth daily             furosemide (LASIX) 20 MG tablet  Take 1 tablet by mouth daily             Handicap Placard MISC  by Does not apply route             lisinopril (PRINIVIL;ZESTRIL) 20 MG tablet  Take 1 tablet by mouth once daily             nicotine (NICODERM CQ) 14 MG/24HR  Place 1 patch onto the skin daily             omeprazole (PRILOSEC) 20 MG delayed release capsule  qd             OXcarbazepine (TRILEPTAL) 600 MG tablet  Take 1 tablet by mouth twice daily             oxybutynin (DITROPAN-XL) 10 MG extended release tablet  Take 1 tablet by mouth once daily             polyethylene glycol (GLYCOLAX) 17 GM/SCOOP powder  Take 17 g by mouth daily             risperiDONE (RISPERDAL) 4 MG tablet  One tab nightly             simvastatin (ZOCOR) 20 MG tablet  One tab every day             triamcinolone (KENALOG) 0.025 % ointment  Apply topically daily Apply topically daily. vitamin D (ERGOCALCIFEROL) 1.25 MG (98952 UT) CAPS capsule  TAKE 1 CAPSULE BY MOUTH ONCE A WEEK                   Medications marked \"taking\" at this time  Outpatient Medications Marked as Taking for the 2/1/21 encounter (Office Visit) with Tereso Hopkins MD   Medication Sig Dispense Refill    Acetaminophen (TYLENOL PO) Take by mouth      furosemide (LASIX) 20 MG tablet Take 1 tablet by mouth daily 60 tablet 3    nicotine (NICODERM CQ) 14 MG/24HR Place 1 patch onto the skin daily 30 patch 0    polyethylene glycol (GLYCOLAX) 17 GM/SCOOP powder Take 17 g by mouth daily 510 g 0    triamcinolone (KENALOG) 0.025 % ointment Apply topically daily Apply topically daily.       lisinopril (PRINIVIL;ZESTRIL) 20 MG tablet Take 1 tablet by mouth once daily 90 tablet 0    OXcarbazepine (TRILEPTAL) 600 MG tablet Take 1 tablet by mouth twice daily 180 tablet 0    oxybutynin (DITROPAN-XL) 10 MG extended release tablet Take 1 tablet by mouth once daily 90 tablet 0    risperiDONE (RISPERDAL) 4 MG tablet One tab nightly 90 tablet 0    simvastatin (ZOCOR) 20 MG tablet One tab every day 90 tablet 0    omeprazole (PRILOSEC) 20 MG delayed release capsule qd 120 capsule 0    vitamin D (ERGOCALCIFEROL) 1.25 MG (02480 UT) CAPS capsule TAKE 1 CAPSULE BY MOUTH ONCE A WEEK 12 capsule 1    albuterol sulfate HFA (PROVENTIL HFA) 108 (90 Base) MCG/ACT inhaler Inhale 2 puffs into the lungs every 6 hours as needed for Wheezing 1 Inhaler 3    Handicap Placard MISC by Does not apply route 1 each 0        Medications patient taking as of now reconciled against medications ordered at time of hospital discharge: Yes    Chief Complaint   Patient presents with    Follow-Up from Hospital     right hip pain       HPI  This is a 62-year-old woman who was recently discharged from the hospital after having a fall which caused a left humerus fracture. Patient reports that she fell on her right hip and extended her left arm out and fractured her humerus. She was found to have a sodium of 123 on admission to the hospital.  Patient reports that she did not lose consciousness and is distinctly aware of having the fall. It was my initial impression that the patient had a syncopal episode and a subsequent seizure as the secondary sign of hyponatremia but that is not the case. She did undergo a work-up in the hospital for hyponatremia and she is now currently seeing a nephrologist for evaluation of a cause for her hyponatremia. The patient complains of having persistent right hip pain and clearly she has left arm pain because of the fracture that is present. Inpatient course: Discharge summary reviewed- see chart. Interval history/Current status: Fair    Review of Systems    Vitals:    02/01/21 1426   BP: 112/72   Pulse: 84   Temp: 97.3 °F (36.3 °C)   TempSrc: Infrared   SpO2: 99%   Weight: 261 lb (118.4 kg)     Body mass index is 47.74 kg/m².    Wt Readings from Last 3 Encounters:   02/01/21 261 lb (118.4 kg)   01/28/21 237 lb (107.5 kg)   01/22/21 266 lb 1.6 oz (120.7 kg)     BP Readings from Last 3 Encounters: 02/01/21 112/72   01/28/21 127/76   01/23/21 117/68     Physical Exam  Vitals signs reviewed. Constitutional:       General: She is not in acute distress. Appearance: She is well-developed. She is not diaphoretic. HENT:      Head: Normocephalic and atraumatic. Cardiovascular:      Rate and Rhythm: Normal rate and regular rhythm. Pulses: Normal pulses. Heart sounds: Normal heart sounds. Pulmonary:      Effort: Pulmonary effort is normal. No respiratory distress. Breath sounds: Normal breath sounds. No stridor. No wheezing, rhonchi or rales. Chest:      Chest wall: No tenderness. Neurological:      Mental Status: She is alert and oriented to person, place, and time. Cranial Nerves: No cranial nerve deficit. Psychiatric:         Behavior: Behavior normal.         Thought Content: Thought content normal.         Judgment: Judgment normal.       Assessment/Plan:    Veronica was seen today for follow-up from hospital.    Diagnoses and all orders for this visit:    Chronic hyponatremia  -     XR CHEST (2 VW); Future  -     [place] DISCHARGE MEDS RECONCILED W/ CURRENT OUTPATIENT MED LIST  -     Basic Metabolic Panel; Future  -     SODIUM, URINE, 24 HOUR; Future    Right hip pain  -     XR HIP RIGHT (2-3 VIEWS); Future  -     [place] DISCHARGE MEDS RECONCILED W/ CURRENT OUTPATIENT MED LIST  -     ketorolac (TORADOL) 60 MG/2ML injection; Inject 2 mLs into the muscle once for 1 dose  -     traMADol (ULTRAM) 50 MG tablet; Take 1 tablet by mouth every 4 hours as needed for Pain for up to 7 days. Intended supply: 7 days. Take lowest dose possible to manage pain    Seizure disorder (HCC)  -     [place] DISCHARGE MEDS RECONCILED W/ CURRENT OUTPATIENT MED LIST    Closed fracture of proximal end of left humerus, unspecified fracture morphology, initial encounter  -     ketorolac (TORADOL) 60 MG/2ML injection; Inject 2 mLs into the muscle once for 1 dose  -     traMADol (ULTRAM) 50 MG tablet;  Take 1 tablet by mouth every 4 hours as needed for Pain for up to 7 days. Intended supply: 7 days. Take lowest dose possible to manage pain    Patient is scheduled to see the nephrologist for further follow-up of her hyponatremia. Patient does take several antiseizure medications which in the past have also been a possible cause for the development of hyponatremia.     Medical Decision Making: high complexity      Kandace Barajas MD  [address]

## 2021-02-01 NOTE — LETTER
[address] [address]  Phone: [phone]  Fax: Dallas Trivedi MD        February 1, 2021     Patient: Kylah Gan   YOB: 1975   Date of Visit: 2/1/2021       To Whom It May Concern: It is my medical opinion that Nelson People should remain out of work until 2/8/2021. If you have any questions or concerns, please don't hesitate to call.     Sincerely,          Salvador John MD

## 2021-02-06 LAB
24HR URINE VOLUME (ML): 1900 ML
CREATININE 24 HOUR URINE: 1.5 G/24HR (ref 0.6–1.5)
SODIUM 24 HOUR URINE: 148 MMOL/24 HR (ref 40–220)

## 2021-02-10 ENCOUNTER — TELEPHONE (OUTPATIENT)
Dept: INTERNAL MEDICINE CLINIC | Age: 46
End: 2021-02-10

## 2021-02-10 NOTE — TELEPHONE ENCOUNTER
Patient saw Dr Hortensia Souza on 02/01/2021. She is requesting Dr Hortensia Souza revise her RTW note to state she can only work 1/2 days for 1 week then RTW normal hours. Patient went back to work yesterday but was in to much pain to stay all day. Patient is also requesting something stronger than Tramadol b/c it is not managing her pain at all.

## 2021-02-10 NOTE — TELEPHONE ENCOUNTER
Your last note had patient off for 1 week. She will need a new note. She does work from home, but expresses after trying to work 5 hours she was exhausted. She is scheduled to work 10 hour shifts. How long do you want patient out of work?

## 2021-02-10 NOTE — LETTER
[address] [facility]  [address] [facility] [address] [id number]  Phone: [phone]  Fax: Dallas Trivedi MD        February 13, 2021     Patient: Taiwo Esteban   YOB: 1975   Date of Visit: 2/10/2021       To Whom It May Concern: It is my medical opinion that Jacek Cadena should reduce work hours to 4 hours per day until 3/1/2021. If you have any questions or concerns, please don't hesitate to call.     Sincerely,          Jesu Bella MD

## 2021-02-10 NOTE — TELEPHONE ENCOUNTER
Dx:  L ACL repair   ( 12/ 21/ 16 )       Authorized # of Visits:  [id number]        Next MD visit:  January 24th  Fall Risk: standard         Precautions: n/a             Subjective: Pt reports knee is feeling ok, although felt it give out on her the other day whil Patient returned call and states she can not afford to be off work completely, that's why she is requesting to work 1/2 days for a least a week.

## 2021-02-11 ENCOUNTER — OFFICE VISIT (OUTPATIENT)
Dept: ORTHOPEDIC SURGERY | Age: 46
End: 2021-02-11
Payer: COMMERCIAL

## 2021-02-11 VITALS — WEIGHT: 261 LBS | BODY MASS INDEX: 48.03 KG/M2 | TEMPERATURE: 96.8 F | HEIGHT: 62 IN

## 2021-02-11 DIAGNOSIS — S42.292D OTHER CLOSED DISPLACED FRACTURE OF PROXIMAL END OF LEFT HUMERUS WITH ROUTINE HEALING, SUBSEQUENT ENCOUNTER: ICD-10-CM

## 2021-02-11 DIAGNOSIS — M54.31 RIGHT SIDED SCIATICA: Primary | ICD-10-CM

## 2021-02-11 PROCEDURE — 99213 OFFICE O/P EST LOW 20 MIN: CPT | Performed by: ORTHOPAEDIC SURGERY

## 2021-02-11 RX ORDER — PREDNISONE 10 MG/1
TABLET ORAL
Qty: 26 TABLET | Refills: 0 | Status: SHIPPED | OUTPATIENT
Start: 2021-02-11 | End: 2021-03-19

## 2021-02-11 NOTE — LETTER
[facility]  [address]  [address]  Phone: [phone]  Fax: [phone]    Andrew Hinton MD        February 11, 2021     Patient: Sara Nicole   YOB: 1975   Date of Visit: 2/11/2021       To Whom It May Concern: It is my medical opinion that Nikolay Cavanaugh may return to light duty immediately with the following restrictions: half days, sitting only for four weeks. If you have any questions or concerns, please don't hesitate to call.     Sincerely,        Andrew Hinton MD
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
## 2021-02-14 PROBLEM — M54.31 RIGHT SIDED SCIATICA: Status: ACTIVE | Noted: 2021-02-14

## 2021-02-20 PROBLEM — E86.0 DEHYDRATION: Status: RESOLVED | Noted: 2021-01-21 | Resolved: 2021-02-20

## 2021-02-23 RX ORDER — NICOTINE 21 MG/24HR
1 PATCH, TRANSDERMAL 24 HOURS TRANSDERMAL DAILY
Qty: 30 PATCH | Refills: 0 | Status: SHIPPED | OUTPATIENT
Start: 2021-02-23 | End: 2021-07-28 | Stop reason: ALTCHOICE

## 2021-03-03 ENCOUNTER — HOSPITAL ENCOUNTER (OUTPATIENT)
Age: 46
Discharge: HOME | End: 2021-03-03
Payer: COMMERCIAL

## 2021-03-03 DIAGNOSIS — E22.2 SIADH (SYNDROME OF INAPPROPRIATE ADH PRODUCTION) (HCC): ICD-10-CM

## 2021-03-03 DIAGNOSIS — F17.200 SMOKING: ICD-10-CM

## 2021-03-03 DIAGNOSIS — E87.1 HYPONATREMIA: ICD-10-CM

## 2021-03-03 LAB
ALBUMIN SERPL-MCNC: 4.1 G/DL (ref 3.4–5)
ANION GAP SERPL CALCULATED.3IONS-SCNC: 14 MMOL/L (ref 3–16)
BACTERIA: ABNORMAL /HPF
BILIRUBIN URINE: ABNORMAL
BLOOD, URINE: NEGATIVE
BUN BLDV-MCNC: 6 MG/DL (ref 7–20)
CALCIUM SERPL-MCNC: 9.8 MG/DL (ref 8.3–10.6)
CHLORIDE BLD-SCNC: 95 MMOL/L (ref 99–110)
CLARITY: ABNORMAL
CO2: 24 MMOL/L (ref 21–32)
COLOR: ABNORMAL
CREAT SERPL-MCNC: <0.5 MG/DL (ref 0.6–1.1)
CREATININE URINE: 192.1 MG/DL (ref 28–259)
EPITHELIAL CELLS, UA: 9 /HPF (ref 0–5)
GFR AFRICAN AMERICAN: >60
GFR NON-AFRICAN AMERICAN: >60
GLUCOSE BLD-MCNC: 92 MG/DL (ref 70–99)
GLUCOSE URINE: NEGATIVE MG/DL
HCT VFR BLD CALC: 43.7 % (ref 36–48)
HEMOGLOBIN: 15.1 G/DL (ref 12–16)
KETONES, URINE: NEGATIVE MG/DL
LEUKOCYTE ESTERASE, URINE: NEGATIVE
MCH RBC QN AUTO: 32.2 PG (ref 26–34)
MCHC RBC AUTO-ENTMCNC: 34.4 G/DL (ref 31–36)
MCV RBC AUTO: 93.4 FL (ref 80–100)
MICROSCOPIC EXAMINATION: YES
NITRITE, URINE: NEGATIVE
PDW BLD-RTO: 14.3 % (ref 12.4–15.4)
PH UA: 6 (ref 5–8)
PHOSPHORUS: 4.3 MG/DL (ref 2.5–4.9)
PLATELET # BLD: 244 K/UL (ref 135–450)
PMV BLD AUTO: 9.7 FL (ref 5–10.5)
POTASSIUM SERPL-SCNC: 4 MMOL/L (ref 3.5–5.1)
PROTEIN PROTEIN: 28 MG/DL
PROTEIN UA: ABNORMAL MG/DL
RBC # BLD: 4.68 M/UL (ref 4–5.2)
RBC UA: 1 /HPF (ref 0–4)
SODIUM BLD-SCNC: 133 MMOL/L (ref 136–145)
SPECIFIC GRAVITY UA: 1.02 (ref 1–1.03)
URINE TYPE: ABNORMAL
UROBILINOGEN, URINE: 1 E.U./DL
WBC # BLD: 8.1 K/UL (ref 4–11)
WBC UA: 8 /HPF (ref 0–5)

## 2021-03-03 PROCEDURE — 80069 RENAL FUNCTION PANEL: CPT

## 2021-03-03 PROCEDURE — 85027 COMPLETE CBC AUTOMATED: CPT

## 2021-03-03 PROCEDURE — 84156 ASSAY OF PROTEIN URINE: CPT

## 2021-03-03 PROCEDURE — 81001 URINALYSIS AUTO W/SCOPE: CPT

## 2021-03-03 PROCEDURE — 82570 ASSAY OF URINE CREATININE: CPT

## 2021-03-03 PROCEDURE — 36415 COLL VENOUS BLD VENIPUNCTURE: CPT

## 2021-03-10 ENCOUNTER — OFFICE VISIT (OUTPATIENT)
Dept: INTERNAL MEDICINE CLINIC | Age: 46
End: 2021-03-10
Payer: COMMERCIAL

## 2021-03-10 VITALS
WEIGHT: 264 LBS | OXYGEN SATURATION: 98 % | DIASTOLIC BLOOD PRESSURE: 82 MMHG | SYSTOLIC BLOOD PRESSURE: 108 MMHG | HEART RATE: 103 BPM | BODY MASS INDEX: 48.29 KG/M2 | TEMPERATURE: 97.5 F

## 2021-03-10 DIAGNOSIS — M25.551 RIGHT HIP PAIN: ICD-10-CM

## 2021-03-10 DIAGNOSIS — Z13.31 POSITIVE DEPRESSION SCREENING: ICD-10-CM

## 2021-03-10 DIAGNOSIS — Z72.0 NICOTINE ABUSE: ICD-10-CM

## 2021-03-10 DIAGNOSIS — I10 ESSENTIAL HYPERTENSION: ICD-10-CM

## 2021-03-10 DIAGNOSIS — R32 INCONTINENCE IN FEMALE: ICD-10-CM

## 2021-03-10 DIAGNOSIS — E78.2 MIXED HYPERLIPIDEMIA: ICD-10-CM

## 2021-03-10 DIAGNOSIS — R06.2 WHEEZING ON INSPIRATION: ICD-10-CM

## 2021-03-10 DIAGNOSIS — S42.202A CLOSED FRACTURE OF PROXIMAL END OF LEFT HUMERUS, UNSPECIFIED FRACTURE MORPHOLOGY, INITIAL ENCOUNTER: ICD-10-CM

## 2021-03-10 DIAGNOSIS — E55.9 VITAMIN D DEFICIENCY: ICD-10-CM

## 2021-03-10 DIAGNOSIS — J30.89 ENVIRONMENTAL AND SEASONAL ALLERGIES: ICD-10-CM

## 2021-03-10 DIAGNOSIS — F32.3 CURRENT SEVERE EPISODE OF MAJOR DEPRESSIVE DISORDER WITH PSYCHOTIC FEATURES WITHOUT PRIOR EPISODE (HCC): Primary | ICD-10-CM

## 2021-03-10 PROCEDURE — G8431 POS CLIN DEPRES SCRN F/U DOC: HCPCS | Performed by: NURSE PRACTITIONER

## 2021-03-10 PROCEDURE — 99213 OFFICE O/P EST LOW 20 MIN: CPT | Performed by: NURSE PRACTITIONER

## 2021-03-10 RX ORDER — SERTRALINE HYDROCHLORIDE 25 MG/1
25 TABLET, FILM COATED ORAL DAILY
Qty: 30 TABLET | Refills: 3 | Status: SHIPPED | OUTPATIENT
Start: 2021-03-10 | End: 2021-04-21 | Stop reason: SDUPTHER

## 2021-03-10 RX ORDER — NICOTINE 21 MG/24HR
1 PATCH, TRANSDERMAL 24 HOURS TRANSDERMAL DAILY
Qty: 30 PATCH | Refills: 0 | Status: CANCELLED | OUTPATIENT
Start: 2021-03-10

## 2021-03-10 RX ORDER — NICOTINE 21 MG/24HR
1 PATCH, TRANSDERMAL 24 HOURS TRANSDERMAL EVERY 24 HOURS
Qty: 30 PATCH | Refills: 3 | Status: SHIPPED | OUTPATIENT
Start: 2021-03-10 | End: 2022-02-15

## 2021-03-10 RX ORDER — RISPERIDONE 4 MG/1
TABLET, FILM COATED ORAL
Qty: 90 TABLET | Refills: 0 | Status: SHIPPED | OUTPATIENT
Start: 2021-03-10 | End: 2021-07-08 | Stop reason: SDUPTHER

## 2021-03-10 RX ORDER — KETOROLAC TROMETHAMINE 30 MG/ML
60 INJECTION, SOLUTION INTRAMUSCULAR; INTRAVENOUS ONCE
Qty: 2 ML | Refills: 0 | Status: CANCELLED | OUTPATIENT
Start: 2021-03-10 | End: 2021-03-10

## 2021-03-10 RX ORDER — SIMVASTATIN 20 MG
TABLET ORAL
Qty: 90 TABLET | Refills: 0 | Status: SHIPPED | OUTPATIENT
Start: 2021-03-10 | End: 2021-07-08 | Stop reason: SDUPTHER

## 2021-03-10 RX ORDER — TRIAMCINOLONE ACETONIDE 0.25 MG/G
OINTMENT TOPICAL DAILY
Qty: 15 G | Refills: 0 | Status: SHIPPED | OUTPATIENT
Start: 2021-03-10 | End: 2021-07-08 | Stop reason: SDUPTHER

## 2021-03-10 RX ORDER — OXCARBAZEPINE 600 MG/1
TABLET, FILM COATED ORAL
Qty: 180 TABLET | Refills: 0 | Status: SHIPPED | OUTPATIENT
Start: 2021-03-10 | End: 2021-07-08 | Stop reason: SDUPTHER

## 2021-03-10 RX ORDER — FUROSEMIDE 20 MG/1
20 TABLET ORAL DAILY
Qty: 60 TABLET | Refills: 3 | Status: SHIPPED | OUTPATIENT
Start: 2021-03-10 | End: 2021-04-21 | Stop reason: DRUGHIGH

## 2021-03-10 RX ORDER — OMEPRAZOLE 20 MG/1
CAPSULE, DELAYED RELEASE ORAL
Qty: 120 CAPSULE | Refills: 0 | Status: SHIPPED | OUTPATIENT
Start: 2021-03-10 | End: 2021-07-08 | Stop reason: SDUPTHER

## 2021-03-10 RX ORDER — OXYBUTYNIN CHLORIDE 10 MG/1
TABLET, EXTENDED RELEASE ORAL
Qty: 90 TABLET | Refills: 0 | Status: SHIPPED | OUTPATIENT
Start: 2021-03-10 | End: 2021-07-08 | Stop reason: SDUPTHER

## 2021-03-10 RX ORDER — LISINOPRIL 10 MG/1
TABLET ORAL
Qty: 30 TABLET | Refills: 0 | Status: SHIPPED | OUTPATIENT
Start: 2021-03-10 | End: 2021-04-07

## 2021-03-10 RX ORDER — ALBUTEROL SULFATE 90 UG/1
2 AEROSOL, METERED RESPIRATORY (INHALATION) EVERY 6 HOURS PRN
Qty: 1 INHALER | Refills: 3 | Status: CANCELLED | OUTPATIENT
Start: 2021-03-10

## 2021-03-10 RX ORDER — CETIRIZINE HYDROCHLORIDE 10 MG/1
10 TABLET ORAL DAILY
Qty: 30 TABLET | Refills: 0 | Status: SHIPPED | OUTPATIENT
Start: 2021-03-10 | End: 2021-04-21 | Stop reason: SDUPTHER

## 2021-03-10 RX ORDER — ERGOCALCIFEROL 1.25 MG/1
CAPSULE ORAL
Qty: 12 CAPSULE | Refills: 1 | Status: CANCELLED | OUTPATIENT
Start: 2021-03-10

## 2021-03-10 ASSESSMENT — ENCOUNTER SYMPTOMS
GASTROINTESTINAL NEGATIVE: 1
RESPIRATORY NEGATIVE: 1
EYES NEGATIVE: 1

## 2021-03-10 ASSESSMENT — PATIENT HEALTH QUESTIONNAIRE - PHQ9
SUM OF ALL RESPONSES TO PHQ QUESTIONS 1-9: 14
5. POOR APPETITE OR OVEREATING: 1
7. TROUBLE CONCENTRATING ON THINGS, SUCH AS READING THE NEWSPAPER OR WATCHING TELEVISION: 0
SUM OF ALL RESPONSES TO PHQ QUESTIONS 1-9: 14
8. MOVING OR SPEAKING SO SLOWLY THAT OTHER PEOPLE COULD HAVE NOTICED. OR THE OPPOSITE, BEING SO FIGETY OR RESTLESS THAT YOU HAVE BEEN MOVING AROUND A LOT MORE THAN USUAL: 0
SUM OF ALL RESPONSES TO PHQ9 QUESTIONS 1 & 2: 6
SUM OF ALL RESPONSES TO PHQ QUESTIONS 1-9: 14

## 2021-03-10 ASSESSMENT — COLUMBIA-SUICIDE SEVERITY RATING SCALE - C-SSRS
6. HAVE YOU EVER DONE ANYTHING, STARTED TO DO ANYTHING, OR PREPARED TO DO ANYTHING TO END YOUR LIFE?: NO
2. HAVE YOU ACTUALLY HAD ANY THOUGHTS OF KILLING YOURSELF?: NO
1. WITHIN THE PAST MONTH, HAVE YOU WISHED YOU WERE DEAD OR WISHED YOU COULD GO TO SLEEP AND NOT WAKE UP?: NO

## 2021-03-10 NOTE — PATIENT INSTRUCTIONS
Add extra table salt to meals  Lisinopril decreased to 10 mg  Continue to use nicoderm patch  Do not smoke in car  Walk at least 15-20 minutes once a day  Take vitamin D weekly as written
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
## 2021-03-24 ENCOUNTER — OFFICE VISIT (OUTPATIENT)
Dept: ORTHOPEDIC SURGERY | Age: 46
End: 2021-03-24
Payer: COMMERCIAL

## 2021-03-24 VITALS — WEIGHT: 264 LBS | BODY MASS INDEX: 48.58 KG/M2 | TEMPERATURE: 98.2 F | HEIGHT: 62 IN

## 2021-03-24 DIAGNOSIS — F17.200 CURRENT SMOKER: ICD-10-CM

## 2021-03-24 DIAGNOSIS — S42.292D OTHER CLOSED DISPLACED FRACTURE OF PROXIMAL END OF LEFT HUMERUS WITH ROUTINE HEALING, SUBSEQUENT ENCOUNTER: Primary | ICD-10-CM

## 2021-03-24 DIAGNOSIS — M54.31 RIGHT SIDED SCIATICA: ICD-10-CM

## 2021-03-24 PROCEDURE — 99024 POSTOP FOLLOW-UP VISIT: CPT | Performed by: ORTHOPAEDIC SURGERY

## 2021-03-24 PROCEDURE — 99406 BEHAV CHNG SMOKING 3-10 MIN: CPT | Performed by: ORTHOPAEDIC SURGERY

## 2021-03-24 PROCEDURE — 99214 OFFICE O/P EST MOD 30 MIN: CPT | Performed by: ORTHOPAEDIC SURGERY

## 2021-03-25 RX ORDER — TRAMADOL HYDROCHLORIDE 50 MG/1
50 TABLET ORAL EVERY 6 HOURS PRN
Qty: 20 TABLET | Refills: 0 | Status: SHIPPED | OUTPATIENT
Start: 2021-03-25 | End: 2021-03-30

## 2021-03-25 RX ORDER — PREDNISONE 10 MG/1
TABLET ORAL
Qty: 26 TABLET | Refills: 0 | Status: SHIPPED | OUTPATIENT
Start: 2021-03-25 | End: 2021-07-28 | Stop reason: ALTCHOICE
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
## 2021-03-31 ENCOUNTER — HOSPITAL ENCOUNTER (OUTPATIENT)
Dept: PHYSICAL THERAPY | Age: 46
Setting detail: THERAPIES SERIES
Discharge: HOME | End: 2021-03-31
Payer: COMMERCIAL

## 2021-04-21 ENCOUNTER — OFFICE VISIT (OUTPATIENT)
Dept: INTERNAL MEDICINE CLINIC | Age: 46
End: 2021-04-21
Payer: COMMERCIAL

## 2021-04-21 VITALS
DIASTOLIC BLOOD PRESSURE: 82 MMHG | OXYGEN SATURATION: 96 % | WEIGHT: 264 LBS | SYSTOLIC BLOOD PRESSURE: 106 MMHG | BODY MASS INDEX: 48.29 KG/M2 | TEMPERATURE: 97.5 F | HEART RATE: 100 BPM

## 2021-04-21 DIAGNOSIS — J30.89 ENVIRONMENTAL AND SEASONAL ALLERGIES: ICD-10-CM

## 2021-04-21 DIAGNOSIS — Z13.31 POSITIVE DEPRESSION SCREENING: ICD-10-CM

## 2021-04-21 DIAGNOSIS — E87.1 HYPONATREMIA: ICD-10-CM

## 2021-04-21 DIAGNOSIS — F32.3 CURRENT SEVERE EPISODE OF MAJOR DEPRESSIVE DISORDER WITH PSYCHOTIC FEATURES WITHOUT PRIOR EPISODE (HCC): Primary | ICD-10-CM

## 2021-04-21 LAB — SODIUM BLD-SCNC: 136 MMOL/L (ref 136–145)

## 2021-04-21 PROCEDURE — 99213 OFFICE O/P EST LOW 20 MIN: CPT | Performed by: NURSE PRACTITIONER

## 2021-04-21 RX ORDER — CETIRIZINE HYDROCHLORIDE 10 MG/1
10 TABLET ORAL DAILY
Qty: 30 TABLET | Refills: 3 | Status: SHIPPED | OUTPATIENT
Start: 2021-04-21 | End: 2021-05-21

## 2021-04-21 ASSESSMENT — ENCOUNTER SYMPTOMS
GASTROINTESTINAL NEGATIVE: 1
EYES NEGATIVE: 1
RESPIRATORY NEGATIVE: 1

## 2021-04-21 NOTE — PATIENT INSTRUCTIONS
Continue adding salt to food lightly  Stop smoking  Continue to drink plenty of water during the day  Exercise 20 minutes 3 times a week  Watch food portions

## 2021-04-21 NOTE — PROGRESS NOTES
Subjective:      Patient ID: Dylon Sharma is a 51 y.o. female. Presents today  For follow up on depression and hyypertension    Hypertension  This is a chronic problem. The current episode started more than 1 year ago. The problem has been rapidly improving since onset. The problem is controlled. There are no associated agents to hypertension. Risk factors for coronary artery disease include family history, obesity and smoking/tobacco exposure. Past treatments include ACE inhibitors. Review of Systems   Constitutional: Negative. Eyes: Negative. Respiratory: Negative. Cardiovascular: Negative. Gastrointestinal: Negative. Genitourinary: Negative. Vitals:    04/21/21 0804   BP: 106/82   Pulse: 100   Temp: 97.5 °F (36.4 °C)   SpO2: 96%     BP Readings from Last 3 Encounters:   04/21/21 106/82   03/10/21 108/82   02/01/21 112/72     Wt Readings from Last 3 Encounters:   04/21/21 264 lb (119.7 kg)   03/24/21 264 lb (119.7 kg)   03/10/21 264 lb (119.7 kg)     Objective:   Physical Exam  Constitutional:       Appearance: Normal appearance. She is obese. HENT:      Head: Normocephalic. Right Ear: Hearing, tympanic membrane and external ear normal.      Left Ear: Hearing, tympanic membrane and external ear normal.      Ears:      Comments: Bilateral canal redness noted  Cardiovascular:      Rate and Rhythm: Normal rate and regular rhythm. Pulmonary:      Effort: Pulmonary effort is normal.      Breath sounds: Normal breath sounds. Skin:     General: Skin is warm and dry. Neurological:      Mental Status: She is alert. Psychiatric:         Mood and Affect: Mood is anxious and depressed. Speech: Speech normal.         Behavior: Behavior normal.         Thought Content:  Thought content normal.         Cognition and Memory: Cognition normal.      Comments: PHQ 9 = 9  CATY 7 = 5           Assessment:      Hypertension  Depression   Hyponatremia: dizziness resolved   Plan:

## 2021-04-29 DIAGNOSIS — J01.10 ACUTE FRONTAL SINUSITIS, RECURRENCE NOT SPECIFIED: Primary | ICD-10-CM

## 2021-04-29 DIAGNOSIS — J30.0 CHRONIC VASOMOTOR RHINITIS: ICD-10-CM

## 2021-04-29 RX ORDER — FLUTICASONE PROPIONATE 50 MCG
1 SPRAY, SUSPENSION (ML) NASAL DAILY
Qty: 2 BOTTLE | Refills: 1 | Status: SHIPPED | OUTPATIENT
Start: 2021-04-29 | End: 2021-09-08 | Stop reason: SDUPTHER

## 2021-04-29 RX ORDER — SULFAMETHOXAZOLE AND TRIMETHOPRIM 800; 160 MG/1; MG/1
1 TABLET ORAL 2 TIMES DAILY
Qty: 10 TABLET | Refills: 0 | Status: SHIPPED | OUTPATIENT
Start: 2021-04-29 | End: 2021-05-04

## 2021-04-29 NOTE — TELEPHONE ENCOUNTER
Pt is complaining about congestion, sinus and Pain/pressure for 2 days. patient is requesting if a rx can be called in.    [facility]. I offered her an appt, but pt st she don't want to be seen, she ask if you can write a letter for her because she don't felt like going out of house.

## 2021-05-05 ENCOUNTER — OFFICE VISIT (OUTPATIENT)
Dept: ORTHOPEDIC SURGERY | Age: 46
End: 2021-05-05
Payer: COMMERCIAL

## 2021-05-05 VITALS — HEIGHT: 62 IN | WEIGHT: 264 LBS | BODY MASS INDEX: 48.58 KG/M2

## 2021-05-05 DIAGNOSIS — S42.292D OTHER CLOSED DISPLACED FRACTURE OF PROXIMAL END OF LEFT HUMERUS WITH ROUTINE HEALING, SUBSEQUENT ENCOUNTER: Primary | ICD-10-CM

## 2021-05-05 DIAGNOSIS — F17.200 CURRENT SMOKER: ICD-10-CM

## 2021-05-05 PROCEDURE — 99213 OFFICE O/P EST LOW 20 MIN: CPT | Performed by: ORTHOPAEDIC SURGERY

## 2021-05-05 PROCEDURE — 99406 BEHAV CHNG SMOKING 3-10 MIN: CPT | Performed by: ORTHOPAEDIC SURGERY
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
## 2021-06-23 ENCOUNTER — HOSPITAL ENCOUNTER (OUTPATIENT)
Age: 46
Discharge: HOME | End: 2021-06-23

## 2021-06-23 DIAGNOSIS — E22.2 SIADH (SYNDROME OF INAPPROPRIATE ADH PRODUCTION) (HCC): ICD-10-CM

## 2021-06-23 DIAGNOSIS — F17.200 SMOKING: ICD-10-CM

## 2021-06-23 DIAGNOSIS — E87.1 HYPONATREMIA: ICD-10-CM

## 2021-06-23 LAB
ALBUMIN SERPL-MCNC: 4.5 G/DL (ref 3.4–5)
ANION GAP SERPL CALCULATED.3IONS-SCNC: 21 MMOL/L (ref 3–16)
BUN BLDV-MCNC: 7 MG/DL (ref 7–20)
CALCIUM SERPL-MCNC: 9.9 MG/DL (ref 8.3–10.6)
CHLORIDE BLD-SCNC: 93 MMOL/L (ref 99–110)
CO2: 20 MMOL/L (ref 21–32)
CREAT SERPL-MCNC: <0.5 MG/DL (ref 0.6–1.1)
CREATININE URINE: 23.8 MG/DL (ref 28–259)
GFR AFRICAN AMERICAN: >60
GFR NON-AFRICAN AMERICAN: >60
GLUCOSE BLD-MCNC: 90 MG/DL (ref 70–99)
HCT VFR BLD CALC: 46.3 % (ref 36–48)
HEMOGLOBIN: 16.3 G/DL (ref 12–16)
MCH RBC QN AUTO: 32.2 PG (ref 26–34)
MCHC RBC AUTO-ENTMCNC: 35.1 G/DL (ref 31–36)
MCV RBC AUTO: 91.6 FL (ref 80–100)
PDW BLD-RTO: 15.2 % (ref 12.4–15.4)
PHOSPHORUS: 4.5 MG/DL (ref 2.5–4.9)
PLATELET # BLD: 230 K/UL (ref 135–450)
PMV BLD AUTO: 9.4 FL (ref 5–10.5)
POTASSIUM SERPL-SCNC: 4.3 MMOL/L (ref 3.5–5.1)
PROTEIN PROTEIN: <4 MG/DL
RBC # BLD: 5.05 M/UL (ref 4–5.2)
SODIUM BLD-SCNC: 134 MMOL/L (ref 136–145)
SODIUM URINE: 100 MMOL/L
WBC # BLD: 10.8 K/UL (ref 4–11)

## 2021-06-23 PROCEDURE — 84156 ASSAY OF PROTEIN URINE: CPT

## 2021-06-23 PROCEDURE — 36415 COLL VENOUS BLD VENIPUNCTURE: CPT

## 2021-06-23 PROCEDURE — 85027 COMPLETE CBC AUTOMATED: CPT

## 2021-06-23 PROCEDURE — 84300 ASSAY OF URINE SODIUM: CPT

## 2021-06-23 PROCEDURE — 82570 ASSAY OF URINE CREATININE: CPT

## 2021-06-23 PROCEDURE — 83935 ASSAY OF URINE OSMOLALITY: CPT

## 2021-06-23 PROCEDURE — 80069 RENAL FUNCTION PANEL: CPT

## 2021-06-24 LAB — OSMOLALITY URINE: 359 MOSM/KG (ref 390–1070)

## 2021-07-06 ENCOUNTER — NURSE TRIAGE (OUTPATIENT)
Dept: OTHER | Facility: CLINIC | Age: 46
End: 2021-07-06

## 2021-07-06 NOTE — TELEPHONE ENCOUNTER
How Severe Is Your Condition?: mild Received call from Elizabeth Gutiérrez  at [address] E[facility]/PSC  with [employer]. Brief description of triage: fatigue with dizzy. Triage indicates for patient to have a second level triage. This writer did speak to Charu Oliver at the PCP office and shared that a second level triage was necessary. This writer was unable to speak to a provider at the PCP office. This writer then called Radha Elliott. Recommendation from Radha Elliott for the patient to be seen at PCP tomorrow and to review stroke symptoms with patient. Recommendation from Radha Elliott shared with the patient who voices understanding. Care advice provided, patient verbalizes understanding; denies any other questions or concerns; instructed to call back for any new or worsening symptoms. Writer provided warm transfer to [facility]  at [facility]  for appointment scheduling. Attention Provider: Thank you for allowing me to participate in the care of your patient. The patient was connected to triage in response to information provided to the ECC. Please do not respond through this encounter as the response is not directed to a shared pool. Reason for Disposition   [1] Dizziness (vertigo) present now AND [2] one or more stroke risk factors (i.e., hypertension, diabetes, prior stroke/TIA, heart attack)  (Exception: prior physician evaluation for this AND no different/worse than usual)     Pre diabetic  On HTN medication    Answer Assessment - Initial Assessment Questions  1. DESCRIPTION: \"Describe your dizziness. \"      Current dizziness  Lightheaded now    2. VERTIGO: \"Do you feel like either you or the room is spinning or tilting? \"      Denies    3. LIGHTHEADED: \"Do you feel lightheaded? \" (e.g., somewhat faint, woozy, weak upon standing)      See above note    4. SEVERITY: \"How bad is it? \"  \"Can you walk? \"    - MILD - Feels unsteady but walking normally.     - MODERATE - Feels very unsteady when walking, but not falling; interferes with normal activities (e.g., school, work) . - SEVERE - Unable to walk without falling (requires assistance). Mild   5. ONSET:  \"When did the dizziness begin? \"        Last couple of weeks has become worse. Has been taking off of work due to fatigue. 6. AGGRAVATING FACTORS: \"Does anything make it worse? \" (e.g., standing, change in head position)      Does not know she states    7. CAUSE: \"What do you think is causing the dizziness? \"      Nosebleed this morning. 8. RECURRENT SYMPTOM: \"Have you had dizziness before? \" If so, ask: \"When was the last time? \" \"What happened that time? \"      Has had a lot before she states- she states her sodium level was low and hospitalized  For this. 9. OTHER SYMPTOMS: \"Do you have any other symptoms? \" (e.g., headache, weakness, numbness, vomiting, earache)  Denies vomiting/ear ache      Fatigue  Heart racing currently  Slight headache- currently on both sides      10. PREGNANCY: \"Is there any chance you are pregnant? \" \"When was your last menstrual period? \"        N/a age    Protocols used: DIZZINESS - VERTIGO-ADULT-AH    See above documentation

## 2021-07-08 ENCOUNTER — OFFICE VISIT (OUTPATIENT)
Dept: INTERNAL MEDICINE CLINIC | Age: 46
End: 2021-07-08
Payer: COMMERCIAL

## 2021-07-08 VITALS
DIASTOLIC BLOOD PRESSURE: 74 MMHG | HEART RATE: 87 BPM | SYSTOLIC BLOOD PRESSURE: 106 MMHG | TEMPERATURE: 97.4 F | BODY MASS INDEX: 48.78 KG/M2 | OXYGEN SATURATION: 97 % | WEIGHT: 271 LBS

## 2021-07-08 DIAGNOSIS — Z13.31 POSITIVE DEPRESSION SCREENING: ICD-10-CM

## 2021-07-08 DIAGNOSIS — I10 ESSENTIAL HYPERTENSION: ICD-10-CM

## 2021-07-08 DIAGNOSIS — F32.A DEPRESSIVE DISORDER: ICD-10-CM

## 2021-07-08 DIAGNOSIS — S42.202A CLOSED FRACTURE OF PROXIMAL END OF LEFT HUMERUS, UNSPECIFIED FRACTURE MORPHOLOGY, INITIAL ENCOUNTER: ICD-10-CM

## 2021-07-08 DIAGNOSIS — F32.1 CURRENT MODERATE EPISODE OF MAJOR DEPRESSIVE DISORDER, UNSPECIFIED WHETHER RECURRENT (HCC): Primary | ICD-10-CM

## 2021-07-08 DIAGNOSIS — Z72.0 CONTINUOUS TOBACCO ABUSE: ICD-10-CM

## 2021-07-08 DIAGNOSIS — Z12.11 SCREEN FOR COLON CANCER: ICD-10-CM

## 2021-07-08 DIAGNOSIS — E78.2 MIXED HYPERLIPIDEMIA: ICD-10-CM

## 2021-07-08 DIAGNOSIS — R32 INCONTINENCE IN FEMALE: ICD-10-CM

## 2021-07-08 DIAGNOSIS — M25.551 RIGHT HIP PAIN: ICD-10-CM

## 2021-07-08 DIAGNOSIS — S42.292D OTHER CLOSED DISPLACED FRACTURE OF PROXIMAL END OF LEFT HUMERUS WITH ROUTINE HEALING, SUBSEQUENT ENCOUNTER: ICD-10-CM

## 2021-07-08 DIAGNOSIS — M54.31 RIGHT SIDED SCIATICA: ICD-10-CM

## 2021-07-08 PROCEDURE — 99214 OFFICE O/P EST MOD 30 MIN: CPT | Performed by: NURSE PRACTITIONER

## 2021-07-08 PROCEDURE — G8431 POS CLIN DEPRES SCRN F/U DOC: HCPCS | Performed by: NURSE PRACTITIONER

## 2021-07-08 RX ORDER — OXCARBAZEPINE 600 MG/1
TABLET, FILM COATED ORAL
Qty: 180 TABLET | Refills: 0 | Status: SHIPPED | OUTPATIENT
Start: 2021-07-08 | End: 2021-09-08 | Stop reason: SDUPTHER

## 2021-07-08 RX ORDER — FUROSEMIDE 40 MG/1
40 TABLET ORAL DAILY
Qty: 60 TABLET | Refills: 3 | Status: SHIPPED | OUTPATIENT
Start: 2021-07-08 | End: 2021-09-08 | Stop reason: SDUPTHER

## 2021-07-08 RX ORDER — RISPERIDONE 4 MG/1
TABLET, FILM COATED ORAL
Qty: 90 TABLET | Refills: 0 | Status: SHIPPED | OUTPATIENT
Start: 2021-07-08 | End: 2021-09-08 | Stop reason: SDUPTHER

## 2021-07-08 RX ORDER — TRIAMCINOLONE ACETONIDE 0.25 MG/G
OINTMENT TOPICAL DAILY
Qty: 15 G | Refills: 0 | Status: SHIPPED | OUTPATIENT
Start: 2021-07-08 | End: 2021-09-08 | Stop reason: SDUPTHER

## 2021-07-08 RX ORDER — FLUOXETINE HYDROCHLORIDE 20 MG/1
20 CAPSULE ORAL DAILY
Qty: 90 CAPSULE | Refills: 0 | Status: SHIPPED | OUTPATIENT
Start: 2021-07-08 | End: 2021-09-08 | Stop reason: SDUPTHER

## 2021-07-08 RX ORDER — LISINOPRIL 10 MG/1
TABLET ORAL
Qty: 90 TABLET | Refills: 0 | Status: SHIPPED | OUTPATIENT
Start: 2021-07-08 | End: 2021-09-08 | Stop reason: SDUPTHER

## 2021-07-08 RX ORDER — SIMVASTATIN 20 MG
TABLET ORAL
Qty: 90 TABLET | Refills: 0 | Status: SHIPPED | OUTPATIENT
Start: 2021-07-08 | End: 2021-09-08 | Stop reason: SDUPTHER

## 2021-07-08 RX ORDER — PREDNISONE 10 MG/1
TABLET ORAL
Qty: 26 TABLET | Refills: 0 | Status: CANCELLED | OUTPATIENT
Start: 2021-07-08

## 2021-07-08 RX ORDER — OMEPRAZOLE 20 MG/1
CAPSULE, DELAYED RELEASE ORAL
Qty: 120 CAPSULE | Refills: 0 | Status: SHIPPED | OUTPATIENT
Start: 2021-07-08 | End: 2021-09-08 | Stop reason: SDUPTHER

## 2021-07-08 RX ORDER — OXYBUTYNIN CHLORIDE 10 MG/1
TABLET, EXTENDED RELEASE ORAL
Qty: 90 TABLET | Refills: 0 | Status: SHIPPED | OUTPATIENT
Start: 2021-07-08 | End: 2021-09-08 | Stop reason: SDUPTHER

## 2021-07-08 RX ORDER — NICOTINE 21 MG/24HR
1 PATCH, TRANSDERMAL 24 HOURS TRANSDERMAL DAILY
Qty: 30 PATCH | Refills: 0 | Status: CANCELLED | OUTPATIENT
Start: 2021-07-08

## 2021-07-08 SDOH — ECONOMIC STABILITY: FOOD INSECURITY: WITHIN THE PAST 12 MONTHS, THE FOOD YOU BOUGHT JUST DIDN'T LAST AND YOU DIDN'T HAVE MONEY TO GET MORE.: NEVER TRUE

## 2021-07-08 SDOH — ECONOMIC STABILITY: FOOD INSECURITY: WITHIN THE PAST 12 MONTHS, YOU WORRIED THAT YOUR FOOD WOULD RUN OUT BEFORE YOU GOT MONEY TO BUY MORE.: NEVER TRUE

## 2021-07-08 ASSESSMENT — PATIENT HEALTH QUESTIONNAIRE - PHQ9
5. POOR APPETITE OR OVEREATING: 0
SUM OF ALL RESPONSES TO PHQ QUESTIONS 1-9: 10
6. FEELING BAD ABOUT YOURSELF - OR THAT YOU ARE A FAILURE OR HAVE LET YOURSELF OR YOUR FAMILY DOWN: 0
7. TROUBLE CONCENTRATING ON THINGS, SUCH AS READING THE NEWSPAPER OR WATCHING TELEVISION: 0
3. TROUBLE FALLING OR STAYING ASLEEP: 3
2. FEELING DOWN, DEPRESSED OR HOPELESS: 1
SUM OF ALL RESPONSES TO PHQ QUESTIONS 1-9: 10
8. MOVING OR SPEAKING SO SLOWLY THAT OTHER PEOPLE COULD HAVE NOTICED. OR THE OPPOSITE, BEING SO FIGETY OR RESTLESS THAT YOU HAVE BEEN MOVING AROUND A LOT MORE THAN USUAL: 0
9. THOUGHTS THAT YOU WOULD BE BETTER OFF DEAD, OR OF HURTING YOURSELF: 0
SUM OF ALL RESPONSES TO PHQ QUESTIONS 1-9: 10
4. FEELING TIRED OR HAVING LITTLE ENERGY: 3
SUM OF ALL RESPONSES TO PHQ9 QUESTIONS 1 & 2: 4
1. LITTLE INTEREST OR PLEASURE IN DOING THINGS: 3

## 2021-07-08 ASSESSMENT — SOCIAL DETERMINANTS OF HEALTH (SDOH): HOW HARD IS IT FOR YOU TO PAY FOR THE VERY BASICS LIKE FOOD, HOUSING, MEDICAL CARE, AND HEATING?: NOT HARD AT ALL

## 2021-07-08 ASSESSMENT — COLUMBIA-SUICIDE SEVERITY RATING SCALE - C-SSRS
1. WITHIN THE PAST MONTH, HAVE YOU WISHED YOU WERE DEAD OR WISHED YOU COULD GO TO SLEEP AND NOT WAKE UP?: NO
2. HAVE YOU ACTUALLY HAD ANY THOUGHTS OF KILLING YOURSELF?: NO
6. HAVE YOU EVER DONE ANYTHING, STARTED TO DO ANYTHING, OR PREPARED TO DO ANYTHING TO END YOUR LIFE?: NO

## 2021-07-08 NOTE — PATIENT INSTRUCTIONS
Start walking for 10 minutes 3 times a week for next 4 weeks, then 4 times a week  Decrease food portions  Do not sleep during the day  Take Prozac 20 mg same time each day    Avoid smoking in the house  Send in Cologuard, stool specimen, as soon as it arrives

## 2021-07-08 NOTE — PROGRESS NOTES
Kimberlee Mendoza (:  1975) is a 51 y.o. female,Established patient, here for evaluation of the following chief complaint(s):  Fatigue (couple weeks) and Dizziness         ASSESSMENT/PLAN:  1. Current moderate episode of major depressive disorder, unspecified whether recurrent ([facility]  Assessment & Plan:   Uncontrolled, changes made today: discontinue celexa, start prozac 20 mg and medication adherence emphasized  Orders:  -     FLUoxetine (PROZAC) 20 MG capsule; Take 1 capsule by mouth daily, Disp-90 capsule, R-0Normal  2. Essential hypertension  -     lisinopril (PRINIVIL;ZESTRIL) 10 MG tablet; Take 1 tablet by mouth once daily, Disp-90 tablet, R-0Normal  -     Handicap Placard MISC; Starting Thu 2021, Disp-1 each, R-0, Print  3. Other closed displaced fracture of proximal end of left humerus with routine healing, subsequent encounter  4. Right sided sciatica  5. Incontinence in female  -     oxybutynin (DITROPAN-XL) 10 MG extended release tablet; Take 1 tablet by mouth once daily, Disp-90 tablet, R-0Normal  6. Mixed hyperlipidemia  -     simvastatin (ZOCOR) 20 MG tablet; One tab every day, Disp-90 tablet, R-0Normal  7. Right hip pain  -     Handicap Placard MISC; Starting  2021, Disp-1 each, R-0, Print  8. Closed fracture of proximal end of left humerus, unspecified fracture morphology, initial encounter  -     Handicap Placard MISC; Starting Thu 2021, Disp-1 each, R-0, Print  9. Continuous tobacco abuse  -     CT Lung Screen (Initial or Annual); Future  10. Screen for colon cancer  -     Cologuard (For External Results Only); Future    Avoid smoking in the house    Send in Cologuard, stool specimen, as soon as it arrives  Return in about 2 months (around 2021) for depression. Subjective   SUBJECTIVE/OBJECTIVE:  Fatigue  This is a new problem. The current episode started 1 to 4 weeks ago. The problem occurs constantly. Associated symptoms include fatigue.  She has tried nothing for the symptoms. Dizziness  Associated symptoms include fatigue. Fatigue    Review of Systems   Constitutional: Positive for fatigue. Neurological: Positive for dizziness. Vitals:    07/08/21 1544   BP: 106/74   Pulse: 87   Temp: 97.4 °F (36.3 °C)   SpO2: 97%     BP Readings from Last 3 Encounters:   07/08/21 106/74   06/28/21 110/72   04/21/21 106/82     Wt Readings from Last 3 Encounters:   07/08/21 271 lb (122.9 kg)   06/28/21 268 lb (121.6 kg)   05/05/21 264 lb (119.7 kg)     Objective   Physical Exam  Constitutional:       Appearance: She is obese. Cardiovascular:      Rate and Rhythm: Normal rate and regular rhythm. Pulmonary:      Effort: Pulmonary effort is normal.      Breath sounds: Normal breath sounds. Skin:     General: Skin is warm and dry. Neurological:      Mental Status: She is alert. Psychiatric:         Attention and Perception: Attention normal.         Speech: Speech normal.         Behavior: Behavior is cooperative. Comments: CATY 7=4  PHQ 9 = 12  States she needs to sleep to function and has been calling off work, working at home to take a nap during working hours                    An [address] [place] was used to [employer] this note. --HARRISON Mercado - CNP On the basis of positive PHQ-9 screening (PHQ-9 Total Score: 10), the following plan was implemented: medication prescribed - patient will call for any significant medication side effects or worsening symptoms of depression. Patient will follow-up in 2 month(s) with PCP.

## 2021-07-08 NOTE — LETTER
[address] [address]  Phone: [phone]  Fax: [phone]    HARRISON Sebastian CNP        July 8, 2021     Patient: Heather Abdi   YOB: 1975   Date of Visit: 7/8/2021       To Whom It May Concern: It is my medical opinion that Esperanza Null was absent from work due to chronic illnesses. Please excuse her for absences on July 1, July 6 and July 8. If you have any questions or concerns, please don't hesitate to call.     Sincerely,        HARRISON Sebastian CNP

## 2021-07-08 NOTE — ASSESSMENT & PLAN NOTE
Uncontrolled, changes made today: discontinue celexa, start prozac 20 mg and medication adherence emphasized

## 2021-07-12 DIAGNOSIS — E55.9 VITAMIN D DEFICIENCY: ICD-10-CM

## 2021-07-12 RX ORDER — ERGOCALCIFEROL 1.25 MG/1
CAPSULE ORAL
Qty: 12 CAPSULE | Refills: 0 | Status: SHIPPED | OUTPATIENT
Start: 2021-07-12 | End: 2021-09-08 | Stop reason: SDUPTHER

## 2021-07-14 ENCOUNTER — OFFICE VISIT (OUTPATIENT)
Dept: ORTHOPEDIC SURGERY | Age: 46
End: 2021-07-14
Payer: COMMERCIAL

## 2021-07-14 VITALS — BODY MASS INDEX: 49.87 KG/M2 | WEIGHT: 271 LBS | HEIGHT: 62 IN

## 2021-07-14 DIAGNOSIS — S42.292D OTHER CLOSED DISPLACED FRACTURE OF PROXIMAL END OF LEFT HUMERUS WITH ROUTINE HEALING, SUBSEQUENT ENCOUNTER: Primary | ICD-10-CM

## 2021-07-14 DIAGNOSIS — F17.200 CURRENT SMOKER: ICD-10-CM

## 2021-07-14 PROCEDURE — 99406 BEHAV CHNG SMOKING 3-10 MIN: CPT | Performed by: ORTHOPAEDIC SURGERY

## 2021-07-14 PROCEDURE — 99214 OFFICE O/P EST MOD 30 MIN: CPT | Performed by: ORTHOPAEDIC SURGERY
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
## 2021-07-26 ENCOUNTER — TELEPHONE (OUTPATIENT)
Dept: INTERNAL MEDICINE CLINIC | Age: 46
End: 2021-07-26

## 2021-07-26 DIAGNOSIS — R19.5 POSITIVE COLORECTAL CANCER SCREENING USING COLOGUARD TEST: Primary | ICD-10-CM

## 2021-07-27 ENCOUNTER — TELEPHONE (OUTPATIENT)
Dept: INTERNAL MEDICINE CLINIC | Age: 46
End: 2021-07-27

## 2021-07-27 NOTE — TELEPHONE ENCOUNTER
----- Message from Rach Adorno MA sent at 7/27/2021  2:14 PM EDT -----  Subject: Message to Provider    QUESTIONS  Information for Provider? Trenton Dee from [employer] has results for   [place] cancer screening. The results were positive. Fax was sent on   7/23/21 for the patient. If you need to call back the number is below to   resend the fax.  ---------------------------------------------------------------------------  --------------  [address]  What is the best way for the office to contact you? OK to leave message on   voicemail  Preferred Call Back Phone Number? [phone]  ---------------------------------------------------------------------------  --------------  SCRIPT ANSWERS  Relationship to Patient? Third Party  Representative Name?  [employer]

## 2021-07-28 ENCOUNTER — HOSPITAL ENCOUNTER (OUTPATIENT)
Dept: CT IMAGING | Age: 46
Discharge: HOME | End: 2021-07-28
Payer: COMMERCIAL

## 2021-07-28 DIAGNOSIS — Z72.0 CONTINUOUS TOBACCO ABUSE: ICD-10-CM

## 2021-07-28 PROCEDURE — 71271 CT THORAX LUNG CANCER SCR C-: CPT

## 2021-07-28 NOTE — PROGRESS NOTES
Patient reached __X__ yes  _____ no   VM instructions left ____ yes   phone number ________                                ____ no-office notified          Date _7/30/21________  Time _1400______  Arrival __1230  hosp-endo____    Nothing to eat or drink after midnight-follow your doctors prep instructions-this may include taking a second dose of your prep after midnight  Responsible adult 25 or older to stay on site while you are here-drive you home-stay with you after  Follow any instructions your doctors office has given you  Bring a complete list of all your medications and supplements including name,dose,how often taken the day of your procedure  If you normally take the following medications in the morning please do so the AM of your procedure with a small sip of water       Heart,blood pressure,seizure,thyroid or breathing medications-use your inhalers       DO NOT take blood pressure medications ending in \"jacinta\" or \"pril\" the AM of procedure or evening prior  Take half or your normal dose of any long acting insulins the night before your procedure-do not take any diabetic medications the AM of procedure  Follow your doctors instructions regarding stopping or taking  any blood thinners-if you do not have instructions-call them  Any questions call your doctor  Other ___take trileptal am of procedure___________________________________________________________      COVID TEST    _X__ Vaccinated--instructed to bring card DOS    ___ Covid test to be done 3-5 days prior to scheduled surgery if not vaccinated-patient aware they are REQUIRED to bring a copy of the negative result DOS-if they receive a positive result to notify their surgeon.           If known-indicate where patient is getting covid test done          _____________________________________________    ___ Rapid - DOS    ___ Other________________________________________            Melani Reas POLICY(subject to change)         There is a one visitor policy at [facility] for all surgeries and endoscopies. Whether the visitor can stay or will be asked to wait in the car will depend on the current policy and if social distancing can be maintained. The policy is subject to change at any time. Please make sure the visitor has a cell phone that is on,charged and able to accept calls, as this may be the way that the staff communicates with them. Pain management is NO VISITOR policyThe patients ride is expected to remain in the car with a cell phone for communication. If the ride is leaving the hospital grounds please make sure they are back in time for pickup. Have the patient inform the staff on arrival what their rides plans are while the patient is in the facility. At the MAIN there is one visitor allowed. Please note that the visitor policy is subject to change.

## 2021-07-30 ENCOUNTER — ANESTHESIA EVENT (OUTPATIENT)
Dept: ENDOSCOPY | Age: 46
End: 2021-07-30
Payer: COMMERCIAL

## 2021-07-30 ENCOUNTER — HOSPITAL ENCOUNTER (OUTPATIENT)
Age: 46
Setting detail: OUTPATIENT SURGERY
Discharge: HOME | End: 2021-07-30
Attending: INTERNAL MEDICINE | Admitting: INTERNAL MEDICINE
Payer: COMMERCIAL

## 2021-07-30 ENCOUNTER — ANESTHESIA (OUTPATIENT)
Dept: ENDOSCOPY | Age: 46
End: 2021-07-30
Payer: COMMERCIAL

## 2021-07-30 VITALS
SYSTOLIC BLOOD PRESSURE: 157 MMHG | TEMPERATURE: 97.8 F | HEIGHT: 62 IN | OXYGEN SATURATION: 94 % | WEIGHT: 266 LBS | HEART RATE: 80 BPM | BODY MASS INDEX: 48.95 KG/M2 | RESPIRATION RATE: 18 BRPM | DIASTOLIC BLOOD PRESSURE: 87 MMHG

## 2021-07-30 VITALS — DIASTOLIC BLOOD PRESSURE: 56 MMHG | OXYGEN SATURATION: 98 % | SYSTOLIC BLOOD PRESSURE: 140 MMHG

## 2021-07-30 PROCEDURE — 7100000011 HC PHASE II RECOVERY - ADDTL 15 MIN: Performed by: INTERNAL MEDICINE

## 2021-07-30 PROCEDURE — 7100000000 HC PACU RECOVERY - FIRST 15 MIN: Performed by: INTERNAL MEDICINE

## 2021-07-30 PROCEDURE — 3700000000 HC ANESTHESIA ATTENDED CARE: Performed by: INTERNAL MEDICINE

## 2021-07-30 PROCEDURE — 3609010600 HC COLONOSCOPY POLYPECTOMY SNARE/COLD BIOPSY: Performed by: INTERNAL MEDICINE

## 2021-07-30 PROCEDURE — 3609019800 HC COLONOSCOPY WITH SUBMUCOSAL INJECTION: Performed by: INTERNAL MEDICINE

## 2021-07-30 PROCEDURE — 6360000002 HC RX W HCPCS: Performed by: NURSE ANESTHETIST, CERTIFIED REGISTERED

## 2021-07-30 PROCEDURE — 2580000003 HC RX 258: Performed by: ANESTHESIOLOGY

## 2021-07-30 PROCEDURE — 3700000001 HC ADD 15 MINUTES (ANESTHESIA): Performed by: INTERNAL MEDICINE

## 2021-07-30 PROCEDURE — 2709999900 HC NON-CHARGEABLE SUPPLY: Performed by: INTERNAL MEDICINE

## 2021-07-30 PROCEDURE — 2500000003 HC RX 250 WO HCPCS: Performed by: NURSE ANESTHETIST, CERTIFIED REGISTERED

## 2021-07-30 PROCEDURE — 7100000010 HC PHASE II RECOVERY - FIRST 15 MIN: Performed by: INTERNAL MEDICINE

## 2021-07-30 PROCEDURE — 88305 TISSUE EXAM BY PATHOLOGIST: CPT

## 2021-07-30 PROCEDURE — 7100000001 HC PACU RECOVERY - ADDTL 15 MIN: Performed by: INTERNAL MEDICINE

## 2021-07-30 RX ORDER — PROMETHAZINE HYDROCHLORIDE 25 MG/ML
6.25 INJECTION, SOLUTION INTRAMUSCULAR; INTRAVENOUS
Status: DISCONTINUED | OUTPATIENT
Start: 2021-07-30 | End: 2021-07-30 | Stop reason: HOSPADM

## 2021-07-30 RX ORDER — ONDANSETRON 2 MG/ML
4 INJECTION INTRAMUSCULAR; INTRAVENOUS
Status: DISCONTINUED | OUTPATIENT
Start: 2021-07-30 | End: 2021-07-30 | Stop reason: HOSPADM

## 2021-07-30 RX ORDER — FENTANYL CITRATE 50 UG/ML
50 INJECTION, SOLUTION INTRAMUSCULAR; INTRAVENOUS EVERY 5 MIN PRN
Status: DISCONTINUED | OUTPATIENT
Start: 2021-07-30 | End: 2021-07-30 | Stop reason: HOSPADM

## 2021-07-30 RX ORDER — PROPOFOL 10 MG/ML
INJECTION, EMULSION INTRAVENOUS PRN
Status: DISCONTINUED | OUTPATIENT
Start: 2021-07-30 | End: 2021-07-30 | Stop reason: SDUPTHER

## 2021-07-30 RX ORDER — M-VIT,TX,IRON,MINS/CALC/FOLIC 27MG-0.4MG
1 TABLET ORAL DAILY
COMMUNITY

## 2021-07-30 RX ORDER — FENTANYL CITRATE 50 UG/ML
25 INJECTION, SOLUTION INTRAMUSCULAR; INTRAVENOUS EVERY 5 MIN PRN
Status: DISCONTINUED | OUTPATIENT
Start: 2021-07-30 | End: 2021-07-30 | Stop reason: HOSPADM

## 2021-07-30 RX ORDER — HYDROMORPHONE HCL 110MG/55ML
0.25 PATIENT CONTROLLED ANALGESIA SYRINGE INTRAVENOUS EVERY 5 MIN PRN
Status: DISCONTINUED | OUTPATIENT
Start: 2021-07-30 | End: 2021-07-30 | Stop reason: HOSPADM

## 2021-07-30 RX ORDER — HYDROMORPHONE HCL 110MG/55ML
0.5 PATIENT CONTROLLED ANALGESIA SYRINGE INTRAVENOUS EVERY 5 MIN PRN
Status: DISCONTINUED | OUTPATIENT
Start: 2021-07-30 | End: 2021-07-30 | Stop reason: HOSPADM

## 2021-07-30 RX ORDER — LIDOCAINE HYDROCHLORIDE 10 MG/ML
1 INJECTION, SOLUTION EPIDURAL; INFILTRATION; INTRACAUDAL; PERINEURAL
Status: DISCONTINUED | OUTPATIENT
Start: 2021-07-30 | End: 2021-07-30 | Stop reason: HOSPADM

## 2021-07-30 RX ORDER — HYDROCODONE BITARTRATE AND ACETAMINOPHEN 5; 325 MG/1; MG/1
1 TABLET ORAL
Status: DISCONTINUED | OUTPATIENT
Start: 2021-07-30 | End: 2021-07-30 | Stop reason: HOSPADM

## 2021-07-30 RX ORDER — MIDAZOLAM HYDROCHLORIDE 1 MG/ML
INJECTION INTRAMUSCULAR; INTRAVENOUS PRN
Status: DISCONTINUED | OUTPATIENT
Start: 2021-07-30 | End: 2021-07-30 | Stop reason: SDUPTHER

## 2021-07-30 RX ORDER — LIDOCAINE HYDROCHLORIDE 20 MG/ML
INJECTION, SOLUTION INFILTRATION; PERINEURAL PRN
Status: DISCONTINUED | OUTPATIENT
Start: 2021-07-30 | End: 2021-07-30 | Stop reason: SDUPTHER

## 2021-07-30 RX ORDER — PROPOFOL 10 MG/ML
INJECTION, EMULSION INTRAVENOUS CONTINUOUS PRN
Status: DISCONTINUED | OUTPATIENT
Start: 2021-07-30 | End: 2021-07-30 | Stop reason: SDUPTHER

## 2021-07-30 RX ORDER — SODIUM CHLORIDE 9 MG/ML
INJECTION, SOLUTION INTRAVENOUS CONTINUOUS
Status: DISCONTINUED | OUTPATIENT
Start: 2021-07-30 | End: 2021-07-30 | Stop reason: HOSPADM

## 2021-07-30 RX ADMIN — PROPOFOL 20 MG: 10 INJECTION, EMULSION INTRAVENOUS at 15:26

## 2021-07-30 RX ADMIN — PROPOFOL 20 MG: 10 INJECTION, EMULSION INTRAVENOUS at 15:34

## 2021-07-30 RX ADMIN — MIDAZOLAM 2 MG: 1 INJECTION INTRAMUSCULAR; INTRAVENOUS at 15:09

## 2021-07-30 RX ADMIN — PROPOFOL 100 MCG/KG/MIN: 10 INJECTION, EMULSION INTRAVENOUS at 15:10

## 2021-07-30 RX ADMIN — PROPOFOL 30 MG: 10 INJECTION, EMULSION INTRAVENOUS at 15:14

## 2021-07-30 RX ADMIN — PROPOFOL 20 MG: 10 INJECTION, EMULSION INTRAVENOUS at 15:42

## 2021-07-30 RX ADMIN — LIDOCAINE HYDROCHLORIDE 100 MG: 20 INJECTION, SOLUTION INFILTRATION; PERINEURAL at 15:09

## 2021-07-30 RX ADMIN — SODIUM CHLORIDE: 9 INJECTION, SOLUTION INTRAVENOUS at 13:33

## 2021-07-30 RX ADMIN — PROPOFOL 20 MG: 10 INJECTION, EMULSION INTRAVENOUS at 15:22

## 2021-07-30 RX ADMIN — PROPOFOL 30 MG: 10 INJECTION, EMULSION INTRAVENOUS at 15:10

## 2021-07-30 RX ADMIN — PROPOFOL 20 MG: 10 INJECTION, EMULSION INTRAVENOUS at 15:30

## 2021-07-30 RX ADMIN — PROPOFOL 20 MG: 10 INJECTION, EMULSION INTRAVENOUS at 15:38

## 2021-07-30 RX ADMIN — PROPOFOL 20 MG: 10 INJECTION, EMULSION INTRAVENOUS at 15:18

## 2021-07-30 ASSESSMENT — PULMONARY FUNCTION TESTS
PIF_VALUE: 1

## 2021-07-30 ASSESSMENT — LIFESTYLE VARIABLES: SMOKING_STATUS: 1

## 2021-07-30 ASSESSMENT — PAIN - FUNCTIONAL ASSESSMENT: PAIN_FUNCTIONAL_ASSESSMENT: 0-10
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
## 2021-07-30 NOTE — BRIEF OP NOTE
Brief Postoperative Note - Full Note in Chart Review/Procedures tab       Patient: Maggi Kaminski  YOB: 1975  MRN: [id number]    Date of Procedure: 7/30/2021    Pre-Op Diagnosis:  HISTORY OF COLON POLYPS Z86.010     (+) Cologuard test    Post-Op Diagnosis: Same       Procedure(s):  COLONOSCOPY POLYPECTOMY SNARE/COLD BIOPSY    Surgeon(s):  Lulu Baker MD    Assistant:  * No surgical staff found *    Anesthesia: Monitor Anesthesia Care    Estimated Blood Loss (mL): Minimal    Complications: None    Specimens:   ID Type Source Tests Collected by Time Destination   A : hepatic flexure polyp x2 Tissue Colon SURGICAL PATHOLOGY Anita Hernandez RN 7/30/2021 1522    B : rectosigmoid colon polyp x2 Tissue Colon SURGICAL PATHOLOGY Anita Hernandez RN 7/30/2021 1538        Implants:  * No implants in log *      Drains: * No LDAs found *    Findings:  1) 2 cm flat lesion in hepatic flexure removed piecemeal with cold snare    2) 8 mm polyp in hepatic flexure removed with cold snare    3) Two 5 mm polyps in rectosigmoid area removed with cold snare. Rec:  1) Check pathology  2) Recall colonoscopy in 3 yrs. 3) Follow up with referring MD as usual  4) Follow up with me as needed. 5) Resume diet and meds today.     Electronically signed by Lulu Baker MD on 7/30/2021 at 3:55 PM

## 2021-07-30 NOTE — PROGRESS NOTES
Scope verified using 2 person system. Family in waiting room. Reviewed problem list, assessment, H&P, and checklist preoperatively.
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
## 2021-09-08 ENCOUNTER — OFFICE VISIT (OUTPATIENT)
Dept: INTERNAL MEDICINE CLINIC | Age: 46
End: 2021-09-08
Payer: COMMERCIAL

## 2021-09-08 VITALS
TEMPERATURE: 97.7 F | BODY MASS INDEX: 49.38 KG/M2 | SYSTOLIC BLOOD PRESSURE: 136 MMHG | HEART RATE: 84 BPM | DIASTOLIC BLOOD PRESSURE: 84 MMHG | OXYGEN SATURATION: 95 % | WEIGHT: 270 LBS

## 2021-09-08 DIAGNOSIS — E66.01 CLASS 2 SEVERE OBESITY DUE TO EXCESS CALORIES WITH SERIOUS COMORBIDITY IN ADULT, UNSPECIFIED BMI (HCC): Primary | Chronic | ICD-10-CM

## 2021-09-08 DIAGNOSIS — R32 INCONTINENCE IN FEMALE: ICD-10-CM

## 2021-09-08 DIAGNOSIS — F32.1 CURRENT MODERATE EPISODE OF MAJOR DEPRESSIVE DISORDER, UNSPECIFIED WHETHER RECURRENT (HCC): ICD-10-CM

## 2021-09-08 DIAGNOSIS — E78.2 MIXED HYPERLIPIDEMIA: ICD-10-CM

## 2021-09-08 DIAGNOSIS — E55.9 VITAMIN D DEFICIENCY: ICD-10-CM

## 2021-09-08 DIAGNOSIS — J30.0 CHRONIC VASOMOTOR RHINITIS: ICD-10-CM

## 2021-09-08 DIAGNOSIS — I10 ESSENTIAL HYPERTENSION: ICD-10-CM

## 2021-09-08 PROCEDURE — 99214 OFFICE O/P EST MOD 30 MIN: CPT | Performed by: NURSE PRACTITIONER

## 2021-09-08 RX ORDER — FLUOXETINE HYDROCHLORIDE 20 MG/1
20 CAPSULE ORAL DAILY
Qty: 90 CAPSULE | Refills: 0 | Status: SHIPPED | OUTPATIENT
Start: 2021-09-08 | End: 2022-01-06

## 2021-09-08 RX ORDER — TRIAMCINOLONE ACETONIDE 0.25 MG/G
OINTMENT TOPICAL PRN
Qty: 80 G | Refills: 1 | Status: SHIPPED | OUTPATIENT
Start: 2021-09-08

## 2021-09-08 RX ORDER — OXCARBAZEPINE 600 MG/1
TABLET, FILM COATED ORAL
Qty: 180 TABLET | Refills: 0 | Status: SHIPPED | OUTPATIENT
Start: 2021-09-08 | End: 2021-12-08

## 2021-09-08 RX ORDER — OMEPRAZOLE 20 MG/1
CAPSULE, DELAYED RELEASE ORAL
Qty: 120 CAPSULE | Refills: 0 | Status: SHIPPED | OUTPATIENT
Start: 2021-09-08 | End: 2022-01-21 | Stop reason: SDUPTHER

## 2021-09-08 RX ORDER — OXYBUTYNIN CHLORIDE 10 MG/1
TABLET, EXTENDED RELEASE ORAL
Qty: 90 TABLET | Refills: 0 | Status: SHIPPED | OUTPATIENT
Start: 2021-09-08 | End: 2022-01-21 | Stop reason: SDUPTHER

## 2021-09-08 RX ORDER — FLUTICASONE PROPIONATE 50 MCG
1 SPRAY, SUSPENSION (ML) NASAL DAILY
Qty: 9.9 G | Refills: 1 | Status: SHIPPED | OUTPATIENT
Start: 2021-09-08 | End: 2022-07-06

## 2021-09-08 RX ORDER — ERGOCALCIFEROL 1.25 MG/1
CAPSULE ORAL
Qty: 12 CAPSULE | Refills: 0 | Status: SHIPPED | OUTPATIENT
Start: 2021-09-08 | End: 2021-12-08 | Stop reason: SDUPTHER

## 2021-09-08 RX ORDER — FUROSEMIDE 40 MG/1
40 TABLET ORAL DAILY
Qty: 60 TABLET | Refills: 3 | Status: SHIPPED | OUTPATIENT
Start: 2021-09-08 | End: 2022-01-21 | Stop reason: SDUPTHER

## 2021-09-08 RX ORDER — SIMVASTATIN 20 MG
TABLET ORAL
Qty: 90 TABLET | Refills: 0 | Status: SHIPPED | OUTPATIENT
Start: 2021-09-08 | End: 2021-12-08 | Stop reason: SDUPTHER

## 2021-09-08 RX ORDER — RISPERIDONE 4 MG/1
TABLET, FILM COATED ORAL
Qty: 90 TABLET | Refills: 0 | Status: SHIPPED | OUTPATIENT
Start: 2021-09-08 | End: 2022-01-19

## 2021-09-08 RX ORDER — LISINOPRIL 10 MG/1
TABLET ORAL
Qty: 90 TABLET | Refills: 0 | Status: SHIPPED | OUTPATIENT
Start: 2021-09-08 | End: 2021-12-08 | Stop reason: SDUPTHER

## 2021-09-08 ASSESSMENT — ENCOUNTER SYMPTOMS
ALLERGIC/IMMUNOLOGIC NEGATIVE: 1
EYES NEGATIVE: 1
GASTROINTESTINAL NEGATIVE: 1
RESPIRATORY NEGATIVE: 1

## 2021-09-08 ASSESSMENT — PATIENT HEALTH QUESTIONNAIRE - PHQ9
2. FEELING DOWN, DEPRESSED OR HOPELESS: 1
SUM OF ALL RESPONSES TO PHQ QUESTIONS 1-9: 2
SUM OF ALL RESPONSES TO PHQ QUESTIONS 1-9: 2
1. LITTLE INTEREST OR PLEASURE IN DOING THINGS: 1
SUM OF ALL RESPONSES TO PHQ QUESTIONS 1-9: 2
SUM OF ALL RESPONSES TO PHQ9 QUESTIONS 1 & 2: 2

## 2021-09-08 NOTE — PATIENT INSTRUCTIONS
Watch portions when eating  Drink plenty of water  Walk consistently in house or outside for 25 minutes 3 times per week  Measure your belly button once per week with a tape measure

## 2021-09-08 NOTE — PROGRESS NOTES
Elliot Patricia (:  1975) is a 51 y.o. female,Established patient, here for evaluation of the following chief complaint(s):  Depression (F/U)         ASSESSMENT/PLAN:  1. Class 2 severe obesity due to excess calories with serious comorbidity in adult, unspecified BMI ([facility] [address]  Assessment & Plan:   Uncontrolled, continue current medications and lifestyle modifications recommended  2. Chronic vasomotor rhinitis  -     fluticasone (FLONASE) 50 MCG/ACT nasal spray; 1 spray by Each Nostril route daily, Disp-9.9 g, R-1Normal  3. Vitamin D deficiency  -     vitamin D (ERGOCALCIFEROL) 1.25 MG (33411 UT) CAPS capsule; Take 1 capsule by mouth once a week, Disp-12 capsule, R-0Normal  4. Essential hypertension  -     lisinopril (PRINIVIL;ZESTRIL) 10 MG tablet; Take 1 tablet by mouth once daily, Disp-90 tablet, R-0Normal  5. Incontinence in female  -     oxybutynin (DITROPAN-XL) 10 MG extended release tablet; Take 1 tablet by mouth once daily, Disp-90 tablet, R-0Normal  6. Mixed hyperlipidemia  -     simvastatin (ZOCOR) 20 MG tablet; One tab every day, Disp-90 tablet, R-0Normal  7. Current moderate episode of major depressive disorder, unspecified whether recurrent (HCC)  -     FLUoxetine (PROZAC) 20 MG capsule; Take 1 capsule by mouth daily, Disp-90 capsule, R-0Normal    Watch portions when eating  Drink plenty of water  Walk consistently in house or outside for 25 minutes 3 times per week  Measure your belly button once per week with a tape measure      Subjective   SUBJECTIVE/OBJECTIVE:  HPI Patient presents for follow-up of depression. Patient states she has stopped eating meat and is making healthier snack options. Patient admits she is not consistently exercising because she does not always feel like getting dressed. Review of Systems   HENT: Negative. Eyes: Negative. Respiratory: Negative. Cardiovascular: Negative. Gastrointestinal: Negative. Endocrine: Negative. Genitourinary: Negative. Musculoskeletal: Positive for arthralgias. Allergic/Immunologic: Negative. Neurological: Negative. Hematological: Negative. Psychiatric/Behavioral: The patient is nervous/anxious. Vitals:    09/08/21 0923   BP: 136/84   Pulse: 84   Temp: 97.7 °F (36.5 °C)   SpO2: 95%     BP Readings from Last 3 Encounters:   09/08/21 136/84   07/30/21 (!) 157/87   07/30/21 (!) 140/56     Wt Readings from Last 3 Encounters:   09/08/21 270 lb (122.5 kg)   07/30/21 266 lb (120.7 kg)   07/14/21 271 lb (122.9 kg)       Objective   Physical Exam  Vitals reviewed. Constitutional:       General: She is awake. Appearance: Normal appearance. She is well-developed and well-groomed. She is obese. HENT:      Head: Normocephalic and atraumatic. Right Ear: Hearing normal.      Left Ear: Hearing normal.   Cardiovascular:      Rate and Rhythm: Normal rate and regular rhythm. Heart sounds: Normal heart sounds. Pulmonary:      Effort: Pulmonary effort is normal.      Breath sounds: Normal breath sounds and air entry. Skin:     General: Skin is warm and dry. Neurological:      Mental Status: She is alert. Psychiatric:         Attention and Perception: Attention normal.         Mood and Affect: Mood is anxious and depressed. Affect is flat. Speech: Speech normal.         Behavior: Behavior is cooperative. Cognition and Memory: Cognition normal.      Comments: CATY 7=11  PHQ 9= 4                  An electronic signature was used to authenticate this note.     --Kitty Valdez, HARRISON - CNP

## 2021-11-03 DIAGNOSIS — F32.3 CURRENT SEVERE EPISODE OF MAJOR DEPRESSIVE DISORDER WITH PSYCHOTIC FEATURES WITHOUT PRIOR EPISODE (HCC): ICD-10-CM

## 2021-11-03 DIAGNOSIS — Z13.31 POSITIVE DEPRESSION SCREENING: ICD-10-CM

## 2021-12-06 ENCOUNTER — TELEPHONE (OUTPATIENT)
Dept: INTERNAL MEDICINE CLINIC | Age: 46
End: 2021-12-06

## 2021-12-08 ENCOUNTER — HOSPITAL ENCOUNTER (OUTPATIENT)
Dept: GENERAL RADIOLOGY | Age: 46
Discharge: HOME | End: 2021-12-08
Payer: COMMERCIAL

## 2021-12-08 ENCOUNTER — HOSPITAL ENCOUNTER (OUTPATIENT)
Age: 46
Discharge: HOME | End: 2021-12-08
Payer: COMMERCIAL

## 2021-12-08 ENCOUNTER — OFFICE VISIT (OUTPATIENT)
Dept: INTERNAL MEDICINE CLINIC | Age: 46
End: 2021-12-08
Payer: COMMERCIAL

## 2021-12-08 VITALS
BODY MASS INDEX: 49.33 KG/M2 | SYSTOLIC BLOOD PRESSURE: 118 MMHG | HEART RATE: 95 BPM | OXYGEN SATURATION: 97 % | DIASTOLIC BLOOD PRESSURE: 78 MMHG | WEIGHT: 278.4 LBS | HEIGHT: 63 IN

## 2021-12-08 DIAGNOSIS — M25.562 ACUTE PAIN OF LEFT KNEE: ICD-10-CM

## 2021-12-08 DIAGNOSIS — Z23 NEED FOR INFLUENZA VACCINATION: ICD-10-CM

## 2021-12-08 DIAGNOSIS — E78.2 MIXED HYPERLIPIDEMIA: ICD-10-CM

## 2021-12-08 DIAGNOSIS — E66.01 CLASS 2 SEVERE OBESITY DUE TO EXCESS CALORIES WITH SERIOUS COMORBIDITY IN ADULT, UNSPECIFIED BMI (HCC): ICD-10-CM

## 2021-12-08 DIAGNOSIS — E55.9 VITAMIN D DEFICIENCY: ICD-10-CM

## 2021-12-08 DIAGNOSIS — I10 ESSENTIAL HYPERTENSION: Primary | ICD-10-CM

## 2021-12-08 DIAGNOSIS — F32.1 MODERATE MAJOR DEPRESSION, SINGLE EPISODE (HCC): ICD-10-CM

## 2021-12-08 DIAGNOSIS — Z13.31 POSITIVE DEPRESSION SCREENING: ICD-10-CM

## 2021-12-08 PROCEDURE — 73560 X-RAY EXAM OF KNEE 1 OR 2: CPT

## 2021-12-08 PROCEDURE — 99214 OFFICE O/P EST MOD 30 MIN: CPT | Performed by: NURSE PRACTITIONER

## 2021-12-08 PROCEDURE — G8431 POS CLIN DEPRES SCRN F/U DOC: HCPCS | Performed by: NURSE PRACTITIONER

## 2021-12-08 PROCEDURE — 90471 IMMUNIZATION ADMIN: CPT | Performed by: NURSE PRACTITIONER

## 2021-12-08 PROCEDURE — 90674 CCIIV4 VAC NO PRSV 0.5 ML IM: CPT | Performed by: NURSE PRACTITIONER

## 2021-12-08 RX ORDER — LISINOPRIL 10 MG/1
TABLET ORAL
Qty: 90 TABLET | Refills: 0 | Status: SHIPPED | OUTPATIENT
Start: 2021-12-08 | End: 2022-01-21 | Stop reason: SDUPTHER

## 2021-12-08 RX ORDER — SIMVASTATIN 20 MG
TABLET ORAL
Qty: 90 TABLET | Refills: 0 | Status: SHIPPED | OUTPATIENT
Start: 2021-12-08 | End: 2022-01-21 | Stop reason: SDUPTHER

## 2021-12-08 RX ORDER — ERGOCALCIFEROL 1.25 MG/1
CAPSULE ORAL
Qty: 12 CAPSULE | Refills: 0 | Status: SHIPPED | OUTPATIENT
Start: 2021-12-08 | End: 2022-01-21 | Stop reason: SDUPTHER

## 2021-12-08 RX ORDER — OXCARBAZEPINE 600 MG/1
TABLET, FILM COATED ORAL
Qty: 180 TABLET | Refills: 0 | Status: SHIPPED | OUTPATIENT
Start: 2021-12-08 | End: 2022-03-09 | Stop reason: SDUPTHER

## 2021-12-08 ASSESSMENT — PATIENT HEALTH QUESTIONNAIRE - PHQ9
7. TROUBLE CONCENTRATING ON THINGS, SUCH AS READING THE NEWSPAPER OR WATCHING TELEVISION: 0
3. TROUBLE FALLING OR STAYING ASLEEP: 0
SUM OF ALL RESPONSES TO PHQ QUESTIONS 1-9: 10
4. FEELING TIRED OR HAVING LITTLE ENERGY: 1
9. THOUGHTS THAT YOU WOULD BE BETTER OFF DEAD, OR OF HURTING YOURSELF: 0
6. FEELING BAD ABOUT YOURSELF - OR THAT YOU ARE A FAILURE OR HAVE LET YOURSELF OR YOUR FAMILY DOWN: 2
8. MOVING OR SPEAKING SO SLOWLY THAT OTHER PEOPLE COULD HAVE NOTICED. OR THE OPPOSITE, BEING SO FIGETY OR RESTLESS THAT YOU HAVE BEEN MOVING AROUND A LOT MORE THAN USUAL: 1
SUM OF ALL RESPONSES TO PHQ QUESTIONS 1-9: 10
2. FEELING DOWN, DEPRESSED OR HOPELESS: 2
1. LITTLE INTEREST OR PLEASURE IN DOING THINGS: 1
SUM OF ALL RESPONSES TO PHQ9 QUESTIONS 1 & 2: 3
SUM OF ALL RESPONSES TO PHQ QUESTIONS 1-9: 10
5. POOR APPETITE OR OVEREATING: 3

## 2021-12-08 ASSESSMENT — ENCOUNTER SYMPTOMS
RESPIRATORY NEGATIVE: 1
EYES NEGATIVE: 1
GASTROINTESTINAL NEGATIVE: 1

## 2021-12-08 NOTE — PROGRESS NOTES
Jyoti Godinez (:  1975) is a 51 y.o. female,Established patient, here for evaluation of the following chief complaint(s): Anxiety, Depression, and Leg Pain (left, shooting pain from hip to knee with any and all movement, started \"couple months ago\". )         ASSESSMENT/PLAN:  1. Need for influenza vaccination  -     INFLUENZA, MDCK QUADV, 2 YRS AND OLDER, IM, PF, PREFILL SYR OR SDV, 0.5ML (FLUCELVAX QUADV, PF)  2. Vitamin D deficiency  -     vitamin D (ERGOCALCIFEROL) 1.25 MG (06533 UT) CAPS capsule; Take 1 capsule by mouth once a week, Disp-12 capsule, R-0Normal  3. Mixed hyperlipidemia  -     simvastatin (ZOCOR) 20 MG tablet; One tab every day, Disp-90 tablet, R-0Normal  4. Essential hypertension  -     lisinopril (PRINIVIL;ZESTRIL) 10 MG tablet; Take 1 tablet by mouth once daily, Disp-90 tablet, R-0Normal  5. Acute pain of left knee  -     XR KNEE LEFT (1-2 VIEWS); Future  6. Class 2 severe obesity due to excess calories with serious comorbidity in adult, unspecified BMI ([facility] [address]      Return in about 3 months (around 3/8/2022) for obesity. Subjective   SUBJECTIVE/OBJECTIVE:  HPI Complsinin g of bad leg epain for the last 2 months, getting worse and worse. States she can barely walk    Review of Systems   Constitutional: Positive for fatigue. HENT: Negative. Eyes: Negative. Respiratory: Negative. Cardiovascular: Negative. Gastrointestinal: Negative. Genitourinary: Negative. Musculoskeletal: Positive for arthralgias and gait problem. Hematological: Negative. Psychiatric/Behavioral: The patient is nervous/anxious.           Vitals:    21 1054   BP: 118/78   Pulse: 95   SpO2: 97%     BP Readings from Last 3 Encounters:   21 118/78   21 136/84   21 (!) 157/87     Wt Readings from Last 3 Encounters:   21 278 lb 6.4 oz (126.3 kg)   21 270 lb (122.5 kg)   21 266 lb (120.7 kg)     Objective   Physical Exam  Constitutional: Appearance: Normal appearance. She is obese. Cardiovascular:      Rate and Rhythm: Normal rate and regular rhythm. Pulmonary:      Effort: Pulmonary effort is normal.      Breath sounds: Normal breath sounds and air entry. Comments: Last visit, states that she would attempt to stop smoking by #1 cleaning although smoke from her house and has smoked in the car as well. These measures were not carried out  Musculoskeletal:        Legs:    Skin:     Findings: Erythema and rash present. Rash is crusting and urticarial.             Comments: Eczema attic rash noted on all extremities, has not attempted to apply ointment to them to prevent dehydration or urticaria   Neurological:      Mental Status: She is alert. Psychiatric:         Attention and Perception: Attention normal.         Mood and Affect: Mood is depressed. Speech: Speech normal.         Thought Content: Thought content normal.      Comments: PHQ-9 numbers has fallen, but still not where they need to be                  An electronic signature was used to authenticate this note. --Allegra Whitman, APRN - CNP On the basis of positive PHQ-9 screening (PHQ-9 Total Score: 10), the following plan was implemented: additional evaluation and assessment performed. Patient will follow-up in 3 month(s) with PCP.

## 2021-12-08 NOTE — TELEPHONE ENCOUNTER
Requested Prescriptions     Pending Prescriptions Disp Refills    OXcarbazepine (TRILEPTAL) 600 MG tablet [Pharmacy Med Name: Oxcarbazepine 600mg Tablet] 180 tablet 0     Sig: Take 1 tablet by mouth twice daily. Last ov:  Today   Next ov: 12/20/2021   Last labs: 06/23/2021

## 2021-12-08 NOTE — PATIENT INSTRUCTIONS
Need to stop smoking in the house and car  Clean home of all cigarette smoke   Continue to drink plenty of water  Start exercising, from house to street twice a day  Apply heating pad to left knee and take an alleve afterwards  Be careful of portion sizes of food  Apply kenalog to eczema rash

## 2022-01-06 ENCOUNTER — TELEPHONE (OUTPATIENT)
Dept: INTERNAL MEDICINE CLINIC | Age: 47
End: 2022-01-06

## 2022-01-06 DIAGNOSIS — F32.1 CURRENT MODERATE EPISODE OF MAJOR DEPRESSIVE DISORDER, UNSPECIFIED WHETHER RECURRENT (HCC): ICD-10-CM

## 2022-01-06 RX ORDER — FLUOXETINE HYDROCHLORIDE 20 MG/1
CAPSULE ORAL
Qty: 90 CAPSULE | Refills: 0 | Status: SHIPPED | OUTPATIENT
Start: 2022-01-06 | End: 2022-01-21 | Stop reason: SDUPTHER

## 2022-01-06 NOTE — TELEPHONE ENCOUNTER
Recent Visits  Date Type Provider Dept   12/08/21 Office Visit Abel Rangel, APRN - CNP [facility]   09/08/21 Office Visit Abel Rangel, APRN - CNP [facility]   07/08/21 Office Visit Abel Rangel, APRN - CNP [facility]   04/21/21 Office Visit Abel Rangel, APRN - CNP [facility]   03/10/21 Office Visit Abel Rangel, APRN - CNP [facility]   02/01/21 Office Visit Elizbeth Gaucher, MD [facility]   12/10/20 Office Visit Abel Rangel, APRN - CNP [facility]   Showing recent visits within past 540 days with a meds authorizing provider and meeting all other requirements  Future Appointments  Date Type Provider Dept   03/09/22 Appointment Abel Rangel, APRN - CNP [facility]   Showing future appointments within next 150 days with a meds authorizing provider and meeting all other requirements     12/8/2021

## 2022-01-17 ENCOUNTER — TELEPHONE (OUTPATIENT)
Dept: INTERNAL MEDICINE CLINIC | Age: 47
End: 2022-01-17

## 2022-01-17 NOTE — TELEPHONE ENCOUNTER
Informed Pt that she has to get a covid test before she can be seen for her sinus issues to rule that out and also for her to come in office for FMLA paperwork and has to be a negative covid test gave Pt covid test sites and told her she can also go to urgent care for test

## 2022-01-17 NOTE — TELEPHONE ENCOUNTER
----- Message from Rachel Brooks sent at 1/17/2022 10:35 AM EST -----  Subject: Appointment Request    Reason for Call: Routine (Patient Request) No Script    QUESTIONS  Type of Appointment? Established Patient  Reason for appointment request? No appointments available during search  Additional Information for Provider? Pt is calling to set up an appt for   in office for FMLA (for chronic pt would not advise) and head congestion &   nose bleeds. Pt refused vv due to fmla.   ---------------------------------------------------------------------------  --------------  CALL BACK INFO  What is the best way for the office to contact you? OK to leave message on   voicemail, OK to respond with electronic message via [employer] portal (only   for patients who have registered [employer] account)  Preferred Call Back Phone Number? [phone]  ---------------------------------------------------------------------------  --------------  SCRIPT ANSWERS  Relationship to Patient? Self  (Is the patient requesting to see the provider for a procedure?)? No  (Is the patient requesting to see the provider urgently  today or   tomorrow. )? No  Have you been diagnosed with, awaiting test results for, or told that you   are suspected of having COVID-19 (Coronavirus)? (If patient has tested   negative or was tested as a requirement for work, school, or travel and   not based on symptoms, answer no)? No  Within the past two weeks have you developed any of the following symptoms   (answer no if symptoms have been present longer than 2 weeks or began   more than 2 weeks ago)? Fever or Chills, Cough, Shortness of breath or   difficulty breathing, Loss of taste or smell, Sore throat, Nasal   congestion, Sneezing or runny nose, Fatigue or generalized body aches   (answer no if pain is specific to a body part e.g. back pain), Diarrhea,   Headache?  Yes

## 2022-01-19 RX ORDER — RISPERIDONE 4 MG/1
TABLET, FILM COATED ORAL
Qty: 90 TABLET | Refills: 0 | Status: SHIPPED | OUTPATIENT
Start: 2022-01-19 | End: 2022-03-09 | Stop reason: SDUPTHER

## 2022-01-20 ENCOUNTER — TELEPHONE (OUTPATIENT)
Dept: INTERNAL MEDICINE CLINIC | Age: 47
End: 2022-01-20

## 2022-01-20 NOTE — TELEPHONE ENCOUNTER
----- Message from Ashwini Vargas sent at 1/19/2022  5:19 PM EST -----  Subject: Appointment Request    Reason for Call: Routine (Patient Request) No Script    QUESTIONS  Type of Appointment? Established Patient  Reason for appointment request? No appointments available during search  Additional Information for Provider? patient has had nose bleed for seven   days and has negative COVID and would like to be seen soon sees provider   Germán Kumar, would like in person visit   ---------------------------------------------------------------------------  --------------  [address]  What is the best way for the office to contact you? OK to leave message on   voicemail  Preferred Call Back Phone Number? [phone]  ---------------------------------------------------------------------------  --------------  SCRIPT ANSWERS  Relationship to Patient? Self  (Is the patient requesting to see the provider for a procedure?)? No  (Is the patient requesting to see the provider urgently  today or   tomorrow. )? No  Have you been diagnosed with, awaiting test results for, or told that you   are suspected of having COVID-19 (Coronavirus)? (If patient has tested   negative or was tested as a requirement for work, school, or travel and   not based on symptoms, answer no)? No  Within the past two weeks have you developed any of the following symptoms   (answer no if symptoms have been present longer than 2 weeks or began   more than 2 weeks ago)? Fever or Chills, Cough, Shortness of breath or   difficulty breathing, Loss of taste or smell, Sore throat, Nasal   congestion, Sneezing or runny nose, Fatigue or generalized body aches   (answer no if pain is specific to a body part e.g. back pain), Diarrhea,   Headache? No  Have you had close contact with someone with COVID-19 in the last 14 days? No  (Service Expert  click yes below to proceed with [employer] As Usual   Scheduling)?  Yes

## 2022-01-20 NOTE — TELEPHONE ENCOUNTER
Patients  brought COVID results into office and was very belligerent with the staff. He was cursing and stating his wife needed to have an appt ASAP and this office was ridiculous for making him late to work [facility] just so she could get an appt. COVID results received and appt scheduled per Nurse Vaibhav Jackson.

## 2022-01-21 ENCOUNTER — OFFICE VISIT (OUTPATIENT)
Dept: INTERNAL MEDICINE CLINIC | Age: 47
End: 2022-01-21
Payer: COMMERCIAL

## 2022-01-21 VITALS
TEMPERATURE: 97.6 F | WEIGHT: 283 LBS | BODY MASS INDEX: 50.14 KG/M2 | HEIGHT: 63 IN | SYSTOLIC BLOOD PRESSURE: 118 MMHG | OXYGEN SATURATION: 99 % | DIASTOLIC BLOOD PRESSURE: 78 MMHG | HEART RATE: 90 BPM

## 2022-01-21 DIAGNOSIS — R32 INCONTINENCE IN FEMALE: ICD-10-CM

## 2022-01-21 DIAGNOSIS — E55.9 VITAMIN D DEFICIENCY: ICD-10-CM

## 2022-01-21 DIAGNOSIS — Z13.31 POSITIVE DEPRESSION SCREENING: ICD-10-CM

## 2022-01-21 DIAGNOSIS — E78.2 MIXED HYPERLIPIDEMIA: ICD-10-CM

## 2022-01-21 DIAGNOSIS — I10 ESSENTIAL HYPERTENSION: ICD-10-CM

## 2022-01-21 DIAGNOSIS — F32.3 CURRENT SEVERE EPISODE OF MAJOR DEPRESSIVE DISORDER WITH PSYCHOTIC FEATURES WITHOUT PRIOR EPISODE (HCC): ICD-10-CM

## 2022-01-21 DIAGNOSIS — R04.0 EPISTAXIS: Primary | ICD-10-CM

## 2022-01-21 PROCEDURE — 99214 OFFICE O/P EST MOD 30 MIN: CPT | Performed by: NURSE PRACTITIONER

## 2022-01-21 RX ORDER — ERGOCALCIFEROL 1.25 MG/1
CAPSULE ORAL
Qty: 12 CAPSULE | Refills: 0 | Status: SHIPPED | OUTPATIENT
Start: 2022-01-21 | End: 2022-03-09 | Stop reason: SDUPTHER

## 2022-01-21 RX ORDER — LISINOPRIL 10 MG/1
TABLET ORAL
Qty: 90 TABLET | Refills: 0 | Status: SHIPPED | OUTPATIENT
Start: 2022-01-21 | End: 2022-03-09 | Stop reason: SDUPTHER

## 2022-01-21 RX ORDER — FLUOXETINE HYDROCHLORIDE 40 MG/1
CAPSULE ORAL
Qty: 90 CAPSULE | Refills: 1 | Status: SHIPPED | OUTPATIENT
Start: 2022-01-21 | End: 2022-03-09 | Stop reason: SDUPTHER

## 2022-01-21 RX ORDER — FUROSEMIDE 40 MG/1
40 TABLET ORAL DAILY
Qty: 60 TABLET | Refills: 3 | Status: SHIPPED | OUTPATIENT
Start: 2022-01-21 | End: 2022-03-09 | Stop reason: SDUPTHER

## 2022-01-21 RX ORDER — OXYBUTYNIN CHLORIDE 10 MG/1
TABLET, EXTENDED RELEASE ORAL
Qty: 90 TABLET | Refills: 0 | Status: SHIPPED | OUTPATIENT
Start: 2022-01-21 | End: 2022-03-09 | Stop reason: SDUPTHER

## 2022-01-21 RX ORDER — ARIPIPRAZOLE 5 MG/1
5 TABLET ORAL DAILY
Qty: 30 TABLET | Refills: 3 | Status: SHIPPED | OUTPATIENT
Start: 2022-01-21 | End: 2022-03-09 | Stop reason: SDUPTHER

## 2022-01-21 RX ORDER — OMEPRAZOLE 20 MG/1
CAPSULE, DELAYED RELEASE ORAL
Qty: 120 CAPSULE | Refills: 0 | Status: SHIPPED | OUTPATIENT
Start: 2022-01-21 | End: 2022-03-09 | Stop reason: SDUPTHER

## 2022-01-21 RX ORDER — SIMVASTATIN 20 MG
TABLET ORAL
Qty: 90 TABLET | Refills: 0 | Status: SHIPPED | OUTPATIENT
Start: 2022-01-21 | End: 2022-03-09 | Stop reason: SDUPTHER

## 2022-01-21 ASSESSMENT — ENCOUNTER SYMPTOMS
RESPIRATORY NEGATIVE: 1
EYES NEGATIVE: 1
ALLERGIC/IMMUNOLOGIC NEGATIVE: 1

## 2022-01-21 ASSESSMENT — PATIENT HEALTH QUESTIONNAIRE - PHQ9
SUM OF ALL RESPONSES TO PHQ9 QUESTIONS 1 & 2: 5
3. TROUBLE FALLING OR STAYING ASLEEP: 0
SUM OF ALL RESPONSES TO PHQ QUESTIONS 1-9: 15
6. FEELING BAD ABOUT YOURSELF - OR THAT YOU ARE A FAILURE OR HAVE LET YOURSELF OR YOUR FAMILY DOWN: 3
SUM OF ALL RESPONSES TO PHQ QUESTIONS 1-9: 15
5. POOR APPETITE OR OVEREATING: 1
7. TROUBLE CONCENTRATING ON THINGS, SUCH AS READING THE NEWSPAPER OR WATCHING TELEVISION: 3
9. THOUGHTS THAT YOU WOULD BE BETTER OFF DEAD, OR OF HURTING YOURSELF: 0
1. LITTLE INTEREST OR PLEASURE IN DOING THINGS: 3
8. MOVING OR SPEAKING SO SLOWLY THAT OTHER PEOPLE COULD HAVE NOTICED. OR THE OPPOSITE, BEING SO FIGETY OR RESTLESS THAT YOU HAVE BEEN MOVING AROUND A LOT MORE THAN USUAL: 0
SUM OF ALL RESPONSES TO PHQ QUESTIONS 1-9: 15
10. IF YOU CHECKED OFF ANY PROBLEMS, HOW DIFFICULT HAVE THESE PROBLEMS MADE IT FOR YOU TO DO YOUR WORK, TAKE CARE OF THINGS AT HOME, OR GET ALONG WITH OTHER PEOPLE: 2
2. FEELING DOWN, DEPRESSED OR HOPELESS: 2
SUM OF ALL RESPONSES TO PHQ QUESTIONS 1-9: 15
4. FEELING TIRED OR HAVING LITTLE ENERGY: 3

## 2022-01-21 NOTE — PROGRESS NOTES
Keya Acharya (:  1975) is a 51 y.o. female,Established patient, here for evaluation of the following chief complaint(s):  Congestion, Sinus Problem, Headache, and Forms (FMLA)         ASSESSMENT/PLAN:  1. Vitamin D deficiency  The following orders have not been finalized:  -     vitamin D (ERGOCALCIFEROL) 1.25 MG (39673 UT) CAPS capsule  2. Mixed hyperlipidemia  The following orders have not been finalized:  -     simvastatin (ZOCOR) 20 MG tablet  3. Essential hypertension  The following orders have not been finalized:  -     lisinopril (PRINIVIL;ZESTRIL) 10 MG tablet  4. Positive depression screening  The following orders have not been finalized:  -     sertraline (ZOLOFT) 50 MG tablet  5. Current severe episode of major depressive disorder with psychotic features without prior episode ([facility] [address]  The following orders have not been finalized:  -     sertraline (ZOLOFT) 50 MG tablet  6. Incontinence in female  The following orders have not been finalized:  -     oxybutynin (DITROPAN-XL) 10 MG extended release tablet    Veronica was seen today for congestion, sinus problem, headache and forms. Diagnoses and all orders for this visit:    Epistaxis  -     [address], Leonardo Maharaj, , Otolaryngology, [facility]    Vitamin D deficiency  -     vitamin D (ERGOCALCIFEROL) 1.25 MG (03810 UT) CAPS capsule; Take 1 capsule by mouth once a week    Mixed hyperlipidemia  -     simvastatin (ZOCOR) 20 MG tablet; One tab every day    Essential hypertension  -     lisinopril (PRINIVIL;ZESTRIL) 10 MG tablet; Take 1 tablet by mouth once daily    Positive depression screening  -     Discontinue: sertraline (ZOLOFT) 50 MG tablet; Take 1 tablet by mouth once daily    Current severe episode of major depressive disorder with psychotic features without prior episode ([facility])  -     Discontinue: sertraline (ZOLOFT) 50 MG tablet;  Take 1 tablet by mouth once daily  -     Ambulatory referral to Psychology    Incontinence in female  - oxybutynin (DITROPAN-XL) 10 MG extended release tablet; Take 1 tablet by mouth once daily    Major depressive disorder with single episode, in full remission ([facility] [address]    Current moderate episode of major depressive disorder, unspecified whether recurrent (HCC)  -     FLUoxetine (PROZAC) 40 MG capsule; Take 1 capsule by mouth once daily    Other orders  -     omeprazole (PRILOSEC) 20 MG delayed release capsule; qd  -     furosemide (LASIX) 40 MG tablet; Take 1 tablet by mouth daily  -     ARIPiprazole (ABILIFY) 5 MG tablet; Take 1 tablet by mouth daily               Subjective   SUBJECTIVE/OBJECTIVE:  HPI  Presents today for nosebleeds for the past 9 days. Usually bleeds upon awakening  Coughing and outside on the deck. Previous nosebleeds a year ago. Also experiencing increased depression symptoms  Review of Systems   Constitutional: Positive for fatigue. HENT: Positive for nosebleeds. Eyes: Negative. Respiratory: Negative. Endocrine: Negative. Genitourinary: Negative. Musculoskeletal: Negative. Skin: Negative. Allergic/Immunologic: Negative. Neurological: Negative. Hematological: Negative. Psychiatric/Behavioral: The patient is nervous/anxious. Vitals:    01/21/22 1026   BP: 118/78   Pulse: 90   Temp: 97.6 °F (36.4 °C)   SpO2: 99%     BP Readings from Last 3 Encounters:   01/21/22 118/78   12/08/21 118/78   09/08/21 136/84     Wt Readings from Last 3 Encounters:   01/21/22 283 lb (128.4 kg)   12/08/21 278 lb 6.4 oz (126.3 kg)   09/08/21 270 lb (122.5 kg)     Objective   Physical Exam  Constitutional:       Appearance: Normal appearance. She is obese. HENT:      Head: Normocephalic. Nose:      Right Nostril: Septal hematoma present. Right Sinus: No maxillary sinus tenderness or frontal sinus tenderness. Left Sinus: No maxillary sinus tenderness or frontal sinus tenderness. Cardiovascular:      Rate and Rhythm: Normal rate and regular rhythm.       Heart sounds: Normal heart sounds. Pulmonary:      Effort: Pulmonary effort is normal.      Breath sounds: Normal breath sounds and air entry. Skin:     General: Skin is warm and dry. Neurological:      Mental Status: She is alert. Psychiatric:         Mood and Affect: Mood is anxious and depressed. Affect is tearful. Speech: Speech normal.         Thought Content: Thought content normal.      Comments: Lacks motivation to do things besides work. Work often times feels lightheaded and dizzy goes to bed for few hours and then resumes work. Has not started walking or exercising, states she is too tired. An electronic signature was used to authenticate this note. --HARRISON Gaines CNP   PHQ-9 score today: (PHQ-9 Total Score: 15), additional evaluation and assessment performed, follow-up plan includes but not limited to: Medication management and Referral to /Specialist  for evaluation and management.

## 2022-01-21 NOTE — PATIENT INSTRUCTIONS
Place a humidifier in the office  Do not tilt head back when bleeding occurs  Continue  To drink plenty of water  Stop smoking  Do not lie down until time to go to bed

## 2022-01-21 NOTE — LETTER
[address] [address]  Phone: [phone]  Fax: ElijahDuc Rigo 6, APRN - CNP        January 21, 2022     Patient: Keya Acharya   YOB: 1975   Date of Visit: 1/21/2022       To Whom It May Concern:    Please excuse Gurvinder Palacios for hours missed due to illness on Sunday 16, Tuesday, 18 for an acute illness. She is late reporting to work today, 21 for health evaluation. If you have any questions or concerns, please don't hesitate to call.     Sincerely,        Gianluca Farley, APRN - CNP

## 2022-01-21 NOTE — TELEPHONE ENCOUNTER
Patient would like to know if something was going to be called in for her sinus concerns that were discussed at there visit this morning.

## 2022-02-02 ENCOUNTER — VIRTUAL VISIT (OUTPATIENT)
Dept: INTERNAL MEDICINE CLINIC | Age: 47
End: 2022-02-02
Payer: COMMERCIAL

## 2022-02-02 ENCOUNTER — OFFICE VISIT (OUTPATIENT)
Dept: PSYCHOLOGY | Age: 47
End: 2022-02-02
Payer: COMMERCIAL

## 2022-02-02 DIAGNOSIS — F33.9 EPISODE OF RECURRENT MAJOR DEPRESSIVE DISORDER, UNSPECIFIED DEPRESSION EPISODE SEVERITY (HCC): Primary | Chronic | ICD-10-CM

## 2022-02-02 DIAGNOSIS — Z13.31 POSITIVE DEPRESSION SCREENING: ICD-10-CM

## 2022-02-02 DIAGNOSIS — J30.89 ENVIRONMENTAL AND SEASONAL ALLERGIES: Primary | ICD-10-CM

## 2022-02-02 DIAGNOSIS — F32.1 MODERATE MAJOR DEPRESSION, SINGLE EPISODE (HCC): ICD-10-CM

## 2022-02-02 PROCEDURE — 90791 PSYCH DIAGNOSTIC EVALUATION: CPT | Performed by: PSYCHOLOGIST

## 2022-02-02 PROCEDURE — 99214 OFFICE O/P EST MOD 30 MIN: CPT | Performed by: NURSE PRACTITIONER

## 2022-02-02 RX ORDER — LEVOCETIRIZINE DIHYDROCHLORIDE 5 MG/1
5 TABLET, FILM COATED ORAL NIGHTLY
Qty: 30 TABLET | Refills: 1 | Status: SHIPPED | OUTPATIENT
Start: 2022-02-02 | End: 2022-05-03

## 2022-02-02 ASSESSMENT — ENCOUNTER SYMPTOMS
GASTROINTESTINAL NEGATIVE: 1
RESPIRATORY NEGATIVE: 1
ALLERGIC/IMMUNOLOGIC NEGATIVE: 1
RHINORRHEA: 1
EYES NEGATIVE: 1

## 2022-02-02 ASSESSMENT — PATIENT HEALTH QUESTIONNAIRE - PHQ9
SUM OF ALL RESPONSES TO PHQ QUESTIONS 1-9: 13
SUM OF ALL RESPONSES TO PHQ9 QUESTIONS 1 & 2: 6
SUM OF ALL RESPONSES TO PHQ QUESTIONS 1-9: 13
SUM OF ALL RESPONSES TO PHQ QUESTIONS 1-9: 13
3. TROUBLE FALLING OR STAYING ASLEEP: 1
9. THOUGHTS THAT YOU WOULD BE BETTER OFF DEAD, OR OF HURTING YOURSELF: 0
8. MOVING OR SPEAKING SO SLOWLY THAT OTHER PEOPLE COULD HAVE NOTICED. OR THE OPPOSITE, BEING SO FIGETY OR RESTLESS THAT YOU HAVE BEEN MOVING AROUND A LOT MORE THAN USUAL: 0
2. FEELING DOWN, DEPRESSED OR HOPELESS: 3
6. FEELING BAD ABOUT YOURSELF - OR THAT YOU ARE A FAILURE OR HAVE LET YOURSELF OR YOUR FAMILY DOWN: 3
10. IF YOU CHECKED OFF ANY PROBLEMS, HOW DIFFICULT HAVE THESE PROBLEMS MADE IT FOR YOU TO DO YOUR WORK, TAKE CARE OF THINGS AT HOME, OR GET ALONG WITH OTHER PEOPLE: 3
7. TROUBLE CONCENTRATING ON THINGS, SUCH AS READING THE NEWSPAPER OR WATCHING TELEVISION: 1
1. LITTLE INTEREST OR PLEASURE IN DOING THINGS: 3
SUM OF ALL RESPONSES TO PHQ QUESTIONS 1-9: 13
5. POOR APPETITE OR OVEREATING: 0
4. FEELING TIRED OR HAVING LITTLE ENERGY: 2
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
## 2022-02-02 NOTE — LETTER
[address]  Phone: [phone]  Fax: ElijahDuc Rigo 6, APRN - CNP        February 2, 2022     Patient: Mendez Meyers   YOB: 1975   Date of Visit: 2/2/2022       To Whom It May Concern:    Please excuse  Palmer Heriberto for illness on the following dates: 1/27/1/28,1/30,1/31 and 12/1/2022. She was ill and may return to work without restrictions. If you have any questions or concerns, please don't hesitate to call.     Sincerely,        Bib Powers, HARRISON - CNP

## 2022-02-02 NOTE — PATIENT INSTRUCTIONS
1) Look over the list of values. Choose the 5 most important to you. Bring this in next time. A Quick Look at Your Values   Values are your hearts deepest desires for how you want to behave as a human being. Values are not about what you want to get or achieve; they are about how you want to behave or act on an ongoing basis. There are literally hundreds of different values, but below youll find a list of the most common ones. Probably, not all of them will be relevant to you. Keep in mind there are no such things as right values or wrong values. Its a bit like our taste in pizzas. If you prefer ham and pineapple but I prefer salami and olives, that doesnt mean that my taste in pizzas is right and yours is wrong. It just means we have different tastes. And similarly, we may have different values. So read through the list below and write a letter next to each value: V = Very  important, Q = Quite important, and N = Not so important; and make sure to score at least ten of them as Very important. 1. Acceptance: to be open to and accepting of myself, others, life etc  2. Adventure: to be adventurous; to actively seek, create, or explore novel or stimulating  experiences  3. Assertiveness: to respectfully stand up for my rights and request what I want  4. Authenticity: to be authentic, genuine, real; to be true to myself  5. Beauty: to appreciate, create, nurture or cultivate beauty in myself, others, the  environment etc  6. Caring: to be caring towards myself, others, the environment etc  7. Challenge: to keep challenging myself to grow, learn, improve  8. Compassion: to act with kindness towards those who are suffering  9. Connection: to engage fully in whatever I am doing, and be fully present with others  10. Contribution: to contribute, help, assist, or make a positive difference to myself or  others  11. Conformity: to be respectful and obedient of rules and obligations  12.  Cooperation: to be cooperative and collaborative with others  15. Courage: to be courageous or brave; to persist in the face of fear, threat, or difficulty  14. Creativity: to be creative or innovative  15. Curiosity: to be curious, open-minded and interested; to explore and discover  16. Encouragement: to encourage and reward behaviour that I value in myself or others  17. [place]: to treat others as equal to myself, and vice-versa  18. Excitement: to seek, create and engage in activities that are exciting, stimulating or  thrilling  19. Fairness: to be fair to myself or others  20. Fitness: to maintain or improve my fitness; to look after my physical and mental  health and wellbeing  21. Flexibility: to adjust and adapt readily to changing circumstances  22. Freedom: to live freely; to choose how I live and behave, or help others do likewise  23. Friendliness: to be friendly, companionable, or agreeable towards others  24. Forgiveness: to be forgiving towards myself or others  25. Fun: to be fun-loving; to seek, create, and engage in fun-filled activities  26. Generosity: to be generous, sharing and giving, to myself or others  27. Gratitude: to be grateful for and appreciative of the positive aspects of myself, others  and life  28. Honesty: to be honest, truthful, and sincere with myself and others  29. Humour: to see and appreciate the humorous side of life  30. Humility: to be humble or modest; to let my achievements speak for themselves  31. Industry: to be industrious, hard-working, dedicated  28. [place]: to be self-supportive, and choose my own way of doing things  33. Intimacy: to open up, reveal, and share myself -- emotionally or physically - in my  close personal relationships  29. Justice: to uphold justice and fairness  35. Kindness: to be kind, compassionate, considerate, nurturing or caring towards myself  or others  39. Love: to act lovingly or affectionately towards myself or others  40.  Mindfulness: to be conscious of, open to, and curious about my here-and-now  experience  45. Order: to be orderly and organized  44. Open-mindedness: to think things through, see things from others points of view, and  weigh evidence fairly. 36. Patience: to wait calmly for what I want  41. Persistence: to continue resolutely, despite problems or difficulties. 42. Pleasure: to create and give pleasure to myself or others  43. Power: to strongly influence or wield authority over others, e.g. taking charge,  leading, organizing  44. Reciprocity: to build relationships in which there is a fair balance of giving and taking  45. Respect: to be respectful towards myself or others; to be polite, considerate and show  positive regard  46. Responsibility: to be responsible and accountable for my actions  47. Romance: to be romantic; to display and express love or strong affection  50. Safety: to secure, protect, or ensure safety of myself or others  49. Self-awareness: to be aware of my own thoughts, feelings and actions  50. Self-care: to look after my health and wellbeing, and get my needs met  51. Self-development: to keep growing, advancing or improving in knowledge, skills,  character, or life experience. 52. Self-control: to act in accordance with my own ideals  48. Sensuality: to create, explore and enjoy experiences that stimulate the five senses  54. Sexuality: to explore or express my sexuality  54. Spirituality: to connect with things bigger than myself  56. Skilfulness: to continually practice and improve my skills, and apply myself fully  when using them  57. Supportiveness: to be supportive, helpful, encouraging, and available to myself or  others  62. Trust: to be trustworthy; to be loyal, faithful, sincere, and reliable  59. Insert your own unlisted value here:  60. Insert your own unlisted value here:      From: [email or web address]_[email or web address] pdf

## 2022-02-15 ENCOUNTER — OFFICE VISIT (OUTPATIENT)
Dept: ENT CLINIC | Age: 47
End: 2022-02-15
Payer: COMMERCIAL

## 2022-02-15 VITALS — DIASTOLIC BLOOD PRESSURE: 81 MMHG | TEMPERATURE: 97.5 F | SYSTOLIC BLOOD PRESSURE: 139 MMHG | HEART RATE: 87 BPM

## 2022-02-15 DIAGNOSIS — J30.9 ALLERGIC RHINITIS, UNSPECIFIED SEASONALITY, UNSPECIFIED TRIGGER: ICD-10-CM

## 2022-02-15 DIAGNOSIS — J34.89 NASAL MUCOSA DRY: ICD-10-CM

## 2022-02-15 DIAGNOSIS — J01.90 ACUTE SINUSITIS, RECURRENCE NOT SPECIFIED, UNSPECIFIED LOCATION: Primary | ICD-10-CM

## 2022-02-15 DIAGNOSIS — J34.3 HYPERTROPHY OF BOTH INFERIOR NASAL TURBINATES: ICD-10-CM

## 2022-02-15 DIAGNOSIS — R04.0 EPISTAXIS: ICD-10-CM

## 2022-02-15 PROCEDURE — 99214 OFFICE O/P EST MOD 30 MIN: CPT | Performed by: STUDENT IN AN ORGANIZED HEALTH CARE EDUCATION/TRAINING PROGRAM

## 2022-02-15 RX ORDER — PREDNISONE 10 MG/1
TABLET ORAL
Qty: 21 TABLET | Refills: 0 | Status: SHIPPED | OUTPATIENT
Start: 2022-02-15 | End: 2022-06-27

## 2022-02-15 RX ORDER — AMOXICILLIN AND CLAVULANATE POTASSIUM 875; 125 MG/1; MG/1
1 TABLET, FILM COATED ORAL 2 TIMES DAILY
Qty: 20 TABLET | Refills: 0 | Status: SHIPPED | OUTPATIENT
Start: 2022-02-15 | End: 2022-02-25
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
## 2022-02-15 NOTE — PATIENT INSTRUCTIONS
DR. Livia Clark NOSEBLEED INSTRUCTIONS:     Obtain nasal saline spray over the counter. Spray nasal saline spray or gel multiple times a day (up to 5 times throughout the day) in each nostril to help with nasal mucosal moisturization for the next 3 weeks. You can get this over the counter. You cannot over use this!  Place antibiotic ointment (I.e. polysporin, triple antiobiotic ointment, bactroban) to inside of both nostrils and along the nasal septum twice daily for next 14 days then as needed for moisturization.  Consider humidification machine in the bedroom to help with nasal moisturization   Avoid nasal trauma nose picking, harsh nasal blowing, rubbing nose after blowing! If you need to blow your nose blow very gently and do not harshly wipe nose afterwards.  In case of another nosebleed: First spray AFRIN (can obtain over the counter) in affected nostril, then saturate a cottonball with Afrin and place it on the affected side and then placing unrelenting digital pressure for at least 15 minutes. After this take the cottonball allowed and reinspected. If still bleeding please repeat. If Bleeding does not stop after 1 hour or you lose a large amount of blood go to emergency department.

## 2022-02-16 ENCOUNTER — OFFICE VISIT (OUTPATIENT)
Dept: PSYCHOLOGY | Age: 47
End: 2022-02-16
Payer: COMMERCIAL

## 2022-02-16 DIAGNOSIS — F33.1 MODERATE EPISODE OF RECURRENT MAJOR DEPRESSIVE DISORDER (HCC): Primary | ICD-10-CM

## 2022-02-16 PROCEDURE — 90832 PSYTX W PT 30 MINUTES: CPT | Performed by: PSYCHOLOGIST

## 2022-02-16 NOTE — PATIENT INSTRUCTIONS
1) Look on [email or web address] com to find a few local groups that you could be interested in. Think about bird watching. Bring in the groups you found next time    2) Write a letter to [place] about your grief. Bring it in next time.

## 2022-02-16 NOTE — PROGRESS NOTES
Behavioral Health Consultation  Washington Wasserman, Ph.D.  Psychologist  2022   1:32 PM EST       Time spent with Patient: 30 minutes  [facility]. Reason for Consult:    Chief Complaint   Patient presents with    Depression     Referring Provider: No referring provider defined for this encounter. Pt provided informed consent for the behavioral health program. Discussed with patient model of service to include the limits of confidentiality (i.e. abuse reporting, suicide  intervention, etc.) and short-term intervention focused approach. Pt indicated understanding. Feedback given to PCP. S:  Pt seen per PCP re: depression    Patient seen for follow-up. Patient noted that she is still feeling physically unwell which has impacted her mood. Patient completed her values exercise and identified values as below. Patient described barriers to valued living. Patient noted that she used to be very extroverted but that the depression has stopped her from engaging socially with others. Patient also stated that her best friends . Patient stated that she is not sure she was able to fully processed the grief.   Focused visit on problem focused coping to valued living and set related plan    [place] =4   Honesty = 9  Humility=7  Kindness=  Trust        O:  MSE:    Appearance: good hygiene   Attitude: cooperative and friendly  Consciousness: alert  Orientation: oriented to person, place, time, general circumstance  Memory: recent and remote memory intact  Attention/Concentration: intact during session  Psychomotor Activity:normal  Eye Contact: normal  Speech: normal rate and volume, well-articulated  Mood: Depressed  Affect: congruent  Perception: within normal limits  Thought Content: within normal limits  Thought Process: logical, coherent  Insight: good  Judgment: intact  Ability to understand instructions: Yes  Morbid Ideation: no   Suicide Assessment: no suicidal ideation, plan, or intent  Homicidal Ideation: no     History:    Social History:   Social History     Socioeconomic History    Marital status:      Spouse name: Not on file    Number of children: 0    Years of education: Not on file    Highest education level: Not on file   Occupational History    Occupation:    Tobacco Use    Smoking status: Current Every Day Smoker     Packs/day: 1.50     Years: 31.00     Pack years: 46.50     Types: Cigarettes     Start date: 1987    Smokeless tobacco: Never Used   Vaping Use    Vaping Use: Never used   Substance and Sexual Activity    Alcohol use: Not Currently    Drug use: No    Sexual activity: Not Currently     Partners: Male   Other Topics Concern    Not on file   Social History Narrative    Not on file     Social Determinants of Health     Financial Resource Strain: Low Risk     Difficulty of Paying Living Expenses: Not hard at all   Food Insecurity: No Food Insecurity    Worried About [address] in the Last Year: Never true    [address] [facility] [employer] in the Last Year: Never true   Transportation Needs:     Lack of Transportation (Medical): Not on file    Lack of Transportation (Non-Medical):  Not on file   Physical Activity:     Days of Exercise per Week: Not on file    Minutes of Exercise per Session: Not on file   Stress:     Feeling of Stress : Not on file   Social Connections:     Frequency of Communication with Friends and Family: Not on file    Frequency of Social Gatherings with Friends and Family: Not on file    Attends Sabianist Services: Not on file    Active Member of Clubs or Organizations: Not on file    Attends Club or Organization Meetings: Not on file    Marital Status: Not on file   Intimate Partner Violence:     Fear of Current or Ex-Partner: Not on file    Emotionally Abused: Not on file    Physically Abused: Not on file    Sexually Abused: Not on file   Housing Stability:     Unable to Pay for Housing in the Last Year: Not on file    Number of Places Lived in the Last Year: Not on file    Unstable Housing in the Last Year: Not on file     TOBACCO:   reports that she has been smoking cigarettes. She started smoking about 35 years ago. She has a 46.50 pack-year smoking history. She has never used smokeless tobacco.  ETOH:   reports previous alcohol use. A:    PHQ Scores 2/2/2022 1/21/2022 12/8/2021 9/8/2021 7/8/2021 3/10/2021 2/1/2021   PHQ2 Score 6 5 3 2 4 6 2   PHQ9 Score 13 15 10 2 10 14 2     Interpretation of Total Score Depression Severity: 1-4 = Minimal depression, 5-9 = Mild depression, 10-14 = Moderate depression, 15-19 = Moderately severe depression, 20-27 = Severe depression    No flowsheet data found. Interpretation of CATY-7 score: 5-9 = mild anxiety, 10-14 = moderate anxiety, 15+ = severe anxiety. Recommend referral to behavioral health for scores 10 or greater. Diagnosis:    1. Moderate episode of recurrent major depressive disorder (HCC)          Plan:    Patient Instructions   1) Look on [email or web address] com to find a few local groups that you could be interested in. Think about bird watching. Bring in the groups you found next time    2) [facility] your [facility]. Bring it in next time. Pt interventions:  See above    No follow-ups on file. Documentation was done using voice recognition dragon software. Every effort was made to ensure accuracy; however, inadvertent, unintentional computerized transcription errors may be present.

## 2022-03-02 ENCOUNTER — OFFICE VISIT (OUTPATIENT)
Dept: PSYCHOLOGY | Age: 47
End: 2022-03-02
Payer: COMMERCIAL

## 2022-03-02 DIAGNOSIS — F33.1 MODERATE EPISODE OF RECURRENT MAJOR DEPRESSIVE DISORDER (HCC): Primary | Chronic | ICD-10-CM

## 2022-03-02 PROCEDURE — 90832 PSYTX W PT 30 MINUTES: CPT | Performed by: PSYCHOLOGIST

## 2022-03-02 NOTE — PROGRESS NOTES
Behavioral Health Consultation  Arnulfo Roberson, Ph.D.  Psychologist  3/2/2022   1:32 PM EST       Time spent with Patient: 30 minutes      Reason for Consult:    Chief Complaint   Patient presents with    Depression     Referring Provider: No referring provider defined for this encounter. Pt provided informed consent for the behavioral health program. Discussed with patient model of service to include the limits of confidentiality (i.e. abuse reporting, suicide  intervention, etc.) and short-term intervention focused approach. Pt indicated understanding. Feedback given to PCP. S:  Pt seen per PCP re: depression    Patient seen for follow-up. Pt noted that she is feeling better and feels like she is coming out of a \"cave of darkness\". Pt started a new position at work. Pt stated that the stress of the new position has increased her smoking. Pt identified some groups that she is interested in joining. Pt did not write the letter to [facility] yet. Pt noted that she wants to catch up on organization.  Focused intervention bx activation, problem focused coping, restructuring         O:  MSE:    Appearance: good hygiene   Attitude: cooperative and friendly  Consciousness: alert  Orientation: oriented to person, place, time, general circumstance  Memory: recent and remote memory intact  Attention/Concentration: intact during session  Psychomotor Activity:normal  Eye Contact: normal  Speech: normal rate and volume, well-articulated  Mood: euthymic  Affect: congruent  Perception: within normal limits  Thought Content: within normal limits  Thought Process: logical, coherent  Insight: good  Judgment: intact  Ability to understand instructions: Yes  Morbid Ideation: no   Suicide Assessment: no suicidal ideation, plan, or intent  Homicidal Ideation: no     History:    Social History:   Social History     Socioeconomic History    Marital status:      Spouse name: Not on file    Number of children: 0    Years of education: Not on file    Highest education level: Not on file   Occupational History    Occupation:    Tobacco Use    Smoking status: Current Every Day Smoker     Packs/day: 1.50     Years: 31.00     Pack years: 46.50     Types: Cigarettes     Start date: 25    Smokeless tobacco: Never Used   Vaping Use    Vaping Use: Never used   Substance and Sexual Activity    Alcohol use: Not Currently    Drug use: No    Sexual activity: Not Currently     Partners: Male   Other Topics Concern    Not on file   Social History Narrative    Not on file     Social Determinants of Health     Financial Resource Strain: Low Risk     Difficulty of Paying Living Expenses: Not hard at all   Food Insecurity: No Food Insecurity    Worried About [address] [employer] in the Last Year: Never true    [address] [employer] in the Last Year: Never true   Transportation Needs:     Lack of Transportation (Medical): Not on file    Lack of Transportation (Non-Medical): Not on file   Physical Activity:     Days of Exercise per Week: Not on file    Minutes of Exercise per Session: Not on file   Stress:     Feeling of Stress : Not on file   Social Connections:     Frequency of Communication with Friends and Family: Not on file    Frequency of Social Gatherings with Friends and Family: Not on file    Attends Pentecostal Services: Not on file    Active Member of [address] [place] [address] [employer] or Organizations: Not on file    Attends Club or Organization Meetings: Not on file    Marital Status: Not on file   Intimate Partner Violence:     Fear of Current or Ex-Partner: Not on file    Emotionally Abused: Not on file    Physically Abused: Not on file    Sexually Abused: Not on file   Housing Stability:     Unable to Pay for Housing in the Last Year: Not on file    Number of [place] in the Last Year: Not on file    Unstable Housing in the Last Year: Not on file     TOBACCO:   reports that she has been smoking cigarettes.  She started smoking about 35 years ago. She has a 46.50 pack-year smoking history. She has never used smokeless tobacco.  ETOH:   reports previous alcohol use. A:    PHQ Scores 2/2/2022 1/21/2022 12/8/2021 9/8/2021 7/8/2021 3/10/2021 2/1/2021   PHQ2 Score 6 5 3 2 4 6 2   PHQ9 Score 13 15 10 2 10 14 2     Interpretation of Total Score Depression Severity: 1-4 = Minimal depression, 5-9 = Mild depression, 10-14 = Moderate depression, 15-19 = Moderately severe depression, 20-27 = Severe depression    No flowsheet data found. Interpretation of CATY-7 score: 5-9 = mild anxiety, 10-14 = moderate anxiety, 15+ = severe anxiety. Recommend referral to behavioral health for scores 10 or greater. Diagnosis:    1. Moderate episode of recurrent major depressive disorder ([facility] [address] [address]          Plan:    Patient Instructions   1) Set a timer on your phone and label it \"organization\". Each day spend 15 minutes towards organizing. Make a check list for a room and once that room is complete go to the next one      Organize papers into files/folder  Bills due  Bills paid  Insurance papers  Misc      Cleaning  Tobacco cleaned off desk   Floor swept     2) Take a picture of your desk today and a picture of it on the day you come to see me next        Pt interventions:  See above    No follow-ups on file. Documentation was done using voice recognition dragon software. Every effort was made to ensure accuracy; however, inadvertent, unintentional computerized transcription errors may be present.

## 2022-03-09 ENCOUNTER — OFFICE VISIT (OUTPATIENT)
Dept: INTERNAL MEDICINE CLINIC | Age: 47
End: 2022-03-09
Payer: COMMERCIAL

## 2022-03-09 VITALS
WEIGHT: 279 LBS | SYSTOLIC BLOOD PRESSURE: 124 MMHG | BODY MASS INDEX: 49.43 KG/M2 | OXYGEN SATURATION: 98 % | DIASTOLIC BLOOD PRESSURE: 80 MMHG | HEIGHT: 63 IN | TEMPERATURE: 97.6 F | HEART RATE: 82 BPM

## 2022-03-09 DIAGNOSIS — E55.9 VITAMIN D DEFICIENCY: ICD-10-CM

## 2022-03-09 DIAGNOSIS — I10 ESSENTIAL HYPERTENSION: ICD-10-CM

## 2022-03-09 DIAGNOSIS — E78.2 MIXED HYPERLIPIDEMIA: ICD-10-CM

## 2022-03-09 DIAGNOSIS — E66.01 CLASS 2 SEVERE OBESITY DUE TO EXCESS CALORIES WITH SERIOUS COMORBIDITY IN ADULT, UNSPECIFIED BMI (HCC): ICD-10-CM

## 2022-03-09 DIAGNOSIS — Z00.00 ENCOUNTER FOR WELL ADULT EXAM WITHOUT ABNORMAL FINDINGS: Primary | ICD-10-CM

## 2022-03-09 DIAGNOSIS — R32 INCONTINENCE IN FEMALE: ICD-10-CM

## 2022-03-09 DIAGNOSIS — J30.89 ENVIRONMENTAL AND SEASONAL ALLERGIES: ICD-10-CM

## 2022-03-09 PROCEDURE — 99396 PREV VISIT EST AGE 40-64: CPT | Performed by: NURSE PRACTITIONER

## 2022-03-09 RX ORDER — ARIPIPRAZOLE 5 MG/1
5 TABLET ORAL DAILY
Qty: 30 TABLET | Refills: 3 | Status: SHIPPED | OUTPATIENT
Start: 2022-03-09 | End: 2022-08-23 | Stop reason: SDUPTHER

## 2022-03-09 RX ORDER — OMEPRAZOLE 20 MG/1
CAPSULE, DELAYED RELEASE ORAL
Qty: 120 CAPSULE | Refills: 0 | Status: SHIPPED | OUTPATIENT
Start: 2022-03-09 | End: 2022-08-23 | Stop reason: SDUPTHER

## 2022-03-09 RX ORDER — SIMVASTATIN 20 MG
TABLET ORAL
Qty: 90 TABLET | Refills: 0 | Status: SHIPPED | OUTPATIENT
Start: 2022-03-09 | End: 2022-08-23 | Stop reason: SDUPTHER

## 2022-03-09 RX ORDER — RISPERIDONE 4 MG/1
TABLET, FILM COATED ORAL
Qty: 90 TABLET | Refills: 0 | Status: SHIPPED | OUTPATIENT
Start: 2022-03-09 | End: 2022-07-18

## 2022-03-09 RX ORDER — CETIRIZINE HYDROCHLORIDE 10 MG/1
10 TABLET ORAL DAILY
Qty: 90 TABLET | Refills: 1 | Status: SHIPPED | OUTPATIENT
Start: 2022-03-09 | End: 2022-08-23 | Stop reason: SDUPTHER

## 2022-03-09 RX ORDER — OXCARBAZEPINE 600 MG/1
TABLET, FILM COATED ORAL
Qty: 180 TABLET | Refills: 0 | Status: SHIPPED | OUTPATIENT
Start: 2022-03-09 | End: 2022-04-21

## 2022-03-09 RX ORDER — OXYBUTYNIN CHLORIDE 10 MG/1
TABLET, EXTENDED RELEASE ORAL
Qty: 90 TABLET | Refills: 0 | Status: SHIPPED | OUTPATIENT
Start: 2022-03-09 | End: 2022-05-03

## 2022-03-09 RX ORDER — FUROSEMIDE 40 MG/1
40 TABLET ORAL DAILY
Qty: 60 TABLET | Refills: 3 | Status: SHIPPED | OUTPATIENT
Start: 2022-03-09 | End: 2022-08-16

## 2022-03-09 RX ORDER — FLUOXETINE HYDROCHLORIDE 40 MG/1
CAPSULE ORAL
Qty: 90 CAPSULE | Refills: 1 | Status: SHIPPED | OUTPATIENT
Start: 2022-03-09 | End: 2022-03-09 | Stop reason: SDUPTHER

## 2022-03-09 RX ORDER — LISINOPRIL 10 MG/1
TABLET ORAL
Qty: 90 TABLET | Refills: 0 | Status: SHIPPED | OUTPATIENT
Start: 2022-03-09 | End: 2022-08-23 | Stop reason: SDUPTHER

## 2022-03-09 RX ORDER — FLUOXETINE HYDROCHLORIDE 40 MG/1
CAPSULE ORAL
Qty: 90 CAPSULE | Refills: 1 | Status: SHIPPED | OUTPATIENT
Start: 2022-03-09

## 2022-03-09 RX ORDER — ERGOCALCIFEROL 1.25 MG/1
CAPSULE ORAL
Qty: 12 CAPSULE | Refills: 0 | Status: SHIPPED | OUTPATIENT
Start: 2022-03-09 | End: 2022-08-23 | Stop reason: SDUPTHER

## 2022-03-09 ASSESSMENT — PATIENT HEALTH QUESTIONNAIRE - PHQ9
SUM OF ALL RESPONSES TO PHQ QUESTIONS 1-9: 1
1. LITTLE INTEREST OR PLEASURE IN DOING THINGS: 0
SUM OF ALL RESPONSES TO PHQ QUESTIONS 1-9: 1
SUM OF ALL RESPONSES TO PHQ9 QUESTIONS 1 & 2: 1
2. FEELING DOWN, DEPRESSED OR HOPELESS: 1

## 2022-03-09 ASSESSMENT — ENCOUNTER SYMPTOMS
EYES NEGATIVE: 1
ALLERGIC/IMMUNOLOGIC NEGATIVE: 1

## 2022-03-09 NOTE — PATIENT INSTRUCTIONS
Start exercising at least walk in the back yard  Continue with present medication  Take certizine every morning  Continue to monitor food portions       Eating Healthy Foods: Care Instructions  Your Care Instructions     Eating healthy foods can help lower your risk for disease. Healthy food gives you energy and keeps your heart strong, your brain active, your muscles working, and your bones strong. A healthy diet includes a variety of foods from the basic food groups: grains, vegetables, fruits, milk and milk products, and meat and beans. Some people may eat more of their favorite foods from only one food group and, as a result, miss getting the nutrients they need. So, it is important to pay attention not only to what you eat but also to what you are missing from your diet. You can eat a healthy, balanced diet by making a few small changes. Follow-up care is a key part of your treatment and safety. Be sure to make and go to all appointments, and call your doctor if you are having problems. It's also a good idea to know your test results and keep a list of the medicines you take. How can you care for yourself at home? Look at what you eat  · Keep a food diary for a week or two and record everything you eat or drink. Track the number of servings you eat from each food group. · For a balanced diet every day, eat a variety of:  ? 6 or more ounce-equivalents of grains, such as cereals, breads, crackers, rice, or pasta, every day. An ounce-equivalent is 1 slice of bread, 1 cup of ready-to-eat cereal, or ½ cup of cooked rice, cooked pasta, or cooked cereal.  ? 2½ cups of vegetables, especially:  § Dark-green vegetables such as broccoli and spinach. § Orange vegetables such as carrots and sweet potatoes. § Dry beans (such as dietz and kidney beans) and peas (such as lentils). ? 2 cups of fresh, frozen, or canned fruit. A small apple or 1 banana or orange equals 1 cup.   ? 3 cups of nonfat or low-fat milk, yogurt, or other milk products. ? 5½ ounces of meat and beans, such as chicken, fish, lean meat, beans, nuts, and seeds. One egg, 1 tablespoon of peanut butter, ½ ounce nuts or seeds, or ¼ cup of cooked beans equals 1 ounce of meat. · Learn how to read food labels for serving sizes and ingredients. Fast-food and convenience-food meals often contain few or no fruits or vegetables. Make sure you eat some fruits and vegetables to make the meal more nutritious. · Look at your food diary. For each food group, add up what you have eaten and then divide the total by the number of days. This will give you an idea of how much you are eating from each food group. See if you can find some ways to change your diet to make it more healthy. Start small  · Do not try to make dramatic changes to your diet all at once. You might feel that you are missing out on your favorite foods and then be more likely to fail. · Start slowly, and gradually change your habits. Try some of the following:  ? Use whole wheat bread instead of white bread. ? Use nonfat or low-fat milk instead of whole milk. ? Eat brown rice instead of white rice, and eat whole wheat pasta instead of white-flour pasta. ? Try low-fat cheeses and low-fat yogurt. ? Add more fruits and vegetables to meals and have them for snacks. ? Add lettuce, tomato, cucumber, and onion to sandwiches. ? Add fruit to yogurt and cereal.  Enjoy food  · You can still eat your favorite foods. You just may need to eat less of them. If your favorite foods are high in fat, salt, and sugar, limit how often you eat them, but do not cut them out entirely. · Eat a wide variety of foods. Make healthy choices when eating out  · The type of restaurant you choose can help you make healthy choices. Even fast-food chains are now offering more low-fat or healthier choices on the menu. · Choose smaller portions, or take half of your meal home. · When eating out, try:  ?  A veggie pizza with a whole wheat crust or grilled chicken (instead of sausage or pepperoni). ? Pasta with roasted vegetables, grilled chicken, or marinara sauce instead of cream sauce. ? A vegetable wrap or grilled chicken wrap. ? Broiled or poached food instead of fried or breaded items. Make healthy choices easy  · Buy packaged, prewashed, ready-to-eat fresh vegetables and fruits, such as baby carrots, salad mixes, and chopped or shredded broccoli and cauliflower. · Buy packaged, presliced fruits, such as melon or pineapple. · Choose 100% fruit or vegetable juice instead of soda. Limit juice intake to 4 to 6 oz (½ to ¾ cup) a day. · Blend low-fat yogurt, fruit juice, and canned or frozen fruit to make a smoothie for breakfast or a snack. Where can you learn more? Go to https://Cash Check CardpewhereIstand.com.Pressglue. org and sign in to your [employer] account. Enter J681 in the [employer] box to learn more about \"Eating Healthy Foods: Care Instructions. \"     If you do not have an account, please click on the \"Sign Up Now\" link. Current as of: September 8, 2021               Content Version: 13.1  © 2006-2021 Healthwise, Incorporated. Care instructions adapted under license by [facility] ([place] [facility]). If you have questions about a medical condition or this instruction, always ask your healthcare professional. [place] [id number] any warranty or liability for your use of this information. Smoking Cessation  This document explains the best ways for you to quit smoking and new treatments to help. It lists new medicines that can double or triple your chances of quitting and quitting for good. It also considers ways to avoid relapses and concerns you may have about quitting, including weight gain. NICOTINE: A POWERFUL ADDICTION  If you have tried to quit smoking, you know how hard it can be. It is hard because nicotine is a very addictive drug. For some people, it can be as addictive as heroin or cocaine.  Usually, people make 2 or 3 tries, or more, before finally being able to quit. Each time you try to quit, you can learn about what helps and what hurts. Quitting takes hard work and a lot of effort, but you can quit smoking. QUITTING SMOKING IS ONE OF THE MOST IMPORTANT THINGS YOU WILL EVER DO.  · You will live longer, feel better, and live better. · The impact on your body of quitting smoking is felt almost immediately:  · Within 20 minutes, blood pressure decreases. Pulse returns to its normal level. · After 8 hours, carbon monoxide levels in the blood return to normal. Oxygen level increases. · After 24 hours, chance of heart attack starts to decrease. Breath, hair, and body stop smelling like smoke. · After 48 hours, damaged nerve endings begin to recover. Sense of taste and smell improve. · After 72 hours, the body is virtually free of nicotine. Bronchial tubes relax and breathing becomes easier. · After 2 to 12 weeks, lungs can hold more air. Exercise becomes easier and circulation improves. · Quitting will reduce your risk of having a heart attack, stroke, cancer, or lung disease:  · After 1 year, the risk of coronary heart disease is cut in half. · After 5 years, the risk of stroke falls to the same as a nonsmoker. · After 10 years, the risk of lung cancer is cut in half and the risk of other cancers decreases significantly. · After 15 years, the risk of coronary heart disease drops, usually to the level of a nonsmoker. · If you are pregnant, quitting smoking will improve your chances of having a healthy baby. · The people you live with, especially your children, will be healthier. · You will have extra money to spend on things other than cigarettes. FIVE KEYS TO QUITTING  Studies have shown that these 5 steps will help you quit smoking and quit for good. You have the best chances of quitting if you use them together:  3. Get ready. 4. Get support and encouragement. 5. Learn new skills and behaviors.   6. Get enjoyable to do every day. Reward yourself for not smoking. · Explore interactive web-based programs that specialize in helping you quit. 4. GET MEDICINE AND USE IT CORRECTLY  Medicines can help you stop smoking and decrease the urge to smoke. Combining medicine with the above behavioral methods and support can quadruple your chances of successfully quitting smoking. The [place] Food and Drug Administration (FDA) has approved 7 medicines to help you quit smoking. These medicines fall into 3 categories. · Nicotine replacement therapy (delivers nicotine to your body without the negative effects and risks of smoking):  · Nicotine gum: Available over-the-counter. · Nicotine lozenges: Available over-the-counter. · Nicotine inhaler: Available by prescription. · Nicotine nasal spray: Available by prescription. · Nicotine skin patches (transdermal): Available by prescription and over-the-counter. · Antidepressant medicine (helps people abstain from smoking, but how this works is unknown):  · Bupropion sustained-release (SR) tablets: Available by prescription. · Nicotinic receptor partial agonist (simulates the effect of nicotine in your brain): · Varenicline tartrate tablets: Available by prescription. · Ask your caregiver for advice about which medicines to use and how to use them. Carefully read the information on the package. · Everyone who is trying to quit may benefit from using a medicine. If you are pregnant or trying to become pregnant, nursing an infant, you are under age 25, or you smoke fewer than 10 cigarettes per day, talk to your caregiver before taking any nicotine replacement medicines. · You should stop using a nicotine replacement product and call your caregiver if you experience nausea, dizziness, weakness, vomiting, fast or irregular heartbeat, mouth problems with the lozenge or gum, or redness or swelling of the skin around the patch that does not go away.   · Do not use any other product containing nicotine while using a nicotine replacement product. · Talk to your caregiver before using these products if you have diabetes, heart disease, asthma, stomach ulcers, you had a recent heart attack, you have high blood pressure that is not controlled with medicine, a history of irregular heartbeat, or you have been prescribed medicine to help you quit smoking. 5. BE PREPARED FOR RELAPSE OR DIFFICULT SITUATIONS  · Most relapses occur within the first 3 months after quitting. Do not be discouraged if you start smoking again. Remember, most people try several times before they finally quit. · You may have symptoms of withdrawal because your body is used to nicotine. You may crave cigarettes, be irritable, feel very hungry, cough often, get headaches, or have difficulty concentrating. · The withdrawal symptoms are only temporary. They are strongest when you first quit, but they will go away within 10 to 14 days. Here are some difficult situations to watch for:  · Alcohol. Avoid drinking alcohol. Drinking lowers your chances of successfully quitting. · Caffeine. Try to reduce the amount of caffeine you consume. It also lowers your chances of successfully quitting. · Other smokers. Being around smoking can make you want to smoke. Avoid smokers. · Weight gain. Many smokers will gain weight when they quit, usually less than 10 pounds. Eat a healthy diet and stay active. Do not let weight gain distract you from your main goal, quitting smoking. Some medicines that help you quit smoking may also help delay weight gain. You can always lose the weight gained after you quit. · Bad mood or depression. There are a lot of ways to improve your mood other than smoking. If you are having problems with any of these situations, talk to your caregiver. SPECIAL SITUATIONS AND CONDITIONS  Studies suggest that everyone can quit smoking. Your situation or condition can give you a special reason to quit.   · Pregnant women/new mothers: By quitting, you protect your baby's health and your own. · Hospitalized patients: By quitting, you reduce health problems and help healing. · Heart attack patients: By quitting, you reduce your risk of a second heart attack. · Lung, head, and neck cancer patients: By quitting, you reduce your chance of a second cancer. · Parents of children and adolescents: By quitting, you protect your children from illnesses caused by secondhand smoke. QUESTIONS TO THINK ABOUT  Think about the following questions before you try to stop smoking. You may want to talk about your answers with your caregiver. · Why do you want to quit? · If you tried to quit in the past, what helped and what did not? · What will be the most difficult situations for you after you quit? How will you plan to handle them? · Who can help you through the tough times? Your family? Friends? Caregiver? · What pleasures do you get from smoking? What ways can you still get pleasure if you quit? Here are some questions to ask your caregiver:  · How can you help me to be successful at quitting? · What medicine do you think would be best for me and how should I take it? · What should I do if I need more help? · What is smoking withdrawal like? How can I get information on withdrawal?  Quitting takes hard work and a lot of effort, but you can quit smoking. FOR MORE INFORMATION   Smokefree. gov (PortableGrid.se) provides free, accurate, evidence-based information and professional assistance to help support the immediate and long-term needs of people trying to quit smoking. Document Released: 12/12/2002 Document Revised: 12/06/2012 Document Reviewed: 10/04/2010  SEAN BENJAMIN [facility] Patient Information ©2012 [place]. Advance Care Planning: Care Instructions  Your Care Instructions     It can be hard to live with an illness that cannot be cured.  But if your health is getting worse, you may want to make decisions about end-of-life care. Planning for the end of your life does not mean that you are giving up. It is a way to make sure that your wishes are met. Clearly stating your wishes can make it easier for your loved ones. Making plans while you are still able may also ease your mind and make your final days less stressful and more meaningful. Follow-up care is a key part of your treatment and safety. Be sure to make and go to all appointments, and call your doctor if you are having problems. It's also a good idea to know your test results and keep a list of the medicines you take. What can you do to plan for the end of life? · You can bring these issues up with your doctor. You do not need to wait until your doctor starts the conversation. You might start with, \"What makes life worth living for me is. Chan Caridad Chan Caridad \" And then follow it with, \"I would not be willing to live with . Chan Caridad Chan Caridad Chan Caridad \" When you complete this sentence it helps your doctor understand your wishes. · Talk openly and honestly with your doctor. This is the best way to understand the decisions you will need to make as your health changes. Know that you can always change your mind. · Ask your doctor about commonly used life-support measures. These include tube feedings, breathing machines, and fluids given through a vein (IV). Understanding these treatments will help you decide whether you want them. · You may choose to have these life-supporting treatments for a limited time. This allows a trial period to see whether they will help you. You may also decide that you want your doctor to take only certain measures to keep you alive. It may help to think about the big picture, like what makes life worth living for you or what your values and goals are. · Talk to your doctor about how long you are likely to live. Your doctor may be able to give you an idea of what usually happens with your specific illness. · Think about preparing papers that state your wishes.  These papers are called advance directives. If you do this early and review them often, there will not be any confusion about what you want. You can change your instructions at any time. Which papers should you prepare? Advance directives are legal papers that tell doctors how you want to be cared for at the end of your life. You do not need a  to write these papers. Ask your doctor or your state health department for information on how to write your advance directives. They may have the forms for each of these types of papers. Make sure your doctor has a copy of these on file, and give a copy to a family member or close friend. · Consider a do-not-resuscitate order (DNR). This order asks that no extra treatments be done if your heart stops or you stop breathing. Extra treatments may include cardiopulmonary resuscitation (CPR), electrical shock to restart your heart, or a machine to breathe for you. If you decide to have a DNR order, ask your doctor to explain and write it. Place the order in your home where everyone can easily see it. · Consider a living will. A living will explains your wishes about life support and other treatments at the end of your life if you become unable to speak for yourself. Living graham tell doctors to use or not use treatments that would keep you alive. You must have one or two witnesses or a notary present when you sign this form. A living will may be called something else in your state. · Consider a medical power of . This form allows you to name a person to make decisions about your care if you are not able to. Most people ask a close friend or family member. Talk to this person about the kinds of treatments you want and those that you do not want. Make sure this person understands your wishes. A medical power of  may be called something else in your state. These legal papers are simple to change.  Tell your doctor what you want to change, and ask him or her to make a note in your medical file. Give your family updated copies of the papers. Where can you learn more? Go to https://chpepiceweb.[employer] Networks. org and sign in to your [employer] account. Enter P184 in the [facility] box to learn more about \"Advance Care Planning: Care Instructions. \"     If you do not have an account, please click on the \"Sign Up Now\" link. Current as of: March 17, 2021               Content Version: 13.1  © 7055-7214 [employer]. Care instructions adapted under license by [facility] ([facility]). If you have questions about a medical condition or this instruction, always ask your healthcare professional. [address] any warranty or liability for your use of this information.
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
## 2022-03-10 LAB
ESTIMATED AVERAGE GLUCOSE: 122.6 MG/DL
HBA1C MFR BLD: 5.9 %

## 2022-03-23 ENCOUNTER — OFFICE VISIT (OUTPATIENT)
Dept: PSYCHOLOGY | Age: 47
End: 2022-03-23
Payer: COMMERCIAL

## 2022-03-23 DIAGNOSIS — F33.1 MODERATE EPISODE OF RECURRENT MAJOR DEPRESSIVE DISORDER (HCC): Primary | Chronic | ICD-10-CM

## 2022-03-23 PROCEDURE — 90832 PSYTX W PT 30 MINUTES: CPT | Performed by: PSYCHOLOGIST

## 2022-03-23 NOTE — PROGRESS NOTES
Behavioral Health Consultation  Galindo Ku, Ph.D.  Psychologist  3/23/2022   1:32 PM EST       Time spent with Patient: 30 minutes      Reason for Consult:    Chief Complaint   Patient presents with    Depression     Referring Provider: No referring provider defined for this encounter. Pt provided informed consent for the behavioral health program. Discussed with patient model of service to include the limits of confidentiality (i.e. abuse reporting, suicide  intervention, etc.) and short-term intervention focused approach. Pt indicated understanding. Feedback given to PCP. S:  Pt seen per PCP re: depression    Patient seen for follow-up. Patient rated depression at 3 out of 10. Patient noted that she has been going out more with friends which has been exciting. Patient noted that home continues to be a trigger as her  also struggles with depression and is not doing as well as her. Patient stated that she has not been organizing as she would like.   Focus visit on problem focused coping, MI, discussion of behavioral activation and set behavior change plan        O:  MSE:    Appearance: good hygiene   Attitude: cooperative and friendly  Consciousness: alert  Orientation: oriented to person, place, time, general circumstance  Memory: recent and remote memory intact  Attention/Concentration: intact during session  Psychomotor Activity:normal  Eye Contact: normal  Speech: normal rate and volume, well-articulated  Mood: euthymic  Affect: congruent  Perception: within normal limits  Thought Content: within normal limits  Thought Process: logical, coherent  Insight: good  Judgment: intact  Ability to understand instructions: Yes  Morbid Ideation: no   Suicide Assessment: no suicidal ideation, plan, or intent  Homicidal Ideation: no     History:    Social History:   Social History     Socioeconomic History    Marital status:      Spouse name: Not on file    Number of children: 0    Years of education: Not on file    Highest education level: Not on file   Occupational History    Occupation:    Tobacco Use    Smoking status: Current Every Day Smoker     Packs/day: 1.50     Years: 31.00     Pack years: 46.50     Types: Cigarettes     Start date: 25    Smokeless tobacco: Never Used   Vaping Use    Vaping Use: Never used   Substance and Sexual Activity    Alcohol use: Not Currently    Drug use: No    Sexual activity: Not Currently     Partners: Male   Other Topics Concern    Not on file   Social History Narrative    Not on file     Social Determinants of Health     Financial Resource Strain: Low Risk     Difficulty of Paying Living Expenses: Not hard at all   Food Insecurity: No Food Insecurity    Worried About [address] in the Last Year: Never true    [address] in the Last Year: Never true   Transportation Needs:     Lack of Transportation (Medical): Not on file    Lack of Transportation (Non-Medical): Not on file   Physical Activity:     Days of Exercise per Week: Not on file    Minutes of Exercise per Session: Not on file   Stress:     Feeling of Stress : Not on file   Social Connections:     Frequency of Communication with Friends and Family: Not on file    Frequency of Social Gatherings with Friends and Family: Not on file    Attends Mandaen Services: Not on file    Active Member of [address] [place] [address] [employer] or Organizations: Not on file    Attends Club or Organization Meetings: Not on file    Marital Status: Not on file   Intimate Partner Violence:     Fear of Current or Ex-Partner: Not on file    Emotionally Abused: Not on file    Physically Abused: Not on file    Sexually Abused: Not on file   Housing Stability:     Unable to Pay for Housing in the Last Year: Not on file    Number of [place] in the Last Year: Not on file    Unstable Housing in the Last Year: Not on file     TOBACCO:   reports that she has been smoking cigarettes.  She started smoking about 35 years ago. She has a 46.50 pack-year smoking history. She has never used smokeless tobacco.  ETOH:   reports previous alcohol use. A:    PHQ Scores 3/9/2022 2/2/2022 1/21/2022 12/8/2021 9/8/2021 7/8/2021 3/10/2021   PHQ2 Score 1 6 5 3 2 4 6   PHQ9 Score 1 13 15 10 2 10 14     Interpretation of Total Score Depression Severity: 1-4 = Minimal depression, 5-9 = Mild depression, 10-14 = Moderate depression, 15-19 = Moderately severe depression, 20-27 = Severe depression    No flowsheet data found. Interpretation of CATY-7 score: 5-9 = mild anxiety, 10-14 = moderate anxiety, 15+ = severe anxiety. Recommend referral to behavioral health for scores 10 or greater. Diagnosis:    1. Moderate episode of recurrent major depressive disorder ([facility]          Plan:    There are no Patient Instructions on file for this visit. Pt interventions:  See above    No follow-ups on file. Documentation was done using voice recognition dragon software. Every effort was made to ensure accuracy; however, inadvertent, unintentional computerized transcription errors may be present.

## 2022-04-06 ENCOUNTER — OFFICE VISIT (OUTPATIENT)
Dept: PSYCHOLOGY | Age: 47
End: 2022-04-06
Payer: COMMERCIAL

## 2022-04-06 DIAGNOSIS — F32.1 MODERATE MAJOR DEPRESSION (HCC): Primary | ICD-10-CM

## 2022-04-06 PROCEDURE — 90832 PSYTX W PT 30 MINUTES: CPT | Performed by: PSYCHOLOGIST

## 2022-04-06 NOTE — PROGRESS NOTES
Behavioral Health Consultation  Morteza Marie, Ph.D.  Psychologist  4/6/2022   1:32 PM EST       Time spent with Patient: 30 minutes      Reason for Consult:    No chief complaint on file. Referring Provider: No referring provider defined for this encounter. Pt provided informed consent for the behavioral health program. Discussed with patient model of service to include the limits of confidentiality (i.e. abuse reporting, suicide  intervention, etc.) and short-term intervention focused approach. Pt indicated understanding. Feedback given to PCP. S:  Pt seen per PCP re: depression    Patient seen for follow-up. Patient rated depression at 4 out of 10. Pt reported that she ahs been spending time with friends and also groomed a friends bird. Pt stated that work with her manager is somewhat better. Pt is not happy at her job. Pt has not been cleaning her desk or bedroom. Pt has mold in the bedroom which is why she needs to clean it so they can repair the floor. Focused visit on MI, problem focused, and restructuring.          O:  MSE:    Appearance: good hygiene   Attitude: cooperative and friendly  Consciousness: alert  Orientation: oriented to person, place, time, general circumstance  Memory: recent and remote memory intact  Attention/Concentration: intact during session  Psychomotor Activity:normal  Eye Contact: normal  Speech: normal rate and volume, well-articulated  Mood: euthymic  Affect: congruent  Perception: within normal limits  Thought Content: within normal limits  Thought Process: logical, coherent  Insight: good  Judgment: intact  Ability to understand instructions: Yes  Morbid Ideation: no   Suicide Assessment: no suicidal ideation, plan, or intent  Homicidal Ideation: no     History:    Social History:   Social History     Socioeconomic History    Marital status:      Spouse name: Not on file    Number of children: 0    Years of education: Not on file    Highest education level: Not on file   Occupational History    Occupation:    Tobacco Use    Smoking status: Current Every Day Smoker     Packs/day: 1.50     Years: 31.00     Pack years: 46.50     Types: Cigarettes     Start date: 25    Smokeless tobacco: Never Used   Vaping Use    Vaping Use: Never used   Substance and Sexual Activity    Alcohol use: Not Currently    Drug use: No    Sexual activity: Not Currently     Partners: Male   Other Topics Concern    Not on file   Social History Narrative    Not on file     Social Determinants of Health     Financial Resource Strain: Low Risk     Difficulty of Paying Living Expenses: Not hard at all   Food Insecurity: No Food Insecurity    Worried About [address] in the Last Year: Never true    [address] in the Last Year: Never true   Transportation Needs:     Lack of Transportation (Medical): Not on file    Lack of Transportation (Non-Medical): Not on file   Physical Activity:     Days of Exercise per Week: Not on file    Minutes of Exercise per Session: Not on file   Stress:     Feeling of Stress : Not on file   Social Connections:     Frequency of Communication with Friends and Family: Not on file    Frequency of Social Gatherings with Friends and Family: Not on file    Attends Hinduism Services: Not on file    Active Member of [address] [place] [address] [employer] or Organizations: Not on file    Attends Club or Organization Meetings: Not on file    Marital Status: Not on file   Intimate Partner Violence:     Fear of Current or Ex-Partner: Not on file    Emotionally Abused: Not on file    Physically Abused: Not on file    Sexually Abused: Not on file   Housing Stability:     Unable to Pay for Housing in the Last Year: Not on file    Number of [place] in the Last Year: Not on file    Unstable Housing in the Last Year: Not on file     TOBACCO:   reports that she has been smoking cigarettes. She started smoking about 35 years ago.  She has a 46.50 pack-year smoking history. She has never used smokeless tobacco.  ETOH:   reports previous alcohol use. A:    PHQ Scores 3/9/2022 2/2/2022 1/21/2022 12/8/2021 9/8/2021 7/8/2021 3/10/2021   PHQ2 Score 1 6 5 3 2 4 6   PHQ9 Score 1 13 15 10 2 10 14     Interpretation of Total Score Depression Severity: 1-4 = Minimal depression, 5-9 = Mild depression, 10-14 = Moderate depression, 15-19 = Moderately severe depression, 20-27 = Severe depression    No flowsheet data found. Interpretation of CATY-7 score: 5-9 = mild anxiety, 10-14 = moderate anxiety, 15+ = severe anxiety. Recommend referral to behavioral health for scores 10 or greater. Diagnosis:    No diagnosis found. Plan:    There are no Patient Instructions on file for this visit. Pt interventions:  See above    No follow-ups on file. Documentation was done using voice recognition dragon software. Every effort was made to ensure accuracy; however, inadvertent, unintentional computerized transcription errors may be present.

## 2022-04-12 ENCOUNTER — TELEPHONE (OUTPATIENT)
Dept: CASE MANAGEMENT | Age: 47
End: 2022-04-12

## 2022-04-21 RX ORDER — OXCARBAZEPINE 600 MG/1
TABLET, FILM COATED ORAL
Qty: 180 TABLET | Refills: 0 | Status: SHIPPED | OUTPATIENT
Start: 2022-04-21 | End: 2022-07-25

## 2022-04-21 NOTE — TELEPHONE ENCOUNTER
Recent Visits  Date Type Provider Dept   03/09/22 Office Visit HARRISON Ragsdale CNP [facility]   01/21/22 Office Visit HARRISON Ragsdale CNP [facility]   12/08/21 Office Visit HARRISON Ragsdale - CNP [facility]   09/08/21 Office Visit HARRISON Ragsdale - CNP [facility]   07/08/21 Office Visit HARRISON Ragsdale - CNP [facility]   04/21/21 Office Visit HARRISON Ragsdale - CNP [facility]   03/10/21 Office Visit HARRISON Ragsdale - CNP [facility]   02/01/21 Office Visit Franky Correa MD [facility]   12/10/20 Office Visit HARRISON Ragsdale - CNP [facility]   Showing recent visits within past 540 days with a meds authorizing provider and meeting all other requirements  Future Appointments  Date Type Provider Dept   06/13/22 Appointment HARRISON Ragsdale CNP [facility]   Showing future appointments within next 150 days with a meds authorizing provider and meeting all other requirements     3/9/2022

## 2022-04-27 ENCOUNTER — OFFICE VISIT (OUTPATIENT)
Dept: PSYCHOLOGY | Age: 47
End: 2022-04-27
Payer: COMMERCIAL

## 2022-04-27 DIAGNOSIS — F33.1 MODERATE EPISODE OF RECURRENT MAJOR DEPRESSIVE DISORDER (HCC): Primary | Chronic | ICD-10-CM

## 2022-04-27 PROCEDURE — 90832 PSYTX W PT 30 MINUTES: CPT | Performed by: PSYCHOLOGIST

## 2022-04-27 ASSESSMENT — PATIENT HEALTH QUESTIONNAIRE - PHQ9
SUM OF ALL RESPONSES TO PHQ9 QUESTIONS 1 & 2: 1
4. FEELING TIRED OR HAVING LITTLE ENERGY: 1
8. MOVING OR SPEAKING SO SLOWLY THAT OTHER PEOPLE COULD HAVE NOTICED. OR THE OPPOSITE, BEING SO FIGETY OR RESTLESS THAT YOU HAVE BEEN MOVING AROUND A LOT MORE THAN USUAL: 0
9. THOUGHTS THAT YOU WOULD BE BETTER OFF DEAD, OR OF HURTING YOURSELF: 0
SUM OF ALL RESPONSES TO PHQ QUESTIONS 1-9: 6
1. LITTLE INTEREST OR PLEASURE IN DOING THINGS: 1
7. TROUBLE CONCENTRATING ON THINGS, SUCH AS READING THE NEWSPAPER OR WATCHING TELEVISION: 1
2. FEELING DOWN, DEPRESSED OR HOPELESS: 0
5. POOR APPETITE OR OVEREATING: 1
3. TROUBLE FALLING OR STAYING ASLEEP: 1
SUM OF ALL RESPONSES TO PHQ QUESTIONS 1-9: 6
6. FEELING BAD ABOUT YOURSELF - OR THAT YOU ARE A FAILURE OR HAVE LET YOURSELF OR YOUR FAMILY DOWN: 1
SUM OF ALL RESPONSES TO PHQ QUESTIONS 1-9: 6
SUM OF ALL RESPONSES TO PHQ QUESTIONS 1-9: 6

## 2022-04-27 NOTE — PROGRESS NOTES
Behavioral Health Consultation  Rosa Caban, Ph.D.  Psychologist  4/27/2022   1:32 PM EST       Time spent with Patient: 30 minutes      Reason for Consult:    Chief Complaint   Patient presents with    Depression     Referring Provider: No referring provider defined for this encounter. Pt provided informed consent for the behavioral health program. Discussed with patient model of service to include the limits of confidentiality (i.e. abuse reporting, suicide  intervention, etc.) and short-term intervention focused approach. Pt indicated understanding. Feedback given to PCP. S:  Pt seen per PCP re: depression    Patient seen for follow-up. Pt stated that she has not cleaned out her house yet. Pt noted that she feels like her choices are seperating her and her . Pt noted part of her challenges in cleaning the room is that she will not be able to smoke in the bedroom. Pt is aware of her addiction to tobacco and hte negative consequenes but continues to be precontmeplative. Pt has gone out with friends after work which was beneficial. Focused intervention on MI, cognitive reappraisal, problem focused coping.        O:  MSE:    Appearance: good hygiene   Attitude: cooperative and friendly  Consciousness: alert  Orientation: oriented to person, place, time, general circumstance  Memory: recent and remote memory intact  Attention/Concentration: intact during session  Psychomotor Activity:normal  Eye Contact: normal  Speech: normal rate and volume, well-articulated  Mood: euthymic  Affect: congruent  Perception: within normal limits  Thought Content: within normal limits  Thought Process: logical, coherent  Insight: good  Judgment: intact  Ability to understand instructions: Yes  Morbid Ideation: no   Suicide Assessment: no suicidal ideation, plan, or intent  Homicidal Ideation: no     History:    Social History:   Social History     Socioeconomic History    Marital status:      Spouse name: Not on file    Number of children: 0    Years of education: Not on file    Highest education level: Not on file   Occupational History    Occupation:    Tobacco Use    Smoking status: Current Every Day Smoker     Packs/day: 1.50     Years: 31.00     Pack years: 46.50     Types: Cigarettes     Start date: 25    Smokeless tobacco: Never Used   Vaping Use    Vaping Use: Never used   Substance and Sexual Activity    Alcohol use: Not Currently    Drug use: No    Sexual activity: Not Currently     Partners: Male   Other Topics Concern    Not on file   Social History Narrative    Not on file     Social Determinants of Health     Financial Resource Strain: Low Risk     Difficulty of Paying Living Expenses: Not hard at all   Food Insecurity: No Food Insecurity    Worried About [address] in the Last Year: Never true    [address] in the Last Year: Never true   Transportation Needs:     Lack of Transportation (Medical): Not on file    Lack of Transportation (Non-Medical):  Not on file   Physical Activity:     Days of Exercise per Week: Not on file    Minutes of Exercise per Session: Not on file   Stress:     Feeling of Stress : Not on file   Social Connections:     Frequency of Communication with Friends and Family: Not on file    Frequency of Social Gatherings with Friends and Family: Not on file    Attends Holiness Services: Not on file    Active Member of [address] [place] [address] or Organizations: Not on file    Attends Club or Organization Meetings: Not on file    Marital Status: Not on file   Intimate Partner Violence:     Fear of Current or Ex-Partner: Not on file    Emotionally Abused: Not on file    Physically Abused: Not on file    Sexually Abused: Not on file   Housing Stability:     Unable to Pay for Housing in the Last Year: Not on file    Number of [place] in the Last Year: Not on file    Unstable Housing in the Last Year: Not on file     TOBACCO:   reports that she has been smoking cigarettes. She started smoking about 35 years ago. She has a 46.50 pack-year smoking history. She has never used smokeless tobacco.  ETOH:   reports previous alcohol use. A:    PHQ Scores 4/27/2022 3/9/2022 2/2/2022 1/21/2022 12/8/2021 9/8/2021 7/8/2021   PHQ2 Score 1 1 6 5 3 2 4   PHQ9 Score 6 1 13 15 10 2 10     Interpretation of Total Score Depression Severity: 1-4 = Minimal depression, 5-9 = Mild depression, 10-14 = Moderate depression, 15-19 = Moderately severe depression, 20-27 = Severe depression    Diagnosis:    1. Moderate episode of recurrent major depressive disorder ([facility]          Plan:    There are no Patient Instructions on file for this visit. Pt interventions:  See above    No follow-ups on file. Documentation was done using voice recognition dragon software. Every effort was made to ensure accuracy; however, inadvertent, unintentional computerized transcription errors may be present.

## 2022-05-02 DIAGNOSIS — R32 INCONTINENCE IN FEMALE: ICD-10-CM

## 2022-05-03 RX ORDER — OXYBUTYNIN CHLORIDE 10 MG/1
TABLET, EXTENDED RELEASE ORAL
Qty: 90 TABLET | Refills: 0 | Status: SHIPPED | OUTPATIENT
Start: 2022-05-03 | End: 2022-06-13

## 2022-05-25 ENCOUNTER — OFFICE VISIT (OUTPATIENT)
Dept: PSYCHOLOGY | Age: 47
End: 2022-05-25
Payer: COMMERCIAL

## 2022-05-25 DIAGNOSIS — F32.1 MODERATE MAJOR DEPRESSION (HCC): Primary | ICD-10-CM

## 2022-05-25 PROCEDURE — 90832 PSYTX W PT 30 MINUTES: CPT | Performed by: PSYCHOLOGIST

## 2022-05-25 NOTE — PROGRESS NOTES
Behavioral Health Consultation  Lonny Jason, Ph.D.  Psychologist  2022   1:32 PM EST       Time spent with Patient: 30 minutes      Reason for Consult:    Chief Complaint   Patient presents with    Depression     Referring Provider: No referring provider defined for this encounter. Pt provided informed consent for the behavioral health program. Discussed with patient model of service to include the limits of confidentiality (i.e. abuse reporting, suicide  intervention, etc.) and short-term intervention focused approach. Pt indicated understanding. Feedback given to PCP. S:  Pt seen per PCP re: depression    Patient seen for follow-up. Pt stated that she has beend oing well. Pt met with the nutritionist and set a goal that she feels is manageable. Pt has been working on the room and that she has gotten a lot completed. Pt has been doing the dishes most days of . Pt had a friend who  recently at the age of 39. Pt described grief response. Focused intervention on MI, cognitive reappraisal and processing.      O:  MSE:    Appearance: good hygiene   Attitude: cooperative and friendly  Consciousness: alert  Orientation: oriented to person, place, time, general circumstance  Memory: recent and remote memory intact  Attention/Concentration: intact during session  Psychomotor Activity:normal  Eye Contact: normal  Speech: normal rate and volume, well-articulated  Mood: euthymic  Affect: congruent  Perception: within normal limits  Thought Content: within normal limits  Thought Process: logical, coherent  Insight: good  Judgment: intact  Ability to understand instructions: Yes  Morbid Ideation: no   Suicide Assessment: no suicidal ideation, plan, or intent  Homicidal Ideation: no     History:    Social History:   Social History     Socioeconomic History    Marital status:      Spouse name: Not on file    Number of children: 0    Years of education: Not on file    Highest education level: Not on file   Occupational History    Occupation:    Tobacco Use    Smoking status: Current Every Day Smoker     Packs/day: 1.50     Years: 31.00     Pack years: 46.50     Types: Cigarettes     Start date: 25    Smokeless tobacco: Never Used   Vaping Use    Vaping Use: Never used   Substance and Sexual Activity    Alcohol use: Not Currently    Drug use: No    Sexual activity: Not Currently     Partners: Male   Other Topics Concern    Not on file   Social History Narrative    Not on file     Social Determinants of Health     Financial Resource Strain: Low Risk     Difficulty of Paying Living Expenses: Not hard at all   Food Insecurity: No Food Insecurity    Worried About [address] in the Last Year: Never true    [address] in the Last Year: Never true   Transportation Needs:     Lack of Transportation (Medical): Not on file    Lack of Transportation (Non-Medical): Not on file   Physical Activity:     Days of Exercise per Week: Not on file    Minutes of Exercise per Session: Not on file   Stress:     Feeling of Stress : Not on file   Social Connections:     Frequency of Communication with Friends and Family: Not on file    Frequency of Social Gatherings with Friends and Family: Not on file    Attends Jainism Services: Not on file    Active Member of [address] [place] [address] [employer] or Organizations: Not on file    Attends Club or Organization Meetings: Not on file    Marital Status: Not on file   Intimate Partner Violence:     Fear of Current or Ex-Partner: Not on file    Emotionally Abused: Not on file    Physically Abused: Not on file    Sexually Abused: Not on file   Housing Stability:     Unable to Pay for Housing in the Last Year: Not on file    Number of [place] in the Last Year: Not on file    Unstable Housing in the Last Year: Not on file     TOBACCO:   reports that she has been smoking cigarettes. She started smoking about 35 years ago.  She has a 46.50 pack-year smoking history. She has never used smokeless tobacco.  ETOH:   reports previous alcohol use. A:    PHQ Scores 4/27/2022 3/9/2022 2/2/2022 1/21/2022 12/8/2021 9/8/2021 7/8/2021   PHQ2 Score 1 1 6 5 3 2 4   PHQ9 Score 6 1 13 15 10 2 10     Interpretation of Total Score Depression Severity: 1-4 = Minimal depression, 5-9 = Mild depression, 10-14 = Moderate depression, 15-19 = Moderately severe depression, 20-27 = Severe depression    Diagnosis:    1. Moderate major depression ([facility] [address]          Plan:    There are no Patient Instructions on file for this visit. Pt interventions:  See above    No follow-ups on file. Documentation was done using voice recognition dragon software. Every effort was made to ensure accuracy; however, inadvertent, unintentional computerized transcription errors may be present.

## 2022-06-13 ENCOUNTER — OFFICE VISIT (OUTPATIENT)
Dept: INTERNAL MEDICINE CLINIC | Age: 47
End: 2022-06-13
Payer: COMMERCIAL

## 2022-06-13 VITALS
DIASTOLIC BLOOD PRESSURE: 76 MMHG | BODY MASS INDEX: 53.28 KG/M2 | WEIGHT: 293 LBS | OXYGEN SATURATION: 97 % | HEART RATE: 76 BPM | SYSTOLIC BLOOD PRESSURE: 118 MMHG

## 2022-06-13 DIAGNOSIS — H53.9 VISUAL DISTURBANCE: ICD-10-CM

## 2022-06-13 DIAGNOSIS — Z13.1 SCREENING FOR DIABETES MELLITUS: ICD-10-CM

## 2022-06-13 DIAGNOSIS — E66.01 MORBID OBESITY WITH BMI OF 40.0-44.9, ADULT (HCC): ICD-10-CM

## 2022-06-13 DIAGNOSIS — Z23 NEED FOR STREPTOCOCCUS PNEUMONIAE VACCINATION: ICD-10-CM

## 2022-06-13 DIAGNOSIS — N32.81 OAB (OVERACTIVE BLADDER): Primary | ICD-10-CM

## 2022-06-13 DIAGNOSIS — R56.9 SEIZURES (HCC): ICD-10-CM

## 2022-06-13 LAB
CHOLESTEROL, FASTING: 197 MG/DL (ref 0–199)
HDLC SERPL-MCNC: 62 MG/DL (ref 40–60)
LDL CHOLESTEROL CALCULATED: 115 MG/DL
TRIGLYCERIDE, FASTING: 102 MG/DL (ref 0–150)
VLDLC SERPL CALC-MCNC: 20 MG/DL

## 2022-06-13 PROCEDURE — 90471 IMMUNIZATION ADMIN: CPT | Performed by: NURSE PRACTITIONER

## 2022-06-13 PROCEDURE — 99214 OFFICE O/P EST MOD 30 MIN: CPT | Performed by: NURSE PRACTITIONER

## 2022-06-13 PROCEDURE — 90732 PPSV23 VACC 2 YRS+ SUBQ/IM: CPT | Performed by: NURSE PRACTITIONER

## 2022-06-13 ASSESSMENT — PATIENT HEALTH QUESTIONNAIRE - PHQ9
1. LITTLE INTEREST OR PLEASURE IN DOING THINGS: 1
5. POOR APPETITE OR OVEREATING: 1
10. IF YOU CHECKED OFF ANY PROBLEMS, HOW DIFFICULT HAVE THESE PROBLEMS MADE IT FOR YOU TO DO YOUR WORK, TAKE CARE OF THINGS AT HOME, OR GET ALONG WITH OTHER PEOPLE: 1
SUM OF ALL RESPONSES TO PHQ QUESTIONS 1-9: 8
SUM OF ALL RESPONSES TO PHQ9 QUESTIONS 1 & 2: 2
3. TROUBLE FALLING OR STAYING ASLEEP: 3
2. FEELING DOWN, DEPRESSED OR HOPELESS: 1
SUM OF ALL RESPONSES TO PHQ QUESTIONS 1-9: 8
SUM OF ALL RESPONSES TO PHQ QUESTIONS 1-9: 8
8. MOVING OR SPEAKING SO SLOWLY THAT OTHER PEOPLE COULD HAVE NOTICED. OR THE OPPOSITE, BEING SO FIGETY OR RESTLESS THAT YOU HAVE BEEN MOVING AROUND A LOT MORE THAN USUAL: 0
SUM OF ALL RESPONSES TO PHQ QUESTIONS 1-9: 8
9. THOUGHTS THAT YOU WOULD BE BETTER OFF DEAD, OR OF HURTING YOURSELF: 0
7. TROUBLE CONCENTRATING ON THINGS, SUCH AS READING THE NEWSPAPER OR WATCHING TELEVISION: 1
6. FEELING BAD ABOUT YOURSELF - OR THAT YOU ARE A FAILURE OR HAVE LET YOURSELF OR YOUR FAMILY DOWN: 0
4. FEELING TIRED OR HAVING LITTLE ENERGY: 1

## 2022-06-13 ASSESSMENT — ANXIETY QUESTIONNAIRES
GAD7 TOTAL SCORE: 4
5. BEING SO RESTLESS THAT IT IS HARD TO SIT STILL: 1
6. BECOMING EASILY ANNOYED OR IRRITABLE: 1
3. WORRYING TOO MUCH ABOUT DIFFERENT THINGS: 0
1. FEELING NERVOUS, ANXIOUS, OR ON EDGE: 1
7. FEELING AFRAID AS IF SOMETHING AWFUL MIGHT HAPPEN: 0
4. TROUBLE RELAXING: 1
2. NOT BEING ABLE TO STOP OR CONTROL WORRYING: 0
IF YOU CHECKED OFF ANY PROBLEMS ON THIS QUESTIONNAIRE, HOW DIFFICULT HAVE THESE PROBLEMS MADE IT FOR YOU TO DO YOUR WORK, TAKE CARE OF THINGS AT HOME, OR GET ALONG WITH OTHER PEOPLE: SOMEWHAT DIFFICULT

## 2022-06-13 ASSESSMENT — ENCOUNTER SYMPTOMS
RESPIRATORY NEGATIVE: 1
EYES NEGATIVE: 1
ALLERGIC/IMMUNOLOGIC NEGATIVE: 1
GASTROINTESTINAL NEGATIVE: 1

## 2022-06-13 NOTE — PROGRESS NOTES
Lisa Aly (:  1975) is a 49 y.o. female,Established patient, here for evaluation of the following chief complaint(s):  Depression         ASSESSMENT/PLAN:    Jack Cervantes was seen today for depression. Diagnoses and all orders for this visit:    OAB (overactive bladder)  -     mirabegron (MYRBETRIQ) 25 MG TB24; Take 1 tablet by mouth daily    Seizures (HCC)  -     Michelle Ray MD, Neurology, [facility]    Screening for diabetes mellitus  -     Hemoglobin A1C; Future    Visual disturbance  -     External Referral To Ophthalmology    Need for Streptococcus pneumoniae vaccination  -     Pneumococcal, PPSV23, PNEUMOVAX 23, (age 2 yrs+), SC/IM    Morbid obesity with BMI of 40.0-44.9, adult ([facility] [address]  -     Lipid, Fasting               Subjective   SUBJECTIVE/OBJECTIVE:  HPI Presents today for follow up on depression. States she is afraid she is having seizures, grunting and groaning. She scores have improved, however she does states she has been overeating. Review of Systems   Constitutional: Negative. HENT: Negative. Eyes: Negative. Respiratory: Negative. Cardiovascular: Negative. Gastrointestinal: Negative. Endocrine: Negative. Genitourinary: Negative. Musculoskeletal: Negative. Allergic/Immunologic: Negative. Neurological: Positive for seizures. Hematological: Negative. Psychiatric/Behavioral: The patient is nervous/anxious.       Vitals:    22 0921   BP: 118/76   Pulse: 76   SpO2: 97%     BP Readings from Last 3 Encounters:   22 118/76   22 124/80   02/15/22 139/81     Wt Readings from Last 3 Encounters:   22 296 lb (134.3 kg)   22 279 lb (126.6 kg)   22 283 lb (128.4 kg)   On the basis of positive PHQ-9 screening (PHQ-9 Total Score: 8), the following plan was implemented: additional evaluation and assessment performed, follow-up plan includes but not limited to: Medication management and Referral to /Specialist for evaluation and management. Patient will follow-up in 3 day(s) with psychologist.       Objective   Physical Exam  Constitutional:       Appearance: Normal appearance. HENT:      Head: Normocephalic. Cardiovascular:      Rate and Rhythm: Normal rate and regular rhythm. Heart sounds: Normal heart sounds. Pulmonary:      Effort: Pulmonary effort is normal.      Breath sounds: Normal breath sounds and air entry. Skin:     General: Skin is warm and dry. Neurological:      Mental Status: She is alert. Psychiatric:         Attention and Perception: Attention normal.         Mood and Affect: Mood and affect normal.         Speech: Speech normal.                  An electronic signature was used to authenticate this note.     --Bartolome Rowland, HARRISON - CNP

## 2022-06-13 NOTE — PATIENT INSTRUCTIONS
Continue with seizure medication  Continue to drink plenty of water  Exercise at least 3 times a week for 20 minutes  Continue decrease cigarette usage

## 2022-06-14 LAB
ESTIMATED AVERAGE GLUCOSE: 116.9 MG/DL
HBA1C MFR BLD: 5.7 %

## 2022-06-15 ENCOUNTER — OFFICE VISIT (OUTPATIENT)
Dept: PSYCHOLOGY | Age: 47
End: 2022-06-15
Payer: COMMERCIAL

## 2022-06-15 DIAGNOSIS — F32.1 MODERATE MAJOR DEPRESSION (HCC): Primary | ICD-10-CM

## 2022-06-15 PROCEDURE — 90832 PSYTX W PT 30 MINUTES: CPT | Performed by: PSYCHOLOGIST

## 2022-06-15 NOTE — PROGRESS NOTES
Behavioral Health Consultation  Karen Nelson, Ph.D.  Psychologist  6/15/2022   1:32 PM EST       Time spent with Patient: 30 minutes      Reason for Consult:    Chief Complaint   Patient presents with    Depression     Referring Provider: No referring provider defined for this encounter. Pt provided informed consent for the behavioral health program. Discussed with patient model of service to include the limits of confidentiality (i.e. abuse reporting, suicide  intervention, etc.) and short-term intervention focused approach. Pt indicated understanding. Feedback given to PCP. S:  Pt seen per PCP re: depression    Patient seen for follow-up. Pt noted that she is experiencing increased pain. Pt feels like she has a pinched nerve. Pt noted that her depression has been somewhat better. Pt noted that her pain has given her perspective. Pt stated that she has met with the dietician and that she was pleased with the changes that she has made. Pt wants to walk more. Focused visit on MI and problem focused coping. Pt set goal to walk the length of the house each time that she goes to the bathroom.      O:  MSE:    Appearance: good hygiene   Attitude: cooperative and friendly  Consciousness: alert  Orientation: oriented to person, place, time, general circumstance  Memory: recent and remote memory intact  Attention/Concentration: intact during session  Psychomotor Activity:normal  Eye Contact: normal  Speech: normal rate and volume, well-articulated  Mood: euthymic  Affect: congruent  Perception: within normal limits  Thought Content: within normal limits  Thought Process: logical, coherent  Insight: good  Judgment: intact  Ability to understand instructions: Yes  Morbid Ideation: no   Suicide Assessment: no suicidal ideation, plan, or intent  Homicidal Ideation: no     History:    Social History:   Social History     Socioeconomic History    Marital status:      Spouse name: Not on file    Number of children: 0    Years of education: Not on file    Highest education level: Not on file   Occupational History    Occupation:    Tobacco Use    Smoking status: Current Every Day Smoker     Packs/day: 1.50     Years: 31.00     Pack years: 46.50     Types: Cigarettes     Start date: 25    Smokeless tobacco: Never Used   Vaping Use    Vaping Use: Never used   Substance and Sexual Activity    Alcohol use: Not Currently    Drug use: No    Sexual activity: Not Currently     Partners: Male   Other Topics Concern    Not on file   Social History Narrative    Not on file     Social Determinants of Health     Financial Resource Strain: Low Risk     Difficulty of Paying Living Expenses: Not hard at all   Food Insecurity: No Food Insecurity    Worried About [email or web address] in the Last Year: Never true    [email or web address] in the Last Year: Never true   Transportation Needs:     Lack of Transportation (Medical): Not on file    Lack of Transportation (Non-Medical):  Not on file   Physical Activity:     Days of Exercise per Week: Not on file    Minutes of Exercise per Session: Not on file   Stress:     Feeling of Stress : Not on file   Social Connections:     Frequency of Communication with Friends and Family: Not on file    Frequency of Social Gatherings with Friends and Family: Not on file    Attends Mormonism Services: Not on file    Active Member of [address] [place] [address] or Organizations: Not on file    Attends Club or Organization Meetings: Not on file    Marital Status: Not on file   Intimate Partner Violence:     Fear of Current or Ex-Partner: Not on file    Emotionally Abused: Not on file    Physically Abused: Not on file    Sexually Abused: Not on file   Housing Stability:     Unable to Pay for Housing in the Last Year: Not on file    Number of [place] in the Last Year: Not on file    Unstable Housing in the Last Year: Not on file     TOBACCO:   reports that she has been smoking cigarettes. She started smoking about 35 years ago. She has a 46.50 pack-year smoking history. She has never used smokeless tobacco.  ETOH:   reports previous alcohol use. A:    PHQ Scores 6/13/2022 4/27/2022 3/9/2022 2/2/2022 1/21/2022 12/8/2021 9/8/2021   PHQ2 Score 2 1 1 6 5 3 2   PHQ9 Score 8 6 1 13 15 10 2     Interpretation of Total Score Depression Severity: 1-4 = Minimal depression, 5-9 = Mild depression, 10-14 = Moderate depression, 15-19 = Moderately severe depression, 20-27 = Severe depression    Diagnosis:    1. Moderate major depression ([facility] [address]          Plan:    There are no Patient Instructions on file for this visit. Pt interventions:  See above    No follow-ups on file. Documentation was done using voice recognition dragon software. Every effort was made to ensure accuracy; however, inadvertent, unintentional computerized transcription errors may be present.

## 2022-06-16 ENCOUNTER — TELEPHONE (OUTPATIENT)
Dept: INTERNAL MEDICINE CLINIC | Age: 47
End: 2022-06-16

## 2022-06-16 NOTE — TELEPHONE ENCOUNTER
Please Advise      Pt would like for Nurse Car Jimenes to give her a call said the Neurologist can't get her in till August but she woke up Tuesday and couldn't walk

## 2022-06-27 ENCOUNTER — OFFICE VISIT (OUTPATIENT)
Dept: INTERNAL MEDICINE CLINIC | Age: 47
End: 2022-06-27
Payer: COMMERCIAL

## 2022-06-27 VITALS
BODY MASS INDEX: 53.64 KG/M2 | DIASTOLIC BLOOD PRESSURE: 82 MMHG | WEIGHT: 293 LBS | SYSTOLIC BLOOD PRESSURE: 120 MMHG | HEART RATE: 81 BPM | OXYGEN SATURATION: 93 %

## 2022-06-27 DIAGNOSIS — R26.9 GAIT DISTURBANCE: Primary | ICD-10-CM

## 2022-06-27 DIAGNOSIS — R23.2 VASOMOTOR FLUSHING: ICD-10-CM

## 2022-06-27 PROCEDURE — 99214 OFFICE O/P EST MOD 30 MIN: CPT | Performed by: NURSE PRACTITIONER

## 2022-06-27 RX ORDER — PAROXETINE 7.5 MG/1
1 CAPSULE ORAL DAILY
Qty: 30 CAPSULE | Refills: 1 | Status: SHIPPED | OUTPATIENT
Start: 2022-06-27 | End: 2022-08-23 | Stop reason: SDUPTHER

## 2022-06-27 ASSESSMENT — PATIENT HEALTH QUESTIONNAIRE - PHQ9
4. FEELING TIRED OR HAVING LITTLE ENERGY: 2
7. TROUBLE CONCENTRATING ON THINGS, SUCH AS READING THE NEWSPAPER OR WATCHING TELEVISION: 3
SUM OF ALL RESPONSES TO PHQ QUESTIONS 1-9: 13
2. FEELING DOWN, DEPRESSED OR HOPELESS: 1
5. POOR APPETITE OR OVEREATING: 1
8. MOVING OR SPEAKING SO SLOWLY THAT OTHER PEOPLE COULD HAVE NOTICED. OR THE OPPOSITE, BEING SO FIGETY OR RESTLESS THAT YOU HAVE BEEN MOVING AROUND A LOT MORE THAN USUAL: 0
1. LITTLE INTEREST OR PLEASURE IN DOING THINGS: 1
SUM OF ALL RESPONSES TO PHQ QUESTIONS 1-9: 13
SUM OF ALL RESPONSES TO PHQ9 QUESTIONS 1 & 2: 2
SUM OF ALL RESPONSES TO PHQ QUESTIONS 1-9: 13
SUM OF ALL RESPONSES TO PHQ QUESTIONS 1-9: 13
9. THOUGHTS THAT YOU WOULD BE BETTER OFF DEAD, OR OF HURTING YOURSELF: 0
3. TROUBLE FALLING OR STAYING ASLEEP: 3
10. IF YOU CHECKED OFF ANY PROBLEMS, HOW DIFFICULT HAVE THESE PROBLEMS MADE IT FOR YOU TO DO YOUR WORK, TAKE CARE OF THINGS AT HOME, OR GET ALONG WITH OTHER PEOPLE: 1
6. FEELING BAD ABOUT YOURSELF - OR THAT YOU ARE A FAILURE OR HAVE LET YOURSELF OR YOUR FAMILY DOWN: 2

## 2022-06-27 ASSESSMENT — ENCOUNTER SYMPTOMS
RESPIRATORY NEGATIVE: 1
ALLERGIC/IMMUNOLOGIC NEGATIVE: 1
GASTROINTESTINAL NEGATIVE: 1

## 2022-06-27 ASSESSMENT — ANXIETY QUESTIONNAIRES
GAD7 TOTAL SCORE: 9
7. FEELING AFRAID AS IF SOMETHING AWFUL MIGHT HAPPEN: 3
IF YOU CHECKED OFF ANY PROBLEMS ON THIS QUESTIONNAIRE, HOW DIFFICULT HAVE THESE PROBLEMS MADE IT FOR YOU TO DO YOUR WORK, TAKE CARE OF THINGS AT HOME, OR GET ALONG WITH OTHER PEOPLE: SOMEWHAT DIFFICULT
3. WORRYING TOO MUCH ABOUT DIFFERENT THINGS: 0
1. FEELING NERVOUS, ANXIOUS, OR ON EDGE: 1
2. NOT BEING ABLE TO STOP OR CONTROL WORRYING: 2
6. BECOMING EASILY ANNOYED OR IRRITABLE: 1
5. BEING SO RESTLESS THAT IT IS HARD TO SIT STILL: 1
4. TROUBLE RELAXING: 1

## 2022-06-27 NOTE — PATIENT INSTRUCTIONS
Take 10 deep breathes 3 times a day  Decrease amount of smoking  15 steps in place 2 times a day  Ambulate as tolerated

## 2022-07-06 ENCOUNTER — OFFICE VISIT (OUTPATIENT)
Dept: NEUROLOGY | Age: 47
End: 2022-07-06
Payer: COMMERCIAL

## 2022-07-06 VITALS
WEIGHT: 293 LBS | SYSTOLIC BLOOD PRESSURE: 148 MMHG | DIASTOLIC BLOOD PRESSURE: 86 MMHG | BODY MASS INDEX: 51.91 KG/M2 | HEIGHT: 63 IN | HEART RATE: 78 BPM

## 2022-07-06 DIAGNOSIS — Z87.820 H/O TRAUMATIC BRAIN INJURY: ICD-10-CM

## 2022-07-06 DIAGNOSIS — G40.219 PARTIAL SYMPTOMATIC EPILEPSY WITH COMPLEX PARTIAL SEIZURES, INTRACTABLE, WITHOUT STATUS EPILEPTICUS (HCC): Primary | ICD-10-CM

## 2022-07-06 PROCEDURE — 99205 OFFICE O/P NEW HI 60 MIN: CPT | Performed by: PSYCHIATRY & NEUROLOGY

## 2022-07-13 ENCOUNTER — OFFICE VISIT (OUTPATIENT)
Dept: PSYCHOLOGY | Age: 47
End: 2022-07-13
Payer: COMMERCIAL

## 2022-07-13 DIAGNOSIS — F33.1 MODERATE EPISODE OF RECURRENT MAJOR DEPRESSIVE DISORDER (HCC): Primary | Chronic | ICD-10-CM

## 2022-07-13 PROCEDURE — 90832 PSYTX W PT 30 MINUTES: CPT | Performed by: PSYCHOLOGIST

## 2022-07-13 NOTE — PROGRESS NOTES
Behavioral Health Consultation  Madeline Mora, Ph.D.  Psychologist  7/13/2022   1:32 PM EST       Time spent with Patient: 30 minutes      Reason for Consult:    Chief Complaint   Patient presents with    Depression     Referring Provider: No referring provider defined for this encounter. Pt provided informed consent for the behavioral health program. Discussed with patient model of service to include the limits of confidentiality (i.e. abuse reporting, suicide  intervention, etc.) and short-term intervention focused approach. Pt indicated understanding. Feedback given to PCP. S:  Pt seen per PCP re: depression    Patient seen for follow-up. Patient reported that she has been feeling slightly overwhelmed. Patient continues to manage her depressed symptoms. 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 Patient noted that this person is struggling with threatened homelessness and patient has been helping them. Patient reported that they have been pushing boundaries and asking for more help. Focused intervention on boundaries.   O:  MSE:    Appearance: good hygiene   Attitude: cooperative and friendly  Consciousness: alert  Orientation: oriented to person, place, time, general circumstance  Memory: recent and remote memory intact  Attention/Concentration: intact during session  Psychomotor Activity:normal  Eye Contact: normal  Speech: normal rate and volume, well-articulated  Mood: euthymic  Affect: congruent  Perception: within normal limits  Thought Content: within normal limits  Thought Process: logical, coherent  Insight: good  Judgment: intact  Ability to understand instructions: Yes  Morbid Ideation: no   Suicide Assessment: no suicidal ideation, plan, or intent  Homicidal Ideation: no     History:    Social History:   Social History     Socioeconomic History    Marital status:      Spouse name: Not on file    Number of children: 0    Years of education: Not on file    Highest education level: Not on file   Occupational History    Occupation:    Tobacco Use    Smoking status: Current Every Day Smoker     Packs/day: 1.50     Years: 31.00     Pack years: 46.50     Types: Cigarettes     Start date: 25    Smokeless tobacco: Never Used   Vaping Use    Vaping Use: Never used   Substance and Sexual Activity    Alcohol use: Not Currently    Drug use: No    Sexual activity: Not Currently     Partners: Male   Other Topics Concern    Not on file   Social History Narrative    Not on file     Social Determinants of Health     Financial Resource Strain:     Difficulty of Paying Living Expenses: Not on file   Food Insecurity:     Worried About Running Out of Food in the Last Year: Not on file    [employer] in the Last Year: Not on file   Transportation Needs:     Lack of Transportation (Medical): Not on file    Lack of Transportation (Non-Medical): Not on file   Physical Activity:     Days of Exercise per Week: Not on file    Minutes of Exercise per Session: Not on file   Stress:     Feeling of Stress : Not on file   Social Connections:     Frequency of Communication with Friends and Family: Not on file    Frequency of Social Gatherings with Friends and Family: Not on file    Attends Restorationism Services: Not on file    Active Member of [address] [place] [address] [employer] or Organizations: Not on file    Attends Club or Organization Meetings: Not on file    Marital Status: Not on file   Intimate Partner Violence:     Fear of Current or Ex-Partner: Not on file    Emotionally Abused: Not on file    Physically Abused: Not on file    Sexually Abused: Not on file   Housing Stability:     Unable to Pay for Housing in the Last Year: Not on file    Number of [place] in the Last Year: Not on file    Unstable Housing in the Last Year: Not on file     TOBACCO:   reports that she has been smoking cigarettes.  She started smoking

## 2022-07-18 RX ORDER — RISPERIDONE 4 MG/1
TABLET, FILM COATED ORAL
Qty: 90 TABLET | Refills: 0 | Status: SHIPPED | OUTPATIENT
Start: 2022-07-18 | End: 2022-08-23 | Stop reason: SDUPTHER

## 2022-07-18 NOTE — TELEPHONE ENCOUNTER
Recent Visits  Date Type Provider Dept   06/27/22 Office Visit HARRISON Cotton CNP [facility]   06/13/22 Office Visit HARRISON Cotton CNP [facility]   03/09/22 Office Visit HARRISON Cotton - CNP [facility]   01/21/22 Office Visit HARRISON Cotton - CNP [facility]   12/08/21 Office Visit HARRISON Cotton - CNP [facility]   09/08/21 Office Visit HARRISON Cotton - CNP [facility]   07/08/21 Office Visit HARRISON Cotton - CNP [facility]   04/21/21 Office Visit HARRISON Cotton - CNP [facility]   03/10/21 Office Visit HARRISON Cotton - CNP [facility]   02/01/21 Office Visit Ro Muse MD [facility]   Showing recent visits within past 540 days with a meds authorizing provider and meeting all other requirements  Future Appointments  Date Type Provider Dept   09/14/22 Appointment HARRISON Cotton CNP [facility]   Showing future appointments within next 150 days with a meds authorizing provider and meeting all other requirements     6/27/2022

## 2022-07-21 ENCOUNTER — TELEPHONE (OUTPATIENT)
Dept: INTERNAL MEDICINE CLINIC | Age: 47
End: 2022-07-21

## 2022-07-21 NOTE — TELEPHONE ENCOUNTER
Patient is requesting a referral to see an orthopedic doctor, she stated she has been having hip pain.

## 2022-07-21 NOTE — TELEPHONE ENCOUNTER
It is the same hip that she fell on recently, she is going need to come in to be evaluated probably some physical therapy before we will do an MRI and send her to for an orthopedic referral

## 2022-07-25 ENCOUNTER — OFFICE VISIT (OUTPATIENT)
Dept: INTERNAL MEDICINE CLINIC | Age: 47
End: 2022-07-25
Payer: COMMERCIAL

## 2022-07-25 VITALS
BODY MASS INDEX: 53.64 KG/M2 | HEART RATE: 75 BPM | DIASTOLIC BLOOD PRESSURE: 60 MMHG | WEIGHT: 293 LBS | SYSTOLIC BLOOD PRESSURE: 108 MMHG | OXYGEN SATURATION: 97 %

## 2022-07-25 DIAGNOSIS — N32.81 OAB (OVERACTIVE BLADDER): ICD-10-CM

## 2022-07-25 DIAGNOSIS — M25.551 CHRONIC RIGHT HIP PAIN: Primary | ICD-10-CM

## 2022-07-25 DIAGNOSIS — G89.29 CHRONIC RIGHT HIP PAIN: Primary | ICD-10-CM

## 2022-07-25 PROCEDURE — 99213 OFFICE O/P EST LOW 20 MIN: CPT | Performed by: NURSE PRACTITIONER

## 2022-07-25 RX ORDER — OXCARBAZEPINE 600 MG/1
TABLET, FILM COATED ORAL
Qty: 180 TABLET | Refills: 0 | Status: SHIPPED | OUTPATIENT
Start: 2022-07-25 | End: 2022-08-23 | Stop reason: SDUPTHER

## 2022-07-25 RX ORDER — OXCARBAZEPINE 600 MG/1
TABLET, FILM COATED ORAL
Qty: 180 TABLET | Refills: 0 | Status: SHIPPED | OUTPATIENT
Start: 2022-07-25 | End: 2022-07-25 | Stop reason: SDUPTHER

## 2022-07-25 SDOH — ECONOMIC STABILITY: FOOD INSECURITY: WITHIN THE PAST 12 MONTHS, YOU WORRIED THAT YOUR FOOD WOULD RUN OUT BEFORE YOU GOT MONEY TO BUY MORE.: NEVER TRUE

## 2022-07-25 SDOH — ECONOMIC STABILITY: FOOD INSECURITY: WITHIN THE PAST 12 MONTHS, THE FOOD YOU BOUGHT JUST DIDN'T LAST AND YOU DIDN'T HAVE MONEY TO GET MORE.: NEVER TRUE

## 2022-07-25 ASSESSMENT — ANXIETY QUESTIONNAIRES
GAD7 TOTAL SCORE: 2
2. NOT BEING ABLE TO STOP OR CONTROL WORRYING: 0
4. TROUBLE RELAXING: 0
IF YOU CHECKED OFF ANY PROBLEMS ON THIS QUESTIONNAIRE, HOW DIFFICULT HAVE THESE PROBLEMS MADE IT FOR YOU TO DO YOUR WORK, TAKE CARE OF THINGS AT HOME, OR GET ALONG WITH OTHER PEOPLE: SOMEWHAT DIFFICULT
3. WORRYING TOO MUCH ABOUT DIFFERENT THINGS: 0
7. FEELING AFRAID AS IF SOMETHING AWFUL MIGHT HAPPEN: 0
5. BEING SO RESTLESS THAT IT IS HARD TO SIT STILL: 0
6. BECOMING EASILY ANNOYED OR IRRITABLE: 1
1. FEELING NERVOUS, ANXIOUS, OR ON EDGE: 1

## 2022-07-25 ASSESSMENT — PATIENT HEALTH QUESTIONNAIRE - PHQ9
10. IF YOU CHECKED OFF ANY PROBLEMS, HOW DIFFICULT HAVE THESE PROBLEMS MADE IT FOR YOU TO DO YOUR WORK, TAKE CARE OF THINGS AT HOME, OR GET ALONG WITH OTHER PEOPLE: 1
9. THOUGHTS THAT YOU WOULD BE BETTER OFF DEAD, OR OF HURTING YOURSELF: 0
8. MOVING OR SPEAKING SO SLOWLY THAT OTHER PEOPLE COULD HAVE NOTICED. OR THE OPPOSITE, BEING SO FIGETY OR RESTLESS THAT YOU HAVE BEEN MOVING AROUND A LOT MORE THAN USUAL: 0
4. FEELING TIRED OR HAVING LITTLE ENERGY: 1
2. FEELING DOWN, DEPRESSED OR HOPELESS: 1
7. TROUBLE CONCENTRATING ON THINGS, SUCH AS READING THE NEWSPAPER OR WATCHING TELEVISION: 2
1. LITTLE INTEREST OR PLEASURE IN DOING THINGS: 1
5. POOR APPETITE OR OVEREATING: 0
SUM OF ALL RESPONSES TO PHQ QUESTIONS 1-9: 6
6. FEELING BAD ABOUT YOURSELF - OR THAT YOU ARE A FAILURE OR HAVE LET YOURSELF OR YOUR FAMILY DOWN: 1
SUM OF ALL RESPONSES TO PHQ QUESTIONS 1-9: 6
3. TROUBLE FALLING OR STAYING ASLEEP: 0
SUM OF ALL RESPONSES TO PHQ9 QUESTIONS 1 & 2: 2

## 2022-07-25 ASSESSMENT — ENCOUNTER SYMPTOMS
RESPIRATORY NEGATIVE: 1
ALLERGIC/IMMUNOLOGIC NEGATIVE: 1

## 2022-07-25 ASSESSMENT — SOCIAL DETERMINANTS OF HEALTH (SDOH): HOW HARD IS IT FOR YOU TO PAY FOR THE VERY BASICS LIKE FOOD, HOUSING, MEDICAL CARE, AND HEATING?: NOT HARD AT ALL

## 2022-07-25 NOTE — PROGRESS NOTES
Claude Dailey (:  1975) is a 49 y.o. female,Established patient, here for evaluation of the following chief complaint(s):  Hip Pain (Right side)         ASSESSMENT/PLAN:    Nupur Mcdonough was seen today for hip pain. Diagnoses and all orders for this visit:    Chronic right hip pain  -     Rosie Horvath MD, Orthopedics and Sports Medicine, [facility]; Future    OAB (overactive bladder)  -     mirabegron (MYRBETRIQ) 50 MG TB24; Take 50 mg by mouth in the morning. Other orders  -     OXcarbazepine (TRILEPTAL) 600 MG tablet; One tablet twice a day     Always walk with a cane  Continue to drink plenty of water  Heating pad to hip for pain  Stop smoking         Subjective   SUBJECTIVE/OBJECTIVE:  HPI Complaining of right hip pain has been causing so much pain makes it impossible for her to walk. Eats at times, the pain keeps her up at night. Review of Systems   Constitutional: Negative. HENT: Negative. Respiratory: Negative. Cardiovascular: Negative. Endocrine: Negative. Genitourinary: Negative. Musculoskeletal:  Positive for arthralgias. Skin: Negative. Allergic/Immunologic: Negative. Neurological: Negative. Hematological: Negative. Psychiatric/Behavioral: Negative. Vitals:    22 1113   BP: 108/60   Pulse: 75   SpO2: 97%      BP Readings from Last 3 Encounters:   22 108/60   22 (!) 148/86   22 120/82      Wt Readings from Last 3 Encounters:   22 298 lb (135.2 kg)   22 298 lb (135.2 kg)   22 298 lb (135.2 kg)      Objective   Physical Exam  Constitutional:       Appearance: She is obese. Cardiovascular:      Rate and Rhythm: Normal rate and regular rhythm. Heart sounds: Normal heart sounds. Pulmonary:      Effort: Pulmonary effort is normal.      Breath sounds: Normal breath sounds and air entry.    Genitourinary:     Comments: Continues to complain of bladder leakage, will increase dosage to 50 mg  Musculoskeletal:      Right hip: Decreased range of motion. Left hip: Normal.        Legs:       Comments: Pain noted in buccal area with approximately 40 degrees rotation for abduction at ad duction   Skin:     General: Skin is warm and dry. Neurological:      Mental Status: She is alert. Psychiatric:         Mood and Affect: Mood is anxious and depressed. Speech: Speech is rapid and pressured. Comments: Appears less anxious once symptomology was explained to her in the effects she will have since she is so obese. An electronic signature was used to authenticate this note.     --HARRISON Bardales - CNP

## 2022-07-25 NOTE — PATIENT INSTRUCTIONS
Always walk with a cane  Continue to drink plenty of water  Heating pad to hip for pain  Stop smoking

## 2022-08-05 ENCOUNTER — HOSPITAL ENCOUNTER (OUTPATIENT)
Dept: MRI IMAGING | Age: 47
Discharge: HOME | End: 2022-08-05
Payer: COMMERCIAL

## 2022-08-05 DIAGNOSIS — M25.551 CHRONIC RIGHT HIP PAIN: ICD-10-CM

## 2022-08-05 DIAGNOSIS — G89.29 CHRONIC RIGHT HIP PAIN: ICD-10-CM

## 2022-08-05 PROCEDURE — 73721 MRI JNT OF LWR EXTRE W/O DYE: CPT

## 2022-08-08 ENCOUNTER — OFFICE VISIT (OUTPATIENT)
Dept: ORTHOPEDIC SURGERY | Age: 47
End: 2022-08-08
Payer: COMMERCIAL

## 2022-08-08 VITALS — RESPIRATION RATE: 12 BRPM | HEIGHT: 63 IN | WEIGHT: 293 LBS | BODY MASS INDEX: 51.91 KG/M2

## 2022-08-08 DIAGNOSIS — M25.559 HIP PAIN: ICD-10-CM

## 2022-08-08 DIAGNOSIS — M70.61 GREATER TROCHANTERIC BURSITIS OF RIGHT HIP: ICD-10-CM

## 2022-08-08 DIAGNOSIS — M25.851 HIP IMPINGEMENT SYNDROME, RIGHT: ICD-10-CM

## 2022-08-08 DIAGNOSIS — M16.11 PRIMARY OSTEOARTHRITIS OF RIGHT HIP: Primary | ICD-10-CM

## 2022-08-08 PROCEDURE — 99204 OFFICE O/P NEW MOD 45 MIN: CPT | Performed by: ORTHOPAEDIC SURGERY

## 2022-08-08 RX ORDER — MELOXICAM 15 MG/1
15 TABLET ORAL DAILY
Qty: 30 TABLET | Refills: 0 | Status: SHIPPED | OUTPATIENT
Start: 2022-08-08 | End: 2022-09-07

## 2022-08-08 NOTE — LETTER
Physical Therapy Rehabilitation Referral    Patient Name: Diana Long MRN: [id number]  DOS: 8/8/2022     Diagnosis:   1. Primary osteoarthritis of right hip    2. Hip impingement syndrome, right    3. Greater trochanteric bursitis of right hip    4.  Hip pain            Precautions:     [x] Evaluate and Treat    Post Op Instructions:  [] 20 lb flat foot weight bearing x 3 weeks with crutches  [] 20 lb flat foot weight bearing x 4 weeks with crutches (cartilage repair protocol)  [] Weight bearing as tolerated x 2 weeks with crutches  [] No active abduction x 6 weeks (abductor repair protocol)  [] Continuous passive motion (CPM)   [] AAROM: Flexion to 90   [] Uni-planar passive range of motion   [] AAROM: External rotation to 10   [] Hip brace on at all times    [] AAROM: Extension to 10   [] Hip brace when hip at risk     [] AAROM:  Neutral IR   [] Home exercise program (copy to patient)   [] AAROM: Abduction to 25    [] Isometric external rotator strengthening    [] Isometric glute max strengthening     [] Isometric abductor strengthening     [] Leg Circumduction (PT and family member assisted x 3 weeks)                 Post-op Phases:  []Phase I: Maximum Protection (Day 1-3 Weeks)  []Phase II: Mobility & Neuromuscular Retraining (3-6 Weeks)  []Phase III: Phase III: Muscle Balance and Strengthening (6-12 Weeks)  []Phase IV: Functional Training of the Hip & Lower Extremity (12-18 Weeks)  []Phase V: Advanced Training - Specificity for Return to Sport and/or Work (18-24 [employer])    Non-Operative & Pre-Operative Rehabilitation:    Cardiovascular:            Deep Hip Rotators  [x] Upright Bike (Baseline)           [x] External Rotators AROM in 4PK (Baseline)  [] Walking (Progression 1)           [x] Internal Rotators AROM in 4PK (Baseline)  [] Running (Progression 2)           [] ER in side lying (Progression 1)  [] Dynamic Loading (Progression 3)          [] IR in side lying (Progression 1)                [] ER with resistance in 4PK (Progression 2)                [] IR with resistance in 4PK (Progression 2)                 [] Lateral Step Up (Progression 3)    Positioning:  [x] 4PK with pelvic tilts (Baseline)  [] Pelvic tilts in sitting (Progression 1)      Core:   [x] Relaxation Breathing (Baseline)         [] [place] lying elbow presses (Progression 1)  [x] Side Planks (Baseline)         [] Side planks + hip abduction (Progression 1)  [x] Bird-Dog (Baseline)            [] Bird-Dog with resistance (Progression 1)    Glute Complex:            Load Transfer:  [x] Bridging (Baseline)             [x] Weight Shifting (Baseline)     [] Single Leg Bridge (Progression 1)        [] Single Leg Stance to SEBT(Progression 1)     [] Single Leg Lower (Progression 2)         [] Hip hinge + monster walks (Progression 2)       Mobility:  [x] Leonard position with breathing (baseline)  [x] Hip Hinge with stick & neutral spine (baseline)  [x] Sit to stand (baseline)  [] Hip Hinge without guidance (Progression 1)  [] Hip Hinge with resisted punch out guidance (Progression 2)      Pelvic Floor:  [] Relaxation breathing         Activities:       [] Rowing       [] Stepper/Exercise bike     [] Swimming  [] Water exercises    Modalities:     Return to Sport:  [x] Of Choice      [] Plyometrics  [] Ultrasound     [] Rhythmic stabilization  [] Iontophoresis    [] Core strengthening   [] Moist heat     [] Sports specific program:   [] Massage         [x] Cryotherapy      [] Electrical stimulation     [] Paraffin  [] Whirlpool  [] TENS    [x] Home exercise program (copy to patient).         Perform exercises for:   15     minutes    3      times/day  [x] Supervised physical therapy  Frequency: []  1x week  [x] 2x week  [] 3x week  [] Other:   Duration: [] 2 weeks   [] 4 weeks  [x] 6 weeks  [] Other:     Additional Instructions:           Sincerely,    Jillian Camacho MD [facility] [place] -

## 2022-08-08 NOTE — LETTER
[facility]  [address] [address]  Phone: [phone]  Fax: [phone]    Mel Peterson MD    August 9, 2022     Rebekah Dear, [place] [address]  [id number]    Patient: Mitra Fu   MR Number: [id number]   YOB: 1975   Date of Visit: 8/8/2022       Dear Rebekah Dear: Thank you for referring Souleymane Valencia to me for evaluation/treatment. Below are the relevant portions of my assessment and plan of care. If you have questions, please do not hesitate to call me. I look forward to following Hayley Mauricio along with you.     Sincerely,      Mel Peterson MD
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
## 2022-08-15 ENCOUNTER — OFFICE VISIT (OUTPATIENT)
Dept: INTERNAL MEDICINE CLINIC | Age: 47
End: 2022-08-15
Payer: COMMERCIAL

## 2022-08-15 VITALS
BODY MASS INDEX: 51.91 KG/M2 | HEIGHT: 63 IN | WEIGHT: 293 LBS | SYSTOLIC BLOOD PRESSURE: 132 MMHG | DIASTOLIC BLOOD PRESSURE: 88 MMHG | HEART RATE: 75 BPM | OXYGEN SATURATION: 95 %

## 2022-08-15 DIAGNOSIS — G47.33 OSA (OBSTRUCTIVE SLEEP APNEA): Primary | ICD-10-CM

## 2022-08-15 DIAGNOSIS — E66.01 MORBID OBESITY WITH BMI OF 40.0-44.9, ADULT (HCC): ICD-10-CM

## 2022-08-15 DIAGNOSIS — R42 DIZZINESS: ICD-10-CM

## 2022-08-15 DIAGNOSIS — H81.13 BENIGN PAROXYSMAL POSITIONAL VERTIGO DUE TO BILATERAL VESTIBULAR DISORDER: ICD-10-CM

## 2022-08-15 LAB
A/G RATIO: 1.4 (ref 1.1–2.2)
ALBUMIN SERPL-MCNC: 3.5 G/DL (ref 3.4–5)
ALP BLD-CCNC: 122 U/L (ref 40–129)
ALT SERPL-CCNC: 22 U/L (ref 10–40)
ANION GAP SERPL CALCULATED.3IONS-SCNC: 12 MMOL/L (ref 3–16)
AST SERPL-CCNC: 19 U/L (ref 15–37)
BASOPHILS ABSOLUTE: 0.1 K/UL (ref 0–0.2)
BASOPHILS RELATIVE PERCENT: 0.5 %
BILIRUB SERPL-MCNC: 0.3 MG/DL (ref 0–1)
BUN BLDV-MCNC: 7 MG/DL (ref 7–20)
CALCIUM SERPL-MCNC: 8.5 MG/DL (ref 8.3–10.6)
CHLORIDE BLD-SCNC: 93 MMOL/L (ref 99–110)
CO2: 27 MMOL/L (ref 21–32)
CREAT SERPL-MCNC: <0.5 MG/DL (ref 0.6–1.1)
EOSINOPHILS ABSOLUTE: 0.2 K/UL (ref 0–0.6)
EOSINOPHILS RELATIVE PERCENT: 2 %
GFR AFRICAN AMERICAN: >60
GFR NON-AFRICAN AMERICAN: >60
GLUCOSE BLD-MCNC: 88 MG/DL (ref 70–99)
HCT VFR BLD CALC: 39.7 % (ref 36–48)
HEMOGLOBIN: 13.7 G/DL (ref 12–16)
LYMPHOCYTES ABSOLUTE: 1.7 K/UL (ref 1–5.1)
LYMPHOCYTES RELATIVE PERCENT: 17 %
MCH RBC QN AUTO: 31.8 PG (ref 26–34)
MCHC RBC AUTO-ENTMCNC: 34.5 G/DL (ref 31–36)
MCV RBC AUTO: 92.2 FL (ref 80–100)
MONOCYTES ABSOLUTE: 0.9 K/UL (ref 0–1.3)
MONOCYTES RELATIVE PERCENT: 9.5 %
NEUTROPHILS ABSOLUTE: 7 K/UL (ref 1.7–7.7)
NEUTROPHILS RELATIVE PERCENT: 71 %
PDW BLD-RTO: 13.8 % (ref 12.4–15.4)
PLATELET # BLD: 196 K/UL (ref 135–450)
PMV BLD AUTO: 8.9 FL (ref 5–10.5)
POTASSIUM SERPL-SCNC: 4.1 MMOL/L (ref 3.5–5.1)
RBC # BLD: 4.3 M/UL (ref 4–5.2)
SODIUM BLD-SCNC: 132 MMOL/L (ref 136–145)
TOTAL PROTEIN: 6 G/DL (ref 6.4–8.2)
WBC # BLD: 9.9 K/UL (ref 4–11)

## 2022-08-15 PROCEDURE — 99213 OFFICE O/P EST LOW 20 MIN: CPT | Performed by: INTERNAL MEDICINE

## 2022-08-15 RX ORDER — MECLIZINE HYDROCHLORIDE 25 MG/1
25 TABLET ORAL 3 TIMES DAILY PRN
Qty: 30 TABLET | Refills: 0 | Status: SHIPPED | OUTPATIENT
Start: 2022-08-15 | End: 2022-09-14

## 2022-08-15 ASSESSMENT — ENCOUNTER SYMPTOMS
NAUSEA: 1
RESPIRATORY NEGATIVE: 1
COUGH: 0
SWOLLEN GLANDS: 0
SINUS PAIN: 0
EYE DISCHARGE: 0
SINUS PRESSURE: 0
RHINORRHEA: 0
RECTAL PAIN: 0
VOMITING: 0
VISUAL CHANGE: 0
CHANGE IN BOWEL HABIT: 0
SORE THROAT: 0
DIARRHEA: 1
ABDOMINAL PAIN: 1
ALLERGIC/IMMUNOLOGIC NEGATIVE: 1

## 2022-08-15 ASSESSMENT — ANXIETY QUESTIONNAIRES
5. BEING SO RESTLESS THAT IT IS HARD TO SIT STILL: 0
IF YOU CHECKED OFF ANY PROBLEMS ON THIS QUESTIONNAIRE, HOW DIFFICULT HAVE THESE PROBLEMS MADE IT FOR YOU TO DO YOUR WORK, TAKE CARE OF THINGS AT HOME, OR GET ALONG WITH OTHER PEOPLE: NOT DIFFICULT AT ALL
GAD7 TOTAL SCORE: 1
4. TROUBLE RELAXING: 0
6. BECOMING EASILY ANNOYED OR IRRITABLE: 0
3. WORRYING TOO MUCH ABOUT DIFFERENT THINGS: 0
2. NOT BEING ABLE TO STOP OR CONTROL WORRYING: 0
1. FEELING NERVOUS, ANXIOUS, OR ON EDGE: 1
7. FEELING AFRAID AS IF SOMETHING AWFUL MIGHT HAPPEN: 0

## 2022-08-15 ASSESSMENT — PATIENT HEALTH QUESTIONNAIRE - PHQ9
SUM OF ALL RESPONSES TO PHQ QUESTIONS 1-9: 13
SUM OF ALL RESPONSES TO PHQ QUESTIONS 1-9: 13
9. THOUGHTS THAT YOU WOULD BE BETTER OFF DEAD, OR OF HURTING YOURSELF: 0
7. TROUBLE CONCENTRATING ON THINGS, SUCH AS READING THE NEWSPAPER OR WATCHING TELEVISION: 3
3. TROUBLE FALLING OR STAYING ASLEEP: 1
5. POOR APPETITE OR OVEREATING: 3
10. IF YOU CHECKED OFF ANY PROBLEMS, HOW DIFFICULT HAVE THESE PROBLEMS MADE IT FOR YOU TO DO YOUR WORK, TAKE CARE OF THINGS AT HOME, OR GET ALONG WITH OTHER PEOPLE: 0
SUM OF ALL RESPONSES TO PHQ QUESTIONS 1-9: 13
8. MOVING OR SPEAKING SO SLOWLY THAT OTHER PEOPLE COULD HAVE NOTICED. OR THE OPPOSITE, BEING SO FIGETY OR RESTLESS THAT YOU HAVE BEEN MOVING AROUND A LOT MORE THAN USUAL: 0
2. FEELING DOWN, DEPRESSED OR HOPELESS: 1
SUM OF ALL RESPONSES TO PHQ9 QUESTIONS 1 & 2: 2
1. LITTLE INTEREST OR PLEASURE IN DOING THINGS: 1
6. FEELING BAD ABOUT YOURSELF - OR THAT YOU ARE A FAILURE OR HAVE LET YOURSELF OR YOUR FAMILY DOWN: 1
SUM OF ALL RESPONSES TO PHQ QUESTIONS 1-9: 13
4. FEELING TIRED OR HAVING LITTLE ENERGY: 3
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
## 2022-08-15 NOTE — LETTER
[address] [address]  Phone: [phone]  Fax: [phone]    Jerica Hurtado MD           August 15, 2022     Patient: Dejah Santizo   YOB: 1975   Date of Visit: 8/15/2022       To Whom It May Concern: It is my medical opinion that Kristi Malhotra may return to work on 8/17/2022. If you have any questions or concerns, please don't hesitate to call.     Sincerely,    .Sy Gomez MD

## 2022-08-16 ENCOUNTER — TELEPHONE (OUTPATIENT)
Dept: INTERNAL MEDICINE CLINIC | Age: 47
End: 2022-08-16

## 2022-08-16 RX ORDER — FUROSEMIDE 40 MG/1
20 TABLET ORAL DAILY
Qty: 60 TABLET | Refills: 3 | Status: SHIPPED | OUTPATIENT
Start: 2022-08-16

## 2022-08-16 NOTE — TELEPHONE ENCOUNTER
----- Message from Mark Juanita sent at 8/16/2022 11:10 AM EDT -----  Subject: Message to Provider    QUESTIONS  Information for Provider? Patient would like Jacquie Blake to give her a call. Sodium level is low and wants to discuss.  Please call asap for next step.  ---------------------------------------------------------------------------  --------------  Dimitry ROBLERO  [phone]; OK to leave message on voicemail  ---------------------------------------------------------------------------  --------------  SCRIPT ANSWERS  undefined

## 2022-08-17 ENCOUNTER — HOSPITAL ENCOUNTER (OUTPATIENT)
Dept: PHYSICAL THERAPY | Age: 47
Setting detail: THERAPIES SERIES
Discharge: HOME | End: 2022-08-17
Payer: COMMERCIAL

## 2022-08-17 NOTE — FLOWSHEET NOTE
Physical Therapy  Cancellation/No-show Note  Patient Name:  Nubia Belcher  :  1975   Date:  2022  Cancelled visits to date: 1  No-shows to date: 0    Patient status for today's appointment patient:  []  Cancelled  [x]  Rescheduled appointment 22  []  No-show     Reason given by patient:  []  Patient ill  []  Conflicting appointment  []  No transportation    []  Conflict with work  []  No reason given  [x]  Other:     Comments:  Patient was 25 mins late for evaluation, was unable to accommodate patient, so rescheduled her PT evaluation     Phone call information:   []  Phone call made today to patient at _ time at number provided:      []  Patient answered, conversation as follows:    []  Patient did not answer, message left as follows:  []  Phone call not made today  [x]  Phone call not needed - pt contacted us to cancel and provided reason for cancellation.      Electronically signed by:  Idalmis Parsons PT

## 2022-08-19 ENCOUNTER — TELEPHONE (OUTPATIENT)
Dept: INTERNAL MEDICINE CLINIC | Age: 47
End: 2022-08-19

## 2022-08-19 NOTE — TELEPHONE ENCOUNTER
Patient said she has a h/o hyponatremia. She is c/o feeling lightheaded, fatigued and being very dizzy for several days. Patient missed work yesterday and today, due to the severity of her symptoms. Patient said she needs a call back today to discuss her symptoms and how to manage them. She states she can not wait until next week for a response.

## 2022-08-20 ENCOUNTER — APPOINTMENT (OUTPATIENT)
Dept: GENERAL RADIOLOGY | Age: 47
End: 2022-08-20
Payer: COMMERCIAL

## 2022-08-20 ENCOUNTER — APPOINTMENT (OUTPATIENT)
Dept: CT IMAGING | Age: 47
End: 2022-08-20
Payer: COMMERCIAL

## 2022-08-20 ENCOUNTER — HOSPITAL ENCOUNTER (EMERGENCY)
Age: 47
Discharge: HOME | End: 2022-08-20
Attending: STUDENT IN AN ORGANIZED HEALTH CARE EDUCATION/TRAINING PROGRAM
Payer: COMMERCIAL

## 2022-08-20 VITALS
WEIGHT: 293 LBS | RESPIRATION RATE: 17 BRPM | BODY MASS INDEX: 51.91 KG/M2 | SYSTOLIC BLOOD PRESSURE: 137 MMHG | OXYGEN SATURATION: 94 % | HEIGHT: 63 IN | HEART RATE: 65 BPM | DIASTOLIC BLOOD PRESSURE: 73 MMHG | TEMPERATURE: 98.2 F

## 2022-08-20 DIAGNOSIS — R42 LIGHTHEADEDNESS: Primary | ICD-10-CM

## 2022-08-20 LAB
A/G RATIO: 1.1 (ref 1.1–2.2)
ALBUMIN SERPL-MCNC: 3.9 G/DL (ref 3.4–5)
ALP BLD-CCNC: 135 U/L (ref 40–129)
ALT SERPL-CCNC: 31 U/L (ref 10–40)
ANION GAP SERPL CALCULATED.3IONS-SCNC: 10 MMOL/L (ref 3–16)
AST SERPL-CCNC: 27 U/L (ref 15–37)
BASOPHILS ABSOLUTE: 0.1 K/UL (ref 0–0.2)
BASOPHILS RELATIVE PERCENT: 0.9 %
BILIRUB SERPL-MCNC: <0.2 MG/DL (ref 0–1)
BUN BLDV-MCNC: 4 MG/DL (ref 7–20)
CALCIUM SERPL-MCNC: 9.8 MG/DL (ref 8.3–10.6)
CHLORIDE BLD-SCNC: 97 MMOL/L (ref 99–110)
CO2: 27 MMOL/L (ref 21–32)
CREAT SERPL-MCNC: <0.5 MG/DL (ref 0.6–1.1)
EOSINOPHILS ABSOLUTE: 0.2 K/UL (ref 0–0.6)
EOSINOPHILS RELATIVE PERCENT: 1.6 %
GFR AFRICAN AMERICAN: >60
GFR NON-AFRICAN AMERICAN: >60
GLUCOSE BLD-MCNC: 107 MG/DL (ref 70–99)
HCT VFR BLD CALC: 44.9 % (ref 36–48)
HEMOGLOBIN: 15.1 G/DL (ref 12–16)
LYMPHOCYTES ABSOLUTE: 2.7 K/UL (ref 1–5.1)
LYMPHOCYTES RELATIVE PERCENT: 24 %
MCH RBC QN AUTO: 31.1 PG (ref 26–34)
MCHC RBC AUTO-ENTMCNC: 33.7 G/DL (ref 31–36)
MCV RBC AUTO: 92.1 FL (ref 80–100)
MONOCYTES ABSOLUTE: 0.9 K/UL (ref 0–1.3)
MONOCYTES RELATIVE PERCENT: 8 %
NEUTROPHILS ABSOLUTE: 7.4 K/UL (ref 1.7–7.7)
NEUTROPHILS RELATIVE PERCENT: 65.5 %
PDW BLD-RTO: 13.7 % (ref 12.4–15.4)
PLATELET # BLD: 368 K/UL (ref 135–450)
PMV BLD AUTO: 8.2 FL (ref 5–10.5)
POTASSIUM SERPL-SCNC: 5 MMOL/L (ref 3.5–5.1)
RBC # BLD: 4.87 M/UL (ref 4–5.2)
SARS-COV-2, NAAT: NOT DETECTED
SODIUM BLD-SCNC: 134 MMOL/L (ref 136–145)
TOTAL PROTEIN: 7.6 G/DL (ref 6.4–8.2)
TROPONIN: <0.01 NG/ML
WBC # BLD: 11.3 K/UL (ref 4–11)

## 2022-08-20 PROCEDURE — 96360 HYDRATION IV INFUSION INIT: CPT

## 2022-08-20 PROCEDURE — 70450 CT HEAD/BRAIN W/O DYE: CPT

## 2022-08-20 PROCEDURE — 87635 SARS-COV-2 COVID-19 AMP PRB: CPT

## 2022-08-20 PROCEDURE — 70498 CT ANGIOGRAPHY NECK: CPT

## 2022-08-20 PROCEDURE — 93005 ELECTROCARDIOGRAM TRACING: CPT | Performed by: PHYSICIAN ASSISTANT

## 2022-08-20 PROCEDURE — 6370000000 HC RX 637 (ALT 250 FOR IP): Performed by: STUDENT IN AN ORGANIZED HEALTH CARE EDUCATION/TRAINING PROGRAM

## 2022-08-20 PROCEDURE — 99285 EMERGENCY DEPT VISIT HI MDM: CPT

## 2022-08-20 PROCEDURE — 71045 X-RAY EXAM CHEST 1 VIEW: CPT

## 2022-08-20 PROCEDURE — 80053 COMPREHEN METABOLIC PANEL: CPT

## 2022-08-20 PROCEDURE — 85025 COMPLETE CBC W/AUTO DIFF WBC: CPT

## 2022-08-20 PROCEDURE — 2580000003 HC RX 258: Performed by: PHYSICIAN ASSISTANT

## 2022-08-20 PROCEDURE — 6360000004 HC RX CONTRAST MEDICATION: Performed by: STUDENT IN AN ORGANIZED HEALTH CARE EDUCATION/TRAINING PROGRAM

## 2022-08-20 PROCEDURE — 84484 ASSAY OF TROPONIN QUANT: CPT

## 2022-08-20 RX ORDER — MECLIZINE HCL 12.5 MG/1
25 TABLET ORAL ONCE
Status: COMPLETED | OUTPATIENT
Start: 2022-08-20 | End: 2022-08-20

## 2022-08-20 RX ORDER — 0.9 % SODIUM CHLORIDE 0.9 %
1000 INTRAVENOUS SOLUTION INTRAVENOUS ONCE
Status: COMPLETED | OUTPATIENT
Start: 2022-08-20 | End: 2022-08-20

## 2022-08-20 RX ADMIN — MECLIZINE 25 MG: 12.5 TABLET ORAL at 19:38

## 2022-08-20 RX ADMIN — SODIUM CHLORIDE 1000 ML: 9 INJECTION, SOLUTION INTRAVENOUS at 18:27

## 2022-08-20 RX ADMIN — IOPAMIDOL 75 ML: 755 INJECTION, SOLUTION INTRAVENOUS at 21:14

## 2022-08-20 ASSESSMENT — ENCOUNTER SYMPTOMS
VOMITING: 0
COLOR CHANGE: 0
NAUSEA: 0
ABDOMINAL PAIN: 0
SHORTNESS OF BREATH: 0
SINUS PRESSURE: 1
DIARRHEA: 1
COUGH: 1
CONSTIPATION: 0
SINUS PAIN: 1

## 2022-08-20 ASSESSMENT — PAIN - FUNCTIONAL ASSESSMENT: PAIN_FUNCTIONAL_ASSESSMENT: NONE - DENIES PAIN

## 2022-08-20 NOTE — Clinical Note
Theron Georges was seen and treated in our emergency department on 8/20/2022. She may return to work on 08/23/2022. If you have any questions or concerns, please don't hesitate to call.       Tristan Schneider MD

## 2022-08-20 NOTE — ED PROVIDER NOTES
In addition to the advanced practice provider, I personally saw Gabriele Cole and performed a substantive portion of the visit including all aspects of the medical decision making. Medical Decision Making  Pt with lightheadedness fatigue  Seen by PCP, told Na was low  No true dizziness. Does get lightheaded like a head rush when she stands up from a sitting position or when she sits up from a laying down position. Symptoms present for about 10 days. No falls or head trauma. Endorses good appetite. Has had some diarrhea at home no vomiting. Denies abdominal pain, recent fevers or chills. No chest pain or trouble breathing. On exam pt is resting comfortably  Cardiac RRR, no murmur  Lungs clear bilaterally, no increased work of breathing  Abdomen soft nontender  Patient is oriented x4. Speech is clear. No facial drooping. Extraocular movements are intact. No dysmetria on finger-to-nose bilaterally. Test of skew is negative bilaterally. There is no abnormal nystagmus. She has no drift in all 4 extremities. She ambulates steadily with the assistance of her cane. EKG  The Ekg interpreted by me in the absence of a cardiologist shows. Normal sinus rhythm with a ventricular rate of 72. Axis normal.  QTc appropriate. RV conduction delay changes noted. No specific ST or T wave abnormality. No significant change from prior 1-. Echo 2017 reviewed:  Findings   Left Ventricle   Left ventricular size is normal . Normal left ventricular wall thickness. Global ejection fraction is normal and estimated at 55 %. No regional wall   motion abnormalities are noted. Normal diastolic function. E/e'= 9.95 . Mitral Valve   Mitral valve is structurally normal. Trivial mitral regurgitation is   present. Left Atrium   Normal in size. Aortic Valve   The aortic valve is structurally normal. There is no significant aortic   regurgitation.    Aorta   The aortic root is normal in size.   Right Ventricle   The right ventricle is normal in size and function. Tricuspid Valve   Tricuspid valve is structurally normal. There is trivial tricuspid   regurgitation with RVSP estimated at 23 mmHg. Right Atrium   The right atrium is normal in size. Pulmonic Valve   Not well seen. No evidence of pulmonic valve regurgitation. SEP-1  Is this patient to be included in the SEP-1 Core Measure due to severe sepsis or septic shock? No   Exclusion criteria - the patient is NOT to be included for SEP-1 Core Measure due to: Infection is not suspected      CTA HEAD NECK W CONTRAST   Final Result   No large vessel occlusion visualized within the head or neck. This scan was analyzed using [employer]. ai contact LVO. Identification of suspected   findings is not for diagnostic use beyond notification. Viz LVO is limited to   analysis of imaging data and should not be used in-lieu of full patient   evaluation or relied upon to make or confirm diagnosis. RECOMMENDATIONS:   Unavailable         CT HEAD WO CONTRAST   Final Result   No acute intracranial abnormality.          XR CHEST PORTABLE   Final Result   No acute findings           Labs Reviewed   CBC WITH AUTO DIFFERENTIAL - Abnormal; Notable for the following components:       Result Value    WBC 11.3 (*)     All other components within normal limits   COMPREHENSIVE METABOLIC PANEL - Abnormal; Notable for the following components:    Sodium 134 (*)     Chloride 97 (*)     Glucose 107 (*)     BUN 4 (*)     Creatinine <0.5 (*)     Alkaline Phosphatase 135 (*)     All other components within normal limits   COVID-19, RAPID   TROPONIN     Medications   0.9 % sodium chloride bolus (0 mLs IntraVENous Stopped 8/20/22 1936)   meclizine (ANTIVERT) tablet 25 mg (25 mg Oral Given 8/20/22 1938)   iopamidol (ISOVUE-370) 76 % injection 75 mL (75 mLs IntraVENous Given 8/20/22 2114)     Course and MDM:    Patient presents for evaluation of lightheaded sensation x2 days.  NIH is 0 here. She is hemodynamically stable and very comfortable appearing at rest.  EKG troponin are not suggestive of acute cardiac ischemia. She has no clinically significant electrolyte derangement or NEVA today. No anemia to explain her symptoms. She is denying any active infectious symptoms, has no fever or leukocytosis here. She does not meet sepsis criteria. She is able to ambulate steadily with the assistance of a cane which is baseline for her. I did perform CT/CTA head and neck as she had some stroke risk factors including obesity and smoking. CT/CTA is not showing any signs of vertebrobasilar insufficiency, LVO, ICH. I offered this patient admission for PT OT as she is still symptomatic after fluids and Antivert. She feels well for discharge home today and prefers this. She is requesting a work note for several days which I provided. She will need further follow-up but is currently stable to do this as an outpatient. I will refer her to her PCP for further testing, plus or minus echo or brain MRI. She would not be tPA or thrombectomy candidate even if she has had a stroke today due to extended time since last known well, low NIH/low debility. She will return to ER for any new or worsening symptoms, feeling near syncopal, chest pain or trouble breathing. Patient Referrals:  Dean Núñez, APRN - CNP  [address] [facility] [facility]  [phone]    In 1 week      Discharge Medications:  New Prescriptions    No medications on file       FINAL IMPRESSION  1. Lightheadedness        Blood pressure 137/73, pulse 65, temperature 98.2 °F (36.8 °C), temperature source Oral, resp. rate 17, height 5' 3\" (1.6 m), weight 293 lb (132.9 kg), SpO2 94 %, not currently breastfeeding.      For further details of [address] emergency department encounter, please see documentation by advanced practice provider     Jaelyn Isaac MD  08/20/22 5091
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
## 2022-08-21 LAB
EKG ATRIAL RATE: 72 BPM
EKG DIAGNOSIS: NORMAL
EKG P AXIS: 53 DEGREES
EKG P-R INTERVAL: 170 MS
EKG Q-T INTERVAL: 404 MS
EKG QRS DURATION: 94 MS
EKG QTC CALCULATION (BAZETT): 442 MS
EKG R AXIS: 7 DEGREES
EKG T AXIS: 41 DEGREES
EKG VENTRICULAR RATE: 72 BPM

## 2022-08-21 PROCEDURE — 93010 ELECTROCARDIOGRAM REPORT: CPT | Performed by: INTERNAL MEDICINE

## 2022-08-22 ENCOUNTER — HOSPITAL ENCOUNTER (OUTPATIENT)
Dept: PHYSICAL THERAPY | Age: 47
Setting detail: THERAPIES SERIES
Discharge: HOME | End: 2022-08-22
Payer: COMMERCIAL

## 2022-08-22 PROCEDURE — 97162 PT EVAL MOD COMPLEX 30 MIN: CPT
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
## 2022-08-22 NOTE — TELEPHONE ENCOUNTER
Patient went to ED on 8/20. She has an appointment tomorrow but she would like to know what to do now.

## 2022-08-22 NOTE — FLOWSHEET NOTE
[address] [place] [address]  Phone: [phone]   Fax: [phone]    Physical Therapy Treatment Note/ Progress Report:     Date:  2022    Patient Name:  Yariel Cheema    :  1975  MRN: [id number]  Restrictions/Precautions:    Medical/Treatment Diagnosis Information:  Diagnosis: M25.559 - Hip pain  M16.11 - Primary osteoarthritis of right hip  M25.851 - Hip impingement syndrome, right  M70.61 - Greater trochanteric bursitis of right hip  Treatment Diagnosis: R hip pain, dec BLE ROM, dec BLE strength, dec endurance, dec BLE muscle length, antalgic gait, balance deficits, lightheadedness and dizziness  Insurance/Certification information:  PT Insurance Information: [employer], copay $60, 20 PT/OT/speech combined - [employer], reauth after 12 visit  Physician Information:  Des Berry MD    Plan of care signed (Y/N): []  Yes [x]  No     Date of Patient follow up with Physician:      Progress Report: []  Yes  [x]  No     Date Range for reporting period:  Beginnin22  Ending:     Progress report due (10 Rx/or 30 days whichever is less): visit #10 or 69     Recertification due (POC duration/ or 90 days whichever is less): visit #20 or 22     Visit # Insurance Allowable Auth required? Date Range    20 combined PT/OT/Speech - [employer] [x]  Yes, [employer] after 12th visit  []  No 12th visit     Latex Allergy:  [x]NO      []YES  Preferred Language for Healthcare:   [x]English       []other:    Functional Scale:           Date assessed:  TO physical FS primary measure score = 50; risk adjusted = 44  22    Pain level:  310     SUBJECTIVE:  See eval    OBJECTIVE: See eval      RESTRICTIONS/PRECAUTIONS: : light headedness, dizziness, Seizure 3 months ago (controlled with medication), HTN (controlled), anxiety/depression (controlled).     Exercises/Interventions:     Therapeutic Exercise ()  Resistance / level Sets/sec Reps Notes / Cues 6MWT -  NPV       Standing Isometrics:  Quad sets  Hip add with towel or ball   Hip abd with towel or ball       3 way hip       squats                                          Therapeutic Activities (72634)       5xSTS - NPV       30\" sit to stand - NPV                     Neuromuscular Re-ed (34933)                            Manual Intervention (88223)       Knee mobs/PROM       Tib/Fem Mobs       Patella Mobs       Ankle mobs                         Modalities:     Pt. Education:  -pt educated on diagnosis, prognosis and expectations for rehab  -all pt questions were answered    Home Exercise Program:  -will provided HEP at NPV    Therapeutic Exercise and NMR EXR  [] (74240) Provided verbal/tactile cueing for activities related to strengthening, flexibility, endurance, ROM for improvements in LE, proximal hip, and core control with self care, mobility, lifting, ambulation.  [] (86026) Provided verbal/tactile cueing for activities related to improving balance, coordination, kinesthetic sense, posture, motor skill, proprioception  to assist with LE, proximal hip, and core control in self care, mobility, lifting, ambulation and eccentric single leg control.   [] (54028) Therapist is in constant attendance of 2 or more patients providing skilled therapy interventions, but not providing any significant amount of measurable one-on-one time to either patient, for improvements in LE, proximal hip, and core control in self care, mobility, lifting, ambulation and eccentric single leg control.      NMR and Therapeutic Activities:    [] (86606 or 45245) Provided verbal/tactile cueing for activities related to improving balance, coordination, kinesthetic sense, posture, motor skill, proprioception and motor activation to allow for proper function of core, proximal hip and LE with self care and ADLs  [] (33325) Gait Re-education- Provided training and instruction to the patient for proper LE, core and proximal hip recruitment and positioning and eccentric body weight control with ambulation re-education including up and down stairs     Home Exercise Program:    [] (86168) Reviewed/Progressed HEP activities related to strengthening, flexibility, endurance, ROM of core, proximal hip and LE for functional self-care, mobility, lifting and ambulation/stair navigation   [] (29760)Reviewed/Progressed HEP activities related to improving balance, coordination, kinesthetic sense, posture, motor skill, proprioception of core, proximal hip and LE for self care, mobility, lifting, and ambulation/stair navigation      Manual Treatments:  PROM / STM / Oscillations-Mobs:  G-I, II, III, IV (PA's, Inf., Post.)  [] (06784) Provided manual therapy to mobilize LE, proximal hip and/or LS spine soft tissue/joints for the purpose of modulating pain, promoting relaxation,  increasing ROM, reducing/eliminating soft tissue swelling/inflammation/restriction, improving soft tissue extensibility and allowing for proper ROM for normal function with self care, mobility, lifting and ambulation. Modalities:  [] (76829) Vasopneumatic compression: Utilized vasopneumatic compression to decrease edema / swelling for the purpose of improving mobility and quad tone / recruitment which will allow for increased overall function including but not limited to self-care, transfers, ambulation, and ascending / descending stairs.        Charges:  Timed Code Treatment Minutes: 45   Total Treatment Minutes: 45     [] EVAL - LOW (47089)   [x] EVAL - MOD (54107)  [] EVAL - HIGH (63474)  [] RE-EVAL (61133)  [] JQ(36471) x       [] Ionto  [] NMR (86388) x       [] Vaso  [] Manual (63157) x       [] Ultrasound  [] TA x        [] Mech Traction (61298)  [] Aquatic Therapy x     [] ES (un) (53650):   [] Home Management Training x  [] ES(attended) (23004)   [] Dry Needling 1-2 muscles (04441):  [] Dry Needling 3+ muscles (015602  [] Group:      [] Other:     GOALS:  Patient stated goal: Pt reports wanting to decrease pain and improve activity tolerance. [] Progressing: [] Met: [] Not Met: [] Adjusted     Therapist goals for Patient:   Short Term Goals: To be achieved in: 2 weeks  1. Independent in HEP and progression per patient tolerance, in order to prevent re-injury. [] Progressing: [] Met: [] Not Met: [] Adjusted  2. Patient will have a decrease in R hip pain to facilitate improvement in movement, function, and ADLs as indicated by Functional Deficits. [] Progressing: [] Met: [] Not Met: [] Adjusted     Long Term Goals: To be achieved in: 8 weeks  1. FOTO score of at least 60 to demonstrate improvements in quality of life and activity tolerance. [] Progressing: [] Met: [] Not Met: [] Adjusted  2. Pt will demonstrate an increase in FARRIS Balance score by 9 (MCID) to denote improvements in balance and decrease risk of falls. [] Progressing: [] Met: [] Not Met: [] Adjusted  3. Pt will be able to tolerate standing for up to 20 minutes without an increase in pain greater than 4/10 or with minimal lightheadedness to improve ability to independently cook and clean at home. [] Progressing: [] Met: [] Not Met: [] Adjusted  3. Patient will demonstrate an increase in hip and BLE Strength to at least 4/5 as well as good proximal hip strength and control to allow for proper functional mobility as indicated by patients Functional Deficits. [] Progressing: [] Met: [] Not Met: [] Adjusted  4. Patient will ambulate for 1765 feet (age matched norm) in 6 minutes without assistive device to demonstrate good endurance. [] Progressing: [] Met: [] Not Met: [] Adjusted  5. Patient will demonstrate improvements in her walking speed during 10MWT from 0.77m/s to 1.39m/s to safely ambulate in the community. [] Progressing: [] Met: [] Not Met: [] Adjusted         Overall Progression Towards Functional goals/ Treatment Progress Update:  [] Patient is progressing as expected towards functional goals listed. [] Progression is slowed due to complexities/Impairments listed. [] Progression has been slowed due to co-morbidities. [x] Plan just implemented, too soon to assess goals progression <30days   [] Goals require adjustment due to lack of progress  [] Patient is not progressing as expected and requires additional follow up with physician  [] Other    Persisting Functional Limitations/Impairments:  [x]Sitting [x]Standing   [x]Walking [x]Stairs   [x]Transfers []ADLs   [x]Squatting/bending [x]Kneeling  [x]Housework []Job related tasks  []Driving []Sports/Recreation   []Sleeping []Other:    ASSESSMENT:  See eval  Treatment/Activity Tolerance:  [] Pt able to complete treatment [] Patient limited by fatique  [] Patient limited by pain  [] Patient limited by other medical complications  [x] Other: Pt limited by light headedness and dizziness    Prognosis:  [x] Good [] Fair  [] Poor    Patient Requires Follow-up: [x] Yes  [] No    Return to Play:    [x]  N/A   []  Stage 1: Intro to Strength   []  Stage 2: Return to Run and Strength   []  Stage 3: Return to Jump and Strength   []  Stage 4: Dynamic Strength and Agility   []  Stage 5: Sport Specific Training     []  Ready to Return to Play, Meets All Above Stages   []  Not Ready for Return to Sports   Comments:            PLAN: See [facility]. PT 1x / week for 8 weeks. [] Continue per plan of care [] Alter current plan (see comments)  [x] Plan of care initiated [] Hold pending MD visit [] Discharge    Electronically signed by: Jerryl Holter, PT, DPT      Note: If patient does not return for scheduled/ recommended follow up visits, this note will serve as a discharge from care along with most recent update on progress.

## 2022-08-22 NOTE — TELEPHONE ENCOUNTER
----- Message from Janene Ashby sent at 8/22/2022  1:43 PM EDT -----  Subject: Message to Provider    QUESTIONS  Information for Provider? Patient has an appointment scheduled with EDSON Santoyo tomorrow as a virtual @ 2:40 pm. Patient would like a call back   today if possible to discuss next steps with her PCP. Please advise. Thank   you.  ---------------------------------------------------------------------------  --------------   [employer] INFO  [phone]; OK to leave message on voicemail, OK to respond with   electronic message via [employer] portal (only for patients who have   registered [employer] account)  ---------------------------------------------------------------------------  --------------  SCRIPT ANSWERS  Relationship to Patient?  Self

## 2022-08-23 ENCOUNTER — TELEMEDICINE (OUTPATIENT)
Dept: INTERNAL MEDICINE CLINIC | Age: 47
End: 2022-08-23
Payer: COMMERCIAL

## 2022-08-23 DIAGNOSIS — E66.01 CLASS 2 SEVERE OBESITY DUE TO EXCESS CALORIES WITH SERIOUS COMORBIDITY IN ADULT, UNSPECIFIED BMI (HCC): ICD-10-CM

## 2022-08-23 DIAGNOSIS — E55.9 VITAMIN D DEFICIENCY: ICD-10-CM

## 2022-08-23 DIAGNOSIS — R23.2 VASOMOTOR FLUSHING: ICD-10-CM

## 2022-08-23 DIAGNOSIS — I10 ESSENTIAL HYPERTENSION: ICD-10-CM

## 2022-08-23 DIAGNOSIS — M25.851 HIP IMPINGEMENT SYNDROME, RIGHT: ICD-10-CM

## 2022-08-23 DIAGNOSIS — N32.81 OAB (OVERACTIVE BLADDER): ICD-10-CM

## 2022-08-23 DIAGNOSIS — E87.1 HYPONATREMIA: Primary | ICD-10-CM

## 2022-08-23 DIAGNOSIS — M70.61 GREATER TROCHANTERIC BURSITIS OF RIGHT HIP: ICD-10-CM

## 2022-08-23 DIAGNOSIS — J30.89 ENVIRONMENTAL AND SEASONAL ALLERGIES: ICD-10-CM

## 2022-08-23 DIAGNOSIS — E78.2 MIXED HYPERLIPIDEMIA: ICD-10-CM

## 2022-08-23 DIAGNOSIS — F17.200 CURRENT SMOKER: ICD-10-CM

## 2022-08-23 DIAGNOSIS — R56.9 SEIZURES (HCC): Chronic | ICD-10-CM

## 2022-08-23 DIAGNOSIS — M25.559 HIP PAIN: ICD-10-CM

## 2022-08-23 DIAGNOSIS — M16.11 PRIMARY OSTEOARTHRITIS OF RIGHT HIP: ICD-10-CM

## 2022-08-23 PROCEDURE — 99214 OFFICE O/P EST MOD 30 MIN: CPT | Performed by: NURSE PRACTITIONER

## 2022-08-23 RX ORDER — SIMVASTATIN 20 MG
TABLET ORAL
Qty: 90 TABLET | Refills: 0 | Status: SHIPPED | OUTPATIENT
Start: 2022-08-23

## 2022-08-23 RX ORDER — RISPERIDONE 4 MG/1
TABLET, FILM COATED ORAL
Qty: 90 TABLET | Refills: 0 | Status: SHIPPED | OUTPATIENT
Start: 2022-08-23

## 2022-08-23 RX ORDER — OXCARBAZEPINE 600 MG/1
TABLET, FILM COATED ORAL
Qty: 180 TABLET | Refills: 0 | Status: SHIPPED | OUTPATIENT
Start: 2022-08-23

## 2022-08-23 RX ORDER — OXCARBAZEPINE 600 MG/1
TABLET, FILM COATED ORAL
Qty: 180 TABLET | Refills: 0 | Status: CANCELLED | OUTPATIENT
Start: 2022-08-23

## 2022-08-23 RX ORDER — CETIRIZINE HYDROCHLORIDE 10 MG/1
10 TABLET ORAL DAILY
Qty: 90 TABLET | Refills: 1 | Status: SHIPPED | OUTPATIENT
Start: 2022-08-23

## 2022-08-23 RX ORDER — OMEPRAZOLE 20 MG/1
CAPSULE, DELAYED RELEASE ORAL
Qty: 120 CAPSULE | Refills: 0 | Status: SHIPPED | OUTPATIENT
Start: 2022-08-23

## 2022-08-23 RX ORDER — ARIPIPRAZOLE 5 MG/1
5 TABLET ORAL DAILY
Qty: 90 TABLET | Refills: 0 | Status: SHIPPED | OUTPATIENT
Start: 2022-08-23

## 2022-08-23 RX ORDER — LISINOPRIL 10 MG/1
TABLET ORAL
Qty: 90 TABLET | Refills: 0 | Status: SHIPPED | OUTPATIENT
Start: 2022-08-23

## 2022-08-23 RX ORDER — ERGOCALCIFEROL 1.25 MG/1
CAPSULE ORAL
Qty: 12 CAPSULE | Refills: 0 | Status: SHIPPED | OUTPATIENT
Start: 2022-08-23

## 2022-08-23 RX ORDER — PAROXETINE 7.5 MG/1
1 CAPSULE ORAL DAILY
Qty: 30 CAPSULE | Refills: 1 | Status: SHIPPED | OUTPATIENT
Start: 2022-08-23

## 2022-08-23 ASSESSMENT — ENCOUNTER SYMPTOMS
RESPIRATORY NEGATIVE: 1
ALLERGIC/IMMUNOLOGIC NEGATIVE: 1
GASTROINTESTINAL NEGATIVE: 1
EYES NEGATIVE: 1

## 2022-08-23 NOTE — PROGRESS NOTES
Veronica Summers (:  1975) is a Established patient, here for evaluation of the followin [address] was seen today for follow-up. Diagnoses and all orders for this visit:    Hyponatremia  -     Sodium; Future    Environmental and seasonal allergies  -     cetirizine (ZYRTEC) 10 MG tablet; Take 1 tablet by mouth daily    Essential hypertension  -     lisinopril (PRINIVIL;ZESTRIL) 10 MG tablet; Take 1 tablet by mouth once daily    Hip pain    Primary osteoarthritis of right hip    Hip impingement syndrome, right    Greater trochanteric bursitis of right hip    OAB (overactive bladder)  -     mirabegron (MYRBETRIQ) 50 MG TB24; Take 50 mg by mouth daily    Vasomotor flushing  -     PARoxetine Mesylate 7.5 MG CAPS; Take 1 capsule by mouth daily    Mixed hyperlipidemia  -     simvastatin (ZOCOR) 20 MG tablet; One tab every day    Vitamin D deficiency  -     vitamin D (ERGOCALCIFEROL) 1.25 MG (95521 UT) CAPS capsule; Take 1 capsule by mouth once a week    Seizures (HCC)    Class 2 severe obesity due to excess calories with serious comorbidity in adult, unspecified BMI (HCC)    Current smoker    Other orders  -     ARIPiprazole (ABILIFY) 5 MG tablet; Take 1 tablet by mouth daily  -     omeprazole (PRILOSEC) 20 MG delayed release capsule; qd  -     risperiDONE (RISPERDAL) 4 MG tablet; One tab nightly  -     OXcarbazepine (TRILEPTAL) 600 MG tablet; One tablet twice a day   -Be active daily  -Avoid lying in bed continually  -Stop smoking  -Increase water intake to 64 ounces a day  -BP check with Monday lab draw  -Continue to with hold Lasix    Below is the assessment and plan developed based on review of pertinent history, physical exam, labs, studies, and medications. 1. Hyponatremia  -     Sodium; Future  2. Environmental and seasonal allergies  -     cetirizine (ZYRTEC) 10 MG tablet; Take 1 tablet by mouth daily, Disp-90 tablet, R-1Normal  3.  Essential hypertension  -     lisinopril (PRINIVIL;ZESTRIL) 10 MG tablet; Take 1 tablet by mouth once daily, Disp-90 tablet, R-0Normal  4. Hip pain  5. Primary osteoarthritis of right hip  6. Hip impingement syndrome, right  7. Greater trochanteric bursitis of right hip  8. OAB (overactive bladder)  -     mirabegron (MYRBETRIQ) 50 MG TB24; Take 50 mg by mouth daily, Disp-90 tablet, R-0Normal  9. Vasomotor flushing  -     PARoxetine Mesylate 7.5 MG CAPS; Take 1 capsule by mouth daily, Disp-30 capsule, R-1Normal  10. Mixed hyperlipidemia  -     simvastatin (ZOCOR) 20 MG tablet; One tab every day, Disp-90 tablet, R-0Normal  11. Vitamin D deficiency  -     vitamin D (ERGOCALCIFEROL) 1.25 MG (75296 UT) CAPS capsule; Take 1 capsule by mouth once a week, Disp-12 capsule, R-0Normal  12. Seizures ([facility]  Assessment & Plan:   Well-controlled, continue current medications and medication adherence emphasized  13. Class 2 severe obesity due to excess calories with serious comorbidity in adult, unspecified BMI ([facility]  Assessment & Plan:   Uncontrolled, lifestyle modifications recommended  14. Current smoker    No follow-ups on file. Subjective   HPI Presents today for follow up on dizziness. States it is gradually better with increase in sodium intake. States she feels tired all the time  Review of Systems   Constitutional:  Positive for fatigue. HENT: Negative. Eyes: Negative. Respiratory: Negative. Cardiovascular: Negative. Gastrointestinal: Negative. Endocrine: Negative. Genitourinary: Negative. Musculoskeletal: Negative. Allergic/Immunologic: Negative. Neurological:  Positive for light-headedness. Hematological: Negative. Psychiatric/Behavioral:  The patient is nervous/anxious.          Objective   Patient-Reported Vitals  No data recorded     Physical Exam  [INSTRUCTIONS:  \"[x]\" Indicates a positive item  \"[]\" Indicates a negative item  -- DELETE ALL ITEMS NOT EXAMINED]    Constitutional: [x] Appears well-developed and well-nourished [x] No apparent distress      [] Abnormal -     Mental status: [x] Alert and awake  [x] Oriented to person/place/time [x] Able to follow commands    [] Abnormal -     Eyes:   EOM    [x]  Normal    [] Abnormal -   Sclera  [x]  Normal    [] Abnormal -          Discharge [x]  None visible   [] Abnormal -     HENT: [x] Normocephalic, atraumatic  [] Abnormal -   [x] Mouth/Throat: Mucous membranes are moist    External Ears [x] Normal  [] Abnormal -    Neck: [x] No visualized mass [] Abnormal -     Pulmonary/Chest: [x] Respiratory effort normal   [x] No visualized signs of difficulty breathing or respiratory distress        [] Abnormal -      Musculoskeletal:   [x] Normal gait with no signs of ataxia         [x] Normal range of motion of neck        [] Abnormal -     Neurological:        [x] No Facial Asymmetry (Cranial nerve 7 motor function) (limited exam due to video visit)          [x] No gaze palsy        [] Abnormal -          Skin:        [x] No significant exanthematous lesions or discoloration noted on facial skin         [] Abnormal -            Psychiatric:       [x] Normal Affect [] Abnormal -        [x] No Hallucinations    Other pertinent observable physical exam findings:-             Ludmila Friends, was evaluated through a synchronous (real-time) audio-video encounter. The patient (or guardian if applicable) is aware that this is a billable service, which includes applicable co-pays. This Virtual Visit was conducted with patient's (and/or legal guardian's) consent. The visit was conducted pursuant to the emergency declaration under the [facility], [address] [place] [address] authority and the [employer] Act. Patient identification was verified, and a caregiver was present when appropriate. The patient was located at Home: [address] [place] [address].    Provider was located at [facility] ([address] [place], [place] [address]t): [address] [facility],  [address] [facility].         --Rubi Espinoza, HARRISON - CNP

## 2022-08-23 NOTE — TELEPHONE ENCOUNTER
Patient has stopped taking the meclizine and the lasix and she states that this is the first time in 3 weeks that she has felt better.

## 2022-08-24 ENCOUNTER — OFFICE VISIT (OUTPATIENT)
Dept: PSYCHOLOGY | Age: 47
End: 2022-08-24
Payer: COMMERCIAL

## 2022-08-24 DIAGNOSIS — F32.1 MODERATE MAJOR DEPRESSION (HCC): Primary | ICD-10-CM

## 2022-08-24 PROCEDURE — 90832 PSYTX W PT 30 MINUTES: CPT | Performed by: PSYCHOLOGIST

## 2022-08-24 NOTE — PATIENT INSTRUCTIONS
Chicken Soup for the Soul: Too Funny!: Samuel [address] to [employer]  by Nima Nascimento   Apr 19, 2022    GO GET YOUR BATTERY TODAY !

## 2022-08-24 NOTE — PROGRESS NOTES
Behavioral Health Consultation  Marilyn Foreman, Ph.D.  Psychologist  8/24/2022   1:32 PM EST       Time spent with Patient: 30 minutes      Reason for Consult:    Chief Complaint   Patient presents with    Depression     Referring Provider: No referring provider defined for this encounter. Pt provided informed consent for the behavioral health program. Discussed with patient model of service to include the limits of confidentiality (i.e. abuse reporting, suicide  intervention, etc.) and short-term intervention focused approach. Pt indicated understanding. Feedback given to PCP. S:  Pt seen per PCP re: depression    Patient seen for follow-up. Pt noted that she has been lightheaded. Pt stated that this has been going on for 3 weeks and has been frustrating. Pt noted that it has also been financially stressful d/t copays. Pt noted that emotionally, she has been feeling numb. Pt reported that she was going to do a datenight with her . Pts  wants to move his office into hers which she feels is a bad idea. Focused visit on boundaries and problem focused coping to address activation diffculties d/t lightheadness.    O:  MSE:    Appearance: good hygiene   Attitude: cooperative and friendly  Consciousness: alert  Orientation: oriented to person, place, time, general circumstance  Memory: recent and remote memory intact  Attention/Concentration: intact during session  Psychomotor Activity:normal  Eye Contact: normal  Speech: normal rate and volume, well-articulated  Mood: euthymic  Affect: congruent  Perception: within normal limits  Thought Content: within normal limits  Thought Process: logical, coherent  Insight: good  Judgment: intact  Ability to understand instructions: Yes  Morbid Ideation: no   Suicide Assessment: no suicidal ideation, plan, or intent  Homicidal Ideation: no     History:    Social History:   Social History     Socioeconomic History    Marital status:      Spouse name: Not on file    Number of children: 0    Years of education: Not on file    Highest education level: Not on file   Occupational History    Occupation:    Tobacco Use    Smoking status: Every Day     Packs/day: 1.50     Years: 31.00     Pack years: 46.50     Types: Cigarettes     Start date: 25    Smokeless tobacco: Never   Vaping Use    Vaping Use: Never used   Substance and Sexual Activity    Alcohol use: Not Currently    Drug use: No    Sexual activity: Not Currently     Partners: Male   Other Topics Concern    Not on file   Social History Narrative    Not on file     Social Determinants of Health     Financial Resource Strain: Low Risk     Difficulty of Paying Living Expenses: Not hard at all   Food Insecurity: No Food Insecurity    Worried About Running Out of Food in the Last Year: Never true    Ran Out of Food in the Last Year: Never true   Transportation Needs: Not on file   Physical Activity: Not on file   Stress: Not on file   Social Connections: Not on file   Intimate Partner Violence: Not on file   Housing Stability: Not on file     TOBACCO:   reports that she has been smoking cigarettes. She started smoking about 35 years ago. She has a 46.50 pack-year smoking history. She has never used smokeless tobacco.  ETOH:   reports that she does not currently use alcohol. A:    PHQ Scores 8/15/2022 7/25/2022 6/27/2022 6/13/2022/13/2022 4/27/2022 3/9/2022 2/2/2022   PHQ2 Score 2 2 2 2 1 1 6   PHQ9 Score 13 6 13 8 6 1 13     Interpretation of Total Score Depression Severity: 1-4 = Minimal depression, 5-9 = Mild depression, 10-14 = Moderate depression, 15-19 = Moderately severe depression, 20-27 = Severe depression    Diagnosis:    1. Moderate major depression ([facility]          Plan:    Patient Instructions   Chicken Soup for the Soul: Too Funny!: Samuel 12 to [employer]  by Mamie Parham   Apr 19, 2022    GO GET YOUR BATTERY TODAY ! Pt interventions:  See above    No follow-ups on file. Documentation was done using voice recognition dragon software. Every effort was made to ensure accuracy; however, inadvertent, unintentional computerized transcription errors may be present.

## 2022-08-29 ENCOUNTER — HOSPITAL ENCOUNTER (OUTPATIENT)
Dept: PHYSICAL THERAPY | Age: 47
Setting detail: THERAPIES SERIES
Discharge: HOME | End: 2022-08-29
Payer: COMMERCIAL

## 2022-08-29 NOTE — FLOWSHEET NOTE
Physical Therapy    Physical Therapy  Cancellation/No-show Note  Patient Name:  Dejah Santizo  :  1975   Date:  2022  Cancelled visits to date: 0  No-shows to date: 22    Patient status for today's appointment patient:  []  Cancelled  []  Rescheduled appointment  [x]  No-show     Reason given by patient:  []  Patient ill  []  Conflicting appointment  []  No transportation    []  Conflict with work  []  No reason given  [x]  Other:     Comments:      Phone call information:   [x]  Phone call made today to patient at 10:50am at number provided:      [x]  Patient answered, conversation as follows: Pt thought today was labor day and that no therapy was scheduled today. PT asked patient to reschedule today's appointment with the  and stated when the next scheduled appointment was for physical therapy on  at 9:45a. Pt was receptive to instructions. []  Patient did not answer, message left as follows:  []  Phone call not made today  []  Phone call not needed - pt contacted us to cancel and provided reason for cancellation.      Electronically signed by:  Sabine Shanks, PT, DPT

## 2022-08-30 ENCOUNTER — TELEPHONE (OUTPATIENT)
Dept: INTERNAL MEDICINE CLINIC | Age: 47
End: 2022-08-30

## 2022-08-30 DIAGNOSIS — E87.1 HYPONATREMIA: ICD-10-CM

## 2022-08-30 LAB — SODIUM BLD-SCNC: 132 MMOL/L (ref 136–145)

## 2022-08-30 NOTE — TELEPHONE ENCOUNTER
Patient called to follow up on status of letter    Patient is stating that she has been off the following days  -September 2022: 18, 19, 20, 23, 25,26, 27, (Full day)  -September 2022: 30 (1/2 day)

## 2022-08-31 DIAGNOSIS — E87.1 HYPONATREMIA: Primary | ICD-10-CM

## 2022-08-31 RX ORDER — SODIUM CHLORIDE
1 POWDER (GRAM) MISCELLANEOUS 2 TIMES DAILY WITH MEALS
Qty: 500 G | Refills: 0 | Status: SHIPPED | OUTPATIENT
Start: 2022-08-31

## 2022-09-06 ENCOUNTER — TELEPHONE (OUTPATIENT)
Dept: INTERNAL MEDICINE CLINIC | Age: 47
End: 2022-09-06

## 2022-09-08 NOTE — TELEPHONE ENCOUNTER
Patient returned the call. Patient needs a work excuse for the following dates:  08/23 she missed all day, 08/25, 08/26,08/27,08/30, 09/02 and 09/03 she left early b/c she felt lightheaded.

## 2022-09-12 ENCOUNTER — HOSPITAL ENCOUNTER (OUTPATIENT)
Dept: PHYSICAL THERAPY | Age: 47
Setting detail: THERAPIES SERIES
Discharge: HOME | End: 2022-09-12
Payer: COMMERCIAL

## 2022-09-12 ENCOUNTER — OFFICE VISIT (OUTPATIENT)
Dept: ORTHOPEDIC SURGERY | Age: 47
End: 2022-09-12
Payer: COMMERCIAL

## 2022-09-12 VITALS — WEIGHT: 293 LBS | BODY MASS INDEX: 51.91 KG/M2 | HEIGHT: 63 IN

## 2022-09-12 DIAGNOSIS — M16.11 PRIMARY OSTEOARTHRITIS OF RIGHT HIP: Primary | ICD-10-CM

## 2022-09-12 PROCEDURE — 97110 THERAPEUTIC EXERCISES: CPT

## 2022-09-12 PROCEDURE — 97530 THERAPEUTIC ACTIVITIES: CPT

## 2022-09-12 PROCEDURE — 99213 OFFICE O/P EST LOW 20 MIN: CPT | Performed by: ORTHOPAEDIC SURGERY

## 2022-09-12 RX ORDER — MELOXICAM 15 MG/1
15 TABLET ORAL DAILY
Qty: 30 TABLET | Refills: 5 | Status: SHIPPED | OUTPATIENT
Start: 2022-09-12
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
## 2022-09-12 NOTE — FLOWSHEET NOTE
[address] [place] [address] Lisbeth Chamorro  Phone: [phone]   Fax: [phone]    Physical Therapy Treatment Note/ Progress Report:     Date:  2022    Patient Name:  Nubia Belcher    :  1975  MRN: [id number]  Restrictions/Precautions:    Medical/Treatment Diagnosis Information:  Diagnosis: M25.559 - Hip pain  M16.11 - Primary osteoarthritis of right hip  M25.851 - Hip impingement syndrome, right  M70.61 - Greater trochanteric bursitis of right hip  Treatment Diagnosis: R hip pain, dec BLE ROM, dec BLE strength, dec endurance, dec BLE muscle length, antalgic gait, balance deficits, lightheadedness and dizziness  Insurance/Certification information:  PT Insurance Information: [employer], copay $60, 20 PT/OT/speech combined - [employer], reauth after 12 visit  Physician Information:  Tamiko Adhikari MD    Plan of care signed (Y/N): []  Yes [x]  No     Date of Patient follow up with Physician:      Progress Report: []  Yes  [x]  No     Date Range for reporting period:  Beginnin22  Ending:     Progress report due (10 Rx/or 30 days whichever is less): visit #10 or      Recertification due (POC duration/ or 90 days whichever is less): visit #20 or 22     Visit # Insurance Allowable Auth required? Date Range    20 combined PT/OT/Speech - [employer] [x]  Yes, [employer] after 12th visit  []  No 12th visit     Latex Allergy:  [x]NO      []YES  Preferred Language for Healthcare:   [x]English       []other:    Functional Scale:           Date assessed:  TO physical FS primary measure score = 50; risk adjusted = 44  22    Pain level: 2-3/10     SUBJECTIVE:  : reports feeling better and not having as much discomfort/ pain since i've last seen her. Was able to work all day Saturday and feels like she is getting back to being able to work. Has an appointment with the orthopedist today and will provide update at next visit.      OBJECTIVE:   : 6MWT: 708 ft with occasional cane use with L trunk lean as pt fatigues            5xSTS: 17 seconds          30 seconds chair test: 9    RESTRICTIONS/PRECAUTIONS: : light headedness, dizziness, Seizure 3 months ago (controlled with medication), HTN (controlled), anxiety/depression (controlled). Exercises/Interventions:     Therapeutic Exercise (61784)  Resistance / level Sets/sec Reps Notes / Cues   TM or nustep       Standing Isometrics:  Quad sets  Hip add with towel or ball   Hip abd with towel or ball       3 way hip       Sit to stand from elevated table  1x10  9/12: cueing for proper biomechanics include dec BUE utilization, glut squeeze and pushing through heels to dec knee pain   Supine:  SLR  Bridge  Hip abd with band    March with hold       Green to orange TB   2x10  2x10  2x10B    2x10  9/12: added to HEP, with v/c for equal BLE utilization           SB fwd and diagonals - npv                     Therapeutic Activities (10822)       5xSTS  6MWT  30\" sit to stand   Pt edu about work from home ergonomics, soreness management and improving endurance and sleep positions to dec pain X12 minutes                                  Neuromuscular Re-ed (17768)                            Manual Intervention (30432)       Knee mobs/PROM       Tib/Fem Mobs       Patella Mobs       Ankle mobs                         Modalities:     Pt. Education:  -pt educated on diagnosis, prognosis and expectations for rehab  -all pt questions were answered    Home Exercise Program:  Access Code: [email or web address]  URL: [email or web address]  Date: 09/12/2022  Prepared by: Madison Cooper    Exercises  Single Leg Bridge - 1 x daily - 7 x weekly - 3 sets - 10 reps  Supine Bridge - 1 x daily - 7 x weekly - 3 sets - 10 reps  Hooklying Clamshell with Resistance - 1 x daily - 7 x weekly - 3 sets - 10 reps  Supine March - 1 x daily - 7 x weekly - 3 sets - 10 reps      Therapeutic Exercise and NMR EXR  [] (28376) Provided verbal/tactile cueing for activities related to strengthening, flexibility, endurance, ROM for improvements in LE, proximal hip, and core control with self care, mobility, lifting, ambulation.  [] (84013) Provided verbal/tactile cueing for activities related to improving balance, coordination, kinesthetic sense, posture, motor skill, proprioception  to assist with LE, proximal hip, and core control in self care, mobility, lifting, ambulation and eccentric single leg control.   [] (10535) Therapist is in constant attendance of 2 or more patients providing skilled therapy interventions, but not providing any significant amount of measurable one-on-one time to either patient, for improvements in LE, proximal hip, and core control in self care, mobility, lifting, ambulation and eccentric single leg control.      NMR and Therapeutic Activities:    [] (65860 or 59043) Provided verbal/tactile cueing for activities related to improving balance, coordination, kinesthetic sense, posture, motor skill, proprioception and motor activation to allow for proper function of core, proximal hip and LE with self care and ADLs  [] (52808) Gait Re-education- Provided training and instruction to the patient for proper LE, core and proximal hip recruitment and positioning and eccentric body weight control with ambulation re-education including up and down stairs     Home Exercise Program:    [] (18488) Reviewed/Progressed HEP activities related to strengthening, flexibility, endurance, ROM of core, proximal hip and LE for functional self-care, mobility, lifting and ambulation/stair navigation   [] (27767)Reviewed/Progressed HEP activities related to improving balance, coordination, kinesthetic sense, posture, motor skill, proprioception of core, proximal hip and LE for self care, mobility, lifting, and ambulation/stair navigation      Manual Treatments:  PROM / STM / Oscillations-Mobs:  G-I, II, III, IV (PA's, Inf., Post.)  [] (27252) Provided manual therapy to mobilize LE, proximal hip and/or LS spine soft tissue/joints for the purpose of modulating pain, promoting relaxation,  increasing ROM, reducing/eliminating soft tissue swelling/inflammation/restriction, improving soft tissue extensibility and allowing for proper ROM for normal function with self care, mobility, lifting and ambulation. Modalities:  [] (05563) Vasopneumatic compression: Utilized vasopneumatic compression to decrease edema / swelling for the purpose of improving mobility and quad tone / recruitment which will allow for increased overall function including but not limited to self-care, transfers, ambulation, and ascending / descending stairs. Charges:  Timed Code Treatment Minutes: 45   Total Treatment Minutes: 45     [] EVAL - LOW (45333)   [] EVAL - MOD (88926)  [] EVAL - HIGH (08165)  [] RE-EVAL (98165)  [x] ZA(06399) x   2    [] Ionto  [] NMR (33463) x       [] Vaso  [] Manual (62317) x       [] Ultrasound  [x] TA x   1     [] Mech Traction (68006)  [] Aquatic Therapy x     [] ES (un) (77812):   [] Home Management Training x  [] ES(attended) (26816)   [] Dry Needling 1-2 muscles (36461):  [] Dry Needling 3+ muscles (238571  [] Group:      [] Other:     GOALS:  Patient stated goal: Pt reports wanting to decrease pain and improve activity tolerance. [] Progressing: [] Met: [] Not Met: [] Adjusted     Therapist goals for Patient:   Short Term Goals: To be achieved in: 2 weeks  1. Independent in HEP and progression per patient tolerance, in order to prevent re-injury. [] Progressing: [] Met: [] Not Met: [] Adjusted  2. Patient will have a decrease in R hip pain to facilitate improvement in movement, function, and ADLs as indicated by Functional Deficits. [] Progressing: [] Met: [] Not Met: [] Adjusted     Long Term Goals: To be achieved in: 8 weeks  1. FOTO score of at least 60 to demonstrate improvements in quality of life and activity tolerance.   [] Progressing: [] Met: [] Not Met: [] Adjusted  2. Pt will demonstrate an increase in FARRIS Balance score by 9 (MCID) to denote improvements in balance and decrease risk of falls. [] Progressing: [] Met: [] Not Met: [] Adjusted  3. Pt will be able to tolerate standing for up to 20 minutes without an increase in pain greater than 4/10 or with minimal lightheadedness to improve ability to independently cook and clean at home. [] Progressing: [] Met: [] Not Met: [] Adjusted  3. Patient will demonstrate an increase in hip and BLE Strength to at least 4/5 as well as good proximal hip strength and control to allow for proper functional mobility as indicated by patients Functional Deficits. [] Progressing: [] Met: [] Not Met: [] Adjusted  4. Patient will ambulate for 1765 feet (age matched norm) in 6 minutes without assistive device to demonstrate good endurance. [] Progressing: [] Met: [] Not Met: [] Adjusted  5. Patient will demonstrate improvements in her walking speed during 10MWT from 0.77m/s to 1.39m/s to safely ambulate in the community. [] Progressing: [] Met: [] Not Met: [] Adjusted       6. Patient will demonstrate improvements of 4s (MCID) during 5xSTS to demonstrate improvements in BLE strength to demonstrate improvements in ability to perform transfers independently and improve quality of life. [] Progressing: [] Met: [] Not Met: [] Adjusted     - added 9/12    Overall Progression Towards Functional goals/ Treatment Progress Update:  [] Patient is progressing as expected towards functional goals listed. [] Progression is slowed due to complexities/Impairments listed. [] Progression has been slowed due to co-morbidities.   [x] Plan just implemented, too soon to assess goals progression <30days   [] Goals require adjustment due to lack of progress  [] Patient is not progressing as expected and requires additional follow up with physician  [] Other    Persisting Functional Limitations/Impairments:  [x]Sitting [x]Standing   [x]Walking [x]Stairs   [x]Transfers []ADLs   [x]Squatting/bending [x]Kneeling  [x]Housework []Job related tasks  []Driving []Sports/Recreation   []Sleeping []Other:    ASSESSMENT:  9/12: Pt tolerated exercises well this date. Pt appeared motivated this date and was able to push through her fatigue this session especially during 6MWT. PT provided and pt demo-d HEP. During ambulation pt utilized cane occasionally and demonstrated inc in R trunk lean without cane use with occasional excess hip rotation. During objective testing pt ambulated 708 ft during 6MWT demonstrating poor endurance based on age matched norms of 1795ft and during 5xSTS performed at 16 sec with age matched norms of 11.4s and 30 second chair test (9) demonstrated fair BLE strength. PT also provided education about sleep positions to decrease R sided hip pain including left side lying with pillow between legs, supine sleeping position and work from home ergonomics to decrease back pain and improve seated posture. PT also provided education on soreness management and increasing endurance throughout the work day by walking a lap every hour to decrease sedentary lifestyle and improve endurance. Plan to continue with BLE ROM, BLE and core strengthening to improve endurance and balance and pt's quality of life.     Treatment/Activity Tolerance:  [] Pt able to complete treatment [] Patient limited by fatique  [] Patient limited by pain  [] Patient limited by other medical complications  [x] Other: Pt limited by light headedness and dizziness    Prognosis:  [x] Good [] Fair  [] Poor    Patient Requires Follow-up: [x] Yes  [] No    Return to Play:    [x]  N/A   []  Stage 1: Intro to Strength   []  Stage 2: Return to Run and Strength   []  Stage 3: Return to Jump and Strength   []  Stage 4: Dynamic Strength and Agility   []  Stage 5: Sport Specific Training     []  Ready to Return to Play, Meets All Above Stages   []  Not Ready for Return to Sports   Comments:            PLAN: See eval. PT 1x / week for 8 weeks. [] Continue per plan of care [] Alter current plan (see comments)  [x] Plan of care initiated [] Hold pending MD visit [] Discharge    Electronically signed by: Ashley Lawson, PT, DPT      Note: If patient does not return for scheduled/ recommended follow up visits, this note will serve as a discharge from care along with most recent update on progress.

## 2022-09-14 ENCOUNTER — OFFICE VISIT (OUTPATIENT)
Dept: PSYCHOLOGY | Age: 47
End: 2022-09-14
Payer: COMMERCIAL

## 2022-09-14 ENCOUNTER — OFFICE VISIT (OUTPATIENT)
Dept: INTERNAL MEDICINE CLINIC | Age: 47
End: 2022-09-14
Payer: COMMERCIAL

## 2022-09-14 VITALS
SYSTOLIC BLOOD PRESSURE: 122 MMHG | HEART RATE: 92 BPM | WEIGHT: 293 LBS | BODY MASS INDEX: 52.97 KG/M2 | OXYGEN SATURATION: 97 % | DIASTOLIC BLOOD PRESSURE: 64 MMHG

## 2022-09-14 DIAGNOSIS — G43.109 MIGRAINE AURA WITHOUT HEADACHE: Primary | ICD-10-CM

## 2022-09-14 DIAGNOSIS — R42 VERTIGO: ICD-10-CM

## 2022-09-14 DIAGNOSIS — E87.1 HYPONATREMIA: ICD-10-CM

## 2022-09-14 DIAGNOSIS — F32.1 MODERATE MAJOR DEPRESSION (HCC): Primary | ICD-10-CM

## 2022-09-14 PROCEDURE — 99214 OFFICE O/P EST MOD 30 MIN: CPT | Performed by: NURSE PRACTITIONER

## 2022-09-14 PROCEDURE — 90832 PSYTX W PT 30 MINUTES: CPT | Performed by: PSYCHOLOGIST

## 2022-09-14 RX ORDER — RIMEGEPANT SULFATE 75 MG/75MG
1 TABLET, ORALLY DISINTEGRATING ORAL EVERY OTHER DAY
Qty: 16 TABLET | Refills: 0 | Status: SHIPPED | OUTPATIENT
Start: 2022-09-14 | End: 2022-09-19 | Stop reason: SDUPTHER

## 2022-09-14 RX ORDER — RIMEGEPANT SULFATE 75 MG/75MG
1 TABLET, ORALLY DISINTEGRATING ORAL EVERY OTHER DAY
Qty: 2 TABLET | Refills: 0 | COMMUNITY
Start: 2022-09-14 | End: 2022-09-20

## 2022-09-14 RX ORDER — SODIUM BICARBONATE 325 MG/1
325 TABLET ORAL 2 TIMES DAILY
Qty: 60 TABLET | Refills: 11 | Status: SHIPPED | OUTPATIENT
Start: 2022-09-14 | End: 2023-09-14

## 2022-09-14 ASSESSMENT — ENCOUNTER SYMPTOMS
EYES NEGATIVE: 1
COUGH: 1
SHORTNESS OF BREATH: 1

## 2022-09-14 NOTE — PROGRESS NOTES
Behavioral Health Consultation  Maricel Pollock, Ph.D.  Psychologist  9/14/2022   1:32 PM EST       Time spent with Patient: 30 minutes      Reason for Consult:    Chief Complaint   Patient presents with    Depression       Referring Provider: No referring provider defined for this encounter. Pt provided informed consent for the behavioral health program. Discussed with patient model of service to include the limits of confidentiality (i.e. abuse reporting, suicide  intervention, etc.) and short-term intervention focused approach. Pt indicated understanding. Feedback given to PCP. S:  Pt seen per PCP re: depression    Patient seen for follow-up. Pt started physical therapy and noted that it has been positive but is challenging. Pt noted that her therapist personality is a good match. Pt noted feeling accomplished for what she achieved in PT. Pt continues to feel lightheaded which is causing her to miss work. Pt has been reading which has been enjoyable. Pt does have challenges w/ giving herself backhanded compliments. Focused visit on MI, cognitive restructuring, and perspective taking.    O:  MSE:    Appearance: good hygiene   Attitude: cooperative and friendly  Consciousness: alert  Orientation: oriented to person, place, time, general circumstance  Memory: recent and remote memory intact  Attention/Concentration: intact during session  Psychomotor Activity:normal  Eye Contact: normal  Speech: normal rate and volume, well-articulated  Mood: euthymic  Affect: congruent  Perception: within normal limits  Thought Content: within normal limits  Thought Process: logical, coherent  Insight: good  Judgment: intact  Ability to understand instructions: Yes  Morbid Ideation: no   Suicide Assessment: no suicidal ideation, plan, or intent  Homicidal Ideation: no     History:    Social History:   Social History     Socioeconomic History    Marital status:      Spouse name: Not on file    Number of children: 0 Years of education: Not on file    Highest education level: Not on file   Occupational History    Occupation:    Tobacco Use    Smoking status: Every Day     Packs/day: 1.50     Years: 31.00     Pack years: 46.50     Types: Cigarettes     Start date: 25    Smokeless tobacco: Never   Vaping Use    Vaping Use: Never used   Substance and Sexual Activity    Alcohol use: Not Currently    Drug use: No    Sexual activity: Not Currently     Partners: Male   Other Topics Concern    Not on file   Social History Narrative    Not on file     Social Determinants of Health     Financial Resource Strain: Low Risk     Difficulty of Paying Living Expenses: Not hard at all   Food Insecurity: No Food Insecurity    Worried About Running Out of Food in the Last Year: Never true    Ran Out of Food in the Last Year: Never true   Transportation Needs: Not on file   Physical Activity: Not on file   Stress: Not on file   Social Connections: Not on file   Intimate Partner Violence: Not on file   Housing Stability: Not on file     TOBACCO:   reports that she has been smoking cigarettes. She started smoking about 35 years ago. She has a 46.50 pack-year smoking history. She has never used smokeless tobacco.  ETOH:   reports that she does not currently use alcohol. A:    PHQ Scores 8/15/2022 7/25/2022 6/27/2022 6/13/2022 4/27/2022 3/9/2022 2/2/2022   PHQ2 Score 2 2 2 2 1 1 6   PHQ9 Score 13 6 13 8 6 1 13     Interpretation of Total Score Depression Severity: 1-4 = Minimal depression, 5-9 = Mild depression, 10-14 = Moderate depression, 15-19 = Moderately severe depression, 20-27 = Severe depression    Diagnosis:    1. Moderate major depression ([facility]            Plan:    There are no Patient Instructions on file for this visit. Pt interventions:  See above    No follow-ups on file. Documentation was done using voice recognition dragon software.   Every effort was made to ensure accuracy; however, inadvertent, unintentional computerized transcription errors may be present.

## 2022-09-14 NOTE — PROGRESS NOTES
Veronica Summers (:  1975) is a 49 y.o. female,Established patient, here for evaluation of the following chief complaint(s):  Fatigue (lightheaded)         ASSESSMENT/PLAN:  1. Migraine aura without headache  -     Rimegepant Sulfate (NURTEC) 75 MG TBDP; Take 1 tablet by mouth every other day, Disp-2 tablet, R-0Sample  Veronica was seen today for fatigue. Diagnoses and all orders for this visit:    Migraine aura without headache  -     Rimegepant Sulfate (NURTEC) 75 MG TBDP; Take 1 tablet by mouth every other day  -     Rimegepant Sulfate (NURTEC) 75 MG TBDP; Take 1 tablet by mouth every other day    Vertigo  -     Jamaica Anglin MD, Neurology, [facility]    Hyponatremia  -     sodium bicarbonate 325 MG tablet; Take 1 tablet by mouth 2 times daily     Take Nurtec every other day  Continue to drink water  Continue to walk  Stop smoking           Subjective   SUBJECTIVE/OBJECTIVE:  HPI Presents today with \"drunk\" feeling, waves in front of eyes    Review of Systems   Constitutional:  Positive for fatigue. Eyes: Negative. Respiratory:  Positive for cough and shortness of breath. Endocrine: Negative. Genitourinary: Negative. Musculoskeletal: Negative. Neurological:  Positive for dizziness. Vitals:    22 1040   BP: 122/64   Pulse: 92   SpO2: 97%     BP Readings from Last 3 Encounters:   22 122/64   22 137/73   08/15/22 132/88      Wt Readings from Last 3 Encounters:   22 299 lb (135.6 kg)   22 296 lb (134.3 kg)   22 293 lb (132.9 kg)      Objective   Physical Exam  Constitutional:       Appearance: Normal appearance. She is obese. Cardiovascular:      Rate and Rhythm: Normal rate and regular rhythm. Heart sounds: Normal heart sounds. Pulmonary:      Effort: Pulmonary effort is normal.      Breath sounds: Normal breath sounds and air entry. Skin:     General: Skin is warm and dry.    Neurological:      Mental Status: She is alert and oriented to person, place, and time. Cranial Nerves: Cranial nerves are intact. Coordination: Coordination abnormal.      Comments: Denies phonophobia. Consider migraines. Nurtec given, states edge off drunk feeling   Psychiatric:         Attention and Perception: Attention normal.      Comments: [place] improved, becoming more energetic                An electronic signature was used to authenticate this note.     --Taz Robles, APRDARNELL - CNP

## 2022-09-14 NOTE — LETTER
[address] [address]  Phone: [phone]  Fax: [phone]    HARRISON Crawford CNP        September 14, 2022     Patient: Nubia Belcher   YOB: 1975   Date of Visit: 9/14/2022       To Whom it May Concern:    Nikita Medeiros was seen in my clinic on 9/14/2022. She needs to be excused for the absences on September 9 & 13. If you have any questions or concerns, please don't hesitate to call.     Sincerely,         HARRISON Crawford CNP

## 2022-09-16 ENCOUNTER — TELEPHONE (OUTPATIENT)
Dept: ADMINISTRATIVE | Age: 47
End: 2022-09-16

## 2022-09-16 NOTE — TELEPHONE ENCOUNTER
PA submitted VIA CMM for  Nurtec 75MG dispersible tablets (Key: [id number])  Message from Plan  PAMELA does not manage PA for this patient

## 2022-09-19 ENCOUNTER — HOSPITAL ENCOUNTER (OUTPATIENT)
Dept: PHYSICAL THERAPY | Age: 47
Setting detail: THERAPIES SERIES
Discharge: HOME | End: 2022-09-19
Payer: COMMERCIAL

## 2022-09-19 DIAGNOSIS — G43.109 MIGRAINE AURA WITHOUT HEADACHE: ICD-10-CM

## 2022-09-19 PROCEDURE — 97110 THERAPEUTIC EXERCISES: CPT

## 2022-09-19 NOTE — FLOWSHEET NOTE
[address] [place] [address]  Phone: [phone]   Fax: [phone]    Physical Therapy Treatment Note/ Progress Report:     Date:  2022    Patient Name:  Dejah Santizo    :  1975  MRN: [id number]  Restrictions/Precautions:    Medical/Treatment Diagnosis Information:  Diagnosis: M25.559 - Hip pain  M16.11 - Primary osteoarthritis of right hip  M25.851 - Hip impingement syndrome, right  M70.61 - Greater trochanteric bursitis of right hip  Treatment Diagnosis: R hip pain, dec BLE ROM, dec BLE strength, dec endurance, dec BLE muscle length, antalgic gait, balance deficits, lightheadedness and dizziness  Insurance/Certification information:  PT Insurance Information: [employer], copay $60, 20 PT/OT/speech combined - [employer], reauth after 12 visit  Physician Information:  Joy Duarte MD    Plan of care signed (Y/N): []  Yes [x]  No     Date of Patient follow up with Physician:      Progress Report: []  Yes  [x]  No     Date Range for reporting period:  Beginnin22  Ending:     Progress report due (10 Rx/or 30 days whichever is less): visit #10 or      Recertification due (POC duration/ or 90 days whichever is less): visit #20 or 22     Visit # Insurance Allowable Auth required? Date Range   3/8 20 combined PT/OT/Speech - [employer] [x]  Yes, [employer] after 12th visit  []  No 12th visit     Latex Allergy:  [x]NO      []YES  Preferred Language for Healthcare:   [x]English       []other:    Functional Scale:           Date assessed:  FOTO physical FS primary measure score = 50; risk adjusted = 44  22    Pain level: 2-3/10     SUBJECTIVE: : Having some R knee pain today. Reports not using her cane for 3 days and feels like she is having less pain in her arm without using it. : reports feeling better and not having as much discomfort/ pain since i've last seen her.  Was able to work all day Saturday and feels like she is getting back to being able to work. Has an appointment with the orthopedist today and will provide update at next visit. OBJECTIVE:   9/12: 6MWT: 708 ft with occasional cane use with L trunk lean as pt fatigues            5xSTS: 17 seconds          30 seconds chair test: 9    RESTRICTIONS/PRECAUTIONS: : light headedness, dizziness, Seizure 3 months ago (controlled with medication), HTN (controlled), anxiety/depression (controlled). Exercises/Interventions:     Therapeutic Exercise (64401)  Resistance / level Sets/sec Reps Notes / Cues   TM or nustep 3 min    9/19: Stopped due to inc in pain   Supine Isometrics:  Quad sets  Hip add with gait belt  Hip abd with towel or ball    10x5\" B  10x5\"  10x5\"  9/19: performed in supine d/t inc in knee pain with standing   Standing 3 way hip  1x10 B  9/19: added   Seated HS stretch  2x30\" B  9/19: added   Sit to stand from elevated table   9/12: cueing for proper biomechanics include dec BUE utilization, glut squeeze and pushing through heels to dec knee pain   Supine:  SLR  Bridge  Bridge with abd squeeze  Hip abd with strap    March with hold  HL abd squeeze     Green to orange TB   2x10  2x10  1x10  2x10B    10x 5\"       3\" holds             9/12: added to HEP, with v/c for equal BLE utilization           SB fwd and diagonals - npv                     Therapeutic Activities (22322)       X12 minutes                                  Neuromuscular Re-ed (48924)                            Manual Intervention ([id number])       Knee mobs/PROM       Tib/Fem Mobs       Patella Mobs       Ankle mobs                         Modalities:     Pt. Education:  -pt educated on diagnosis, prognosis and expectations for rehab  -all pt questions were answered    Home Exercise Program:  Access Code: [email or web address]  URL: [email or web address]  Date: 09/12/2022  Prepared by: Joy Guevara    Exercises  Single Leg  - 1 x daily - 7 x weekly - 3 sets - 10 reps ' adjusted to SLR on 9/19  Supine Bridge - 1 x daily - 7 x weekly - 3 sets - 10 reps  Hooklying Clamshell with Resistance - 1 x daily - 7 x weekly - 3 sets - 10 reps  Supine March - 1 x daily - 7 x weekly - 3 sets - 10 reps      Therapeutic Exercise and NMR EXR  [] (48811) Provided verbal/tactile cueing for activities related to strengthening, flexibility, endurance, ROM for improvements in LE, proximal hip, and core control with self care, mobility, lifting, ambulation.  [] (28235) Provided verbal/tactile cueing for activities related to improving balance, coordination, kinesthetic sense, posture, motor skill, proprioception  to assist with LE, proximal hip, and core control in self care, mobility, lifting, ambulation and eccentric single leg control.   [] (54239) Therapist is in constant attendance of 2 or more patients providing skilled therapy interventions, but not providing any significant amount of measurable one-on-one time to either patient, for improvements in LE, proximal hip, and core control in self care, mobility, lifting, ambulation and eccentric single leg control.      NMR and Therapeutic Activities:    [] (44383 or 90631) Provided verbal/tactile cueing for activities related to improving balance, coordination, kinesthetic sense, posture, motor skill, proprioception and motor activation to allow for proper function of core, proximal hip and LE with self care and ADLs  [] (59733) Gait Re-education- Provided training and instruction to the patient for proper LE, core and proximal hip recruitment and positioning and eccentric body weight control with ambulation re-education including up and down stairs     Home Exercise Program:    [] (46544) Reviewed/Progressed HEP activities related to strengthening, flexibility, endurance, ROM of core, proximal hip and LE for functional self-care, mobility, lifting and ambulation/stair navigation   [] (00890)Reviewed/Progressed HEP activities related to improving balance, coordination, kinesthetic sense, posture, motor skill, proprioception of core, proximal hip and LE for self care, mobility, lifting, and ambulation/stair navigation      Manual Treatments:  PROM / STM / Oscillations-Mobs:  G-I, II, III, IV (PA's, Inf., Post.)  [] (75133) Provided manual therapy to mobilize LE, proximal hip and/or LS spine soft tissue/joints for the purpose of modulating pain, promoting relaxation,  increasing ROM, reducing/eliminating soft tissue swelling/inflammation/restriction, improving soft tissue extensibility and allowing for proper ROM for normal function with self care, mobility, lifting and ambulation. Modalities:  [] (54681) Vasopneumatic compression: Utilized vasopneumatic compression to decrease edema / swelling for the purpose of improving mobility and quad tone / recruitment which will allow for increased overall function including but not limited to self-care, transfers, ambulation, and ascending / descending stairs. Charges:  Timed Code Treatment Minutes: 40   Total Treatment Minutes: 40     [] EVAL - LOW (15396)   [] EVAL - MOD (54727)  [] EVAL - HIGH (07890)  [] RE-EVAL (24713)  [x] TC(94179) x   3    [] Ionto  [] NMR (86437) x       [] Vaso  [] Manual (46117) x       [] Ultrasound  [] TA x        [] Mech Traction (12698)  [] Aquatic Therapy x     [] ES (un) (31666):   [] Home Management Training x  [] ES(attended) (49772)   [] Dry Needling 1-2 muscles (73035):  [] Dry Needling 3+ muscles (263785  [] Group:      [] Other:     GOALS:  Patient stated goal: Pt reports wanting to decrease pain and improve activity tolerance. [] Progressing: [] Met: [] Not Met: [] Adjusted     Therapist goals for Patient:   Short Term Goals: To be achieved in: 2 weeks  1. Independent in HEP and progression per patient tolerance, in order to prevent re-injury. [] Progressing: [] Met: [] Not Met: [] Adjusted  2.  Patient will have a decrease in R hip pain to facilitate improvement in movement, function, and ADLs as indicated by Functional Deficits. [] Progressing: [] Met: [] Not Met: [] Adjusted     Long Term Goals: To be achieved in: 8 weeks  1. FOTO score of at least 60 to demonstrate improvements in quality of life and activity tolerance. [] Progressing: [] Met: [] Not Met: [] Adjusted  2. Pt will demonstrate an increase in FARRIS Balance score by 9 (MCID) to denote improvements in balance and decrease risk of falls. [] Progressing: [] Met: [] Not Met: [] Adjusted  3. Pt will be able to tolerate standing for up to 20 minutes without an increase in pain greater than 4/10 or with minimal lightheadedness to improve ability to independently cook and clean at home. [] Progressing: [] Met: [] Not Met: [] Adjusted  3. Patient will demonstrate an increase in hip and BLE Strength to at least 4/5 as well as good proximal hip strength and control to allow for proper functional mobility as indicated by patients Functional Deficits. [] Progressing: [] Met: [] Not Met: [] Adjusted  4. Patient will ambulate for 1765 feet (age matched norm) in 6 minutes without assistive device to demonstrate good endurance. [] Progressing: [] Met: [] Not Met: [] Adjusted  5. Patient will demonstrate improvements in her walking speed during 10MWT from 0.77m/s to 1.39m/s to safely ambulate in the community. [] Progressing: [] Met: [] Not Met: [] Adjusted       6. Patient will demonstrate improvements of 4s (MCID) during 5xSTS to demonstrate improvements in BLE strength to demonstrate improvements in ability to perform transfers independently and improve quality of life. [] Progressing: [] Met: [] Not Met: [] Adjusted     - added 9/12    Overall Progression Towards Functional goals/ Treatment Progress Update:  [] Patient is progressing as expected towards functional goals listed. [] Progression is slowed due to complexities/Impairments listed. [] Progression has been slowed due to co-morbidities.   [x] Plan just implemented, too soon to assess goals progression <30days   [] Goals require adjustment due to lack of progress  [] Patient is not progressing as expected and requires additional follow up with physician  [] Other    Persisting Functional Limitations/Impairments:  [x]Sitting [x]Standing   [x]Walking [x]Stairs   [x]Transfers []ADLs   [x]Squatting/bending [x]Kneeling  [x]Housework []Job related tasks  []Driving []Sports/Recreation   []Sleeping []Other:    ASSESSMENT:  9/19: Pt tolerated exercises today but had increase in pain with Nustep and needed to dec amount of time utilized and described knee pain continued with isometrics. Had increase in knee pain with standing but was able to perform exercises in supine this date. Continues to demonstrate difficulty with hip weakness and core stabilization during standing exercises and required v/c and t/c to decrease compensatory trunk lean. Plan at next visit to perform PN and update HEP at npv and look at cane use, PT encouraged pt to bring cane into next session and to continue with BLE strengthening to improve quality of life. 9/12: Pt tolerated exercises well this date. Pt appeared motivated this date and was able to push through her fatigue this session especially during 6MWT. PT provided and pt demo-d HEP. During ambulation pt utilized cane occasionally and demonstrated inc in R trunk lean without cane use with occasional excess hip rotation. During objective testing pt ambulated 708 ft during 6MWT demonstrating poor endurance based on age matched norms of 1795ft and during 5xSTS performed at 16 sec with age matched norms of 11.4s and 30 second chair test (9) demonstrated fair BLE strength. PT also provided education about sleep positions to decrease R sided hip pain including left side lying with pillow between legs, supine sleeping position and work from home ergonomics to decrease back pain and improve seated posture.  PT also provided education on soreness management and increasing endurance throughout the work day by walking a lap every hour to decrease sedentary lifestyle and improve endurance. Plan to continue with BLE ROM, BLE and core strengthening to improve endurance and balance and pt's quality of life. Treatment/Activity Tolerance:  [] Pt able to complete treatment [] Patient limited by fatique  [] Patient limited by pain  [] Patient limited by other medical complications  [x] Other: Pt limited by light headedness and dizziness    Prognosis:  [x] Good [] Fair  [] Poor    Patient Requires Follow-up: [x] Yes  [] No    Return to Play:    [x]  N/A   []  Stage 1: Intro to Strength   []  Stage 2: Return to Run and Strength   []  Stage 3: Return to Jump and Strength   []  Stage 4: Dynamic Strength and Agility   []  Stage 5: Sport Specific Training     []  Ready to Return to Play, Meets All Above Stages   []  Not Ready for Return to Sports   Comments:            PLAN: See eval. PT 1x / week for 8 weeks. [] Continue per plan of care [] Alter current plan (see comments)  [x] Plan of care initiated [] Hold pending MD visit [] Discharge    Electronically signed by: Seth Damon PT, DPT      Note: If patient does not return for scheduled/ recommended follow up visits, this note will serve as a discharge from care along with most recent update on progress.

## 2022-09-20 RX ORDER — RIMEGEPANT SULFATE 75 MG/75MG
1 TABLET, ORALLY DISINTEGRATING ORAL EVERY OTHER DAY
Qty: 16 TABLET | Refills: 0 | Status: SHIPPED | OUTPATIENT
Start: 2022-09-20 | End: 2022-10-31

## 2022-09-21 ENCOUNTER — OFFICE VISIT (OUTPATIENT)
Dept: SLEEP MEDICINE | Age: 47
End: 2022-09-21
Payer: COMMERCIAL

## 2022-09-21 VITALS
BODY MASS INDEX: 53.92 KG/M2 | DIASTOLIC BLOOD PRESSURE: 70 MMHG | HEIGHT: 62 IN | TEMPERATURE: 98.4 F | WEIGHT: 293 LBS | HEART RATE: 80 BPM | SYSTOLIC BLOOD PRESSURE: 110 MMHG | OXYGEN SATURATION: 97 % | RESPIRATION RATE: 18 BRPM

## 2022-09-21 DIAGNOSIS — R06.89 GASPING FOR BREATH: ICD-10-CM

## 2022-09-21 DIAGNOSIS — G47.19 EXCESSIVE DAYTIME SLEEPINESS: ICD-10-CM

## 2022-09-21 DIAGNOSIS — R06.83 SNORING: ICD-10-CM

## 2022-09-21 DIAGNOSIS — E66.01 CLASS 3 SEVERE OBESITY DUE TO EXCESS CALORIES WITH SERIOUS COMORBIDITY AND BODY MASS INDEX (BMI) OF 50.0 TO 59.9 IN ADULT (HCC): ICD-10-CM

## 2022-09-21 DIAGNOSIS — I10 HYPERTENSION, UNSPECIFIED TYPE: ICD-10-CM

## 2022-09-21 DIAGNOSIS — G47.33 OSA (OBSTRUCTIVE SLEEP APNEA): Primary | ICD-10-CM

## 2022-09-21 DIAGNOSIS — Z86.69 H/O PARTIAL SEIZURES: ICD-10-CM

## 2022-09-21 DIAGNOSIS — R06.81 WITNESSED EPISODE OF APNEA: ICD-10-CM

## 2022-09-21 PROCEDURE — 99204 OFFICE O/P NEW MOD 45 MIN: CPT | Performed by: PSYCHIATRY & NEUROLOGY

## 2022-09-21 ASSESSMENT — SLEEP AND FATIGUE QUESTIONNAIRES
HOW LIKELY ARE YOU TO NOD OFF OR FALL ASLEEP WHILE SITTING QUIETLY AFTER LUNCH WITHOUT ALCOHOL: 1
HOW LIKELY ARE YOU TO NOD OFF OR FALL ASLEEP WHEN YOU ARE A PASSENGER IN A CAR FOR AN HOUR WITHOUT A BREAK: 1
HOW LIKELY ARE YOU TO NOD OFF OR FALL ASLEEP WHILE SITTING INACTIVE IN A PUBLIC PLACE: 0
HOW LIKELY ARE YOU TO NOD OFF OR FALL ASLEEP WHILE SITTING AND TALKING TO SOMEONE: 1
HOW LIKELY ARE YOU TO NOD OFF OR FALL ASLEEP IN A CAR, WHILE STOPPED FOR A FEW MINUTES IN TRAFFIC: 0
HOW LIKELY ARE YOU TO NOD OFF OR FALL ASLEEP WHILE LYING DOWN TO REST IN THE AFTERNOON WHEN CIRCUMSTANCES PERMIT: 3
HOW LIKELY ARE YOU TO NOD OFF OR FALL ASLEEP WHILE WATCHING TV: 0
ESS TOTAL SCORE: 8
HOW LIKELY ARE YOU TO NOD OFF OR FALL ASLEEP WHILE SITTING AND READING: 2
NECK CIRCUMFERENCE (INCHES): 20.5

## 2022-09-21 ASSESSMENT — ENCOUNTER SYMPTOMS
APNEA: 1
WHEEZING: 1
ALLERGIC/IMMUNOLOGIC NEGATIVE: 1
GASTROINTESTINAL NEGATIVE: 1
CHOKING: 1
EYES NEGATIVE: 1

## 2022-09-21 NOTE — PATIENT INSTRUCTIONS
Orders Placed This Encounter   Procedures    Ambulatory Referral to Bariatric Surgery     Referral Priority:   Routine     Referral Type:   Eval and Treat     Referral Reason:   Specialty Services Required     Requested Specialty:   Bariatric Surgery     Number of Visits Requested:   1    Baseline Diagnostic Sleep Study     Standing Status:   Future     Standing Expiration Date:   9/21/2023     Order Specific Question:   Adult or Pediatric     Answer:   Adult Study (>7 Years)     Order Specific Question:   Location For Sleep Study     Answer:   [place]     Order Specific Question:   Select Sleep Lab Location     Answer:   [facility]    Sleep Study with PAP Titration     Standing Status:   Future     Standing Expiration Date:   9/21/2023     Order Specific Question:   Sleep Study Titration Type     Answer:   CPAP     Order Specific Question:   Location For Sleep Study     Answer:   [place]     Order Specific Question:   Select Sleep Lab Location     Answer:   [facility]
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
## 2022-09-26 ENCOUNTER — HOSPITAL ENCOUNTER (OUTPATIENT)
Dept: PHYSICAL THERAPY | Age: 47
Setting detail: THERAPIES SERIES
Discharge: HOME | End: 2022-09-26
Payer: COMMERCIAL

## 2022-09-26 PROCEDURE — 97110 THERAPEUTIC EXERCISES: CPT

## 2022-09-26 PROCEDURE — 97530 THERAPEUTIC ACTIVITIES: CPT

## 2022-09-26 NOTE — PROGRESS NOTES
[address] [place] [address]  Phone: [phone]   Fax: [phone]    Physical Therapy Treatment Note/ Progress Report:     Date:  2022    Patient Name:  Gus Dallas    :  1975  MRN: [id number]  Restrictions/Precautions:    Medical/Treatment Diagnosis Information:  Diagnosis: M25.559 - Hip pain  M16.11 - Primary osteoarthritis of right hip  M25.851 - Hip impingement syndrome, right  M70.61 - Greater trochanteric bursitis of right hip  Treatment Diagnosis: R hip pain, dec BLE ROM, dec BLE strength, dec endurance, dec BLE muscle length, antalgic gait, balance deficits, lightheadedness and dizziness  Insurance/Certification information:  PT Insurance Information: [employer], copay $60, 20 PT/OT/speech combined - HARD [employer], reauth after 12 visit  Physician Information:  Malou Trent MD    Plan of care signed (Y/N): []  Yes [x]  No     Date of Patient follow up with Physician:      Progress Report: []  Yes  [x]  No     Date Range for reporting period:  Beginnin22  Ending:     Progress report due (10 Rx/or 30 days whichever is less): visit #10 or 9/22/22   3/57/76:     Recertification due (POC duration/ or 90 days whichever is less): visit #20 or 22     Visit # Insurance Allowable Auth required? Date Range    20 combined PT/OT/Speech - [employer] [x]  Yes, [employer] after 12th visit  []  No 12th visit     Latex Allergy:  [x]NO      []YES  Preferred Language for Healthcare:   [x]English       []other:    Functional Scale:           Date assessed:  TO physical FS primary measure score = 50; risk adjusted = 44  22    Pain level: 4/10 B knee    SUBJECTIVE: : Having some R knee pain today. Reports not using her cane for 3 days and feels like she is having less pain in her arm without using it.     OBJECTIVE:   : PN completed  6MWT: 920 ft   5xSTS:  6  (12 seconds)  30\" chair test: 11    : 6MWT: 708 ft with occasional cane use with L trunk lean as pt fatigues            5xSTS: 17 seconds          30 seconds chair test: 9    RESTRICTIONS/PRECAUTIONS: : light headedness, dizziness, Seizure 3 months ago (controlled with medication), HTN (controlled), anxiety/depression (controlled). Exercises/Interventions:     Therapeutic Exercise (15451)  Resistance / level Sets/sec Reps Notes / Cues   TM or nustep   : Stopped due to inc in pain   Supine Isometrics:  Quad sets  Hip add with gait belt  Hip abd with towel or ball     : performed in supine d/t inc in knee pain with standing   Standing 3 way hip Gratiot TB 2x10 B  : added to HEP   Standing HS stretch  1x30\" B  : added to hep   Standing HF stretch   1x30\" B  : added to hep   Sit to stand from elevated table   : cueing for proper biomechanics include dec BUE utilization, glut squeeze and pushing through heels to dec knee pain   Supine:  SLR  Bridge  Bridge with abd squeeze  Hip abd with strap    March with hold  HL abd squeeze : added to HEP, with v/c for equal BLE utilization                               Therapeutic Activities (09644)       PN completed to include followinxSTS  6MWT  30\" sit to stand   Pt edu progress/ results, plan moving forward and HEP rev,  discussed  X25 minutes      Sit to stands with TB Gratiot TB 1x10  : added to HEP   Step ups  6 in 2x10  : BUE support                 Neuromuscular Re-ed (49303)                            Manual Intervention (00693)       Knee mobs/PROM       Tib/Fem Mobs       Patella Mobs       Ankle mobs                         Modalities:     Pt. Education:  -pt educated on diagnosis, prognosis and expectations for rehab  -all pt questions were answered    Home Exercise Program:  Access Code: [email or web address]  URL: [email or web address]  Date: 2022  Prepared by: Ashley Lawson    Exercises  Seated Hamstring Stretch - 1 x daily - 7 x weekly - 3 sets - 10 reps  Standing Hip Flexor Stretch - 1 x daily - 7 x weekly proper function of core, proximal hip and LE with self care and ADLs  [] (07303) Gait Re-education- Provided training and instruction to the patient for proper LE, core and proximal hip recruitment and positioning and eccentric body weight control with ambulation re-education including up and down stairs     Home Exercise Program:    [x] (63144) Reviewed/Progressed HEP activities related to strengthening, flexibility, endurance, ROM of core, proximal hip and LE for functional self-care, mobility, lifting and ambulation/stair navigation   [x] (78203)Reviewed/Progressed HEP activities related to improving balance, coordination, kinesthetic sense, posture, motor skill, proprioception of core, proximal hip and LE for self care, mobility, lifting, and ambulation/stair navigation      Manual Treatments:  PROM / STM / Oscillations-Mobs:  G-I, II, III, IV (PA's, Inf., Post.)  [] (50416) Provided manual therapy to mobilize LE, proximal hip and/or LS spine soft tissue/joints for the purpose of modulating pain, promoting relaxation,  increasing ROM, reducing/eliminating soft tissue swelling/inflammation/restriction, improving soft tissue extensibility and allowing for proper ROM for normal function with self care, mobility, lifting and ambulation. Modalities:  [] (79778) Vasopneumatic compression: Utilized vasopneumatic compression to decrease edema / swelling for the purpose of improving mobility and quad tone / recruitment which will allow for increased overall function including but not limited to self-care, transfers, ambulation, and ascending / descending stairs.        Charges:  Timed Code Treatment Minutes: 43   Total Treatment Minutes: 43     [] EVAL - LOW (74029)   [] EVAL - MOD (16353)  [] EVAL - HIGH (05654)  [] RE-EVAL (04894)  [x] UY(39409) x   1    [] Ionto  [] NMR (03294) x       [] Vaso  [] Manual (10325) x       [] Ultrasound  [x] TA x  2      [] Mech Traction (96129)  [] Aquatic Therapy x     [] ES (un) (78943):   [] Home Management Training x  [] ES(attended) (22649)   [] Dry Needling 1-2 muscles (61265):  [] Dry Needling 3+ muscles (779906  [] Group:      [] Other:     GOALS:  Patient stated goal: Pt reports wanting to decrease pain and improve activity tolerance. [] Progressing: [] Met: [] Not Met: [] Adjusted     Therapist goals for Patient:   Short Term Goals: To be achieved in: 2 weeks  1. Independent in HEP and progression per patient tolerance, in order to prevent re-injury. [] Progressing: [] Met: [] Not Met: [] Adjusted  2. Patient will have a decrease in R hip pain to facilitate improvement in movement, function, and ADLs as indicated by Functional Deficits. [] Progressing: [] Met: [] Not Met: [] Adjusted     Long Term Goals: To be achieved in: 8 weeks  1. FOTO score of at least 60 to demonstrate improvements in quality of life and activity tolerance. [] Progressing: [] Met: [] Not Met: [] Adjusted  2. Pt will demonstrate an increase in FARRIS Balance score by 9 (MCID) to denote improvements in balance and decrease risk of falls. [] Progressing: [] Met: [] Not Met: [] Adjusted  3. Pt will be able to tolerate standing for up to 20 minutes without an increase in pain greater than 4/10 or with minimal lightheadedness to improve ability to independently cook and clean at home. [x] Progressing: [] Met: [] Not Met: [] Adjusted  3. Patient will demonstrate an increase in hip and BLE Strength to at least 4/5 as well as good proximal hip strength and control to allow for proper functional mobility as indicated by patients Functional Deficits. [] Progressing: [] Met: [] Not Met: [] Adjusted  4. Patient will ambulate for 1765 feet (age matched norm) in 6 minutes without assistive device to demonstrate good endurance. [x] Progressing: [] Met: [] Not Met: [] Adjusted  5. Patient will demonstrate improvements in her walking speed during 10MWT from 0.77m/s to 1.39m/s to safely ambulate in the community.   [] Progressing: [] Met: [] Not Met: [] Adjusted       6. Patient will demonstrate improvements of 4s (MCID) during 5xSTS to demonstrate improvements in BLE strength to demonstrate improvements in ability to perform transfers independently and improve quality of life. [x] Progressing: [] Met: [] Not Met: [] Adjusted     - added 9/12    Overall Progression Towards Functional goals/ Treatment Progress Update:  [] Patient is progressing as expected towards functional goals listed. [] Progression is slowed due to complexities/Impairments listed. [] Progression has been slowed due to co-morbidities. [x] Plan just implemented, too soon to assess goals progression <30days   [] Goals require adjustment due to lack of progress  [] Patient is not progressing as expected and requires additional follow up with physician  [] Other    Persisting Functional Limitations/Impairments:  [x]Sitting [x]Standing   [x]Walking [x]Stairs   [x]Transfers []ADLs   [x]Squatting/bending [x]Kneeling  [x]Housework []Job related tasks  []Driving []Sports/Recreation   []Sleeping []Other:    ASSESSMENT:  9/26: Pt demonstrated improvements in BLE functional mobility and endurance during PN with scores listed above. Ambulation was assessed during 6 MWT w/o SPC and pt reported not needing her cane as much. PT was able to ambulate with one short standing rest break for 10 seconds but demonstrated good balance, step length and width but had inc in trunk sway. PT discussed cane use with pt including utilizing cane for longer distances or when feeling lightheaded or dizzy, but trying to increase ambulation without cane during 6 minute increments due to good balance during 6MWT. PT added walking program to HEP as listed above and PT updated, pt demo-d new HEP with HO provided. Plan to continue to work on BLE strengthening and endurance to improve pt's quality of life and ability to ambulate at home and  in the community.      Treatment/Activity Tolerance:  [] Pt able to complete treatment [] Patient limited by fatique  [] Patient limited by pain  [] Patient limited by other medical complications  [x] Other: Pt limited by light headedness and dizziness    Prognosis:  [x] Good [] Fair  [] Poor    Patient Requires Follow-up: [x] Yes  [] No    Return to Play:    [x]  N/A   []  Stage 1: Intro to Strength   []  Stage 2: Return to Run and Strength   []  Stage 3: Return to Jump and Strength   []  Stage 4: Dynamic Strength and Agility   []  Stage 5: Sport Specific Training     []  Ready to Return to Play, Meets All Above Stages   []  Not Ready for Return to Sports   Comments:            PLAN: See eval. PT 1x / week for 8 weeks. [] Continue per plan of care [] Alter current plan (see comments)  [x] Plan of care initiated [] Hold pending MD visit [] Discharge    Electronically signed by: Romelia Jimenez PT, DPT      Note: If patient does not return for scheduled/ recommended follow up visits, this note will serve as a discharge from care along with most recent update on progress.

## 2022-10-03 ENCOUNTER — HOSPITAL ENCOUNTER (OUTPATIENT)
Dept: PHYSICAL THERAPY | Age: 47
Setting detail: THERAPIES SERIES
Discharge: HOME | End: 2022-10-03
Payer: COMMERCIAL

## 2022-10-03 PROCEDURE — 97112 NEUROMUSCULAR REEDUCATION: CPT

## 2022-10-03 PROCEDURE — 97530 THERAPEUTIC ACTIVITIES: CPT

## 2022-10-03 PROCEDURE — 97110 THERAPEUTIC EXERCISES: CPT

## 2022-10-03 NOTE — PROGRESS NOTES
[address] [place] [address]  Phone: [phone]   Fax: [phone]    Physical Therapy Treatment Note/ Progress Report:     Date:  10/3/2022    Patient Name:  Fang Rodriguez    :  1975  MRN: [id number]  Restrictions/Precautions:    Medical/Treatment Diagnosis Information:  Diagnosis: M25.559 - Hip pain  M16.11 - Primary osteoarthritis of right hip  M25.851 - Hip impingement syndrome, right  M70.61 - Greater trochanteric bursitis of right hip  Treatment Diagnosis: R hip pain, dec BLE ROM, dec BLE strength, dec endurance, dec BLE muscle length, antalgic gait, balance deficits, lightheadedness and dizziness  Insurance/Certification information:  PT Insurance Information: [employer], copay $60, 20 PT/OT/speech combined - [employer], reauth after 12 visit  Physician Information:  Cecilia Power MD    Plan of care signed (Y/N): []  Yes [x]  No     Date of Patient follow up with Physician:      Progress Report: []  Yes  [x]  No     Date Range for reporting period:  Beginnin22  Ending:     Progress report due (10 Rx/or 30 days whichever is less): visit #10 or 22:     Recertification due (POC duration/ or 90 days whichever is less): visit #20 or 22     Visit # Insurance Allowable Auth required? Date Range    20 combined PT/OT/Speech - [employer] [x]  Yes, Neeraj after 12th visit  []  No 12th visit     Latex Allergy:  [x]NO      []YES  Preferred Language for Healthcare:   [x]English       []other:    Functional Scale:           Date assessed:  TO physical FS primary measure score = 50; risk adjusted = 44  22    Pain level: 4/10 B knee    SUBJECTIVE: 10/3: was able to work for 12.5 hours without leg pain but had increase in back pain due to forward flexed position while seated at desk. Hasn't been using cane but hasn't done walking much due to back pain.     : Having some R knee pain today.  Reports not using her cane for 3 days and feels like she is having less pain in her arm without using it. OBJECTIVE:   : PN completed  6MWT: 920 ft   5xSTS:  6  (12 seconds)  30\" chair test: 11    : 6MWT: 708 ft with occasional cane use with L trunk lean as pt fatigues            5xSTS: 17 seconds          30 seconds chair test: 9    RESTRICTIONS/PRECAUTIONS: : light headedness, dizziness, Seizure 3 months ago (controlled with medication), HTN (controlled), anxiety/depression (controlled).     Exercises/Interventions:     Therapeutic Exercise (61419)  Resistance / level Sets/sec Reps Notes / Cues   TM 5.15 min    Supine Isometrics:  Quad sets  Hip add with gait belt  Hip abd with towel or ball     : performed in supine d/t inc in knee pain with standing   Standing 3 way hip [place] TB 2x10 B  : added to HEP   Standing HS stretch  1x30\" B  : added to hep   Standing HF stretch   1x30\" B  : added to hep   IB  HR  2x30\" B  2x10     Sit to stand from elevated table   : cueing for proper biomechanics include dec BUE utilization, glut squeeze and pushing through heels to dec knee pain   Supine:  SLR  Bridge  Bridge with abd squeeze  Hip abd with strap    March with hold  HL abd squeeze : added to HEP, with v/c for equal BLE utilization    Monster/Reverse              Scap squeeze with ER              Therapeutic Activities (23855)       PN completed to include followinxSTS  6MWT  30\" sit to stand   Pt edu progress/ results, plan moving forward and HEP rev,  discussed       Sit to stands with TB  Sit to stands with med ball 2x10  : added to HEP   Step taps and step ups fwd and lateral  8 in 2x10  No UE support   Marching on foam airex  1x10     ambulation       Home ergonomics including bring the monitor closer and higher, asking work about a new keyboard, and discussing proper posture X10 min                    Neuromuscular Re-ed (57770)       Cable column   walk outs  Walkouts with palloff press   1 plate   X5 each dir  10/3: added   Scap squeeze  8x5\"            Manual Intervention ([phone])       Knee mobs/PROM       Tib/Fem Mobs       Patella Mobs       Ankle mobs                         Modalities:     Pt. Education:  -pt educated on diagnosis, prognosis and expectations for rehab  -all pt questions were answered    Home Exercise Program:  Access Code: [email or web address]  URL: [email or web address]  Date: 10/03/2022  Prepared by: Ashleigh Pool    Exercises  Seated Scapular Retraction - 1 x daily - 7 x weekly - 3 sets - 10 reps - 5 hold  Seated Hamstring Stretch - 1 x daily - 7 x weekly - 3 sets - 10 reps  Standing Hip Flexor Stretch - 1 x daily - 7 x weekly - 3 sets - 10 reps  Standing 3-Way Leg Reach with Resistance at Ankles and Unilateral Counter Support - 1 x daily - 7 x weekly - 3 sets - 10 reps  Sit to Stand with Resistance Around Legs - 1 x daily - 7 x weekly - 3 sets - 10 reps  Step Taps on Low Step - 1 x daily - 7 x weekly - 3 sets - 10 reps    Access Code: [email or web address]  URL: [email or web address]  Date: 09/26/2022  Prepared by: [email or web address] Pool    Exercises  Seated Hamstring Stretch - 1 x daily - 7 x weekly - 3 sets - 10 reps  Standing Hip Flexor Stretch - 1 x daily - 7 x weekly - 3 sets - 10 reps  Standing 3-Way Leg Reach with Resistance at Ankles and Unilateral Counter Support - 1 x daily - 7 x weekly - 3 sets - 10 reps  Step Taps on Low Step - 1 x daily - 7 x weekly - 3 sets - 10 reps  Sit to Stand with Resistance Around Legs - 1 x daily - 7 x weekly - 3 sets - 10 reps  Added endurance/walking program on every other day - walkfor 30 minutes (6 minute walking, 3-4 min break intervals up to 30 min)    Access Code: [email or web address]  URL: [email or web address]  Date: 09/12/2022  Prepared by: Ashleigh Pool    Exercises  Single Leg  - 1 x daily - 7 x weekly - 3 sets - 10 reps ' adjusted to SLR on 9/19  Supine Bridge - 1 x daily - 7 x weekly - 3 sets - 10 reps  Hooklying Clamshell with Resistance - 1 x daily - 7 x weekly - 3 sets - 10 reps  Supine March - 1 x daily - 7 x weekly - 3 sets - 10 reps      Therapeutic Exercise and NMR EXR  [x] (95126) Provided verbal/tactile cueing for activities related to strengthening, flexibility, endurance, ROM for improvements in LE, proximal hip, and core control with self care, mobility, lifting, ambulation. [x] (03576) Provided verbal/tactile cueing for activities related to improving balance, coordination, kinesthetic sense, posture, motor skill, proprioception  to assist with LE, proximal hip, and core control in self care, mobility, lifting, ambulation and eccentric single leg control.   [] (22964) Therapist is in constant attendance of 2 or more patients providing skilled therapy interventions, but not providing any significant amount of measurable one-on-one time to either patient, for improvements in LE, proximal hip, and core control in self care, mobility, lifting, ambulation and eccentric single leg control.      NMR and Therapeutic Activities:    [x] (31834 or 10841) Provided verbal/tactile cueing for activities related to improving balance, coordination, kinesthetic sense, posture, motor skill, proprioception and motor activation to allow for proper function of core, proximal hip and LE with self care and ADLs  [] (58736) Gait Re-education- Provided training and instruction to the patient for proper LE, core and proximal hip recruitment and positioning and eccentric body weight control with ambulation re-education including up and down stairs     Home Exercise Program:    [x] (35391) Reviewed/Progressed HEP activities related to strengthening, flexibility, endurance, ROM of core, proximal hip and LE for functional self-care, mobility, lifting and ambulation/stair navigation   [x] (57025)Reviewed/Progressed HEP activities related to improving balance, coordination, kinesthetic sense, posture, motor skill, proprioception of core, proximal hip and LE for self care, mobility, lifting, and ambulation/stair navigation      Manual Treatments:  PROM / STM / Oscillations-Mobs:  G-I, II, III, IV (PA's, Inf., Post.)  [] (57219) Provided manual therapy to mobilize LE, proximal hip and/or LS spine soft tissue/joints for the purpose of modulating pain, promoting relaxation,  increasing ROM, reducing/eliminating soft tissue swelling/inflammation/restriction, improving soft tissue extensibility and allowing for proper ROM for normal function with self care, mobility, lifting and ambulation. Modalities:  [] (29275) Vasopneumatic compression: Utilized vasopneumatic compression to decrease edema / swelling for the purpose of improving mobility and quad tone / recruitment which will allow for increased overall function including but not limited to self-care, transfers, ambulation, and ascending / descending stairs. Charges:  Timed Code Treatment Minutes: 43   Total Treatment Minutes: 43     [] EVAL - LOW (86026)   [] EVAL - MOD (62901)  [] EVAL - HIGH (63726)  [] RE-EVAL (60704)  [x] LAW(20855) x   1    [] Ionto  [x] NMR (42868) x   1    [] Vaso  [] Manual (77749) x       [] Ultrasound  [x] TA x  1      [] Mech Traction (56739)  [] Aquatic Therapy x     [] ES (un) (32147):   [] Home Management Training x  [] ES(attended) (20776)   [] Dry Needling 1-2 muscles (40960):  [] Dry Needling 3+ muscles (541683  [] Group:      [] Other:     GOALS:  Patient stated goal: Pt reports wanting to decrease pain and improve activity tolerance. [] Progressing: [] Met: [] Not Met: [] Adjusted     Therapist goals for Patient:   Short Term Goals: To be achieved in: 2 weeks  1. Independent in HEP and progression per patient tolerance, in order to prevent re-injury. [] Progressing: [] Met: [] Not Met: [] Adjusted  2. Patient will have a decrease in R hip pain to facilitate improvement in movement, function, and ADLs as indicated by Functional Deficits. [] Progressing: [] Met: [] Not Met: [] Adjusted     Long Term Goals:  To be achieved in: 8 weeks  1. FOTO score of at least 60 to demonstrate improvements in quality of life and activity tolerance. [] Progressing: [] Met: [] Not Met: [] Adjusted  2. Pt will demonstrate an increase in FARRIS Balance score by 9 (MCID) to denote improvements in balance and decrease risk of falls. [] Progressing: [] Met: [] Not Met: [] Adjusted  3. Pt will be able to tolerate standing for up to 20 minutes without an increase in pain greater than 4/10 or with minimal lightheadedness to improve ability to independently cook and clean at home. [x] Progressing: [] Met: [] Not Met: [] Adjusted  3. Patient will demonstrate an increase in hip and BLE Strength to at least 4/5 as well as good proximal hip strength and control to allow for proper functional mobility as indicated by patients Functional Deficits. [] Progressing: [] Met: [] Not Met: [] Adjusted  4. Patient will ambulate for 1765 feet (age matched norm) in 6 minutes without assistive device to demonstrate good endurance. [x] Progressing: [] Met: [] Not Met: [] Adjusted  5. Patient will demonstrate improvements in her walking speed during 10MWT from 0.77m/s to 1.39m/s to safely ambulate in the community. [] Progressing: [] Met: [] Not Met: [] Adjusted       6. Patient will demonstrate improvements of 4s (MCID) during 5xSTS to demonstrate improvements in BLE strength to demonstrate improvements in ability to perform transfers independently and improve quality of life. [x] Progressing: [] Met: [] Not Met: [] Adjusted     - added 9/12    Overall Progression Towards Functional goals/ Treatment Progress Update:  [] Patient is progressing as expected towards functional goals listed. [] Progression is slowed due to complexities/Impairments listed. [] Progression has been slowed due to co-morbidities.   [x] Plan just implemented, too soon to assess goals progression <30days   [] Goals require adjustment due to lack of progress  [] Patient is not progressing as expected and requires additional follow up with physician  [] Other    Persisting Functional Limitations/Impairments:  [x]Sitting [x]Standing   [x]Walking [x]Stairs   [x]Transfers []ADLs   [x]Squatting/bending [x]Kneeling  [x]Housework []Job related tasks  []Driving []Sports/Recreation   []Sleeping []Other:    ASSESSMENT:  10/3: Pt tolerated exercises well this date with increase in endurance  but required frequest standing rest breaks with increase in standing activities this date. PT provided edu on work from home ergonomics to decrease back pain at [facility] pt encouraged to bring picture of [facility] ergonomics. PT updated, pt demo-d new HEP with HO provided. Plan to continue to work on BLE strengthening and endurance to improve pt's quality of life and ability to ambulate at home and  in the community. Treatment/Activity Tolerance:  [] Pt able to complete treatment [] Patient limited by fatique  [] Patient limited by pain  [] Patient limited by other medical complications  [x] Other: Pt limited by light headedness and dizziness    Prognosis:  [x] Good [] Fair  [] Poor    Patient Requires Follow-up: [x] Yes  [] No    Return to Play:    [x]  N/A   []  Stage 1: Intro to Strength   []  Stage 2: Return to Run and Strength   []  Stage 3: Return to Jump and Strength   []  Stage 4: Dynamic Strength and Agility   []  Stage 5: Sport Specific Training     []  Ready to Return to Play, Meets All Above Stages   []  Not Ready for Return to Sports   Comments:            PLAN: See eval. PT 1x / week for 8 weeks. [] Continue per plan of care [] Alter current plan (see comments)  [x] Plan of care initiated [] Hold pending MD visit [] Discharge    Electronically signed by: Alessia Vela, PT, DPT      Note: If patient does not return for scheduled/ recommended follow up visits, this note will serve as a discharge from care along with most recent update on progress.

## 2022-10-05 ENCOUNTER — OFFICE VISIT (OUTPATIENT)
Dept: PSYCHOLOGY | Age: 47
End: 2022-10-05
Payer: COMMERCIAL

## 2022-10-05 DIAGNOSIS — F32.1 MODERATE MAJOR DEPRESSION (HCC): Primary | ICD-10-CM

## 2022-10-05 PROCEDURE — 90832 PSYTX W PT 30 MINUTES: CPT | Performed by: PSYCHOLOGIST

## 2022-10-05 NOTE — PROGRESS NOTES
Behavioral Health Consultation  Dylan Cowan, Ph.D.  Psychologist  10/5/2022   1:32 PM EST       Time spent with Patient: 30 minutes      Reason for Consult:    Chief Complaint   Patient presents with    Depression       Referring Provider: No referring provider defined for this encounter. Pt provided informed consent for the behavioral health program. Discussed with patient model of service to include the limits of confidentiality (i.e. abuse reporting, suicide  intervention, etc.) and short-term intervention focused approach. Pt indicated understanding. Feedback given to PCP. S:  Pt seen per PCP re: depression    Patient seen for follow-up. Pt noted increased tearfulness. Pt reported a trigger of this is stress and negative thoughts about making a mistake. Pt noted that her boss does not address her mistakes in an inappropriate way. Pt also noted that she has been tearful when reading sad or heartwarming stories. Pt noted that she has not been engaigng as much socially. Focused visit on MI for behavioral engagement, problem focused coping, and cognitive reappraisal. Pt set goal to say something good about herself during her break and to engage with a friend.        O:  MSE:    Appearance: good hygiene   Attitude: cooperative and friendly  Consciousness: alert  Orientation: oriented to person, place, time, general circumstance  Memory: recent and remote memory intact  Attention/Concentration: intact during session  Psychomotor Activity:normal  Eye Contact: normal  Speech: normal rate and volume, well-articulated  Mood: euthymic  Affect: congruent  Perception: within normal limits  Thought Content: within normal limits  Thought Process: logical, coherent  Insight: good  Judgment: intact  Ability to understand instructions: Yes  Morbid Ideation: no   Suicide Assessment: no suicidal ideation, plan, or intent  Homicidal Ideation: no     History:    Social History:   Social History     Socioeconomic History Marital status:      Spouse name: Not on file    Number of children: 0    Years of education: Not on file    Highest education level: Not on file   Occupational History    Occupation:    Tobacco Use    Smoking status: Every Day     Packs/day: 1.50     Years: 31.00     Pack years: 46.50     Types: Cigarettes     Start date: 25     Passive exposure: Past    Smokeless tobacco: Never   Vaping Use    Vaping Use: Never used   Substance and Sexual Activity    Alcohol use: Not Currently    Drug use: No    Sexual activity: Not Currently     Partners: Male   Other Topics Concern    Not on file   Social History Narrative    Not on file     Social Determinants of Health     Financial Resource Strain: Low Risk     Difficulty of Paying Living Expenses: Not hard at all   Food Insecurity: No Food Insecurity    Worried About Running Out of Food in the Last Year: Never true    Ran Out of Food in the Last Year: Never true   Transportation Needs: Not on file   Physical Activity: Not on file   Stress: Not on file   Social Connections: Not on file   Intimate Partner Violence: Not on file   Housing Stability: Not on file     TOBACCO:   reports that she has been smoking cigarettes. She started smoking about 35 years ago. She has a 46.50 pack-year smoking history. She has been exposed to tobacco smoke. She has never used smokeless tobacco.  ETOH:   reports that she does not currently use alcohol. A:    PHQ Scores 8/15/2022 7/25/2022 6/27/2022 6/13/2022 4/27/2022 3/9/2022 2/2/2022   PHQ2 Score 2 2 2 2 1 1 6   PHQ9 Score 13 6 13 8 6 1 13     Interpretation of Total Score Depression Severity: 1-4 = Minimal depression, 5-9 = Mild depression, 10-14 = Moderate depression, 15-19 = Moderately severe depression, 20-27 = Severe depression    Diagnosis:    1. Moderate major depression ([facility]            Plan:    There are no Patient Instructions on file for this visit. Pt interventions:  See above    No follow-ups on file. Documentation was done using voice recognition dragon software. Every effort was made to ensure accuracy; however, inadvertent, unintentional computerized transcription errors may be present.

## 2022-10-10 ENCOUNTER — HOSPITAL ENCOUNTER (OUTPATIENT)
Dept: PHYSICAL THERAPY | Age: 47
Setting detail: THERAPIES SERIES
Discharge: HOME | End: 2022-10-10
Payer: COMMERCIAL

## 2022-10-10 PROCEDURE — 97530 THERAPEUTIC ACTIVITIES: CPT

## 2022-10-10 PROCEDURE — 97110 THERAPEUTIC EXERCISES: CPT

## 2022-10-10 NOTE — PROGRESS NOTES
[address] [place] [address], [facility] ,[address]  Phone: [phone]   Fax: [phone]    Physical Therapy Treatment Note/ Progress Report:     Date:  10/10/2022    Patient Name:  Hector Hope    :  1975  MRN: [id number]  Restrictions/Precautions:    Medical/Treatment Diagnosis Information:  Diagnosis: M25.559 - Hip pain  M16.11 - Primary osteoarthritis of right hip  M25.851 - Hip impingement syndrome, right  M70.61 - Greater trochanteric bursitis of right hip  Treatment Diagnosis: R hip pain, dec BLE ROM, dec BLE strength, dec endurance, dec BLE muscle length, antalgic gait, balance deficits, lightheadedness and dizziness  Insurance/Certification information:  PT Insurance Information: [employer], copay $60, 20 PT/OT/speech combined - [employer], reauth after 12 visit  Physician Information:  Jacki Wall MD    Plan of care signed (Y/N): []  Yes [x]  No     Date of Patient follow up with Physician:      Progress Report: []  Yes  [x]  No     Date Range for reporting period:  Beginnin22  Ending:     Progress report due (10 Rx/or 30 days whichever is less): visit #10 or 22:     Recertification due (POC duration/ or 90 days whichever is less): visit #20 or 22     Visit # Insurance Allowable Auth required? Date Range    20 combined PT/OT/Speech - [employer] [x]  Yes, Neeraj after 12th visit  []  No 12th visit     Latex Allergy:  [x]NO      []YES  Preferred Language for Healthcare:   [x]English       []other:    Functional Scale:           Date assessed:  FOTO physical FS primary measure score = 50; risk adjusted = 44  22    Pain level: 4/10 B knee    SUBJECTIVE: 10/10: Feels like her back pain had decreased from severe to manageable but continues to have hip pain on R and not being able to be comfortable when she is sleeping. Reports moving her monitor closer to her and performing her back exercises while she is seated at work.  Has been able to work for 40 hours a week and has her sleep study tomorrow. OBJECTIVE:   : PN completed  6MWT: 920 ft   5xSTS:  6  (12 seconds)  30\" chair test: 11    : 6MWT: 708 ft with occasional cane use with L trunk lean as pt fatigues            5xSTS: 17 seconds          30 seconds chair test: 9    RESTRICTIONS/PRECAUTIONS: : light headedness, dizziness, Seizure 3 months ago (controlled with medication), HTN (controlled), anxiety/depression (controlled).     Exercises/Interventions:     Therapeutic Exercise (15932)  Resistance / level Sets/sec Reps Notes / Cues   TM Nustep      5 min   Supine Isometrics:  Quad sets  Hip add with gait belt  Hip abd with towel or ball    10x10\"  10x10\"  : performed in supine d/t inc in knee pain with standing   Standing 3 way hip  : added to HEP   Standing HS stretch  2x30\" B  : added to hep   Standing HF stretch   2x30\" B  : added to hep   IB  HR  2x30\" B      Sit to stand from elevated table   : cueing for proper biomechanics include dec BUE utilization, glut squeeze and pushing through heels to dec knee pain   Supine:  SLR  Bridge  Bridge with abd squeeze  Hip abd with strap    March with hold  HL abd squeeze : added to HEP, with v/c for equal BLE utilization    Monster/Reverse              Scap squeeze with ER              Therapeutic Activities (13865)       PN completed to include followinxSTS  6MWT  30\" sit to stand   Pt edu progress/ results, plan moving forward and HEP rev,  discussed       Sit to stands with TB  Sit to stands with med ball  : added to HEP   Step taps and step ups fwd and lateral  8 in  No UE support   Marching on foam airex  1x10     ambulation       Home ergonomics includingDiscussed BLE positioning and bringing a picture of postioning at npv X8 min                    Neuromuscular Re-ed (51300)       Cable column   walk outs  Walkouts with palloff press  10/3: added   Scap squeeze            Manual Intervention ([phone]) Knee mobs/PROM       Tib/Fem Mobs       Patella Mobs       Ankle mobs                         Modalities:     Pt. Education:  -pt educated on diagnosis, prognosis and expectations for rehab  -all pt questions were answered    Home Exercise Program:  Access Code: [email or web address]  URL: [email or web address]  Date: 10/10/2022  Prepared by: Alessia Para    Exercises  Seated Scapular Retraction - 1 x daily - 7 x weekly - 3 sets - 10 reps - 5 hold  Seated Hamstring Stretch - 1 x daily - 7 x weekly - 3 sets - 45 hold  Hip Flexor Stretch on Step - 1 x daily - 7 x weekly - 3 sets - 10 reps - 30 hold  Hooklying Isometric Hip Abduction with Belt - 1 x daily - 7 x weekly - 3 sets - 10 reps - 10 hold  Supine Hip Adduction Isometric with Ball - 1 x daily - 7 x weekly - 3 sets - 10 reps - 10 hold      Access Code: [email or web address]  URL: [email or web address]  Date: 10/03/2022  Prepared by: Alessia Para    Exercises  Seated Scapular Retraction - 1 x daily - 7 x weekly - 3 sets - 10 reps - 5 hold  Seated Hamstring Stretch - 1 x daily - 7 x weekly - 3 sets - 10 reps  Standing Hip Flexor Stretch - 1 x daily - 7 x weekly - 3 sets - 10 reps  Standing 3-Way Leg Reach with Resistance at Ankles and Unilateral Counter Support - 1 x daily - 7 x weekly - 3 sets - 10 reps  Sit to Stand with Resistance Around Legs - 1 x daily - 7 x weekly - 3 sets - 10 reps  Step Taps on Low Step - 1 x daily - 7 x weekly - 3 sets - 10 reps    Access Code: [email or web address]  URL: [email or web address]  Date: 09/26/2022  Prepared by: Alessia Para    Exercises  Seated Hamstring Stretch - 1 x daily - 7 x weekly - 3 sets - 10 reps  Standing Hip Flexor Stretch - 1 x daily - 7 x weekly - 3 sets - 10 reps  Standing 3-Way Leg Reach with Resistance at Ankles and Unilateral Counter Support - 1 x daily - 7 x weekly - 3 sets - 10 reps  Step Taps on Low Step - 1 x daily - 7 x weekly - 3 sets - 10 reps  Sit to Stand with Resistance Around Legs - 1 x daily - 7 x weekly - 3 sets - 10 reps  Added endurance/walking program on every other day - walkfor 30 minutes (6 minute walking, 3-4 min break intervals up to 30 min)    Access Code: [email or web address]  URL: [email or web address]  Date: 09/12/2022  Prepared by: Anya Palm    Exercises  Single Leg  - 1 x daily - 7 x weekly - 3 sets - 10 reps ' adjusted to SLR on 9/19  Supine Bridge - 1 x daily - 7 x weekly - 3 sets - 10 reps  Hooklying Clamshell with Resistance - 1 x daily - 7 x weekly - 3 sets - 10 reps  Supine March - 1 x daily - 7 x weekly - 3 sets - 10 reps      Therapeutic Exercise and NMR EXR  [x] (89311) Provided verbal/tactile cueing for activities related to strengthening, flexibility, endurance, ROM for improvements in LE, proximal hip, and core control with self care, mobility, lifting, ambulation. [x] (24456) Provided verbal/tactile cueing for activities related to improving balance, coordination, kinesthetic sense, posture, motor skill, proprioception  to assist with LE, proximal hip, and core control in self care, mobility, lifting, ambulation and eccentric single leg control.   [] (20485) Therapist is in constant attendance of 2 or more patients providing skilled therapy interventions, but not providing any significant amount of measurable one-on-one time to either patient, for improvements in LE, proximal hip, and core control in self care, mobility, lifting, ambulation and eccentric single leg control.      NMR and Therapeutic Activities:    [x] (65706 or 17770) Provided verbal/tactile cueing for activities related to improving balance, coordination, kinesthetic sense, posture, motor skill, proprioception and motor activation to allow for proper function of core, proximal hip and LE with self care and ADLs  [] (35867) Gait Re-education- Provided training and instruction to the patient for proper LE, core and proximal hip recruitment and positioning and eccentric body weight control with ambulation re-education including up and down stairs     Home Exercise Program:    [x] (85606) Reviewed/Progressed HEP activities related to strengthening, flexibility, endurance, ROM of core, proximal hip and LE for functional self-care, mobility, lifting and ambulation/stair navigation   [x] (65433)Reviewed/Progressed HEP activities related to improving balance, coordination, kinesthetic sense, posture, motor skill, proprioception of core, proximal hip and LE for self care, mobility, lifting, and ambulation/stair navigation      Manual Treatments:  PROM / STM / Oscillations-Mobs:  G-I, II, III, IV (PA's, Inf., Post.)  [] (43668) Provided manual therapy to mobilize LE, proximal hip and/or LS spine soft tissue/joints for the purpose of modulating pain, promoting relaxation,  increasing ROM, reducing/eliminating soft tissue swelling/inflammation/restriction, improving soft tissue extensibility and allowing for proper ROM for normal function with self care, mobility, lifting and ambulation. Modalities:  [] (87460) Vasopneumatic compression: Utilized vasopneumatic compression to decrease edema / swelling for the purpose of improving mobility and quad tone / recruitment which will allow for increased overall function including but not limited to self-care, transfers, ambulation, and ascending / descending stairs. Charges:  Timed Code Treatment Minutes: 40   Total Treatment Minutes: 40     [] EVAL - LOW (09004)   [] EVAL - MOD (77757)  [] EVAL - HIGH (35273)  [] RE-EVAL (43272)  [x] GD(52936) x   2    [] Ionto  [] NMR (86670) x       [] Vaso  [] Manual (96620) x       [] Ultrasound  [x] TA x  1      [] Mech Traction (76351)  [] Aquatic Therapy x     [] ES (un) (76754):   [] Home Management Training x  [] ES(attended) (42163)   [] Dry Needling 1-2 muscles (55725):  [] Dry Needling 3+ muscles (952219  [] Group:      [] Other:     GOALS:  Patient stated goal: Pt reports wanting to decrease pain and improve activity tolerance.   [] Progressing: [] Met: [] Not Met: [] Adjusted     Therapist goals for Patient:   Short Term Goals: To be achieved in: 2 weeks  1. Independent in HEP and progression per patient tolerance, in order to prevent re-injury. [] Progressing: [] Met: [] Not Met: [] Adjusted  2. Patient will have a decrease in R hip pain to facilitate improvement in movement, function, and ADLs as indicated by Functional Deficits. [] Progressing: [] Met: [] Not Met: [] Adjusted     Long Term Goals: To be achieved in: 8 weeks  1. FOTO score of at least 60 to demonstrate improvements in quality of life and activity tolerance. [] Progressing: [] Met: [] Not Met: [] Adjusted  2. Pt will demonstrate an increase in FARRIS Balance score by 9 (MCID) to denote improvements in balance and decrease risk of falls. [] Progressing: [] Met: [] Not Met: [] Adjusted  3. Pt will be able to tolerate standing for up to 20 minutes without an increase in pain greater than 4/10 or with minimal lightheadedness to improve ability to independently cook and clean at home. [x] Progressing: [] Met: [] Not Met: [] Adjusted  3. Patient will demonstrate an increase in hip and BLE Strength to at least 4/5 as well as good proximal hip strength and control to allow for proper functional mobility as indicated by patients Functional Deficits. [] Progressing: [] Met: [] Not Met: [] Adjusted  4. Patient will ambulate for 1765 feet (age matched norm) in 6 minutes without assistive device to demonstrate good endurance. [x] Progressing: [] Met: [] Not Met: [] Adjusted  5. Patient will demonstrate improvements in her walking speed during 10MWT from 0.77m/s to 1.39m/s to safely ambulate in the community. [] Progressing: [] Met: [] Not Met: [] Adjusted       6. Patient will demonstrate improvements of 4s (MCID) during 5xSTS to demonstrate improvements in BLE strength to demonstrate improvements in ability to perform transfers independently and improve quality of life.    [x] Progressing: [] Met: [] Not Met: [] Adjusted     - added 9/12    Overall Progression Towards Functional goals/ Treatment Progress Update:  [] Patient is progressing as expected towards functional goals listed. [] Progression is slowed due to complexities/Impairments listed. [] Progression has been slowed due to co-morbidities. [x] Plan just implemented, too soon to assess goals progression <30days   [] Goals require adjustment due to lack of progress  [] Patient is not progressing as expected and requires additional follow up with physician  [] Other    Persisting Functional Limitations/Impairments:  [x]Sitting [x]Standing   [x]Walking [x]Stairs   [x]Transfers []ADLs   [x]Squatting/bending [x]Kneeling  [x]Housework []Job related tasks  []Driving []Sports/Recreation   []Sleeping []Other:    ASSESSMENT:  10/10: Tolerated exercises well this date. Provided PT with information about GAP program and isometrics to utilize during R hip inflammation days. PT updated, pt demo-d new HEP with HO provided. PT also provided education about work from home ergonomics with instruction to keep hip/knees and feet at 90/90 and decrease hip adduction to improve RLE pain during prolonged work hours - pt verbalized understanding. Pt reported decrease in R hip pain at end of session after performing isometrics. Plan to continue to work on BLE strengthening and endurance to improve pt's quality of life and ability to ambulate at home and in the community.      Treatment/Activity Tolerance:  [] Pt able to complete treatment [] Patient limited by fatique  [] Patient limited by pain  [] Patient limited by other medical complications  [x] Other: Pt limited by light headedness and dizziness    Prognosis:  [x] Good [] Fair  [] Poor    Patient Requires Follow-up: [x] Yes  [] No    Return to Play:    [x]  N/A   []  Stage 1: Intro to Strength   []  Stage 2: Return to Run and Strength   []  Stage 3: Return to Jump and Strength   []  Stage 4: Dynamic Strength and Agility   []  Stage 5: Sport Specific Training     []  Ready to Return to Play, Meets All Above Stages   []  Not Ready for Return to Sports   Comments:            PLAN: See eval. PT 1x / week for 8 weeks. [] Continue per plan of care [] Alter current plan (see comments)  [x] Plan of care initiated [] Hold pending MD visit [] Discharge    Electronically signed by: Roxie Peñaloza, PT, DPT      Note: If patient does not return for scheduled/ recommended follow up visits, this note will serve as a discharge from care along with most recent update on progress.

## 2022-10-11 ENCOUNTER — HOSPITAL ENCOUNTER (OUTPATIENT)
Dept: SLEEP CENTER | Age: 47
Discharge: HOME | End: 2022-10-11
Payer: COMMERCIAL

## 2022-10-11 DIAGNOSIS — G47.33 OSA (OBSTRUCTIVE SLEEP APNEA): ICD-10-CM

## 2022-10-11 DIAGNOSIS — Z86.69 H/O PARTIAL SEIZURES: ICD-10-CM

## 2022-10-11 PROCEDURE — 95811 POLYSOM 6/>YRS CPAP 4/> PARM: CPT

## 2022-10-11 PROCEDURE — 95811 POLYSOM 6/>YRS CPAP 4/> PARM: CPT | Performed by: PSYCHIATRY & NEUROLOGY

## 2022-10-13 PROBLEM — R09.02 HYPOXEMIA: Status: ACTIVE | Noted: 2022-10-13

## 2022-10-13 NOTE — PROGRESS NOTES
Renae Serrano         : 1975  [] [facility]     [] [facility]      [] [facility]     [][facility]    [] [facility]  [] [facility]  [] [facility]   [] [facility] [] Other:  Diagnosis: [x] ARYA (G47.33) [] CSA (G47.31) [] Apnea (G47.30)   Length of Need: [] 12 Months [x] 99 Months [] Other:    Machine (KATHARINE!):  [x] ResMed AirSense     Auto [] Other:     []  CPAP () [x] Bilevel ()   Mode: [] Auto [] Spontaneous    Mode: [] Auto [] Spontaneous                     25/20 cm with O2 at 2 LPM       Comfort Settings:   - Ramp Pressure: 5 cmH2O                                        - Ramp time: 15 min                                     -  Flex/EPR - 3 full time                                    - For ResMed Bilevel (TiMax-4 sec   TiMin- 0.2 sec)     Humidifier: [x] Heated ()        [x] Water chamber replacement ()/ 1 per 6 months        Mask:  Please always start with the mask the patient used during the titraion   [x] Nasal () /1 per 3 months [x] Full Face () /1 per 3 months   [x] Patient choice -Size and fit mask [x] Patient Choice - Size and fit mask   [] Dispense:  [] Dispense:   medium Simplus full face    [x] Headgear () / 1 per 3 months [x] Headgear () / 1 per 3 months   [x] Replacement Nasal Cushion ()/2 per month [x] Interface Replacement ()/1 per month   [x] Replacement Nasal Pillows ()/2 per month         Tubing: [x] Heated ()/1 per 3 months    [] Standard ()/1 per 3 months [] Other:           Filters: [x] Non-disposable ()/1 per 6 months     [x] Ultra-Fine, Disposable ()/2 per month        Miscellaneous: [x] Chin Strap ()/ 1 per 6 months [x] O2 bleed-in:   2    LPM   [] Oximetry on CPAP/Bilevel []  Other:          Start Order Date: 10/13/22    MEDICAL JUSTIFICATION:  I, the undersigned, certify that the above prescribed supplies are medically necessary for this patients wellbeing.   In my opinion, the

## 2022-10-14 ENCOUNTER — TELEPHONE (OUTPATIENT)
Dept: PULMONOLOGY | Age: 47
End: 2022-10-14

## 2022-10-14 NOTE — TELEPHONE ENCOUNTER
Split night Sleep study showed severe ARYA. AHI was 101.1  per hr. And O2 Desaturations to 66%. Because of this CPAP/BiPAP was initiated. Central sleep apnea 13.6/hr. Adequate control with BiPAP pressure of 25/20 wth 02 at 1 LPM  Dr Stevan Chua: Follow up with the patient's sleep physician to discuss results  BiPAP pressure of 25/20 with o2 at 2 LPM  Avoid sedatives, alcohol and caffeinated drinks at bedtime. Avoid driving while sleepy. The patient has been notified of this information and all questions answered.     DME:  [facility] sent as expidited

## 2022-10-14 NOTE — PROGRESS NOTES
Pool referral for nocturnal hypoxia and likely lung disease from Dr. Miryam Scott.   First available for whomever has an opening

## 2022-10-17 ENCOUNTER — HOSPITAL ENCOUNTER (OUTPATIENT)
Dept: PHYSICAL THERAPY | Age: 47
Setting detail: THERAPIES SERIES
Discharge: HOME | End: 2022-10-17
Payer: COMMERCIAL

## 2022-10-17 PROCEDURE — 97112 NEUROMUSCULAR REEDUCATION: CPT

## 2022-10-17 PROCEDURE — 97530 THERAPEUTIC ACTIVITIES: CPT

## 2022-10-17 PROCEDURE — 97110 THERAPEUTIC EXERCISES: CPT

## 2022-10-17 NOTE — PROGRESS NOTES
[address] [place] [address]  Phone: [phone]   Fax: [phone]    Physical Therapy Treatment Note/ Progress Report:     Date:  10/17/2022    Patient Name:  Bismark Augustine    :  1975  MRN: [id number]  Restrictions/Precautions:    Medical/Treatment Diagnosis Information:  Diagnosis: M25.559 - Hip pain  M16.11 - Primary osteoarthritis of right hip  M25.851 - Hip impingement syndrome, right  M70.61 - Greater trochanteric bursitis of right hip  Treatment Diagnosis: R hip pain, dec BLE ROM, dec BLE strength, dec endurance, dec BLE muscle length, antalgic gait, balance deficits, lightheadedness and dizziness  Insurance/Certification information:  PT Insurance Information: [employer], copay $60, 20 PT/OT/speech combined - [employer], reauth after 12 visit  Physician Information:  Olayinka García MD    Plan of care signed (Y/N): []  Yes [x]  No     Date of Patient follow up with Physician:      Progress Report: []  Yes  [x]  No     Date Range for reporting period:  Beginnin22  Ending:     Progress report due (10 Rx/or 30 days whichever is less): visit #10 or 22:     Recertification due (POC duration/ or 90 days whichever is less): visit #20 or 22     Visit # Insurance Allowable Auth required? Date Range    20 combined PT/OT/Speech - [employer] [x]  Yes, [employer] after 12th visit  []  No 12th visit     Latex Allergy:  [x]NO      []YES  Preferred Language for Healthcare:   [x]English       []other:    Functional Scale:           Date assessed:  FOTO physical FS primary measure score = 50; risk adjusted = 44  22  FOTO measured at 50 - 10/17/22     Pain level: 4/10 B knee    SUBJECTIVE: 10/17: Pt reports finding out she has sleep apnea and needs to see a pulmonologist. She now has to use a CPAP and needing oxygen at night. Reports having pain in hip after sleeping on back for 20 minutes.       OBJECTIVE:   : PN completed  6MWT: 920 ft 5xSTS:  6  (12 seconds)  30\" chair test: 11    : 6MWT: 708 ft with occasional cane use with L trunk lean as pt fatigues            5xSTS: 17 seconds          30 seconds chair test: 9    RESTRICTIONS/PRECAUTIONS: : light headedness, dizziness, Seizure 3 months ago (controlled with medication), HTN (controlled), anxiety/depression (controlled).     Exercises/Interventions:     Therapeutic Exercise (16231)  Resistance / level Sets/sec Reps Notes / Cues   TM Nustep      1.5     5 min  Supine Isometrics:  Quad sets  Hip add with gait belt  Hip abd with towel or ball     : performed in supine d/t inc in knee pain with standing   Standing 3 way hip  : added to HEP   Standing HS stretch  2x30\" B  : added to hep   Standing HF stretch   2x30\" B  : added to hep   IB  HR  2x30\" B  2x15     Sit to stand from elevated table   : cueing for proper biomechanics include dec BUE utilization, glut squeeze and pushing through heels to dec knee pain   Supine:  SLR  Bridge  Bridge with abd squeeze  Hip abd with strap    March with hold  HL abd squeeze : added to HEP, with v/c for equal BLE utilization    Monster/Reverse              Scap squeeze with ER              Therapeutic Activities (62801)       PN completed to include followinxSTS  6MWT  30\" sit to stand   Pt edu progress/ results, plan moving forward and HEP rev,  discussed       Sit to stands with TB  Sit to stands with med ball  : added to HEP   Step taps and step ups fwd and lateral   No UE support   Marching on foam airex  2x10  Occasional BUE support, cueing for slow controlled movements    ambulation       Home ergonomics includingDiscussed BLE positioing while seated at home in a chair including propping books under to improve seated positoning to dec BLE pain, sleep position,  FOTO measuement performed X5 min                    Neuromuscular Re-ed (01032)       Cable column   walk outs  Walkouts with palloff press  10/3: added   Scap squeeze     Forward lunges on airex  Lat lung on airex  1x10 B  2x10 B  occasional unilateral UE support   Manual Intervention (43657)       Knee mobs/PROM       Tib/Fem Mobs       Patella Mobs       Ankle mobs                         Modalities:     Pt. Education:  10/17: Pt provided education about the importance of pt compliance to exercises outside of the clinic to help with decreasing pain in hip/knees and improving her functional mobility and endurance. PT also provided education about BLE positioing while seated at home in a chair including propping books under to improve seated positoning to dec BLE pain and about sleep positioning including sleeping supine with pillows under knees. -pt educated on diagnosis, prognosis and expectations for rehab  -all pt questions were answered    Home Exercise Program:  Access Code: [email or web address]  URL: [email or web address]  Date: 10/10/2022  Prepared by: Jas Salazar    Exercises  Seated Scapular Retraction - 1 x daily - 7 x weekly - 3 sets - 10 reps - 5 hold  Seated Hamstring Stretch - 1 x daily - 7 x weekly - 3 sets - 45 hold  Hip Flexor Stretch on Step - 1 x daily - 7 x weekly - 3 sets - 10 reps - 30 hold  Hooklying Isometric Hip Abduction with Belt - 1 x daily - 7 x weekly - 3 sets - 10 reps - 10 hold  Supine Hip Adduction Isometric with Ball - 1 x daily - 7 x weekly - 3 sets - 10 reps - 10 hold      Access Code: [email or web address]  URL: [email or web address]  Date: 10/03/2022  Prepared by: Jas Salazar    Exercises  Seated Scapular Retraction - 1 x daily - 7 x weekly - 3 sets - 10 reps - 5 hold  Seated Hamstring Stretch - 1 x daily - 7 x weekly - 3 sets - 10 reps  Standing Hip Flexor Stretch - 1 x daily - 7 x weekly - 3 sets - 10 reps  Standing 3-Way Leg Reach with Resistance at Ankles and Unilateral Counter Support - 1 x daily - 7 x weekly - 3 sets - 10 reps  Sit to Stand with Resistance Around Legs - 1 x daily - 7 x weekly - 3 sets - 10 reps  Step Taps on Low Step - 1 x daily - 7 x weekly - 3 sets - 10 reps    Access Code: [email or web address]  URL: [email or web address]  Date: 09/26/2022  Prepared by: Frederick Boeck    Exercises  Seated Hamstring Stretch - 1 x daily - 7 x weekly - 3 sets - 10 reps  Standing Hip Flexor Stretch - 1 x daily - 7 x weekly - 3 sets - 10 reps  Standing 3-Way Leg Reach with Resistance at Ankles and Unilateral Counter Support - 1 x daily - 7 x weekly - 3 sets - 10 reps  Step Taps on Low Step - 1 x daily - 7 x weekly - 3 sets - 10 reps  Sit to Stand with Resistance Around Legs - 1 x daily - 7 x weekly - 3 sets - 10 reps  Added endurance/walking program on every other day - walkfor 30 minutes (6 minute walking, 3-4 min break intervals up to 30 min)    Access Code: [email or web address]  URL: [email or web address]  Date: 09/12/2022  Prepared by: Frederick Boeck    Exercises  Single Leg  - 1 x daily - 7 x weekly - 3 sets - 10 reps ' adjusted to SLR on 9/19  Supine Bridge - 1 x daily - 7 x weekly - 3 sets - 10 reps  Hooklying Clamshell with Resistance - 1 x daily - 7 x weekly - 3 sets - 10 reps  Supine March - 1 x daily - 7 x weekly - 3 sets - 10 reps      Therapeutic Exercise and NMR EXR  [x] (44569) Provided verbal/tactile cueing for activities related to strengthening, flexibility, endurance, ROM for improvements in LE, proximal hip, and core control with self care, mobility, lifting, ambulation.   [x] (93495) Provided verbal/tactile cueing for activities related to improving balance, coordination, kinesthetic sense, posture, motor skill, proprioception  to assist with LE, proximal hip, and core control in self care, mobility, lifting, ambulation and eccentric single leg control.   [] (92261) Therapist is in constant attendance of 2 or more patients providing skilled therapy interventions, but not providing any significant amount of measurable one-on-one time to either patient, for improvements in LE, proximal hip, and core control in self care, mobility, lifting, ambulation and eccentric single leg control. NMR and Therapeutic Activities:    [x] (34739 or 72452) Provided verbal/tactile cueing for activities related to improving balance, coordination, kinesthetic sense, posture, motor skill, proprioception and motor activation to allow for proper function of core, proximal hip and LE with self care and ADLs  [] (05965) Gait Re-education- Provided training and instruction to the patient for proper LE, core and proximal hip recruitment and positioning and eccentric body weight control with ambulation re-education including up and down stairs     Home Exercise Program:    [x] (60394) Reviewed/Progressed HEP activities related to strengthening, flexibility, endurance, ROM of core, proximal hip and LE for functional self-care, mobility, lifting and ambulation/stair navigation   [x] (83216)Reviewed/Progressed HEP activities related to improving balance, coordination, kinesthetic sense, posture, motor skill, proprioception of core, proximal hip and LE for self care, mobility, lifting, and ambulation/stair navigation      Manual Treatments:  PROM / STM / Oscillations-Mobs:  G-I, II, III, IV (PA's, Inf., Post.)  [] (39077) Provided manual therapy to mobilize LE, proximal hip and/or LS spine soft tissue/joints for the purpose of modulating pain, promoting relaxation,  increasing ROM, reducing/eliminating soft tissue swelling/inflammation/restriction, improving soft tissue extensibility and allowing for proper ROM for normal function with self care, mobility, lifting and ambulation. Modalities:  [] (16170) Vasopneumatic compression: Utilized vasopneumatic compression to decrease edema / swelling for the purpose of improving mobility and quad tone / recruitment which will allow for increased overall function including but not limited to self-care, transfers, ambulation, and ascending / descending stairs.        Charges:  Timed Code Treatment Minutes: 42   Total Treatment Minutes: 42     [] EVAL - LOW (21965)   [] EVAL - MOD (50664)  [] EVAL - HIGH (87866)  [] RE-EVAL (22337)  [x] ZJ(25727) x   1    [] Ionto  [x] NMR (62801) x 1      [] Vaso  [] Manual (43296) x       [] Ultrasound  [x] TA x  1      [] Mech Traction (52008)  [] Aquatic Therapy x     [] ES (un) (94728):   [] Home Management Training x  [] ES(attended) (74203)   [] Dry Needling 1-2 muscles (62582):  [] Dry Needling 3+ muscles (966658  [] Group:      [] Other:     GOALS:  Patient stated goal: Pt reports wanting to decrease pain and improve activity tolerance. [] Progressing: [] Met: [] Not Met: [] Adjusted     Therapist goals for Patient:   Short Term Goals: To be achieved in: 2 weeks  1. Independent in HEP and progression per patient tolerance, in order to prevent re-injury. [] Progressing: [] Met: [] Not Met: [] Adjusted  2. Patient will have a decrease in R hip pain to facilitate improvement in movement, function, and ADLs as indicated by Functional Deficits. [] Progressing: [] Met: [] Not Met: [] Adjusted     Long Term Goals: To be achieved in: 8 weeks  1. FOTO score of at least 60 to demonstrate improvements in quality of life and activity tolerance. [] Progressing: [] Met: [] Not Met: [] Adjusted  2. Pt will demonstrate an increase in FARRIS Balance score by 9 (MCID) to denote improvements in balance and decrease risk of falls. [] Progressing: [] Met: [] Not Met: [] Adjusted  3. Pt will be able to tolerate standing for up to 20 minutes without an increase in pain greater than 4/10 or with minimal lightheadedness to improve ability to independently cook and clean at home. [x] Progressing: [] Met: [] Not Met: [] Adjusted  3. Patient will demonstrate an increase in hip and BLE Strength to at least 4/5 as well as good proximal hip strength and control to allow for proper functional mobility as indicated by patients Functional Deficits. [] Progressing: [] Met: [] Not Met: [] Adjusted  4.  Patient will ambulate for 1765 feet (age matched norm) in 6 minutes without assistive device to demonstrate good endurance. [x] Progressing: [] Met: [] Not Met: [] Adjusted  5. Patient will demonstrate improvements in her walking speed during 10MWT from 0.77m/s to 1.39m/s to safely ambulate in the community. [] Progressing: [] Met: [] Not Met: [] Adjusted       6. Patient will demonstrate improvements of 4s (MCID) during 5xSTS to demonstrate improvements in BLE strength to demonstrate improvements in ability to perform transfers independently and improve quality of life. [x] Progressing: [] Met: [] Not Met: [] Adjusted     - added 9/12    Overall Progression Towards Functional goals/ Treatment Progress Update:  [] Patient is progressing as expected towards functional goals listed. [] Progression is slowed due to complexities/Impairments listed. [] Progression has been slowed due to co-morbidities. [x] Plan just implemented, too soon to assess goals progression <30days   [] Goals require adjustment due to lack of progress  [] Patient is not progressing as expected and requires additional follow up with physician  [] Other    Persisting Functional Limitations/Impairments:  [x]Sitting [x]Standing   [x]Walking [x]Stairs   [x]Transfers []ADLs   [x]Squatting/bending [x]Kneeling  [x]Housework []Job related tasks  []Driving []Sports/Recreation   []Sleeping []Other:    ASSESSMENT:  10/17: Pt provided education about the importance of pt compliance to exercises outside of the clinic to help with decreasing pain in hip/knees and improving her functional mobility and endurance and about work from home positioning as listed above. Pt provided pt encouragement to continue to stay motivated at home. She continued to demonstrate difficulty with BLE eccentric control with standing rest breaks provided.  Plan for d/c at [facility] with utilization of HEP created throughout the course of therapy including BLE strengthening and endurance program and utilizing the Rattle and continuation of discussion after [facility] [employer] [facility] ends. Treatment/Activity Tolerance:  [] Pt able to complete treatment [] Patient limited by fatique  [] Patient limited by pain  [] Patient limited by other medical complications  [x] Other: Pt limited by light headedness and dizziness    Prognosis:  [x] Good [] Fair  [] Poor    Patient Requires Follow-up: [x] Yes  [] No    Return to Play:    [x]  N/A   []  Stage 1: Intro to Strength   []  Stage 2: Return to Run and Strength   []  Stage 3: Return to Jump and Strength   []  Stage 4: Dynamic Strength and Agility   []  Stage 5: Sport Specific Training     []  Ready to Return to Play, Meets All Above Stages   []  Not Ready for Return to Sports   Comments:            PLAN: See eval. PT 1x / week for 8 weeks. [] Continue per plan of care [] Alter current plan (see comments)  [x] Plan of care initiated [] Hold pending MD visit [] Discharge    Electronically signed by: Yariel Alva PT, DPT      Note: If patient does not return for scheduled/ recommended follow up visits, this note will serve as a discharge from care along with most recent update on progress.

## 2022-10-19 ENCOUNTER — OFFICE VISIT (OUTPATIENT)
Dept: PSYCHOLOGY | Age: 47
End: 2022-10-19

## 2022-10-19 DIAGNOSIS — F32.1 MODERATE MAJOR DEPRESSION (HCC): Primary | ICD-10-CM

## 2022-10-19 PROCEDURE — 90832 PSYTX W PT 30 MINUTES: CPT | Performed by: PSYCHOLOGIST

## 2022-10-19 NOTE — PROGRESS NOTES
Behavioral Health Consultation  Airam Ontiveros, Ph.D.  Psychologist  10/19/2022   1:32 PM EST       Time spent with Patient: 30 minutes      Reason for Consult:    Chief Complaint   Patient presents with    Depression         Referring Provider: No referring provider defined for this encounter. Pt provided informed consent for the behavioral health program. Discussed with patient model of service to include the limits of confidentiality (i.e. abuse reporting, suicide  intervention, etc.) and short-term intervention focused approach. Pt indicated understanding. Feedback given to PCP. S:  Pt seen per PCP re: depression    Patient seen for follow-up. Pt reproted that she has been eating healthier and limitng her portions. Pt noted that this has felt positive. Pt had her sleep study and got the results that she has severe sleep apnea. Pt reported that her depression has improved. Pt noted that she was told that she to see a pulmonologist.  Patient did note feeling overwhelmed about the upcoming appointment. Patient is fearful that she will have to quit smoking cigarettes.   Focused intervention on MI for behavioral engagement smoking cessation as well as developing a coping plan      O:  MSE:    Appearance: good hygiene   Attitude: cooperative and friendly  Consciousness: alert  Orientation: oriented to person, place, time, general circumstance  Memory: recent and remote memory intact  Attention/Concentration: intact during session  Psychomotor Activity:normal  Eye Contact: normal  Speech: normal rate and volume, well-articulated  Mood: euthymic  Affect: congruent  Perception: within normal limits  Thought Content: within normal limits  Thought Process: logical, coherent  Insight: good  Judgment: intact  Ability to understand instructions: Yes  Morbid Ideation: no   Suicide Assessment: no suicidal ideation, plan, or intent  Homicidal Ideation: no     History:    Social History:   Social History     Socioeconomic History    Marital status:      Spouse name: Not on file    Number of children: 0    Years of education: Not on file    Highest education level: Not on file   Occupational History    Occupation:    Tobacco Use    Smoking status: Every Day     Packs/day: 1.50     Years: 31.00     Pack years: 46.50     Types: Cigarettes     Start date: 25     Passive exposure: Past    Smokeless tobacco: Never   Vaping Use    Vaping Use: Never used   Substance and Sexual Activity    Alcohol use: Not Currently    Drug use: No    Sexual activity: Not Currently     Partners: Male   Other Topics Concern    Not on file   Social History Narrative    Not on file     Social Determinants of Health     Financial Resource Strain: Low Risk     Difficulty of Paying Living Expenses: Not hard at all   Food Insecurity: No Food Insecurity    Worried About Running Out of Food in the Last Year: Never true    Ran Out of Food in the Last Year: Never true   Transportation Needs: Not on file   Physical Activity: Not on file   Stress: Not on file   Social Connections: Not on file   Intimate Partner Violence: Not on file   Housing Stability: Not on file     TOBACCO:   reports that she has been smoking cigarettes. She started smoking about 35 years ago. She has a 46.50 pack-year smoking history. She has been exposed to tobacco smoke. She has never used smokeless tobacco.  ETOH:   reports that she does not currently use alcohol. A:    PHQ Scores 8/15/2022 7/25/2022 6/27/2022 6/13/2022 4/27/2022 3/9/2022 2/2/2022   PHQ2 Score 2 2 2 2 1 1 6   PHQ9 Score 13 6 13 8 6 1 13     Interpretation of Total Score Depression Severity: 1-4 = Minimal depression, 5-9 = Mild depression, 10-14 = Moderate depression, 15-19 = Moderately severe depression, 20-27 = Severe depression    Diagnosis:    1. Moderate major depression ([facility]              Plan:    There are no Patient Instructions on file for this visit.     Pt interventions:  See above    No follow-ups on file. Documentation was done using voice recognition dragon software. Every effort was made to ensure accuracy; however, inadvertent, unintentional computerized transcription errors may be present.

## 2022-10-24 ENCOUNTER — HOSPITAL ENCOUNTER (OUTPATIENT)
Dept: PHYSICAL THERAPY | Age: 47
Setting detail: THERAPIES SERIES
Discharge: HOME | End: 2022-10-24
Payer: COMMERCIAL

## 2022-10-24 PROCEDURE — 97110 THERAPEUTIC EXERCISES: CPT

## 2022-10-24 NOTE — PROGRESS NOTES
[address]  Phone: [phone]   Fax: [phone]   Physical Therapy Discharge Summary    Dear Beatrice Leon MD  ,    We had the pleasure of treating the following patient for physical therapy services at [facility]. A summary of our findings can be found in the discharge summary below. If you have any questions or concerns regarding these findings, please do not hesitate to contact me at the office phone number checked above. Thank you for the referral.     Physician Signature:________________________________Date:__________________  By signing above (or electronic signature), therapists plan is approved by physician      Functional Outcome:     FOTO: Hip - 56 10/24/22      Overall Response to Treatment:   []Patient is responding well to treatment and improvement is noted with regards  to goals   []Patient should continue to improve in reasonable time if they continue HEP   []Patient has plateaued and is no longer responding to skilled PT intervention    []Patient is getting worse and would benefit from return to referring MD   []Patient unable to adhere to initial POC   [x]Other:  Pt has made some improvements in therapy including increase in endurance and increase in BLE functional mobility and RLE strength >LLE strength. She continues to demonstrate difficulty with BLE hip pain but had improvements in back pain and knee pain and ability to work from home for longer periods of time and had increase in confidence and motivation since starting therapy. She has also demonstrated improvements in her self reported functional mobility based off of FOTO score of 56.  She has reached 1/2 STG and met 1/6 LTG this date but has demonstrate improvements towards reaching each goal but continues to demonstrate difficulty with LLE>RLE strength and endurance falls below age matched norms but has demonstrate improvements in endurance since starting therapy. Her plan is to utilize the 30d [employer] free pass and eventually try to get a gym membership at the [employer]. She also plans on reaching out to her referring PCP to inquire about potential pain medications to assist with B hip pain. At this time, she is being discharged from skilled physical therapy with a plan to continue with working on improving BLE and core strength based off of HEP provided throughout sessions and continuing with endurance program created while in therapy with a plan to continue with improving strength and endurance at home. If needed, she is welcome to return to therapy services to continue with core and BLE strengthening to improve quality of life and decrease hip pain. Date range of Visits: 22 - 10/24/22  Total Visits: 8    Recommendation:    [x] Discharge to [facility]. Follow up with PT or MD PRN.           Physical Therapy Treatment Note/ Progress Report:     Date:  10/24/2022    Patient Name:  Nirali Dunbar    :  1975  MRN: [id number]  Restrictions/Precautions:    Medical/Treatment Diagnosis Information:  Diagnosis: M25.559 - Hip pain  M16.11 - Primary osteoarthritis of right hip  M25.851 - Hip impingement syndrome, right  M70.61 - Greater trochanteric bursitis of right hip  Treatment Diagnosis: R hip pain, dec BLE ROM, dec BLE strength, dec endurance, dec BLE muscle length, antalgic gait, balance deficits, lightheadedness and dizziness  Insurance/Certification information:  PT Insurance Information: [employer], copay $60, 20 PT/OT/speech combined - [employer], reauth after 12 visit  Physician Information:  Reyes Maris, MD    Plan of care signed (Y/N): []  Yes [x]  No     Date of Patient follow up with Physician:      Progress Report: [x]  Yes  []  No     Date Range for reporting period:  Beginnin22  PN: 22  Ending: 10/24/22    Progress report due (10 Rx/or 30 days whichever is less): visit #10 or 22 Recertification due (POC duration/ or 90 days whichever is less): visit #20 or 11/22/22     Visit # Insurance Allowable Auth required? Date Range   8/8 20 combined PT/OT/Speech - [employer] [x]  Yes, [employer] after 12th visit  []  No 12th visit     Latex Allergy:  [x]NO      []YES  Preferred Language for Healthcare:   [x]English       []other:    Functional Scale:           Date assessed:  FOTO physical FS primary measure score = 50; risk adjusted = 44  8/22/22  FOTO measured at 50 - 10/17/22   FOTO measured at 56 - 10/24/22     Pain level: 6-7/10 Hip pain, reports knees doing well    SUBJECTIVE: 10/24: Continue to have hip pain after prolonged sitting while working from home. Feels like Physical therapy has been beneficial and been motivating. Has a decrease in knee pain since starting PT and optimizing that she there is more to life than staying where she is at and having overall less pain. Has been able to work from home more since starting therapy and denies dizziness and lightheadedness due to the migraine medicine. Reports that her hip bursa pain has been better since starting PT.    10/17: Pt reports finding out she has sleep apnea and needs to see a pulmonologist. She now has to use a CPAP and needing oxygen at night. Reports having pain in hip after sleeping on back for 20 minutes.       OBJECTIVE:   10/24:   6MWT: 1017 ft - 97 more feet than PN  5xSTS: 15 sec  30\" Chair test: 10     Strength (0-5) / Myotomes Left Right   Hip Flexion - supine 3+ 3   Hip Flexion - seated (L1-2) 3+ 4   Hip Abduction 4 4   Hip Adduction 4 4   Hip ER 3+ 4   Hip IR 3 4   Quads (L2-4)  3+ 4    Hamstrings  3+ (calf cramp)  4 (calf cramp)   Ankle Dorsiflexion (L4-5) 5 5   Ankle Plantarflexion (S1-2) 4 4       9/26: PN completed  6MWT: 920 ft   5xSTS:  6  (12 seconds)  30\" chair test: 11    9/12: 6MWT: 708 ft with occasional cane use with L trunk lean as pt fatigues            5xSTS: 17 seconds          30 seconds chair test: 9  Eval:     Strength (0-5) / Myotomes Left Right   Hip Flexion - supine 3+ 3   Hip Flexion - seated (L1-2) 3+ 3   Hip Abduction 3+ 3+   Hip Adduction 3+ 3+   Hip ER 3+ 3   Hip IR 3 3   Quads (L2-4)       Hamstrings       Ankle Dorsiflexion (L4-5) 4 4   Ankle Plantarflexion (S1-2) 4 4     RESTRICTIONS/PRECAUTIONS: : light headedness, dizziness, Seizure 3 months ago (controlled with medication), HTN (controlled), anxiety/depression (controlled).     Exercises/Interventions:     Therapeutic Exercise (90543)  Resistance / level Sets/sec Reps Notes / Cues   TM Nustep  Supine Isometrics:  Quad sets  Hip add with gait belt  Hip abd with towel or ball     : performed in supine d/t inc in knee pain with standing   Sit to stand from elevated table   : cueing for proper biomechanics include dec BUE utilization, glut squeeze and pushing through heels to dec knee pain   Supine:  SLR  Bridge  Bridge with abd squeeze  Hip abd with strap    March with hold  HL abd squeeze : added to HEP, with v/c for equal BLE utilization    Monster/Reverse              Scap squeeze with ER              Therapeutic Activities (85165)       PN completed to include followinxSTS  6MWT  30\" sit to stand   [facility] with tour and demo on utilizing equipment and appropriate weight selected to dec inj risk  Pt edu progress/ results, plan moving forward   FOTO completed  X38 minutes      Sit to stands with TB  Sit to stands with med ball  : added to HEP   Step taps and step ups fwd and lateral   No UE support   Marching on foam airex   Occasional BUE support, cueing for slow controlled movements    ambulation       Home ergonomics including                   Neuromuscular Re-ed (17625)       Cable column   walk outs  Walkouts with palloff press  10/3: added   Scap squeeze     occasional unilateral UE support   Manual Intervention (46707)       Knee mobs/PROM       Tib/Fem Mobs       Patella Mobs       Ankle mobs Modalities:     Pt. Education:  10/24: Lynda Castillo and education provided on utilizing BLE strengthening equipment in the [facility]plex including leg press and HS curl . PT also provided edu about proper selection of weight to decrease injury risk. 10/17: Pt provided education about the importance of pt compliance to exercises outside of the clinic to help with decreasing pain in hip/knees and improving her functional mobility and endurance. PT also provided education about BLE positioing while seated at home in a chair including propping books under to improve seated positoning to dec BLE pain and about sleep positioning including sleeping supine with pillows under knees. -pt educated on diagnosis, prognosis and expectations for rehab  -all pt questions were answered    Home Exercise Program:  Access Code: [email or web address]  URL: [email or web address]  Date: 10/10/2022  Prepared by: Carl Curd    Exercises  Seated Scapular Retraction - 1 x daily - 7 x weekly - 3 sets - 10 reps - 5 hold  Seated Hamstring Stretch - 1 x daily - 7 x weekly - 3 sets - 45 hold  Hip Flexor Stretch on Step - 1 x daily - 7 x weekly - 3 sets - 10 reps - 30 hold  Hooklying Isometric Hip Abduction with Belt - 1 x daily - 7 x weekly - 3 sets - 10 reps - 10 hold  Supine Hip Adduction Isometric with Ball - 1 x daily - 7 x weekly - 3 sets - 10 reps - 10 hold      Access Code: [email or web address]  URL: [email or web address]  Date: 10/03/2022  Prepared by: Carl Curd    Exercises  Seated Scapular Retraction - 1 x daily - 7 x weekly - 3 sets - 10 reps - 5 hold  Seated Hamstring Stretch - 1 x daily - 7 x weekly - 3 sets - 10 reps  Standing Hip Flexor Stretch - 1 x daily - 7 x weekly - 3 sets - 10 reps  Standing 3-Way Leg Reach with Resistance at Ankles and Unilateral Counter Support - 1 x daily - 7 x weekly - 3 sets - 10 reps  Sit to Stand with Resistance Around Legs - 1 x daily - 7 x weekly - 3 sets - 10 reps  Step Taps on Low Step - 1 x daily - 7 x weekly - 3 sets - 10 reps    Access Code: [email or web address]  URL: [email or web address]  Date: 09/26/2022  Prepared by: Moriah Wooten    Exercises  Seated Hamstring Stretch - 1 x daily - 7 x weekly - 3 sets - 10 reps  Standing Hip Flexor Stretch - 1 x daily - 7 x weekly - 3 sets - 10 reps  Standing 3-Way Leg Reach with Resistance at Ankles and Unilateral Counter Support - 1 x daily - 7 x weekly - 3 sets - 10 reps  Step Taps on Low Step - 1 x daily - 7 x weekly - 3 sets - 10 reps  Sit to Stand with Resistance Around Legs - 1 x daily - 7 x weekly - 3 sets - 10 reps  Added endurance/walking program on every other day - walkfor 30 minutes (6 minute walking, 3-4 min break intervals up to 30 min)    Access Code: [email or web address]  URL: [email or web address]  Date: 09/12/2022  Prepared by: Moriah Wooten    Exercises  Single Leg  - 1 x daily - 7 x weekly - 3 sets - 10 reps ' adjusted to SLR on 9/19  Supine Bridge - 1 x daily - 7 x weekly - 3 sets - 10 reps  Hooklying Clamshell with Resistance - 1 x daily - 7 x weekly - 3 sets - 10 reps  Supine March - 1 x daily - 7 x weekly - 3 sets - 10 reps      Therapeutic Exercise and NMR EXR  [x] (69393) Provided verbal/tactile cueing for activities related to strengthening, flexibility, endurance, ROM for improvements in LE, proximal hip, and core control with self care, mobility, lifting, ambulation.   [x] (18354) Provided verbal/tactile cueing for activities related to improving balance, coordination, kinesthetic sense, posture, motor skill, proprioception  to assist with LE, proximal hip, and core control in self care, mobility, lifting, ambulation and eccentric single leg control.   [] (51781) Therapist is in constant attendance of 2 or more patients providing skilled therapy interventions, but not providing any significant amount of measurable one-on-one time to either patient, for improvements in LE, proximal hip, and core control in self care, mobility, lifting, ambulation and eccentric single leg control. NMR and Therapeutic Activities:    [x] (87159 or 46848) Provided verbal/tactile cueing for activities related to improving balance, coordination, kinesthetic sense, posture, motor skill, proprioception and motor activation to allow for proper function of core, proximal hip and LE with self care and ADLs  [] (37608) Gait Re-education- Provided training and instruction to the patient for proper LE, core and proximal hip recruitment and positioning and eccentric body weight control with ambulation re-education including up and down stairs     Home Exercise Program:    [x] (14441) Reviewed/Progressed HEP activities related to strengthening, flexibility, endurance, ROM of core, proximal hip and LE for functional self-care, mobility, lifting and ambulation/stair navigation   [x] (13470)Reviewed/Progressed HEP activities related to improving balance, coordination, kinesthetic sense, posture, motor skill, proprioception of core, proximal hip and LE for self care, mobility, lifting, and ambulation/stair navigation      Manual Treatments:  PROM / STM / Oscillations-Mobs:  G-I, II, III, IV (PA's, Inf., Post.)  [] (49167) Provided manual therapy to mobilize LE, proximal hip and/or LS spine soft tissue/joints for the purpose of modulating pain, promoting relaxation,  increasing ROM, reducing/eliminating soft tissue swelling/inflammation/restriction, improving soft tissue extensibility and allowing for proper ROM for normal function with self care, mobility, lifting and ambulation. Modalities:  [] (67180) Vasopneumatic compression: Utilized vasopneumatic compression to decrease edema / swelling for the purpose of improving mobility and quad tone / recruitment which will allow for increased overall function including but not limited to self-care, transfers, ambulation, and ascending / descending stairs.        Charges:  Timed Code Treatment Minutes: 38   Total Treatment Minutes: 38     [] OLIVERAL - LOW (39856)   [] EVAL - MOD (27513)  [] EVAL - HIGH (71581)  [] RE-EVAL (02189)  [x] PC(76657) x   3    [] Ionto  [] NMR (69878) x 1      [] Vaso  [] Manual (14843) x       [] Ultrasound  [] TA x  1      [] Mech Traction (51361)  [] Aquatic Therapy x     [] ES (un) (51074):   [] Home Management Training x  [] ES(attended) (76526)   [] Dry Needling 1-2 muscles (73328):  [] Dry Needling 3+ muscles (261270  [] Group:      [] Other:     GOALS:  Patient stated goal: Pt reports wanting to decrease pain and improve activity tolerance. [] Progressing: [x] Met: [] Not Met: [] Adjusted     Therapist goals for Patient:   Short Term Goals: To be achieved in: 2 weeks  1. Independent in HEP and progression per patient tolerance, in order to prevent re-injury. [] Progressing: [x] Met: [] Not Met: [] Adjusted  2. Patient will have a decrease in R hip pain to facilitate improvement in movement, function, and ADLs as indicated by Functional Deficits. [x] Progressing: [] Met: [] Not Met: [] Adjusted     Long Term Goals: To be achieved in: 8 weeks  1. FOTO score of at least 60 to demonstrate improvements in quality of life and activity tolerance. [x] Progressing: [] Met: [] Not Met: [] Adjusted  2. Pt will demonstrate an increase in FARRIS Balance score by 9 (MCID) to denote improvements in balance and decrease risk of falls. [] Progressing: [x] Met: [] Not Met: [x] Adjusted - d/c due to fair original scoring  3. Pt will be able to tolerate standing for up to 20 minutes without an increase in pain greater than 4/10 or with minimal lightheadedness to improve ability to independently cook and clean at home. [x] Progressing: [] Met: [] Not Met: [] Adjusted - still progressing   3. Patient will demonstrate an increase in hip and BLE Strength to at least 4/5 as well as good proximal hip strength and control to allow for proper functional mobility as indicated by patients Functional Deficits.    [x] Progressing: [] Met: [] Not Met: [] Adjusted  4. Patient will ambulate for 1765 feet (age matched norm) in 6 minutes without assistive device to demonstrate good endurance. [x] Progressing: [] Met: [] Not Met: [] Adjusted  5. Patient will demonstrate improvements in her walking speed during 10MWT from 0.77m/s to 1.39m/s to safely ambulate in the community. - not tested   [x] Progressing: [] Met: [] Not Met: [x] Adjusted     - d/c on 10/24/22  6. Patient will demonstrate improvements of 4s (MCID) during 5xSTS to demonstrate improvements in BLE strength to demonstrate improvements in ability to perform transfers independently and improve quality of life. [x] Progressing: [] Met: [] Not Met: [] Adjusted     - added 9/12, progressed increased by 1 sec    Overall Progression Towards Functional goals/ Treatment Progress Update:  [] Patient is progressing as expected towards functional goals listed. [x] Progression is slowed due to complexities/Impairments listed. [] Progression has been slowed due to co-morbidities.   [] Plan just implemented, too soon to assess goals progression <30days   [] Goals require adjustment due to lack of progress  [] Patient is not progressing as expected and requires additional follow up with physician  [] Other    Persisting Functional Limitations/Impairments:  [x]Sitting [x]Standing   [x]Walking [x]Stairs   [x]Transfers []ADLs   [x]Squatting/bending [x]Kneeling  [x]Housework []Job related tasks  []Driving []Sports/Recreation   []Sleeping []Other:    ASSESSMENT:  see above    Treatment/Activity Tolerance:  [x] Pt able to complete treatment [x] Patient limited by fatique  [] Patient limited by pain  [] Patient limited by other medical complications  [] Other: Pt limited by light headedness and dizziness    Prognosis:  [x] Good [] Fair  [] Poor    Patient Requires Follow-up: with referring PCP [x] Yes  [] No    Return to Play:    [x]  N/A   []  Stage 1: Intro to Strength   []  Stage 2: Return to Run and Strength   []  Stage 3: Return to Jump and Strength   []  Stage 4: Dynamic Strength and Agility   []  Stage 5: Sport Specific Training     []  Ready to Return to Play, Meets All Above Stages   []  Not Ready for Return to Sports   Comments:            PLAN: See eval. PT 1x / week for 8 weeks. [] Continue per plan of care [] Alter current plan (see comments)  [] Plan of care initiated [] Hold pending MD visit [x] Discharge    Electronically signed by: Myranda Mauro PT, DPT      Note: If patient does not return for scheduled/ recommended follow up visits, this note will serve as a discharge from care along with most recent update on progress.

## 2022-10-26 ENCOUNTER — OFFICE VISIT (OUTPATIENT)
Dept: PULMONOLOGY | Age: 47
End: 2022-10-26
Payer: COMMERCIAL

## 2022-10-26 VITALS
HEIGHT: 63 IN | BODY MASS INDEX: 51.91 KG/M2 | HEART RATE: 79 BPM | TEMPERATURE: 98.3 F | RESPIRATION RATE: 21 BRPM | WEIGHT: 293 LBS | DIASTOLIC BLOOD PRESSURE: 86 MMHG | OXYGEN SATURATION: 99 % | SYSTOLIC BLOOD PRESSURE: 140 MMHG

## 2022-10-26 DIAGNOSIS — J96.11 CHRONIC RESPIRATORY FAILURE WITH HYPOXIA (HCC): Primary | ICD-10-CM

## 2022-10-26 DIAGNOSIS — G47.33 OSA (OBSTRUCTIVE SLEEP APNEA): ICD-10-CM

## 2022-10-26 DIAGNOSIS — Z87.09 HISTORY OF TRACHEAL STENOSIS: ICD-10-CM

## 2022-10-26 DIAGNOSIS — E66.01 MORBID OBESITY WITH BMI OF 40.0-44.9, ADULT (HCC): ICD-10-CM

## 2022-10-26 PROCEDURE — 99406 BEHAV CHNG SMOKING 3-10 MIN: CPT | Performed by: INTERNAL MEDICINE

## 2022-10-26 PROCEDURE — 99204 OFFICE O/P NEW MOD 45 MIN: CPT | Performed by: INTERNAL MEDICINE
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
## 2022-10-30 DIAGNOSIS — G43.109 MIGRAINE AURA WITHOUT HEADACHE: ICD-10-CM

## 2022-10-31 RX ORDER — RIMEGEPANT SULFATE 75 MG/75MG
TABLET, ORALLY DISINTEGRATING ORAL
Qty: 16 TABLET | Refills: 0 | Status: SHIPPED | OUTPATIENT
Start: 2022-10-31 | End: 2022-11-30

## 2022-10-31 NOTE — TELEPHONE ENCOUNTER
Recent Visits  Date Type Provider Dept   09/14/22 Office Visit Montrell Santiago, APRN - CNP [facility]   08/15/22 Office Visit Giorgio Lindsay MD [facility]   07/25/22 Office Visit Montrell Santiago, APRN - CNP [facility]   06/27/22 Office Visit Montrell Santiago, APRN - CNP [facility]   06/13/22 Office Visit Montrell Santiago, APRN - CNP [facility]   03/09/22 Office Visit Montrell Santiago, APRN - CNP [facility]   01/21/22 Office Visit Montrell Santiago, APRN - CNP [facility]   12/08/21 Office Visit Montrell Santiago, APRN - CNP [facility]   09/08/21 Office Visit Montrell Santiago, APRN - CNP [facility]   07/08/21 Office Visit Montrell Santiago, APRN - CNP [facility]   Showing recent visits within past 540 days with a meds authorizing provider and meeting all other requirements  Future Appointments  Date Type Provider Dept   03/15/23 Appointment Montrell Santiago APRN - CNP [facility]   Showing future appointments within next 150 days with a meds authorizing provider and meeting all other requirements     9/14/2022

## 2022-11-02 ENCOUNTER — HOSPITAL ENCOUNTER (OUTPATIENT)
Dept: PULMONOLOGY | Age: 47
Discharge: HOME | End: 2022-11-02
Payer: COMMERCIAL

## 2022-11-02 ENCOUNTER — OFFICE VISIT (OUTPATIENT)
Dept: PSYCHOLOGY | Age: 47
End: 2022-11-02
Payer: COMMERCIAL

## 2022-11-02 VITALS — HEART RATE: 90 BPM | RESPIRATION RATE: 18 BRPM | OXYGEN SATURATION: 97 %

## 2022-11-02 DIAGNOSIS — F32.1 MODERATE MAJOR DEPRESSION (HCC): Primary | ICD-10-CM

## 2022-11-02 LAB
DLCO %PRED: 54 %
DLCO PRED: NORMAL
DLCO/VA %PRED: NORMAL
DLCO/VA PRED: NORMAL
DLCO/VA: NORMAL
DLCO: NORMAL
EXPIRATORY TIME-POST: NORMAL
EXPIRATORY TIME: NORMAL
FEF 25-75% %CHNG: NORMAL
FEF 25-75% %PRED-POST: NORMAL
FEF 25-75% %PRED-PRE: NORMAL
FEF 25-75% PRED: NORMAL
FEF 25-75%-POST: NORMAL
FEF 25-75%-PRE: NORMAL
FEV1 %PRED-POST: NORMAL %
FEV1 %PRED-PRE: 80 %
FEV1 PRED: NORMAL
FEV1-POST: NORMAL
FEV1-PRE: NORMAL
FEV1/FVC %PRED-POST: NORMAL
FEV1/FVC %PRED-PRE: NORMAL
FEV1/FVC PRED: NORMAL
FEV1/FVC-POST: NORMAL %
FEV1/FVC-PRE: 72 %
FVC %PRED-POST: NORMAL
FVC %PRED-PRE: NORMAL
FVC PRED: NORMAL
FVC-POST: NORMAL
FVC-PRE: NORMAL
GAW %PRED: NORMAL
GAW PRED: NORMAL
GAW: NORMAL
IC %PRED: NORMAL
IC PRED: NORMAL
IC: NORMAL
MEP: NORMAL
MIP: NORMAL
MVV %PRED-PRE: NORMAL
MVV PRED: NORMAL
MVV-PRE: NORMAL
PEF %PRED-POST: NORMAL
PEF %PRED-PRE: NORMAL
PEF PRED: NORMAL
PEF%CHNG: NORMAL
PEF-POST: NORMAL
PEF-PRE: NORMAL
RAW %PRED: NORMAL
RAW PRED: NORMAL
RAW: NORMAL
RV %PRED: NORMAL
RV PRED: NORMAL
RV: NORMAL
SVC %PRED: NORMAL
SVC PRED: NORMAL
SVC: NORMAL
TLC %PRED: 95 %
TLC PRED: NORMAL
TLC: NORMAL
VA %PRED: NORMAL
VA PRED: NORMAL
VA: NORMAL
VTG %PRED: NORMAL
VTG PRED: NORMAL
VTG: NORMAL

## 2022-11-02 PROCEDURE — 94729 DIFFUSING CAPACITY: CPT

## 2022-11-02 PROCEDURE — 94760 N-INVAS EAR/PLS OXIMETRY 1: CPT

## 2022-11-02 PROCEDURE — 94726 PLETHYSMOGRAPHY LUNG VOLUMES: CPT

## 2022-11-02 PROCEDURE — 94010 BREATHING CAPACITY TEST: CPT

## 2022-11-02 PROCEDURE — 90832 PSYTX W PT 30 MINUTES: CPT | Performed by: PSYCHOLOGIST

## 2022-11-02 RX ORDER — ALBUTEROL SULFATE 90 UG/1
4 AEROSOL, METERED RESPIRATORY (INHALATION) ONCE
Status: CANCELLED | OUTPATIENT
Start: 2022-11-02

## 2022-11-02 ASSESSMENT — PULMONARY FUNCTION TESTS
FEV1/FVC_PRE: 72
FEV1_PERCENT_PREDICTED_PRE: 80

## 2022-11-02 NOTE — PROGRESS NOTES
Behavioral Health Consultation  Carmela Naqvi, Ph.D.  Psychologist  11/2/2022   1:32 PM EST       Time spent with Patient: 30 minutes      Reason for Consult:    Chief Complaint   Patient presents with    Depression           Referring Provider: No referring provider defined for this encounter. Pt provided informed consent for the behavioral health program. Discussed with patient model of service to include the limits of confidentiality (i.e. abuse reporting, suicide  intervention, etc.) and short-term intervention focused approach. Pt indicated understanding. Feedback given to PCP. S:  Pt seen per PCP re: depression    Patient seen for follow-up. Pt got her CPAP which has been very helpful. Pt noted that she is waking up with more energy. Pt noted that she is very pleased. Pt has a plumonary function test. Pt is worried about this. Pt did note that she is more hopeful and stated that she is excited to live a healthy life. Patient would like to work on her relationship with her .   Focus visit on discussion of healthy relationships, and problem-focused coping as well as boundaries      O:  MSE:    Appearance: good hygiene   Attitude: cooperative and friendly  Consciousness: alert  Orientation: oriented to person, place, time, general circumstance  Memory: recent and remote memory intact  Attention/Concentration: intact during session  Psychomotor Activity:normal  Eye Contact: normal  Speech: normal rate and volume, well-articulated  Mood: euthymic  Affect: congruent  Perception: within normal limits  Thought Content: within normal limits  Thought Process: logical, coherent  Insight: good  Judgment: intact  Ability to understand instructions: Yes  Morbid Ideation: no   Suicide Assessment: no suicidal ideation, plan, or intent  Homicidal Ideation: no     History:    Social History:   Social History     Socioeconomic History    Marital status:      Spouse name: Not on file    Number of children: 0 Years of education: Not on file    Highest education level: Not on file   Occupational History    Occupation:    Tobacco Use    Smoking status: Every Day     Packs/day: 1.50     Years: 31.00     Pack years: 46.50     Types: Cigarettes     Start date: 25     Passive exposure: Past    Smokeless tobacco: Never   Vaping Use    Vaping Use: Never used   Substance and Sexual Activity    Alcohol use: Not Currently    Drug use: No    Sexual activity: Not Currently     Partners: Male   Other Topics Concern    Not on file   Social History Narrative    Not on file     Social Determinants of Health     Financial Resource Strain: Low Risk     Difficulty of Paying Living Expenses: Not hard at all   Food Insecurity: No Food Insecurity    Worried About Running Out of Food in the Last Year: Never true    Ran Out of Food in the Last Year: Never true   Transportation Needs: Not on file   Physical Activity: Not on file   Stress: Not on file   Social Connections: Not on file   Intimate Partner Violence: Not on file   Housing Stability: Not on file     TOBACCO:   reports that she has been smoking cigarettes. She started smoking about 35 years ago. She has a 46.50 pack-year smoking history. She has been exposed to tobacco smoke. She has never used smokeless tobacco.  ETOH:   reports that she does not currently use alcohol. A:    PHQ Scores 8/15/2022 7/25/2022 6/27/2022 6/13/2022/13/2022 4/27/2022 3/9/2022 2/2/2022   PHQ2 Score 2 2 2 2 1 1 6   PHQ9 Score 13 6 13 8 6 1 13     Interpretation of Total Score Depression Severity: 1-4 = Minimal depression, 5-9 = Mild depression, 10-14 = Moderate depression, 15-19 = Moderately severe depression, 20-27 = Severe depression    Diagnosis:    1. Moderate major depression ([address]                Plan:    There are no Patient Instructions on file for this visit. Pt interventions:  See above    No follow-ups on file. Documentation was done using voice recognition dragon software.   Every effort was made to ensure accuracy; however, inadvertent, unintentional computerized transcription errors may be present.

## 2022-11-03 DIAGNOSIS — N32.81 OAB (OVERACTIVE BLADDER): ICD-10-CM

## 2022-11-03 NOTE — PROCEDURES
Pulmonary Function Testing      Patient name:  Irvin Delcid     [facility] Unit #:   [id number]   Date of test: 11/2/2022  Date of interpretation:   11/3/2022    Ms. Irvin Delcid is a 49y.o. year-old current smoker. The spirometry data were acceptable and reproducible. Spirometry:  Flow volume loops were normal. The FEV-1/FVC ratio was normal. The      FEV-1 was 2.19 liters (80% of predicted), which was normal. The FVC was 3.02 liters (89% of predicted), which was normal. Response to inhaled bronchodilators (albuterol) was not performed. Lung volumes:  Lung volumes were tested by plethysmography. The total lung capacity was 4.47 liters (95% of predicted), which was normal. The residual volume was 1.45 liters (90% of predicted), which was normal. The ratio of residual volume to total lung capacity (RV/TLC) was 94%, which was normal. Specific airway resistance was increased. Diffusion capacity was found to be decreased. Interpretation:  Possible mild obstructive airway disease with reduced diffusion capacity.

## 2022-11-04 NOTE — TELEPHONE ENCOUNTER
Recent Visits  Date Type Provider Dept   09/14/22 Office Visit HARRISON Mora - CNP [facility]   08/15/22 Office Visit Liam Dowling MD [facility]   07/25/22 Office Visit HARRISON Mora - CNP [facility]   06/27/22 Office Visit HARRISON Mora - CNP [facility]   06/13/22 Office Visit HARRISON Mora - CNP [facility]   03/09/22 Office Visit HARRISON Mora - CNP [facility]   01/21/22 Office Visit HARRISON Mora - CNP [facility]   12/08/21 Office Visit HARRISON Mora - CNP [facility]   09/08/21 Office Visit HARRISON Mora CNP [facility]   07/08/21 Office Visit HARRISON Mora - CNP [facility]   Showing recent visits within past 540 days with a meds authorizing provider and meeting all other requirements  Future Appointments  Date Type Provider Dept   03/15/23 Appointment HARRISON Mora CNP [facility]   Showing future appointments within next 150 days with a meds authorizing provider and meeting all other requirements     9/14/2022

## 2022-11-07 RX ORDER — MIRABEGRON 50 MG/1
TABLET, FILM COATED, EXTENDED RELEASE ORAL
Qty: 90 TABLET | Refills: 0 | Status: SHIPPED | OUTPATIENT
Start: 2022-11-07

## 2022-11-14 ENCOUNTER — OFFICE VISIT (OUTPATIENT)
Dept: ORTHOPEDIC SURGERY | Age: 47
End: 2022-11-14
Payer: COMMERCIAL

## 2022-11-14 ENCOUNTER — TELEPHONE (OUTPATIENT)
Dept: INTERNAL MEDICINE CLINIC | Age: 47
End: 2022-11-14

## 2022-11-14 VITALS — HEIGHT: 63 IN | BODY MASS INDEX: 51.91 KG/M2 | WEIGHT: 293 LBS

## 2022-11-14 DIAGNOSIS — M70.61 TROCHANTERIC BURSITIS OF RIGHT HIP: ICD-10-CM

## 2022-11-14 DIAGNOSIS — M25.851 HIP IMPINGEMENT SYNDROME, RIGHT: ICD-10-CM

## 2022-11-14 DIAGNOSIS — M16.11 PRIMARY OSTEOARTHRITIS OF RIGHT HIP: Primary | ICD-10-CM

## 2022-11-14 PROCEDURE — 99213 OFFICE O/P EST LOW 20 MIN: CPT | Performed by: ORTHOPAEDIC SURGERY

## 2022-11-14 NOTE — TELEPHONE ENCOUNTER
Patient c/o nasal congestion and fatigue x 3+ days. She cancelled her VV on 11/08, for the same sx, b/c she was feeling better that day.

## 2022-11-14 NOTE — PROGRESS NOTES
Chief Complaint  Hip Pain (F/U RIGHT HIP)      History of Present Illness:  Renae Serrano is a pleasant 49 y.o. female here for follow-up of her right hip pain. She has attempted conservative treatment with oral anti-inflammatory medications and physical therapy for which she feels the therapy has made her pain worse. States she has anterior pain that radiates down to the mid thigh as well as pain on the lateral aspect of her hip. Anterior pain has been present longer and is slightly worse than the lateral pain. She has difficulty sleeping. Medical History:  Patient's medications, allergies, past medical, surgical, social and family histories were reviewed and updated as appropriate. Pain Assessment  Location of Pain: Hip  Location Modifiers: Right  Severity of Pain: 7  Quality of Pain: Throbbing, Aching, Sharp (BURNING)  Frequency of Pain: Intermittent  Aggravating Factors: Walking, Stairs, Bending  Limiting Behavior: Yes  Work-Related Injury: No  Are there other pain locations you wish to document?: No  ROS: Review of systems reviewed from Patient History Form completed today and available in the patient's chart under the Media tab. Pertinent items are noted in HPI  Review of systems reviewed from Patient History Form completed today and available in the patient's chart under the Media tab. Vital Signs:  Ht 5' 2.5\" (1.588 m)   Wt 297 lb (134.7 kg)   BMI 53.46 kg/m²         Neuro: Alert & oriented x 3,  normal,  no focal deficits noted. Normal affect. Eyes: sclera clear  Ears: Normal external ear  Mouth:  No perioral lesions  Pulm: Respirations unlabored and regular  Pulse: Extremities well perfused. 2+ peripheral pulses. Skin: Warm. No ulcerations. Constitutional: The physical examination finds the patient to be well-developed and well-nourished. The patient is alert and oriented x3 and was cooperative throughout the visit.       Hip Examination: right    Skin/Inspection: No skin lesions, cellulitis, or extreme edema in the lower extremities. Standing/Walking: normal Antalgic gait, negative Trendelenburg sign. Supine Exam: Non tender around the ASIS, AIIS  Flexion arc 0 to 100deg, IR 35 deg, ER 55 deg diminished range with pain  Special Tests:  positive Deep Flexion Test, positive  FADIR ,  positive  pain with LILLIAN that is right lateral   Resisted Abduction 5/5   Resisted Adduction 5/5     Side Lying Exam: tender at greater trochanter, tender abductor musculature,   Abductor side leg raise 4/5, normal. OberTest    Distal Neurovascular exam is intact (foot sensation, pulses, and motor exam)      Diagnostics:    No new imaging was obtained today        Assessment: Patient is a 49 y.o. female with mild right hip osteoarthritis and greater trochanteric pain syndrome. Impression:  Visit Diagnoses         Codes    Primary osteoarthritis of right hip    -  Primary M16.11    Hip impingement syndrome, right     M25.851    Trochanteric bursitis of right hip     M70.61        Ronn Price comes in today with continued and worsening right hip pain after conservative treatment consisting of oral anti-inflammatories and physical therapy. Exam shows decreased internal rotation and pain on FADIR exam as well as significant tenderness over the trochanter and weakness with hip abduction in the side-lying position. Her x-rays and MRI demonstrate mild osteoarthritic changes of the hip as well as greater trochanteric bursa inflammation gluteus medius tendinosis and possible partial thickness tearing. Given her history and exam and imaging she likely has symptoms related to mild hip osteoarthritis and greater trochanteric pain syndrome. Office Procedures:  No orders of the defined types were placed in this encounter.     Orders Placed This Encounter   Procedures    IR ARTHR/ASP/INJ MAJOR JT/BURSA LEFT WO US     Standing Status:   Future     Standing Expiration Date:   11/14/2023     Scheduling Instructions:      Right hip Cortisone injection       Plan:  We discussed diagnosis and reviewed the imaging once again with the patient, given the fact that she is not improving with conservative treatment the next step would be a intra-articular cortisone injection into the hip joint, when her symptoms are worse at this location. We will schedule this with radiology given her habitus. All the patient's questions were answered while in the clinic. The patient is understanding of all instructions and agrees with the plan. Approximately 25 minutes was spent on patient education and coordinating care. Follow up in: No follow-ups on file. Sincerely,    Helio Dumont MD  [facility] Sports Medicine Fellow    11/16/22  11:14 AM    Sincerely,    Mhoini Tomlinson  [address] [facility] [address] [place] [id number]  Email: [email or web address]  Office: [phone]    11/16/22  11:14 AM    The encounter with [facility] was carried out by myself, Dr Deniz Tarango, who personally examined the patient and reviewed the plan. This dictation was performed with a verbal recognition program (DRAGON) and it was checked for errors. It is possible that there are still dictated errors within this office note. If so, please bring any errors to my attention for an addendum. All efforts were made to ensure that this office note is accurate.

## 2022-11-16 ENCOUNTER — OFFICE VISIT (OUTPATIENT)
Dept: PSYCHOLOGY | Age: 47
End: 2022-11-16
Payer: COMMERCIAL

## 2022-11-16 ENCOUNTER — OFFICE VISIT (OUTPATIENT)
Dept: INTERNAL MEDICINE CLINIC | Age: 47
End: 2022-11-16
Payer: COMMERCIAL

## 2022-11-16 VITALS
HEART RATE: 77 BPM | DIASTOLIC BLOOD PRESSURE: 78 MMHG | OXYGEN SATURATION: 97 % | SYSTOLIC BLOOD PRESSURE: 122 MMHG | WEIGHT: 293 LBS | BODY MASS INDEX: 53.64 KG/M2

## 2022-11-16 DIAGNOSIS — F32.1 MODERATE MAJOR DEPRESSION (HCC): Primary | ICD-10-CM

## 2022-11-16 DIAGNOSIS — F17.200 CURRENT SMOKER: ICD-10-CM

## 2022-11-16 DIAGNOSIS — J01.10 SUBACUTE FRONTAL SINUSITIS: Primary | ICD-10-CM

## 2022-11-16 DIAGNOSIS — R91.8 PULMONARY NODULES: ICD-10-CM

## 2022-11-16 PROCEDURE — 90471 IMMUNIZATION ADMIN: CPT | Performed by: NURSE PRACTITIONER

## 2022-11-16 PROCEDURE — 90674 CCIIV4 VAC NO PRSV 0.5 ML IM: CPT | Performed by: NURSE PRACTITIONER

## 2022-11-16 PROCEDURE — 99214 OFFICE O/P EST MOD 30 MIN: CPT | Performed by: NURSE PRACTITIONER

## 2022-11-16 PROCEDURE — 90832 PSYTX W PT 30 MINUTES: CPT | Performed by: PSYCHOLOGIST

## 2022-11-16 RX ORDER — AMOXICILLIN AND CLAVULANATE POTASSIUM 875; 125 MG/1; MG/1
1 TABLET, FILM COATED ORAL 2 TIMES DAILY
Qty: 14 TABLET | Refills: 0 | Status: SHIPPED | OUTPATIENT
Start: 2022-11-16 | End: 2022-11-23

## 2022-11-16 ASSESSMENT — ENCOUNTER SYMPTOMS
GASTROINTESTINAL NEGATIVE: 1
RESPIRATORY NEGATIVE: 1
EYES NEGATIVE: 1
ALLERGIC/IMMUNOLOGIC NEGATIVE: 1

## 2022-11-16 NOTE — PROGRESS NOTES
Behavioral Health Consultation  Rach Jimenes, Ph.D.  Psychologist  11/16/2022   1:32 PM EST       Time spent with Patient: 30 minutes      Reason for Consult:    Chief Complaint   Patient presents with    Depression             Referring Provider: No referring provider defined for this encounter. Pt provided informed consent for the behavioral health program. Discussed with patient model of service to include the limits of confidentiality (i.e. abuse reporting, suicide  intervention, etc.) and short-term intervention focused approach. Pt indicated understanding. Feedback given to PCP. S:  Pt seen per PCP re: depression    Patient seen for follow-up. Pt reported that she is feeling overwhelmed. Pt noted that she feels that her body is failing her. Pt noted that she needs a hip injection and also had sinus issues. Pt is not sleeping well with the CPAP machine. Patient reported how this is negatively affected her mood and how at times she feels like she has been set back in her progress emotionally.   Focused visit on perspective taking, mindfulness, and problem-focused coping      O:  MSE:    Appearance: good hygiene   Attitude: cooperative and friendly  Consciousness: alert  Orientation: oriented to person, place, time, general circumstance  Memory: recent and remote memory intact  Attention/Concentration: intact during session  Psychomotor Activity:normal  Eye Contact: normal  Speech: normal rate and volume, well-articulated  Mood: euthymic  Affect: congruent  Perception: within normal limits  Thought Content: within normal limits  Thought Process: logical, coherent  Insight: good  Judgment: intact  Ability to understand instructions: Yes  Morbid Ideation: no   Suicide Assessment: no suicidal ideation, plan, or intent  Homicidal Ideation: no     History:    Social History:   Social History     Socioeconomic History    Marital status:      Spouse name: Not on file    Number of children: 0    Years of education: Not on file    Highest education level: Not on file   Occupational History    Occupation:    Tobacco Use    Smoking status: Every Day     Packs/day: 1.50     Years: 35.00     Pack years: 52.50     Types: Cigarettes     Start date: 25     Passive exposure: Past    Smokeless tobacco: Never    Tobacco comments:     Smoked up to 2ppd   Vaping Use    Vaping Use: Never used   Substance and Sexual Activity    Alcohol use: Not Currently    Drug use: No    Sexual activity: Not Currently     Partners: Male   Other Topics Concern    Not on file   Social History Narrative    Not on file     Social Determinants of Health     Financial Resource Strain: Low Risk     Difficulty of Paying Living Expenses: Not hard at all   Food Insecurity: No Food Insecurity    Worried About Running Out of Food in the Last Year: Never true    Ran Out of Food in the Last Year: Never true   Transportation Needs: Not on file   Physical Activity: Not on file   Stress: Not on file   Social Connections: Not on file   Intimate Partner Violence: Not on file   Housing Stability: Not on file     TOBACCO:   reports that she has been smoking cigarettes. She started smoking about 36 years ago. She has a 52.50 pack-year smoking history. She has been exposed to tobacco smoke. She has never used smokeless tobacco.  ETOH:   reports that she does not currently use alcohol. A:    PHQ Scores 8/15/2022 7/25/2022 6/27/2022 6/13/2022 4/27/2022 3/9/2022 2/2/2022   PHQ2 Score 2 2 2 2 1 1 6   PHQ9 Score 13 6 13 8 6 1 13     Interpretation of Total Score Depression Severity: 1-4 = Minimal depression, 5-9 = Mild depression, 10-14 = Moderate depression, 15-19 = Moderately severe depression, 20-27 = Severe depression    Diagnosis:    1. Moderate major depression ([address]                  Plan:    There are no Patient Instructions on file for this visit. Pt interventions:  See above    No follow-ups on file.    Documentation was done using voice recognition dragon software. Every effort was made to ensure accuracy; however, inadvertent, unintentional computerized transcription errors may be present.

## 2022-11-16 NOTE — PROGRESS NOTES
[facility] (:  1975) is a 49 y.o. female,Established patient, here for evaluation of the following chief complaint(s):  Congestion and Fatigue         ASSESSMENT/PLAN:                Subjective   SUBJECTIVE/OBJECTIVE:  HPI Presents today for follow up on congestion and sinus pressure    Review of Systems       Objective   Physical Exam             An electronic signature was used to authenticate this note.     --Tarah Fernandez, APRN - CNP

## 2022-11-16 NOTE — PROGRESS NOTES
Erika Null (:  1975) is a 49 y.o. female,Established patient, here for evaluation of the following chief complaint(s):  Congestion and Fatigue         ASSESSMENT/PLAN:  Katherine Dhaliwal was seen today for congestion and fatigue. Diagnoses and all orders for this visit:    Subacute frontal sinusitis  -     amoxicillin-clavulanate (AUGMENTIN) 875-125 MG per tablet; Take 1 tablet by mouth 2 times daily for 7 days    Pulmonary nodules  -     Low Dose Chest CT-Abnormal Lung Screen Follow up; Future    Current smoker  -     nicotine (NICODERM CQ) 7 MG/24HR; Place 1 patch onto the skin every 24 hours    Other orders  -     Influenza, FLUCELVAX, (age 10 mo+), IM, PF, 0.5 mL     Stop smoking  Do not increase number of cigarettes smoked per day  Gargle with warm salt and water  Do not smoke in the house  Warm compress to face  Call in 3 weeks for increase dosage in Nicoderm patch  No follow-ups on file. Subjective   SUBJECTIVE/OBJECTIVE:  HPI Presents today with complaints of congestion, headache and depression. Has started smoking again, following 4 months of abstinence. Sates she needs to smoke inside because of allergies    Review of Systems   Constitutional:  Positive for fatigue. HENT:  Positive for congestion and postnasal drip. Eyes: Negative. Respiratory: Negative. Cardiovascular: Negative. Gastrointestinal: Negative. Endocrine: Negative. Genitourinary: Negative. Musculoskeletal: Negative. Allergic/Immunologic: Negative. Neurological:  Positive for headaches. Hematological: Negative. Psychiatric/Behavioral:  The patient is nervous/anxious.        Vitals:    22 1357   BP: 122/78   Pulse: 77   SpO2: 97%      BP Readings from Last 3 Encounters:   22 122/78   10/26/22 (!) 140/86   22 110/70      Wt Readings from Last 3 Encounters:   22 298 lb (135.2 kg)   22 297 lb (134.7 kg)   10/26/22 298 lb (135.2 kg)        Objective   Physical Exam  Constitutional:       Appearance: She is obese. HENT:      Head: Normocephalic. Right Ear: Tympanic membrane and external ear normal.      Left Ear: Tympanic membrane and external ear normal.      Ears:      Comments: Canal redness noted     Nose: Rhinorrhea present. Right Sinus: Frontal sinus tenderness present. Left Sinus: Frontal sinus tenderness present. Comments: Pain noted with light palpation     Mouth/Throat:      Mouth: Mucous membranes are moist.      Pharynx: Uvula midline. Oropharyngeal exudate present. Comments: Moderate amount mucoid drainage noted  Cardiovascular:      Rate and Rhythm: Normal rate and regular rhythm. Heart sounds: Normal heart sounds. Pulmonary:      Effort: Pulmonary effort is normal.      Breath sounds: Examination of the right-middle field reveals wheezing. Examination of the left-lower field reveals wheezing. Wheezing present. Lymphadenopathy:      Head:      Right side of head: No submental, submandibular, tonsillar, preauricular, posterior auricular or occipital adenopathy. Left side of head: No submental, submandibular, tonsillar, preauricular, posterior auricular or occipital adenopathy. Cervical: Cervical adenopathy present. Right cervical: Posterior cervical adenopathy present. Left cervical: Posterior cervical adenopathy present. Skin:     General: Skin is warm and dry. Neurological:      Mental Status: She is alert. Psychiatric:      Comments: Today depressed having a \"me\" party. Wants to do better, but keeps repeating same actions                An electronic signature was used to authenticate this note.     --Francesco Lindquist, APRN - CNP

## 2022-11-16 NOTE — PATIENT INSTRUCTIONS
Stop smoking  Do not increase number of cigarettes smoked per day  Gargle with warm salt and water  Do not smoke in the house  Warm compress to face  Call in 3 weeks for increase dosage in Nicoderm patch

## 2022-11-20 DIAGNOSIS — I10 ESSENTIAL HYPERTENSION: ICD-10-CM

## 2022-11-21 RX ORDER — LISINOPRIL 10 MG/1
TABLET ORAL
Qty: 90 TABLET | Refills: 0 | Status: SHIPPED | OUTPATIENT
Start: 2022-11-21

## 2022-11-21 NOTE — TELEPHONE ENCOUNTER
Recent Visits  Date Type Provider Dept   11/16/22 Office Visit Jasmine Gonzalez APRN - CNP [facility]   09/14/22 Office Visit Jasmine Gonzalez APRN - CNP [facility]   08/15/22 Office Visit Calos Jeffrey MD [facility]   07/25/22 Office Visit Jasmine Gonzalez APRN - CNP [facility]   06/27/22 Office Visit Jasmine Gonzalez APRN - CNP [facility]   06/13/22 Office Visit Jasmine Gonzalez APRN - CNP [facility]   03/09/22 Office Visit Jasmine Gonzalez APRN - CNP [facility]   01/21/22 Office Visit Jasmine Gonzalez APRN - CNP [facility]   12/08/21 Office Visit Jasmine oGnzalez APRN - CNP [facility]   09/08/21 Office Visit Jasmine Gonzalez APRN - CNP [facility]   Showing recent visits within past 540 days with a meds authorizing provider and meeting all other requirements  Future Appointments  Date Type Provider Dept   03/15/23 Appointment HARRISON Ferris - CNP [facility]   Showing future appointments within next 150 days with a meds authorizing provider and meeting all other requirements     11/16/2022

## 2022-11-25 ENCOUNTER — TELEPHONE (OUTPATIENT)
Dept: INTERNAL MEDICINE CLINIC | Age: 47
End: 2022-11-25

## 2022-11-25 NOTE — TELEPHONE ENCOUNTER
Patient c/o vaginal d/c and burning since she took a recent ABX. She is requesting a script for a yeast infection. Patient also requesting something stronger than the nicotine (NICODERM CQ) 7 MG/24HR patch.

## 2022-11-28 ENCOUNTER — TELEPHONE (OUTPATIENT)
Dept: ORTHOPEDIC SURGERY | Age: 47
End: 2022-11-28

## 2022-11-28 DIAGNOSIS — F17.200 CURRENT SMOKER: ICD-10-CM

## 2022-11-28 DIAGNOSIS — T36.95XA ANTIBIOTIC-INDUCED YEAST INFECTION: Primary | ICD-10-CM

## 2022-11-28 DIAGNOSIS — B37.9 ANTIBIOTIC-INDUCED YEAST INFECTION: Primary | ICD-10-CM

## 2022-11-28 RX ORDER — FLUCONAZOLE 150 MG/1
150 TABLET ORAL ONCE
Qty: 1 TABLET | Refills: 0 | Status: SHIPPED | OUTPATIENT
Start: 2022-11-28 | End: 2022-11-28

## 2022-11-28 RX ORDER — NICOTINE 21 MG/24HR
1 PATCH, TRANSDERMAL 24 HOURS TRANSDERMAL EVERY 24 HOURS
Qty: 30 PATCH | Refills: 3 | Status: SHIPPED | OUTPATIENT
Start: 2022-11-28 | End: 2023-11-28

## 2022-11-28 NOTE — TELEPHONE ENCOUNTER
General Question     Subject: patient call and stated she went to [facility] last Wednesday for and xray and to get her Cortisone injection for her hip and she stated they id not have the paper work and she would like to know if she can get that sent over to [facility] so she can get that done . Please Advise.    Patient: Burt Simmons  Contact Number: [phone]

## 2022-11-29 NOTE — TELEPHONE ENCOUNTER
S/w patient. She reports she was called and scheduled for injection for last week. She reports unable to proceed for lack of paperwork. Order and all necessary paperwork faxed to [facility] for rescheduling purposes.

## 2022-11-30 DIAGNOSIS — G43.109 MIGRAINE AURA WITHOUT HEADACHE: ICD-10-CM

## 2022-11-30 RX ORDER — RIMEGEPANT SULFATE 75 MG/75MG
TABLET, ORALLY DISINTEGRATING ORAL
Qty: 16 TABLET | Refills: 0 | Status: SHIPPED | OUTPATIENT
Start: 2022-11-30

## 2022-11-30 NOTE — TELEPHONE ENCOUNTER
Recent Visits  Date Type Provider Dept   11/16/22 Office Visit Sitka Cough, APRN - CNP [facility]   09/14/22 Office Visit Megan Cough, APRN - CNP [facility]   08/15/22 Office Visit Jelani Dunne MD [facility]   07/25/22 Office Visit Megan Cough, APRN - CNP [facility]   06/27/22 Office Visit Sitka Cough, APRN - CNP [facility]   06/13/22 Office Visit Sitka Cough, APRN - CNP [facility]   03/09/22 Office Visit Sitka Cough, APRN - CNP [facility]   01/21/22 Office Visit Sitka Cough, APRN - CNP [facility]   12/08/21 Office Visit Megan Cough, APRN - CNP [facility]   09/08/21 Office Visit Megan Cough, APRN - CNP [facility]   Showing recent visits within past 540 days with a meds authorizing provider and meeting all other requirements  Future Appointments  Date Type Provider Dept   03/15/23 Appointment Sitka Cough, APRN - CNP [facility]   Showing future appointments within next 150 days with a meds authorizing provider and meeting all other requirements     11/16/2022

## 2022-12-05 ENCOUNTER — HOSPITAL ENCOUNTER (OUTPATIENT)
Dept: CT IMAGING | Age: 47
Discharge: HOME | End: 2022-12-05
Payer: COMMERCIAL

## 2022-12-05 DIAGNOSIS — M16.11 PRIMARY OSTEOARTHRITIS OF RIGHT HIP: ICD-10-CM

## 2022-12-05 DIAGNOSIS — M25.851 HIP IMPINGEMENT SYNDROME, RIGHT: ICD-10-CM

## 2022-12-05 PROCEDURE — 6360000002 HC RX W HCPCS: Performed by: RADIOLOGY

## 2022-12-05 PROCEDURE — 2500000003 HC RX 250 WO HCPCS: Performed by: RADIOLOGY

## 2022-12-05 PROCEDURE — 77012 CT SCAN FOR NEEDLE BIOPSY: CPT

## 2022-12-05 RX ORDER — TRIAMCINOLONE ACETONIDE 40 MG/ML
INJECTION, SUSPENSION INTRA-ARTICULAR; INTRAMUSCULAR
Status: COMPLETED | OUTPATIENT
Start: 2022-12-05 | End: 2022-12-05

## 2022-12-05 RX ORDER — LIDOCAINE HYDROCHLORIDE 10 MG/ML
INJECTION, SOLUTION EPIDURAL; INFILTRATION; INTRACAUDAL; PERINEURAL
Status: COMPLETED | OUTPATIENT
Start: 2022-12-05 | End: 2022-12-05

## 2022-12-05 RX ORDER — BUPIVACAINE HYDROCHLORIDE 5 MG/ML
INJECTION, SOLUTION EPIDURAL; INTRACAUDAL
Status: COMPLETED | OUTPATIENT
Start: 2022-12-05 | End: 2022-12-05

## 2022-12-05 RX ADMIN — BUPIVACAINE HYDROCHLORIDE 15 ML: 5 INJECTION, SOLUTION EPIDURAL; INTRACAUDAL; PERINEURAL at 12:15

## 2022-12-05 RX ADMIN — TRIAMCINOLONE ACETONIDE 80 MG: 40 INJECTION, SUSPENSION INTRA-ARTICULAR; INTRAMUSCULAR at 12:15

## 2022-12-05 RX ADMIN — LIDOCAINE HYDROCHLORIDE 15 ML: 10 INJECTION, SOLUTION EPIDURAL; INFILTRATION; INTRACAUDAL; PERINEURAL at 12:14

## 2022-12-05 NOTE — PROGRESS NOTES
IMAGING SERVICES NURSING PROGRESS NOTE    Procedure:  Right Hip Inection  December 5, 2022  Lizeth Bueno      Allergies: Allergies   Allergen Reactions    Wellbutrin [Bupropion]      \"get high as a kite, then crash\"       There were no vitals filed for this visit. Procedure explained to patient by MD: yes   Informed consent obtained:yes      Mental Status:  Normal  Readiness to learn:  Yes  Barriers to learning: No    Pain Assessment Pre-Procedure:  Pain Present:  yes  Pain Score:  7  Pain Quality/Description:  Aching    Time out Procedure Verification with:  [x] RN  [x] Physician  [x] Patient  [x] Other: CT Technologist  Procedure site marked, if applicable:  Yes    Note: Patient arrived A & O x 4, denies pain, breathing easily on room air, Spoke to Dr. Belle Chao prior to procedure. Post Procedureal Note:  Patient tolerated procedure well. Breathing easily on room air. Patient transported in stable conditon to home.     Pain Assessment Post-Procedure:  Pain Present:  no  Pain Score:  5  Pain Quality/Description:  Aching    Plan of Care Goals:  Safety measures met:  Yes  Patient understands explanation of procedure:  Yes    Time in:  1210  Time out:  [address] [facility] SHANNA Ge.  R.N. 12/5/2022

## 2022-12-06 DIAGNOSIS — E55.9 VITAMIN D DEFICIENCY: ICD-10-CM

## 2022-12-06 RX ORDER — ERGOCALCIFEROL 1.25 MG/1
CAPSULE ORAL
Qty: 12 CAPSULE | Refills: 0 | Status: SHIPPED | OUTPATIENT
Start: 2022-12-06

## 2022-12-06 NOTE — TELEPHONE ENCOUNTER
Recent Visits  Date Type Provider Dept   11/16/22 Office Visit Montrell Santiago APRN - CNP [facility]   09/14/22 Office Visit Montrell Santiago, APRN - CNP [facility]   08/15/22 Office Visit Giorgio Lindsay MD [facility]   07/25/22 Office Visit Montrell Santiago, APRN - CNP [facility]   06/27/22 Office Visit Montrell Santiago, APRN - CNP [facility]   06/13/22 Office Visit Montrell Santiago, APRN - CNP [facility]   03/09/22 Office Visit Montrell Santiago, APRN - CNP [facility]   01/21/22 Office Visit Montrell Santiago APRN - CNP [facility]   12/08/21 Office Visit Montrell Santiago APRN - CNP [facility]   09/08/21 Office Visit Montrell Santiago APRN - CNP [facility]   Showing recent visits within past 540 days with a meds authorizing provider and meeting all other requirements  Future Appointments  Date Type Provider Dept   03/15/23 Appointment Montrell Santiago APRN - CNP [facility]   Showing future appointments within next 150 days with a meds authorizing provider and meeting all other requirements     11/16/2022

## 2022-12-12 ENCOUNTER — HOSPITAL ENCOUNTER (OUTPATIENT)
Dept: CT IMAGING | Age: 47
Discharge: HOME | End: 2022-12-12
Payer: COMMERCIAL

## 2022-12-12 ENCOUNTER — OFFICE VISIT (OUTPATIENT)
Dept: ORTHOPEDIC SURGERY | Age: 47
End: 2022-12-12

## 2022-12-12 VITALS — BODY MASS INDEX: 51.91 KG/M2 | HEIGHT: 63 IN | WEIGHT: 293 LBS

## 2022-12-12 DIAGNOSIS — M16.11 PRIMARY OSTEOARTHRITIS OF RIGHT HIP: Primary | ICD-10-CM

## 2022-12-12 DIAGNOSIS — M70.61 TROCHANTERIC BURSITIS OF RIGHT HIP: ICD-10-CM

## 2022-12-12 DIAGNOSIS — R91.8 PULMONARY NODULES: ICD-10-CM

## 2022-12-12 DIAGNOSIS — M25.559 HIP PAIN: ICD-10-CM

## 2022-12-12 PROCEDURE — 71250 CT THORAX DX C-: CPT

## 2022-12-12 NOTE — LETTER
[facility]  [address] [address] [address]  Phone: [phone]  Fax: [phone]    Shannan Eric MD    December 13, 2022     [address]  [address]    Patient: Viv Brito   MR Number: [id number]   YOB: 1975   Date of Visit: 12/12/2022       Dear Clint Zepeda: Thank you for referring Loreta Dubon to me for evaluation/treatment. Below are the relevant portions of my assessment and plan of care. If you have questions, please do not hesitate to call me. I look forward to following Gladys Reece along with you.     Sincerely,      Shannan Eric MD

## 2022-12-12 NOTE — PROGRESS NOTES
Chief Complaint  Hip Pain (F/U RIGHT HIP)      History of Present Illness:  Viv Brito is a pleasant 49 y.o. female here for follow-up of her right hip pain. Pain assessment as described below and reviewed today with the patient. She recently underwent a right sided intra-articular cortisone injection with interventional radiology on 12/05/2022. She reports overall feeling 65% relief from the injection. She denies any new injuries or triggers to her hip since her last visit. Medical History:  Patient's medications, allergies, past medical, surgical, social and family histories were reviewed and updated as appropriate. Pain Assessment  Location of Pain: Hip  Location Modifiers: Right  Severity of Pain: 3  Quality of Pain: Aching  Frequency of Pain: Constant  Limiting Behavior: Yes  Result of Injury: No  Work-Related Injury: No  Are there other pain locations you wish to document?: No    ROS: Review of systems reviewed from Patient History Form completed today and available in the patient's chart under the Media tab. Pertinent items are noted in HPI  Review of systems reviewed from Patient History Form completed today and available in the patient's chart under the Media tab. Vital Signs:  Ht 5' 2.5\" (1.588 m)   Wt 298 lb (135.2 kg)   BMI 53.64 kg/m²         Neuro: Alert & oriented x 3,  normal,  no focal deficits noted. Normal affect. Eyes: sclera clear  Ears: Normal external ear  Mouth:  No perioral lesions  Pulm: Respirations unlabored and regular  Pulse: Extremities well perfused. 2+ peripheral pulses. Skin: Warm. No ulcerations. Constitutional: The physical examination finds the patient to be well-developed and well-nourished. The patient is alert and oriented x3 and was cooperative throughout the visit. Hip Examination: right    Skin/Inspection: No skin lesions, cellulitis, or extreme edema in the lower extremities.      Standing/Walking: normal Antalgic gait, negative Trendelenburg sign. Supine Exam: Non tender around the ASIS, AIIS  Flexion arc 0 to 100deg, IR 35 deg, ER 55 deg diminished range with pain  Special Tests: positive Deep Flexion Test  positive FADIR  positive  pain with LILLIAN that is right lateral   Resisted Abduction 5/5   Resisted Adduction 5/5     Side Lying Exam: tender at greater trochanter, tender abductor musculature,   Abductor side leg raise 4/5, normal. OberTest    Distal Neurovascular exam is intact (foot sensation, pulses, and motor exam)      Diagnostics:  No new imaging was obtained today        Assessment: Patient is a 49 y.o. female with mild right hip osteoarthritis and greater trochanteric pain syndrome. This is resolving following a cortisone injection on 12/05/2022. Impression:  Visit Diagnoses         Codes    Primary osteoarthritis of right hip    -  Primary M16.11    Trochanteric bursitis of right hip     M70.61    Hip pain     M25.559            Office Procedures:  No orders of the defined types were placed in this encounter. No orders of the defined types were placed in this encounter. Plan:  Jayshree Davenport is doing well. I recommend she continue with home exercises for range of motion and strengthening. I will see her back as needed going forward. There is always an option for a repeat intra-articular cortisone injection of her right hip with interventional radiology. All the patient's questions were answered while in the clinic. The patient is understanding of all instructions and agrees with the plan. Approximately 25 minutes was spent on patient education and coordinating care. Follow up in: Return if symptoms worsen or fail to improve. Sincerely,    I, Adwoa Swanson, am scribing for Dr. Kiara Sow MD.  12/12/22 11:33 AM Adwoa Swanson.       The physical examination was performed between the patient and Dr. Kiara Sow MD.  All counseling during the appointment was performed between the patient and the provider. Mohini Tomlinson MD [address] [address] [address], [address]  Email: [email or web address]  Office: [phone]    12/12/22  11:32 AM    The encounter with [facility] was carried out by myself, Dr Deniz Tarango, who personally examined the patient and reviewed the plan. This dictation was performed with a verbal recognition program (DRAGON) and it was checked for errors. It is possible that there are still dictated errors within this office note. If so, please bring any errors to my attention for an addendum. All efforts were made to ensure that this office note is accurate.

## 2022-12-13 DIAGNOSIS — R91.1 PULMONARY NODULE, RIGHT: Primary | ICD-10-CM

## 2022-12-14 ENCOUNTER — OFFICE VISIT (OUTPATIENT)
Dept: PSYCHOLOGY | Age: 47
End: 2022-12-14

## 2022-12-14 DIAGNOSIS — F33.1 MODERATE EPISODE OF RECURRENT MAJOR DEPRESSIVE DISORDER (HCC): Primary | Chronic | ICD-10-CM

## 2022-12-14 NOTE — PATIENT INSTRUCTIONS
1) 3x a day do a mindfulness meditation for quitting smoking on Youtube  2) Cut down by 1 cigarette every other day. Make all your cigarettes in the morning.  Keep them in a bag  3) Break the cue to cigarette cycle and think of a new reward system  When you get up---coffee, meditation, cigarette  When you drive---drive, destination, cigarette  When you get off work---stretch, meditation, cigarette  When you take a lunch----eat, stretch, cigarette

## 2022-12-14 NOTE — PROGRESS NOTES
Behavioral Health Consultation  Keri Archer, Ph.D.  Psychologist  12/14/2022   1:32 PM EST       Time spent with Patient: 30 minutes      Reason for Consult:    Chief Complaint   Patient presents with    Depression           Referring Provider: No referring provider defined for this encounter. Pt provided informed consent for the behavioral health program. Discussed with patient model of service to include the limits of confidentiality (i.e. abuse reporting, suicide  intervention, etc.) and short-term intervention focused approach. Pt indicated understanding. Feedback given to PCP. S:  Pt seen per PCP re: depression    Patient seen for follow-up. Patient reported that she has been feeling somewhat anxious. Patient noted that she is worried about her health status. Patient stated that they found a growing nodule on her lung. Patient noted that she feels like she is ready to stop smoking. Focus visit on strategies for managing smoking cessation.       O:  MSE:    Appearance: good hygiene   Attitude: cooperative and friendly  Consciousness: alert  Orientation: oriented to person, place, time, general circumstance  Memory: recent and remote memory intact  Attention/Concentration: intact during session  Psychomotor Activity:normal  Eye Contact: normal  Speech: normal rate and volume, well-articulated  Mood: euthymic  Affect: congruent  Perception: within normal limits  Thought Content: within normal limits  Thought Process: logical, coherent  Insight: good  Judgment: intact  Ability to understand instructions: Yes  Morbid Ideation: no   Suicide Assessment: no suicidal ideation, plan, or intent  Homicidal Ideation: no     History:    Social History:   Social History     Socioeconomic History    Marital status:      Spouse name: Not on file    Number of children: 0    Years of education: Not on file    Highest education level: Not on file   Occupational History    Occupation:    Tobacco Use    Smoking status: Every Day     Packs/day: 1.50     Years: 35.00     Pack years: 52.50     Types: Cigarettes     Start date: 25     Passive exposure: Past    Smokeless tobacco: Never    Tobacco comments:     Smoked up to 2ppd   Vaping Use    Vaping Use: Never used   Substance and Sexual Activity    Alcohol use: Not Currently    Drug use: No    Sexual activity: Not Currently     Partners: Male   Other Topics Concern    Not on file   Social History Narrative    Not on file     Social Determinants of Health     Financial Resource Strain: Low Risk     Difficulty of Paying Living Expenses: Not hard at all   Food Insecurity: No Food Insecurity    Worried About Running Out of Food in the Last Year: Never true    Ran Out of Food in the Last Year: Never true   Transportation Needs: Not on file   Physical Activity: Not on file   Stress: Not on file   Social Connections: Not on file   Intimate Partner Violence: Not on file   Housing Stability: Not on file     TOBACCO:   reports that she has been smoking cigarettes. She started smoking about 35 years ago. She has a 52.50 pack-year smoking history. She has been exposed to tobacco smoke. She has never used smokeless tobacco.  ETOH:   reports that she does not currently use alcohol. A:    PHQ Scores 8/15/2022 7/25/2022 6/27/2022 6/13/2022 4/27/2022 3/9/2022 2/2/2022   PHQ2 Score 2 2 2 2 1 1 6   PHQ9 Score 13 6 13 8 6 1 13     Interpretation of Total Score Depression Severity: 1-4 = Minimal depression, 5-9 = Mild depression, 10-14 = Moderate depression, 15-19 = Moderately severe depression, 20-27 = Severe depression    Diagnosis:    1. Moderate episode of recurrent major depressive disorder ([facility]                Plan:    Patient Instructions   1) 3x a day do a mindfulness meditation for quitting smoking on Youtube  2) Cut down by 1 cigarette every other day. Make all your cigarettes in the morning.  Keep them in a bag  3) Break the cue to cigarette cycle and think of a new reward system  When you get up---coffee, meditation, cigarette  When you drive---drive, destination, cigarette  When you get off work---stretch, meditation, cigarette  When you take a lunch----eat, stretch, cigarette        Pt interventions:  See above    No follow-ups on file. Documentation was done using voice recognition dragon software. Every effort was made to ensure accuracy; however, inadvertent, unintentional computerized transcription errors may be present.

## 2022-12-26 DIAGNOSIS — E55.9 VITAMIN D DEFICIENCY: ICD-10-CM

## 2022-12-26 NOTE — TELEPHONE ENCOUNTER
Recent Visits  Date Type Provider Dept   11/16/22 Office Visit Mian Quigley APRN - CNP [facility]   09/14/22 Office Visit Mian Quigley APRN - CNP [facility]   08/15/22 Office Visit Debi Mueller MD [facility]   07/25/22 Office Visit Mian Quigley APRN - CNP [facility]   06/27/22 Office Visit Mian Quigley APRN - CNP [facility]   06/13/22 Office Visit Mian Quigley APRN - CNP [facility]   03/09/22 Office Visit Mian Quigley APRN - CNP [facility]   01/21/22 Office Visit Mian Quigley APRN - CNP [facility]   12/08/21 Office Visit Mian Quigley APRN - CNP [facility]   09/08/21 Office Visit Mian Quigley APRN - CNP [facility]   Showing recent visits within past 540 days with a meds authorizing provider and meeting all other requirements  Future Appointments  Date Type Provider Dept   03/15/23 Appointment Mian Quigley APRN - CNP [facility]   Showing future appointments within next 150 days with a meds authorizing provider and meeting all other requirements     11/16/2022

## 2022-12-27 DIAGNOSIS — E55.9 VITAMIN D DEFICIENCY: ICD-10-CM

## 2022-12-27 RX ORDER — ERGOCALCIFEROL 1.25 MG/1
CAPSULE ORAL
Qty: 12 CAPSULE | Refills: 0 | OUTPATIENT
Start: 2022-12-27

## 2022-12-27 NOTE — TELEPHONE ENCOUNTER
Recent Visits  Date Type Provider Dept   11/16/22 Office Visit Ryanne Reed APRN - CNP [facility]   09/14/22 Office Visit Ryanne Reed, APRN - CNP [facility]   08/15/22 Office Visit Fifi Flores MD [facility]   07/25/22 Office Visit Ryanne Reed, APRN - CNP [facility]   06/27/22 Office Visit Ryanne Reed APRN - CNP [facility]   06/13/22 Office Visit Ryanne Reed APRN - CNP [facility]   03/09/22 Office Visit Ryanne Reed APRN - CNP [facility]   01/21/22 Office Visit Ryanne Reed APRN - CNP [facility]   12/08/21 Office Visit Ryanne Reed APRN - CNP [facility]   09/08/21 Office Visit Ryanne Reed APRN - CNP [facility]   Showing recent visits within past 540 days with a meds authorizing provider and meeting all other requirements  Future Appointments  Date Type Provider Dept   03/15/23 Appointment Ryanne Reed APRN - CNP [facility]   Showing future appointments within next 150 days with a meds authorizing provider and meeting all other requirements     11/16/2022

## 2022-12-29 RX ORDER — ERGOCALCIFEROL 1.25 MG/1
CAPSULE ORAL
Qty: 12 CAPSULE | Refills: 0 | Status: SHIPPED | OUTPATIENT
Start: 2022-12-29

## 2022-12-29 RX ORDER — OMEPRAZOLE 20 MG/1
CAPSULE, DELAYED RELEASE ORAL
Qty: 120 CAPSULE | Refills: 0 | Status: SHIPPED | OUTPATIENT
Start: 2022-12-29

## 2022-12-30 NOTE — TELEPHONE ENCOUNTER
Recent Visits  Date Type Provider Dept   11/16/22 Office Visit HARRISON Johnson CNP [facility]   09/14/22 Office Visit HARRISON Johnson CNP [facility]   08/15/22 Office Visit Armando Sullivan MD [facility]   07/25/22 Office Visit HARRISON Johnson - CNP [facility]   06/27/22 Office Visit HARRISON Johnson - CNP [facility]   06/13/22 Office Visit HARRISON Johnson - CNP [facility]   03/09/22 Office Visit HARRISON Johnson - CNP [facility]   01/21/22 Office Visit HARRISON Johnson - CNP [facility]   12/08/21 Office Visit HARRISON Johnson CNP [facility]   09/08/21 Office Visit HARRISON Johnson - CNP [facility]   Showing recent visits within past 540 days with a meds authorizing provider and meeting all other requirements  Future Appointments  Date Type Provider Dept   03/15/23 Appointment HARRISON Johnson CNP [facility]   Showing future appointments within next 150 days with a meds authorizing provider and meeting all other requirements     11/16/2022

## 2022-12-31 RX ORDER — ARIPIPRAZOLE 5 MG/1
5 TABLET ORAL DAILY
Qty: 90 TABLET | Refills: 0 | Status: SHIPPED | OUTPATIENT
Start: 2022-12-31

## 2023-01-04 ENCOUNTER — TELEPHONE (OUTPATIENT)
Dept: INTERNAL MEDICINE CLINIC | Age: 48
End: 2023-01-04

## 2023-01-04 NOTE — TELEPHONE ENCOUNTER
Patient called in stating no one called her back with her results from her PET scan and would like to receive a call back from staff.  Patient can be reached at [phone] (home)

## 2023-01-12 RX ORDER — OXCARBAZEPINE 600 MG/1
TABLET, FILM COATED ORAL
Qty: 180 TABLET | Refills: 0 | Status: SHIPPED | OUTPATIENT
Start: 2023-01-12

## 2023-01-12 NOTE — TELEPHONE ENCOUNTER
Recent Visits  Date Type Provider Dept   11/16/22 Office Visit John Paul Range, APRN - CNP [facility]   09/14/22 Office Visit John Paul Range, APRN - CNP [facility]   08/15/22 Office Visit Noe Del Cid MD [facility]   07/25/22 Office Visit John Paul Range, APRN - CNP [facility]   06/27/22 Office Visit John Paul Range, APRN - CNP [facility]   06/13/22 Office Visit John Paul Range, APRN - CNP [facility]   03/09/22 Office Visit John Paul Range, APRN - CNP [facility]   01/21/22 Office Visit John Paul Range, APRN - CNP [facility]   12/08/21 Office Visit John Paul Range, APRN - CNP [facility]   09/08/21 Office Visit John Paul Range, APRN - CNP [facility]   Showing recent visits within past 540 days with a meds authorizing provider and meeting all other requirements  Future Appointments  Date Type Provider Dept   01/18/23 Appointment John Paul Range, APRN - CNP [facility]   03/15/23 Appointment John Paul Range, APRN - CNP [facility]   Showing future appointments within next 150 days with a meds authorizing provider and meeting all other requirements     11/16/2022

## 2023-01-15 NOTE — TELEPHONE ENCOUNTER
Recent Visits  Date Type Provider Dept   11/16/22 Office Visit Jaida Todd APRN - CNP [facility]   09/14/22 Office Visit Jaida Brooks, APRN - CNP [facility]   08/15/22 Office Visit Esme Christianson MD [facility]   07/25/22 Office Visit Jaida Todd, APRN - CNP [facility]   06/27/22 Office Visit Jaida Brooks APRN - CNP [facility]   06/13/22 Office Visit Jaida Todd APRN - CNP [facility]   03/09/22 Office Visit Jaida Brooks APRN - CNP [facility]   01/21/22 Office Visit Jaida Brooks APRN - CNP [facility]   12/08/21 Office Visit Jaida Brooks APRN - CNP [facility]   09/08/21 Office Visit Jaida Brooks APRN - CNP [facility]   Showing recent visits within past 540 days with a meds authorizing provider and meeting all other requirements  Future Appointments  Date Type Provider Dept   01/18/23 Appointment Jaida Brooks APRN - CNP [facility]   03/15/23 Appointment Jaida Brooks APRN - CNP [facility]   Showing future appointments within next 150 days with a meds authorizing provider and meeting all other requirements     11/16/2022

## 2023-01-18 ENCOUNTER — OFFICE VISIT (OUTPATIENT)
Dept: INTERNAL MEDICINE CLINIC | Age: 48
End: 2023-01-18
Payer: COMMERCIAL

## 2023-01-18 ENCOUNTER — TELEMEDICINE (OUTPATIENT)
Dept: PSYCHOLOGY | Age: 48
End: 2023-01-18

## 2023-01-18 VITALS
OXYGEN SATURATION: 98 % | SYSTOLIC BLOOD PRESSURE: 139 MMHG | HEART RATE: 60 BPM | BODY MASS INDEX: 53.1 KG/M2 | DIASTOLIC BLOOD PRESSURE: 77 MMHG | WEIGHT: 293 LBS

## 2023-01-18 DIAGNOSIS — F33.1 MODERATE EPISODE OF RECURRENT MAJOR DEPRESSIVE DISORDER (HCC): Primary | Chronic | ICD-10-CM

## 2023-01-18 DIAGNOSIS — E66.01 MORBID OBESITY WITH BMI OF 40.0-44.9, ADULT (HCC): ICD-10-CM

## 2023-01-18 DIAGNOSIS — Z72.0 TOBACCO ABUSE DISORDER: ICD-10-CM

## 2023-01-18 DIAGNOSIS — E66.01 CLASS 2 SEVERE OBESITY DUE TO EXCESS CALORIES WITH SERIOUS COMORBIDITY IN ADULT, UNSPECIFIED BMI (HCC): Primary | ICD-10-CM

## 2023-01-18 DIAGNOSIS — J32.4 CHRONIC PANSINUSITIS: ICD-10-CM

## 2023-01-18 PROCEDURE — 90832 PSYTX W PT 30 MINUTES: CPT | Performed by: PSYCHOLOGIST

## 2023-01-18 PROCEDURE — 99214 OFFICE O/P EST MOD 30 MIN: CPT | Performed by: NURSE PRACTITIONER

## 2023-01-18 RX ORDER — RISPERIDONE 4 MG/1
TABLET ORAL
Qty: 90 TABLET | Refills: 1 | Status: SHIPPED | OUTPATIENT
Start: 2023-01-18

## 2023-01-18 ASSESSMENT — ENCOUNTER SYMPTOMS
ALLERGIC/IMMUNOLOGIC NEGATIVE: 1
EYES NEGATIVE: 1
RESPIRATORY NEGATIVE: 1

## 2023-01-18 NOTE — PROGRESS NOTES
Isabel Germain (:  1975) is a 49 y.o. female,Established patient, here for evaluation of the following chief complaint(s):  Forms (FMLA/ renewal for Handic[employer] Plac[employer])         ASSESSMENT/PLAN:  Ruthann So was seen today for forms. Diagnoses and all orders for this visit:    Class 2 severe obesity due to excess calories with serious comorbidity in adult, unspecified BMI ([address]  -     Handicap Placard MISC; by Does not apply route    Chronic pansinusitis    Tobacco abuse disorder  -     Handicap Placard MISC; by Does not apply route    Morbid obesity with BMI of 40.0-44.9, adult ([address]  -     Handicap Placard MISC; by Does not apply route          Need to walk slow, walk  Decrease cigarette usage every 2 days by one cigarette  Drink plenty of water      Subjective   SUBJECTIVE/OBJECTIVE:  HPI Presents today for follow up on obesity, nicotine abuse. At present smoking over 2 ppd. States  now she is rolling all cigarettes in the am . Now only rolling 21 per day. Plan to cut back one cigarette every 2 days    Review of Systems   Constitutional: Negative. HENT: Negative. Eyes: Negative. Respiratory: Negative. Cardiovascular: Negative. Endocrine: Negative. Genitourinary: Negative. Musculoskeletal: Negative. Allergic/Immunologic: Negative. Neurological: Negative. Hematological: Negative. Psychiatric/Behavioral:  The patient is nervous/anxious. Vitals:    23 1455   BP: 139/77   Pulse: 60   SpO2: 98%      BP Readings from Last 3 Encounters:   23 139/77   22 122/78   10/26/22 (!) 140/86      Wt Readings from Last 3 Encounters:   23 295 lb (133.8 kg)   22 298 lb (135.2 kg)   22 298 lb (135.2 kg)        Objective   Physical Exam  Constitutional:       Appearance: She is obese. Cardiovascular:      Rate and Rhythm: Normal rate and regular rhythm. Pulmonary:      Effort: Pulmonary effort is normal.      Breath sounds: Normal breath sounds. Neurological:      Mental Status: She is alert. Psychiatric:         Attention and Perception: Attention normal.         Mood and Affect: Mood is anxious and depressed. Comments: New insurance, worried about paying bill. Different from previous, may need to switch providers                 An electronic signature was used to authenticate this note.     --HARRISON Caputo - CNP

## 2023-01-18 NOTE — PROGRESS NOTES
Behavioral Health Consultation  Lashay Brown, Ph.D.  Psychologist  2023   1:32 PM EST       Time spent with Patient: 30 minutes      Reason for Consult:    Chief Complaint   Patient presents with    Depression             Referring Provider: No referring provider defined for this encounter. Pt provided informed consent for the behavioral health program. Discussed with patient model of service to include the limits of confidentiality (i.e. abuse reporting, suicide  intervention, etc.) and short-term intervention focused approach. Pt indicated understanding. Feedback given to PCP. S:  Pt seen per PCP re: depression    Patient seen for follow-up. Pt reported that she is down to 21 cigarettes from 43. Pt reported that it has been the most challenging to reduce her AM cigarette use. Pt described the onset of her depression and noted that it happened when her friend  from lung cancer. Pt stated dissonance related to quitting and holding this grief. Focused intervention on MI and problem solving coping.  Set goal for PT to increase social interaction as loneliness is a trigger and to reduce AM cigarettes by 2.     O:  MSE:    Appearance: good hygiene   Attitude: cooperative and friendly  Consciousness: alert  Orientation: oriented to person, place, time, general circumstance  Memory: recent and remote memory intact  Attention/Concentration: intact during session  Psychomotor Activity:normal  Eye Contact: normal  Speech: normal rate and volume, well-articulated  Mood: euthymic  Affect: congruent  Perception: within normal limits  Thought Content: within normal limits  Thought Process: logical, coherent  Insight: good  Judgment: intact  Ability to understand instructions: Yes  Morbid Ideation: no   Suicide Assessment: no suicidal ideation, plan, or intent  Homicidal Ideation: no     History:    Social History:   Social History     Socioeconomic History    Marital status:      Spouse name: Not on file Number of children: 0    Years of education: Not on file    Highest education level: Not on file   Occupational History    Occupation:    Tobacco Use    Smoking status: Every Day     Packs/day: 1.50     Years: 35.00     Pack years: 52.50     Types: Cigarettes     Start date: 25     Passive exposure: Past    Smokeless tobacco: Never    Tobacco comments:     Smoked up to 2ppd   Vaping Use    Vaping Use: Never used   Substance and Sexual Activity    Alcohol use: Not Currently    Drug use: No    Sexual activity: Not Currently     Partners: Male   Other Topics Concern    Not on file   Social History Narrative    Not on file     Social Determinants of Health     Financial Resource Strain: Low Risk     Difficulty of Paying Living Expenses: Not hard at all   Food Insecurity: No Food Insecurity    Worried About Running Out of Food in the Last Year: Never true    Ran Out of Food in the Last Year: Never true   Transportation Needs: Not on file   Physical Activity: Not on file   Stress: Not on file   Social Connections: Not on file   Intimate Partner Violence: Not on file   Housing Stability: Not on file     TOBACCO:   reports that she has been smoking cigarettes. She started smoking about 36 years ago. She has a 52.50 pack-year smoking history. She has been exposed to tobacco smoke. She has never used smokeless tobacco.  ETOH:   reports that she does not currently use alcohol. A:    PHQ Scores 8/15/2022 7/25/2022 6/27/2022 6/13/2022 4/27/2022 3/9/2022 2/2/2022   PHQ2 Score 2 2 2 2 1 1 6   PHQ9 Score 13 6 13 8 6 1 13     Interpretation of Total Score Depression Severity: 1-4 = Minimal depression, 5-9 = Mild depression, 10-14 = Moderate depression, 15-19 = Moderately severe depression, 20-27 = Severe depression    Diagnosis:    1. Moderate episode of recurrent major depressive disorder ([facility]                  Plan:    There are no Patient Instructions on file for this visit.     Pt interventions:  See above    No follow-ups on file.   Documentation was done using voice recognition dragon software.  Every effort was made to ensure accuracy; however, inadvertent, unintentional computerized transcription errors may be present.

## 2023-01-18 NOTE — PATIENT INSTRUCTIONS
Need to walk slow, walk  Decrease cigarette usage every 2 days by one cigarette  Drink plenty of water

## 2023-01-26 ENCOUNTER — TELEPHONE (OUTPATIENT)
Dept: CASE MANAGEMENT | Age: 48
End: 2023-01-26

## 2023-02-07 ENCOUNTER — TELEPHONE (OUTPATIENT)
Dept: INTERNAL MEDICINE CLINIC | Age: 48
End: 2023-02-07

## 2023-02-07 NOTE — TELEPHONE ENCOUNTER
Patient called the office regarding the following (1 week)  -sinus infection  -head congestion    Patient is requesting and antibiotic

## 2023-02-10 ENCOUNTER — TELEPHONE (OUTPATIENT)
Dept: INTERNAL MEDICINE CLINIC | Age: 48
End: 2023-02-10

## 2023-02-10 DIAGNOSIS — R91.1 PULMONARY NODULE, RIGHT: Primary | ICD-10-CM

## 2023-02-10 NOTE — TELEPHONE ENCOUNTER
Elisabet poe/[facility] is requesting the order for the PET CT LIMITED be revised to skull to thigh b/c they do not do the PET CT LIMITED. Patient is scheduled for 02/13/23. Order should be faxed to [phone].     *order revised and faxed

## 2023-02-13 ENCOUNTER — HOSPITAL ENCOUNTER (OUTPATIENT)
Dept: PET IMAGING | Age: 48
Discharge: HOME | End: 2023-02-13
Payer: COMMERCIAL

## 2023-02-13 DIAGNOSIS — R91.1 PULMONARY NODULE, RIGHT: ICD-10-CM

## 2023-02-13 PROCEDURE — 3430000000 HC RX DIAGNOSTIC RADIOPHARMACEUTICAL: Performed by: NURSE PRACTITIONER

## 2023-02-13 PROCEDURE — A9552 F18 FDG: HCPCS | Performed by: NURSE PRACTITIONER

## 2023-02-13 PROCEDURE — 78815 PET IMAGE W/CT SKULL-THIGH: CPT

## 2023-02-13 RX ORDER — FLUDEOXYGLUCOSE F 18 200 MCI/ML
14.84 INJECTION, SOLUTION INTRAVENOUS
Status: COMPLETED | OUTPATIENT
Start: 2023-02-13 | End: 2023-02-13

## 2023-02-13 RX ADMIN — FLUDEOXYGLUCOSE F 18 14.84 MILLICURIE: 200 INJECTION, SOLUTION INTRAVENOUS at 12:58

## 2023-02-15 DIAGNOSIS — R91.1 PULMONARY NODULE 1 CM OR GREATER IN DIAMETER: Primary | ICD-10-CM

## 2023-02-15 RX ORDER — FEXOFENADINE HCL 180 MG/1
180 TABLET ORAL DAILY
Qty: 90 TABLET | Refills: 1 | Status: SHIPPED | OUTPATIENT
Start: 2023-02-15

## 2023-02-19 DIAGNOSIS — I10 ESSENTIAL HYPERTENSION: ICD-10-CM

## 2023-02-20 DIAGNOSIS — R91.1 PULMONARY NODULE, RIGHT: Primary | ICD-10-CM

## 2023-02-20 RX ORDER — LISINOPRIL 10 MG/1
TABLET ORAL
Qty: 90 TABLET | Refills: 0 | Status: SHIPPED | OUTPATIENT
Start: 2023-02-20

## 2023-02-20 NOTE — TELEPHONE ENCOUNTER
Recent Visits  Date Type Provider Dept   01/18/23 Office Visit John Paul Range, APRN - CNP [facility]   11/16/22 Office Visit John Paul Range, APRN - CNP [facility]   09/14/22 Office Visit John Paul Range, APRN - CNP [facility]   08/15/22 Office Visit Noe Del Cid MD [facility]   07/25/22 Office Visit John Paul Range, APRN - CNP [facility]   06/27/22 Office Visit John Paul Range, APRN - CNP [facility]   06/13/22 Office Visit John Paul Range, APRN - CNP [facility]   03/09/22 Office Visit John Paul Range, APRN - CNP [facility]   01/21/22 Office Visit John Paul Range, APRN - CNP [facility]   12/08/21 Office Visit John Paul Range, APRN - CNP [facility]   Showing recent visits within past 540 days with a meds authorizing provider and meeting all other requirements  Future Appointments  Date Type Provider Dept   03/15/23 Appointment John Paul Emelyn, APRN - CNP [facility]   Showing future appointments within next 150 days with a meds authorizing provider and meeting all other requirements     1/18/2023

## 2023-03-02 DIAGNOSIS — E78.2 MIXED HYPERLIPIDEMIA: ICD-10-CM

## 2023-03-02 RX ORDER — SIMVASTATIN 20 MG
TABLET ORAL
Qty: 90 TABLET | Refills: 0 | Status: SHIPPED | OUTPATIENT
Start: 2023-03-02

## 2023-03-02 NOTE — TELEPHONE ENCOUNTER
Recent Visits  Date Type Provider Dept   01/18/23 Office Visit Berl Gowers, APRN - CNP [facility]   11/16/22 Office Visit Berl Gowers, APRN - CNP [facility]   09/14/22 Office Visit Berl Gowers, APRN - CNP [facility]   08/15/22 Office Visit Mary Ellen Reilly MD [facility]   07/25/22 Office Visit Berl Gowers, APRN - CNP [facility]   06/27/22 Office Visit Berl Gowers, APRN - CNP [facility]   06/13/22 Office Visit Berl Gowers, APRN - CNP [facility]   03/09/22 Office Visit Berl Gowers, APRN - CNP [facility]   01/21/22 Office Visit Berl Gowers, APRN - CNP [facility]   12/08/21 Office Visit Berl Gowers, APRN - CNP [facility]   Showing recent visits within past 540 days with a meds authorizing provider and meeting all other requirements  Future Appointments  Date Type Provider Dept   03/15/23 Appointment Berl Gowers, APRN - CNP [facility]   Showing future appointments within next 150 days with a meds authorizing provider and meeting all other requirements     1/18/2023

## 2023-03-08 ENCOUNTER — OFFICE VISIT (OUTPATIENT)
Dept: PULMONOLOGY | Age: 48
End: 2023-03-08
Payer: COMMERCIAL

## 2023-03-08 VITALS
WEIGHT: 290 LBS | OXYGEN SATURATION: 98 % | SYSTOLIC BLOOD PRESSURE: 124 MMHG | RESPIRATION RATE: 18 BRPM | HEART RATE: 82 BPM | HEIGHT: 63 IN | BODY MASS INDEX: 51.38 KG/M2 | TEMPERATURE: 97.5 F | DIASTOLIC BLOOD PRESSURE: 80 MMHG

## 2023-03-08 DIAGNOSIS — J96.11 CHRONIC RESPIRATORY FAILURE WITH HYPOXIA (HCC): Primary | ICD-10-CM

## 2023-03-08 DIAGNOSIS — E66.01 MORBID OBESITY WITH BMI OF 40.0-44.9, ADULT (HCC): ICD-10-CM

## 2023-03-08 DIAGNOSIS — R91.1 LUNG NODULE: ICD-10-CM

## 2023-03-08 DIAGNOSIS — Z87.09 HISTORY OF TRACHEAL STENOSIS: ICD-10-CM

## 2023-03-08 DIAGNOSIS — G47.33 OSA (OBSTRUCTIVE SLEEP APNEA): ICD-10-CM

## 2023-03-08 PROCEDURE — 99214 OFFICE O/P EST MOD 30 MIN: CPT | Performed by: INTERNAL MEDICINE
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
## 2023-03-15 ENCOUNTER — OFFICE VISIT (OUTPATIENT)
Dept: INTERNAL MEDICINE CLINIC | Age: 48
End: 2023-03-15

## 2023-03-15 VITALS
WEIGHT: 290 LBS | DIASTOLIC BLOOD PRESSURE: 74 MMHG | HEIGHT: 63 IN | HEART RATE: 87 BPM | SYSTOLIC BLOOD PRESSURE: 138 MMHG | BODY MASS INDEX: 51.38 KG/M2 | OXYGEN SATURATION: 93 %

## 2023-03-15 DIAGNOSIS — Z00.00 ENCOUNTER FOR WELL ADULT EXAM WITHOUT ABNORMAL FINDINGS: Primary | ICD-10-CM

## 2023-03-15 DIAGNOSIS — E55.9 VITAMIN D DEFICIENCY: ICD-10-CM

## 2023-03-15 DIAGNOSIS — E66.01 MORBID OBESITY WITH BMI OF 40.0-44.9, ADULT (HCC): ICD-10-CM

## 2023-03-15 DIAGNOSIS — E87.1 HYPONATREMIA: ICD-10-CM

## 2023-03-15 DIAGNOSIS — E66.01 CLASS 2 SEVERE OBESITY DUE TO EXCESS CALORIES WITH SERIOUS COMORBIDITY IN ADULT, UNSPECIFIED BMI (HCC): ICD-10-CM

## 2023-03-15 DIAGNOSIS — G89.29 CHRONIC PAIN OF RIGHT HIP: ICD-10-CM

## 2023-03-15 DIAGNOSIS — L20.82 FLEXURAL ECZEMA: ICD-10-CM

## 2023-03-15 DIAGNOSIS — F17.200 CURRENT SMOKER: ICD-10-CM

## 2023-03-15 DIAGNOSIS — M25.551 CHRONIC PAIN OF RIGHT HIP: ICD-10-CM

## 2023-03-15 DIAGNOSIS — F33.1 MODERATE EPISODE OF RECURRENT MAJOR DEPRESSIVE DISORDER (HCC): ICD-10-CM

## 2023-03-15 DIAGNOSIS — R73.03 PRE-DIABETES: ICD-10-CM

## 2023-03-15 DIAGNOSIS — J43.2 CENTRILOBULAR EMPHYSEMA (HCC): ICD-10-CM

## 2023-03-15 DIAGNOSIS — Z72.0 TOBACCO ABUSE DISORDER: ICD-10-CM

## 2023-03-15 RX ORDER — VIT C/B6/B5/MAGNESIUM/HERB 173 50-5-6-5MG
CAPSULE ORAL DAILY
COMMUNITY

## 2023-03-15 RX ORDER — TRIAMCINOLONE ACETONIDE 0.25 MG/G
OINTMENT TOPICAL PRN
Qty: 80 G | Refills: 1 | Status: SHIPPED | OUTPATIENT
Start: 2023-03-15

## 2023-03-15 RX ORDER — ALBUTEROL SULFATE 90 UG/1
2 AEROSOL, METERED RESPIRATORY (INHALATION) EVERY 6 HOURS PRN
Qty: 18 G | Refills: 3 | Status: SHIPPED | OUTPATIENT
Start: 2023-03-15

## 2023-03-15 RX ORDER — VARENICLINE TARTRATE 1 MG/1
1 TABLET, FILM COATED ORAL 2 TIMES DAILY
Qty: 60 TABLET | Refills: 3 | Status: SHIPPED | OUTPATIENT
Start: 2023-03-15

## 2023-03-15 RX ORDER — VARENICLINE TARTRATE 0.5 MG/1
.5-1 TABLET, FILM COATED ORAL SEE ADMIN INSTRUCTIONS
Qty: 57 TABLET | Refills: 0 | Status: SHIPPED | OUTPATIENT
Start: 2023-03-15

## 2023-03-15 SDOH — ECONOMIC STABILITY: HOUSING INSECURITY
IN THE LAST 12 MONTHS, WAS THERE A TIME WHEN YOU DID NOT HAVE A STEADY PLACE TO SLEEP OR SLEPT IN A SHELTER (INCLUDING NOW)?: NO

## 2023-03-15 SDOH — ECONOMIC STABILITY: FOOD INSECURITY: WITHIN THE PAST 12 MONTHS, YOU WORRIED THAT YOUR FOOD WOULD RUN OUT BEFORE YOU GOT MONEY TO BUY MORE.: NEVER TRUE

## 2023-03-15 SDOH — ECONOMIC STABILITY: FOOD INSECURITY: WITHIN THE PAST 12 MONTHS, THE FOOD YOU BOUGHT JUST DIDN'T LAST AND YOU DIDN'T HAVE MONEY TO GET MORE.: NEVER TRUE

## 2023-03-15 SDOH — ECONOMIC STABILITY: INCOME INSECURITY: HOW HARD IS IT FOR YOU TO PAY FOR THE VERY BASICS LIKE FOOD, HOUSING, MEDICAL CARE, AND HEATING?: SOMEWHAT HARD

## 2023-03-15 ASSESSMENT — PATIENT HEALTH QUESTIONNAIRE - PHQ9
8. MOVING OR SPEAKING SO SLOWLY THAT OTHER PEOPLE COULD HAVE NOTICED. OR THE OPPOSITE, BEING SO FIGETY OR RESTLESS THAT YOU HAVE BEEN MOVING AROUND A LOT MORE THAN USUAL: 0
2. FEELING DOWN, DEPRESSED OR HOPELESS: 1
SUM OF ALL RESPONSES TO PHQ QUESTIONS 1-9: 10
4. FEELING TIRED OR HAVING LITTLE ENERGY: 1
SUM OF ALL RESPONSES TO PHQ QUESTIONS 1-9: 10
3. TROUBLE FALLING OR STAYING ASLEEP: 3
7. TROUBLE CONCENTRATING ON THINGS, SUCH AS READING THE NEWSPAPER OR WATCHING TELEVISION: 2
10. IF YOU CHECKED OFF ANY PROBLEMS, HOW DIFFICULT HAVE THESE PROBLEMS MADE IT FOR YOU TO DO YOUR WORK, TAKE CARE OF THINGS AT HOME, OR GET ALONG WITH OTHER PEOPLE: 1
9. THOUGHTS THAT YOU WOULD BE BETTER OFF DEAD, OR OF HURTING YOURSELF: 0
5. POOR APPETITE OR OVEREATING: 1
SUM OF ALL RESPONSES TO PHQ QUESTIONS 1-9: 10
1. LITTLE INTEREST OR PLEASURE IN DOING THINGS: 1
SUM OF ALL RESPONSES TO PHQ QUESTIONS 1-9: 10
SUM OF ALL RESPONSES TO PHQ9 QUESTIONS 1 & 2: 2
6. FEELING BAD ABOUT YOURSELF - OR THAT YOU ARE A FAILURE OR HAVE LET YOURSELF OR YOUR FAMILY DOWN: 1

## 2023-03-15 ASSESSMENT — ANXIETY QUESTIONNAIRES
5. BEING SO RESTLESS THAT IT IS HARD TO SIT STILL: 0
3. WORRYING TOO MUCH ABOUT DIFFERENT THINGS: 0
4. TROUBLE RELAXING: 1
7. FEELING AFRAID AS IF SOMETHING AWFUL MIGHT HAPPEN: 1
2. NOT BEING ABLE TO STOP OR CONTROL WORRYING: 0
GAD7 TOTAL SCORE: 4
6. BECOMING EASILY ANNOYED OR IRRITABLE: 2
1. FEELING NERVOUS, ANXIOUS, OR ON EDGE: 0
IF YOU CHECKED OFF ANY PROBLEMS ON THIS QUESTIONNAIRE, HOW DIFFICULT HAVE THESE PROBLEMS MADE IT FOR YOU TO DO YOUR WORK, TAKE CARE OF THINGS AT HOME, OR GET ALONG WITH OTHER PEOPLE: SOMEWHAT DIFFICULT

## 2023-03-15 ASSESSMENT — VISUAL ACUITY: OU: 1
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
## 2023-03-15 NOTE — PATIENT INSTRUCTIONS
Use directions for Chantix  Walk around the house at least twice a day, use cane for balance  Continue not eating late at night  Clean house of all cigarette smoke     Starting a Weight Loss Plan: Care Instructions  Overview     If you're thinking about losing weight, it can be hard to know where to start. Your doctor can help you set up a weight loss plan that best meets your needs. You may want to take a class on nutrition or exercise, or you could join a weight loss support group. If you have questions about how to make changes to your eating or exercise habits, ask your doctor about seeing a registered dietitian or an exercise specialist.  It can be a big challenge to lose weight. But you don't have to make huge changes at once. Make small changes, and stick with them. When those changes become habit, add a few more changes. If you don't think you're ready to make changes right now, try to pick a date in the future. Make an appointment to see your doctor to discuss whether the time is right for you to start a plan. Follow-up care is a key part of your treatment and safety. Be sure to make and go to all appointments, and call your doctor if you are having problems. It's also a good idea to know your test results and keep a list of the medicines you take. How can you care for yourself at home? Set realistic goals. Many people expect to lose much more weight than is likely. A weight loss of 5% to 10% of your body weight may be enough to improve your health. Get family and friends involved to provide support. Talk to them about why you are trying to lose weight, and ask them to help. They can help by participating in exercise and having meals with you, even if they may be eating something different. Find what works best for you. If you do not have time or do not like to cook, a program that offers meal replacement bars or shakes may be better for you.  Or if you like to prepare meals, finding a plan that includes daily menus and recipes may be best.  Ask your doctor about other health professionals who can help you achieve your weight loss goals. A dietitian can help you make healthy changes in your diet. An exercise specialist or  can help you develop a safe and effective exercise program.  A counselor or psychiatrist can help you cope with issues such as depression, anxiety, or family problems that can make it hard to focus on weight loss. Consider joining a support group for people who are trying to lose weight. Your doctor can suggest groups in your area. Where can you learn more? Go to http://www.woods.com/ and enter U357 to learn more about \"Starting a Weight Loss Plan: Care Instructions. \"  Current as of: August 25, 2022               Content Version: 13.5  © 2617-0610 [employer] Care instructions adapted under license by [employer]. If you have questions about a medical condition or this instruction, always ask your healthcare professional. [place] [address] any warranty or liability for your use of this information. Learning About Benefits From Quitting Smoking  Why is it important to quit smoking? If you're thinking about quitting smoking, you may have a few reasons to be smoke-free. Your health may be one of them. When you quit smoking, you lower your risk for many serious health problems, such as cancer, lung disease, heart attack, stroke, blood vessel disease, and blindness from macular degeneration. When you're smoke-free, you get sick less often, and you heal faster. You are less likely to get colds, flu, bronchitis, and pneumonia. As a nonsmoker, you may find that your mood is better and you are less stressed. When and how will you feel healthier? Quitting has real health benefits that start from day 1 of being smoke-free. And the longer you stay smoke-free, the healthier you get and the better you feel.   The first hours or days  Soon after you stop smoking, your blood pressure and heart rate go down. That means there's less stress on your heart and blood vessels. Within days, the level of carbon monoxide in your blood drops back to normal. That makes room for more oxygen. Within weeks or months  When your lungs begin to clear, you cough less and breathe deeper, so it may be easier to be active. Your sense of taste and smell should return. That means you may enjoy food more than you have since you started smoking. Over the years  Over the years, your risks of heart disease, heart attack, and stroke are lower. After 10 years, your risk of lung cancer is cut by about half. And your risk for many other types of cancer is lower too. How would quitting help others in your life? When you quit smoking, you improve the health of everyone who now breathes in your smoke. Their heart, lung, and cancer risks may drop, much like yours. They are sick less. For babies and small children, living smoke-free means they're less likely to have ear infections, pneumonia, and bronchitis. If you are or will be pregnant someday, quitting smoking means a healthier . Children who are close to you are less likely to become adult smokers. Where can you learn more? Go to http://www.woods.com/ and enter O319 to learn more about \"Learning About Benefits From Quitting Smoking. \"  Current as of: 2021               Content Version: 13.5   [employer] Inc[employer]. Care instructions adapted under license by [facility] ([facility]). If you have questions about a medical condition or this instruction, always ask your healthcare professional. [place] [address] any warranty or liability for your use of this information. Well Visit, Ages 25 to 72: Care Instructions  Well visits can help you stay healthy.  Your doctor has checked your overall health and may have suggested ways to take good care of yourself. Your doctor also may have recommended tests. You can help prevent illness with healthy eating, good sleep, vaccinations, regular exercise, and other steps. Get the tests that you and your doctor decide on. Depending on your age and risks, examples might include screening for diabetes; hepatitis C; HIV; and cervical, breast, lung, and colon cancer. Screening helps find diseases before any symptoms appear. Eat healthy foods. Choose fruits, vegetables, whole grains, lean protein, and low-fat dairy foods. Limit saturated fat and reduce salt. Limit alcohol. Men should have no more than 2 drinks a day. Women should have no more than 1. For some people, no alcohol is the best choice. Exercise. Get at least 30 minutes of exercise on most days of the week. Walking can be a good choice. Reach and stay at your healthy weight. This will lower your risk for many health problems. Take care of your mental health. Try to stay connected with friends, family, and community, and find ways to manage stress. If you're feeling depressed or hopeless, talk to someone. A counselor can help. If you don't have a counselor, talk to your doctor. Talk to your doctor if you think you may have a problem with alcohol or drug use. This includes prescription medicines and illegal drugs. Avoid tobacco and nicotine: Don't smoke, vape, or chew. If you need help quitting, talk to your doctor. Practice safer sex. Getting tested, using condoms or dental dams, and limiting sex partners can help prevent STIs. Use birth control if it's important to you to prevent pregnancy. Talk with your doctor about your choices and what might be best for you. Prevent problems where you can. Protect your skin from too much sun, wash your hands, brush your teeth twice a day, and wear a seat belt in the car. Where can you learn more?   Go to http://www.odonnell.com/ and enter P072 to learn more about \"Well Visit, Ages 25 to 72: Care Instructions. \"  Current as of: March 9, 2022               Content Version: 13.5  © 0673-0907 Healthwise, Incorporated. Care instructions adapted under license by [facility] ([facility]). If you have questions about a medical condition or this instruction, always ask your healthcare professional. [place] [address] any warranty or liability for your use of this information.

## 2023-03-20 ENCOUNTER — OFFICE VISIT (OUTPATIENT)
Dept: SLEEP MEDICINE | Age: 48
End: 2023-03-20
Payer: COMMERCIAL

## 2023-03-20 ENCOUNTER — TELEPHONE (OUTPATIENT)
Dept: INTERNAL MEDICINE CLINIC | Age: 48
End: 2023-03-20

## 2023-03-20 VITALS
HEART RATE: 95 BPM | DIASTOLIC BLOOD PRESSURE: 77 MMHG | BODY MASS INDEX: 56.35 KG/M2 | TEMPERATURE: 97 F | OXYGEN SATURATION: 99 % | HEIGHT: 60 IN | RESPIRATION RATE: 18 BRPM | SYSTOLIC BLOOD PRESSURE: 137 MMHG | WEIGHT: 287 LBS

## 2023-03-20 DIAGNOSIS — G47.33 OSA TREATED WITH BIPAP: Primary | ICD-10-CM

## 2023-03-20 DIAGNOSIS — I10 PRIMARY HYPERTENSION: ICD-10-CM

## 2023-03-20 DIAGNOSIS — J44.9 OVERLAP SYNDROME OF OBSTRUCTIVE SLEEP APNEA AND CHRONIC OBSTRUCTIVE PULMONARY DISEASE (HCC): ICD-10-CM

## 2023-03-20 DIAGNOSIS — G47.33 OVERLAP SYNDROME OF OBSTRUCTIVE SLEEP APNEA AND CHRONIC OBSTRUCTIVE PULMONARY DISEASE (HCC): ICD-10-CM

## 2023-03-20 DIAGNOSIS — G43.109 MIGRAINE AURA WITHOUT HEADACHE: ICD-10-CM

## 2023-03-20 DIAGNOSIS — E66.01 CLASS 3 SEVERE OBESITY DUE TO EXCESS CALORIES WITH SERIOUS COMORBIDITY AND BODY MASS INDEX (BMI) OF 50.0 TO 59.9 IN ADULT (HCC): ICD-10-CM

## 2023-03-20 DIAGNOSIS — Z99.89 DEPENDENCE ON OTHER ENABLING MACHINES AND DEVICES: ICD-10-CM

## 2023-03-20 PROCEDURE — 3078F DIAST BP <80 MM HG: CPT | Performed by: PSYCHIATRY & NEUROLOGY

## 2023-03-20 PROCEDURE — 99214 OFFICE O/P EST MOD 30 MIN: CPT | Performed by: PSYCHIATRY & NEUROLOGY

## 2023-03-20 PROCEDURE — 3075F SYST BP GE 130 - 139MM HG: CPT | Performed by: PSYCHIATRY & NEUROLOGY

## 2023-03-20 RX ORDER — RIMEGEPANT SULFATE 75 MG/75MG
TABLET, ORALLY DISINTEGRATING ORAL
Qty: 16 TABLET | Refills: 0 | Status: SHIPPED | OUTPATIENT
Start: 2023-03-20

## 2023-03-20 ASSESSMENT — SLEEP AND FATIGUE QUESTIONNAIRES
ESS TOTAL SCORE: 5
HOW LIKELY ARE YOU TO NOD OFF OR FALL ASLEEP WHILE SITTING QUIETLY AFTER LUNCH WITHOUT ALCOHOL: 0
HOW LIKELY ARE YOU TO NOD OFF OR FALL ASLEEP WHILE WATCHING TV: 0
HOW LIKELY ARE YOU TO NOD OFF OR FALL ASLEEP WHILE SITTING INACTIVE IN A PUBLIC PLACE: 1
HOW LIKELY ARE YOU TO NOD OFF OR FALL ASLEEP WHILE LYING DOWN TO REST IN THE AFTERNOON WHEN CIRCUMSTANCES PERMIT: 3
HOW LIKELY ARE YOU TO NOD OFF OR FALL ASLEEP WHILE SITTING AND TALKING TO SOMEONE: 0
HOW LIKELY ARE YOU TO NOD OFF OR FALL ASLEEP WHEN YOU ARE A PASSENGER IN A CAR FOR AN HOUR WITHOUT A BREAK: 0
HOW LIKELY ARE YOU TO NOD OFF OR FALL ASLEEP IN A CAR, WHILE STOPPED FOR A FEW MINUTES IN TRAFFIC: 0
HOW LIKELY ARE YOU TO NOD OFF OR FALL ASLEEP WHILE SITTING AND READING: 1

## 2023-03-20 ASSESSMENT — ENCOUNTER SYMPTOMS
CHOKING: 0
APNEA: 0

## 2023-03-20 NOTE — PROGRESS NOTES
Social History     Socioeconomic History    Marital status:      Spouse name: Not on file    Number of children: 0    Years of education: Not on file    Highest education level: Not on file   Occupational History    Occupation:    Tobacco Use    Smoking status: Every Day     Packs/day: 1.00     Years: 35.00     Pack years: 35.00     Types: Cigarettes     Start date: 25     Passive exposure: Past    Smokeless tobacco: Never    Tobacco comments:     Smoked up to 2ppd-rolling her own   Vaping Use    Vaping Use: Never used   Substance and Sexual Activity    Alcohol use: Not Currently    Drug use: No    Sexual activity: Not Currently     Partners: Male   Other Topics Concern    Not on file   Social History Narrative    Not on file     Social Determinants of Health     Financial Resource Strain: Medium Risk    Difficulty of Paying Living Expenses: Somewhat hard   Food Insecurity: No Food Insecurity    Worried About Running Out of Food in the Last Year: Never true    Ran Out of Food in the Last Year: Never true   Transportation Needs: Unmet Transportation Needs    Lack of Transportation (Medical): Not on file    Lack of Transportation (Non-Medical): Yes   Physical Activity: Not on file   Stress: Not on file   Social Connections: Not on file   Intimate Partner Violence: Not on file   Housing Stability: Unknown    Unable to Pay for Housing in the Last Year: Not on file    Number of [place] in the Last Year: Not on file    Unstable Housing in the Last Year: No       Prior to Admission medications    Medication Sig Start Date End Date Taking?  Authorizing Provider   turmeric 500 MG CAPS Take by mouth daily   Yes Historical Provider, MD   Gianupkgo Biloba (GINKOBA PO) Take by mouth   Yes Historical Provider, MD   TART CHERRY PO Take by mouth   Yes Historical Provider, MD   varenicline (CHANTIX) 0.5 MG tablet Take 1-2 tablets by mouth See Admin Instructions 0.5mg DAILY for 3 days followed by 0.5mg no

## 2023-03-20 NOTE — TELEPHONE ENCOUNTER
Recent Visits  Date Type Provider Dept   03/15/23 Office Visit HARRISON Bonner CNP [facility]   01/18/23 Office Visit HARRISON Bonner CNP [facility]   11/16/22 Office Visit HARRISON Bonner CNP [facility]   09/14/22 Office Visit HARRISON Bonner CNP [facility]   08/15/22 Office Visit Rona Boston MD [facility]   07/25/22 Office Visit HARRISON Bonner CNP [facility]   06/27/22 Office Visit HARRISON Bonner CNP [facility]   06/13/22 Office Visit HARRISON Bonner - CNP [facility]   03/09/22 Office Visit HARRISON Bonner CNP [facility]   01/21/22 Office Visit HARRISON Bonner CNP [facility]   Showing recent visits within past 540 days with a meds authorizing provider and meeting all other requirements  Future Appointments  Date Type Provider Dept   06/14/23 Appointment HARRISON Bonner CNP [facility]   Showing future appointments within next 150 days with a meds authorizing provider and meeting all other requirements     3/15/2023

## 2023-03-20 NOTE — TELEPHONE ENCOUNTER
Franki De La Rosa needs to clarify the frequency and amount of triamcinolone (KENALOG) 0.025 % to be used for each application.

## 2023-03-21 ENCOUNTER — TELEPHONE (OUTPATIENT)
Dept: ORTHOPEDIC SURGERY | Age: 48
End: 2023-03-21

## 2023-03-21 NOTE — TELEPHONE ENCOUNTER
Submitted PA for Nurtec 75MG dispersible tablets  Via CMM Key: [id number] STATUS: APPROVED  Coverage Start Date:02/20/2023  Coverage End Date:03/21/2024    If this requires a response please respond to the pool ( [id number]). Thank you please advise patient.

## 2023-03-22 DIAGNOSIS — L20.82 FLEXURAL ECZEMA: ICD-10-CM

## 2023-03-22 RX ORDER — MELOXICAM 15 MG/1
TABLET ORAL
Qty: 30 TABLET | Refills: 0 | Status: SHIPPED | OUTPATIENT
Start: 2023-03-22

## 2023-03-22 RX ORDER — TRIAMCINOLONE ACETONIDE 0.25 MG/G
1 OINTMENT TOPICAL 2 TIMES DAILY
Qty: 80 G | Refills: 1 | Status: SHIPPED | OUTPATIENT
Start: 2023-03-22

## 2023-04-03 DIAGNOSIS — F17.200 CURRENT SMOKER: ICD-10-CM

## 2023-04-03 RX ORDER — VARENICLINE TARTRATE 0.5 MG/1
TABLET, FILM COATED ORAL
Qty: 56 TABLET | Refills: 0 | OUTPATIENT
Start: 2023-04-03

## 2023-04-03 NOTE — TELEPHONE ENCOUNTER
Recent Visits  Date Type Provider Dept   03/15/23 Office Visit John Paul Range, APRN - CNP [facility]   01/18/23 Office Visit John Paul Range, APRN - CNP [facility]   11/16/22 Office Visit John Paul Range, APRN - CNP [facility]   09/14/22 Office Visit John Paul Range, APRN - CNP [facility]   08/15/22 Office Visit Noe Del Cid MD [facility]   07/25/22 Office Visit John Paul Range, APRN - CNP [facility]   06/27/22 Office Visit John Paul Range, APRN - CNP [facility]   06/13/22 Office Visit John Paul Range, APRN - CNP [facility]   03/09/22 Office Visit John Paul Range, APRN - CNP [facility]   01/21/22 Office Visit John Paul Range, APRN - CNP [facility]   Showing recent visits within past 540 days with a meds authorizing provider and meeting all other requirements  Future Appointments  Date Type Provider Dept   06/14/23 Appointment John Paul Range, APRN - CNP [facility]   Showing future appointments within next 150 days with a meds authorizing provider and meeting all other requirements     3/15/2023

## 2023-04-04 ENCOUNTER — TELEPHONE (OUTPATIENT)
Dept: INTERNAL MEDICINE CLINIC | Age: 48
End: 2023-04-04

## 2023-04-10 DIAGNOSIS — E66.01 MORBID OBESITY WITH BMI OF 40.0-44.9, ADULT (HCC): ICD-10-CM

## 2023-04-10 DIAGNOSIS — E87.1 HYPONATREMIA: ICD-10-CM

## 2023-04-10 DIAGNOSIS — E55.9 VITAMIN D DEFICIENCY: ICD-10-CM

## 2023-04-10 DIAGNOSIS — R73.03 PRE-DIABETES: ICD-10-CM

## 2023-04-10 LAB
25(OH)D3 SERPL-MCNC: 41.5 NG/ML
CHOLEST SERPL-MCNC: 169 MG/DL (ref 0–199)
HDLC SERPL-MCNC: 70 MG/DL (ref 40–60)
LDL CHOLESTEROL CALCULATED: 78 MG/DL
SODIUM SERPL-SCNC: 133 MMOL/L (ref 136–145)
TRIGL SERPL-MCNC: 107 MG/DL (ref 0–150)
VLDLC SERPL CALC-MCNC: 21 MG/DL

## 2023-04-11 LAB
EST. AVERAGE GLUCOSE BLD GHB EST-MCNC: 111.2 MG/DL
HBA1C MFR BLD: 5.5 %

## 2023-04-14 ENCOUNTER — TELEPHONE (OUTPATIENT)
Dept: INTERNAL MEDICINE CLINIC | Age: 48
End: 2023-04-14

## 2023-04-21 DIAGNOSIS — G43.001 MIGRAINE WITHOUT AURA AND WITH STATUS MIGRAINOSUS, NOT INTRACTABLE: ICD-10-CM

## 2023-04-25 ENCOUNTER — TELEPHONE (OUTPATIENT)
Dept: ADMINISTRATIVE | Age: 48
End: 2023-04-25

## 2023-04-25 RX ORDER — MELOXICAM 15 MG/1
TABLET ORAL
Qty: 30 TABLET | Refills: 0 | Status: SHIPPED | OUTPATIENT
Start: 2023-04-25 | End: 2023-05-22

## 2023-05-01 RX ORDER — OMEPRAZOLE 20 MG/1
CAPSULE, DELAYED RELEASE ORAL
Qty: 120 CAPSULE | Refills: 0 | Status: SHIPPED | OUTPATIENT
Start: 2023-05-01

## 2023-05-04 RX ORDER — OXCARBAZEPINE 600 MG/1
TABLET, FILM COATED ORAL
Qty: 180 TABLET | Refills: 0 | Status: SHIPPED | OUTPATIENT
Start: 2023-05-04

## 2023-05-04 NOTE — TELEPHONE ENCOUNTER
Recent Visits  Date Type Provider Dept   03/15/23 Office Visit Barb Stallings APRN - CNP [facility]   01/18/23 Office Visit Barb Stallings APRN - CNP [facility]   11/16/22 Office Visit Barb Stallings APRN - CNP [facility]   09/14/22 Office Visit Barb Stallings APRN - CNP [facility]   08/15/22 Office Visit Aleks Butterfield MD [facility]   07/25/22 Office Visit Barb Stallings APRN - CNP [facility]   06/27/22 Office Visit Barb Stallings APRN - [facility]NP [facility]   06/13/22 Office Visit Barb Stallings APRN - [facility]   03/09/22 Office Visit Barb Stallings APRN - CNP [facility]   01/21/22 Office Visit Barb Stallings APRN - [facility]NP [facility]   Showing recent visits within past 540 days with a meds authorizing provider and meeting all other requirements  Future Appointments  Date Type Provider Dept   06/14/23 Appointment HARRISON Marin - CNP [facility]   Showing future appointments within next 150 days with a meds authorizing provider and meeting all other requirements     3/15/2023

## 2023-05-08 DIAGNOSIS — G43.001 MIGRAINE WITHOUT AURA AND WITH STATUS MIGRAINOSUS, NOT INTRACTABLE: ICD-10-CM

## 2023-05-08 NOTE — TELEPHONE ENCOUNTER
Recent Visits  Date Type Provider Dept   03/15/23 Office Visit HARRISON Burch CNP [facility]   01/18/23 Office Visit HARRISON Burch CNP [facility]   11/16/22 Office Visit HARRISON Burch CNP [facility]   09/14/22 Office Visit HARRISON Burch CNP [facility]   08/15/22 Office Visit Lina Messina MD [facility]   07/25/22 Office Visit HARRISON Burch CNP [facility]   06/27/22 Office Visit HARRISON Burch CNP [facility]   06/13/22 Office Visit HARRISON Burch CNP [facility]   03/09/22 Office Visit HARRISON Burch CNP [facility]   01/21/22 Office Visit HARRISON Burch CNP [facility]   Showing recent visits within past 540 days with a meds authorizing provider and meeting all other requirements  Future Appointments  Date Type Provider Dept   06/14/23 Appointment HARRISON Burch CNP [facility]   Showing future appointments within next 150 days with a meds authorizing provider and meeting all other requirements     3/15/2023

## 2023-05-09 RX ORDER — ARIPIPRAZOLE 5 MG/1
5 TABLET ORAL DAILY
Qty: 90 TABLET | Refills: 0 | Status: SHIPPED | OUTPATIENT
Start: 2023-05-09 | End: 2023-05-11 | Stop reason: SDUPTHER

## 2023-05-11 ENCOUNTER — TELEPHONE (OUTPATIENT)
Dept: INTERNAL MEDICINE CLINIC | Age: 48
End: 2023-05-11

## 2023-05-11 RX ORDER — ARIPIPRAZOLE 5 MG/1
5 TABLET ORAL DAILY
Qty: 90 TABLET | Refills: 1 | Status: SHIPPED | OUTPATIENT
Start: 2023-05-11

## 2023-05-11 NOTE — TELEPHONE ENCOUNTER
Please Advise     Patient states ARIPiprazole (ABILIFY) 5 MG tablet needs to be sent to [facility] and Ubrogepant 100 MG TABS cost $1600 , cant afford medication , patient can be reached at [phone] (home)

## 2023-05-11 NOTE — TELEPHONE ENCOUNTER
Recent Visits  Date Type Provider Dept   03/15/23 Office Visit Patricia Jameson APRN - CNP [facility]   01/18/23 Office Visit Patricia Jameson APRN - CNP [facility]   11/16/22 Office Visit Patricia Jameson APRN - CNP [facility]   09/14/22 Office Visit Patricia Jameson APRN - CNP [facility]   08/15/22 Office Visit Eagle Barr MD [facility]   07/25/22 Office Visit Patricia Jameson APRN - CNP [facility]   06/27/22 Office Visit Patricia Jameson APRN - CNP [facility]   06/13/22 Office Visit Patricia Jameson APRN - CNP [facility]   03/09/22 Office Visit Patricia Jameson APRN - CNP [facility]   01/21/22 Office Visit Patricia Jameson APRN - CNP [facility]   Showing recent visits within past 540 days with a meds authorizing provider and meeting all other requirements  Future Appointments  Date Type Provider Dept   06/14/23 Appointment Patricia Jameson APRN - CNP [facility]   Showing future appointments within next 150 days with a meds authorizing provider and meeting all other requirements     3/15/2023

## 2023-05-21 DIAGNOSIS — H81.13 BENIGN PAROXYSMAL POSITIONAL VERTIGO DUE TO BILATERAL VESTIBULAR DISORDER: ICD-10-CM

## 2023-05-22 RX ORDER — MELOXICAM 15 MG/1
TABLET ORAL
Qty: 30 TABLET | Refills: 1 | Status: SHIPPED | OUTPATIENT
Start: 2023-05-22

## 2023-05-22 RX ORDER — MECLIZINE HYDROCHLORIDE 25 MG/1
TABLET ORAL
Qty: 30 TABLET | Refills: 0 | Status: SHIPPED | OUTPATIENT
Start: 2023-05-22

## 2023-05-22 NOTE — TELEPHONE ENCOUNTER
Last OV: 12/12/22  Last filled:4/24/23       Assessment: Patient is a 49 y.o. female with mild right hip osteoarthritis and greater trochanteric pain syndrome. This is resolving following a cortisone injection on 12/05/2022. Impression:  Visit Diagnoses           Codes     Primary osteoarthritis of right hip    -  Primary M16.11     Trochanteric bursitis of right hip     M70.61     Hip pain     M25.559                Office Procedures:  Encounter Medications    No orders of the defined types were placed in this encounter. No orders of the defined types were placed in this encounter. Plan:  Elvis Grace is doing well. I recommend she continue with home exercises for range of motion and strengthening. I will see her back as needed going forward. There is always an option for a repeat intra-articular cortisone injection of her right hip with interventional radiology. All the patient's questions were answered while in the clinic. The patient is understanding of all instructions and agrees with the plan. Approximately 25 minutes was spent on patient education and coordinating care. Follow up in: Return if symptoms worsen or fail to improve.

## 2023-05-30 DIAGNOSIS — I10 ESSENTIAL HYPERTENSION: ICD-10-CM

## 2023-05-30 RX ORDER — LISINOPRIL 10 MG/1
TABLET ORAL
Qty: 90 TABLET | Refills: 0 | Status: SHIPPED | OUTPATIENT
Start: 2023-05-30

## 2023-05-30 NOTE — TELEPHONE ENCOUNTER
Recent Visits  Date Type Provider Dept   03/15/23 Office Visit HARRISON Castellon - CNP [facility]   01/18/23 Office Visit HARRISON Castellon - CNP [facility]   11/16/22 Office Visit HARRISON Castellon - CNP [facility]   09/14/22 Office Visit HARRISON Castellon CNP [facility]   08/15/22 Office Visit Ramila Aldridge MD [facility]   07/25/22 Office Visit HARRISON Castellon - CNP [facility]   06/27/22 Office Visit HARRISON Castellon - CNP [facility]   06/13/22 Office Visit HARRISON Castellon - CNP [facility]   03/09/22 Office Visit HARRISON Castellon CNP [facility]   01/21/22 Office Visit HARRISON Castellon - CNP [facility]   Showing recent visits within past 540 days with a meds authorizing provider and meeting all other requirements  Future Appointments  Date Type Provider Dept   06/14/23 Appointment HARRISON Castellon CNP [facility]   Showing future appointments within next 150 days with a meds authorizing provider and meeting all other requirements     3/15/2023

## 2023-06-02 DIAGNOSIS — H81.13 BENIGN PAROXYSMAL POSITIONAL VERTIGO DUE TO BILATERAL VESTIBULAR DISORDER: ICD-10-CM

## 2023-06-02 RX ORDER — MECLIZINE HYDROCHLORIDE 25 MG/1
TABLET ORAL
Qty: 30 TABLET | Refills: 0 | OUTPATIENT
Start: 2023-06-02

## 2023-06-05 DIAGNOSIS — E78.2 MIXED HYPERLIPIDEMIA: ICD-10-CM

## 2023-06-06 RX ORDER — SIMVASTATIN 20 MG
TABLET ORAL
Qty: 90 TABLET | Refills: 0 | Status: SHIPPED | OUTPATIENT
Start: 2023-06-06

## 2023-06-06 NOTE — TELEPHONE ENCOUNTER
Recent Visits  Date Type Provider Dept   03/15/23 Office Visit Alnaaury Weems, APRN - CNP [facility]   01/18/23 Office Visit Alna Mini, APRN - CNP [facility]   11/16/22 Office Visit Alna Mini, APRN - CNP [facility]   09/14/22 Office Visit Alna Mini, APRN - CNP [facility]   08/15/22 Office Visit Ashwini Enriquez MD [facility]   07/25/22 Office Visit Alna Mini, APRN - CNP [facility]   06/27/22 Office Visit Alna Mini, APRN - CNP [facility]   06/13/22 Office Visit Alna Mini, APRN - CNP [facility]   03/09/22 Office Visit Alna Mini, APRN - CNP [facility]   01/21/22 Office Visit Alna Mini, APRN - CNP [facility]   Showing recent visits within past 540 days with a meds authorizing provider and meeting all other requirements  Future Appointments  Date Type Provider Dept   06/14/23 Appointment Alnaaury Weems, APRN - CNP [facility]   Showing future appointments within next 150 days with a meds authorizing provider and meeting all other requirements     3/15/2023

## 2023-06-27 DIAGNOSIS — E55.9 VITAMIN D DEFICIENCY: ICD-10-CM

## 2023-06-27 RX ORDER — ERGOCALCIFEROL 1.25 MG/1
CAPSULE ORAL
Qty: 12 CAPSULE | Refills: 0 | Status: SHIPPED | OUTPATIENT
Start: 2023-06-27

## 2023-07-17 RX ORDER — FEXOFENADINE HCL 180 MG/1
TABLET ORAL
Qty: 90 TABLET | Refills: 0 | Status: SHIPPED | OUTPATIENT
Start: 2023-07-17

## 2023-07-17 NOTE — TELEPHONE ENCOUNTER
Recent Visits  Date Type Provider Dept   03/15/23 Office Visit Brady Fountain APRN - CNP [facility]   01/18/23 Office Visit Brady Fountain APRN - CNP [facility]   11/16/22 Office Visit Brady Fountain APRN - CNP [facility]   09/14/22 Office Visit Brady Fountain APRN - CNP [facility]   08/15/22 Office Visit Osei López MD [facility]   07/25/22 Office Visit Brady Fountain APRN - CNP [facility]   06/27/22 Office Visit Brady Fountain APRN - CNP [facility]   06/13/22 Office Visit Brady Fountain APRN - CNP [facility]   03/09/22 Office Visit Brady Fountain APRN - CNP [facility]   Showing recent visits within past 540 days with a meds authorizing provider and meeting all other requirements  Future Appointments  No visits were found meeting these conditions.   Showing future appointments within next 150 days with a meds authorizing provider and meeting all other requirements     3/15/2023

## 2023-07-28 RX ORDER — MELOXICAM 15 MG/1
TABLET ORAL
Qty: 30 TABLET | Refills: 2 | Status: SHIPPED | OUTPATIENT
Start: 2023-07-28

## 2023-07-28 NOTE — TELEPHONE ENCOUNTER
Medication requested: meloxicam  Last OV 12/22/22  Last refilled: 5/22/23    Assessment: Patient is a 49 y.o. female with mild right hip osteoarthritis and greater trochanteric pain syndrome. This is resolving following a cortisone injection on 12/05/2022. Impression:  Visit Diagnoses           Codes     Primary osteoarthritis of right hip    -  Primary M16.11     Trochanteric bursitis of right hip     M70.61     Hip pain     M25.559                Office Procedures:  Encounter Medications    No orders of the defined types were placed in this encounter. No orders of the defined types were placed in this encounter. Plan:  Mariah Moreno is doing well. I recommend she continue with home exercises for range of motion and strengthening. I will see her back as needed going forward. There is always an option for a repeat intra-articular cortisone injection of her right hip with interventional radiology. All the patient's questions were answered while in the clinic. The patient is understanding of all instructions and agrees with the plan. Approximately 25 minutes was spent on patient education and coordinating care. Follow up in: Return if symptoms worsen or fail to improve.

## 2023-07-31 RX ORDER — RISPERIDONE 4 MG/1
TABLET ORAL
Qty: 90 TABLET | Refills: 0 | Status: SHIPPED | OUTPATIENT
Start: 2023-07-31

## 2023-07-31 NOTE — TELEPHONE ENCOUNTER
Recent Visits  Date Type Provider Dept   03/15/23 Office Visit Lestine Ards, APRN - CNP [facility]   01/18/23 Office Visit Lestine Ards, APRN - CNP [facility]   11/16/22 Office Visit Lestine Ards, APRN - CNP [facility]   09/14/22 Office Visit Lestine Ards, APRN - CNP [facility]   08/15/22 Office Visit Zack Gomes MD [facility]   07/25/22 Office Visit Lestine Ards, APRN - CNP [facility]   06/27/22 Office Visit Lestine Ards, APRN - CNP [facility]   06/13/22 Office Visit Lestine Ards, APRN - CNP [facility]   03/09/22 Office Visit Lestine Ards, APRN - CNP [facility]   Showing recent visits within past 540 days with a meds authorizing provider and meeting all other requirements  Future Appointments  No visits were found meeting these conditions.   Showing future appointments within next 150 days with a meds authorizing provider and meeting all other requirements     3/15/2023

## 2023-08-01 DIAGNOSIS — J30.89 ENVIRONMENTAL AND SEASONAL ALLERGIES: ICD-10-CM

## 2023-08-01 NOTE — TELEPHONE ENCOUNTER
Recent Visits  Date Type Provider Dept   03/15/23 Office Visit Kayce Torres APRN - CNP [facility]   01/18/23 Office Visit Kayce Torres APRN - CNP [facility]   11/16/22 Office Visit Kayce Torres APRN - CNP [facility]   09/14/22 Office Visit Kayce Torres APRN - CNP [facility]   08/15/22 Office Visit Kellen Quintana MD [facility]   07/25/22 Office Visit Kayce Torres APRN - CNP [facility]   06/27/22 Office Visit Kayce Torres APRN - CNP [facility]   06/13/22 Office Visit Kayce Torres APRN - CNP [facility]   03/09/22 Office Visit Kayce Torres APRN - CNP [facility]   Showing recent visits within past 540 days with a meds authorizing provider and meeting all other requirements  Future Appointments  No visits were found meeting these conditions.   Showing future appointments within next 150 days with a meds authorizing provider and meeting all other requirements     3/15/2023 \

## 2023-08-02 RX ORDER — CETIRIZINE HYDROCHLORIDE 10 MG/1
TABLET ORAL
Qty: 90 TABLET | Refills: 0 | Status: SHIPPED | OUTPATIENT
Start: 2023-08-02

## 2023-08-15 ENCOUNTER — TELEPHONE (OUTPATIENT)
Dept: CASE MANAGEMENT | Age: 48
End: 2023-08-15

## 2023-08-21 RX ORDER — OXCARBAZEPINE 600 MG/1
TABLET, FILM COATED ORAL
Qty: 180 TABLET | Refills: 0 | Status: SHIPPED | OUTPATIENT
Start: 2023-08-21

## 2023-08-21 NOTE — TELEPHONE ENCOUNTER
Recent Visits  Date Type Provider Dept   03/15/23 Office Visit Murphy Parsons APRN - CNP [facility]   01/18/23 Office Visit Murphy Parsons APRN - CNP [facility]   11/16/22 Office Visit Murphy Parsons APRN - CNP [facility]   09/14/22 Office Visit Murphy Parsons APRN - CNP [facility]   08/15/22 Office Visit Ronit Asencio MD [facility]   07/25/22 Office Visit Murphy Parsons APRN - CNP [facility]   06/27/22 Office Visit Murphy Parsons APRN - CNP [facility]   06/13/22 Office Visit HARRISON Waterman - CNP [facility]   03/09/22 Office Visit Murphy Parsons APRN - CNP [facility]   Showing recent visits within past 540 days with a meds authorizing provider and meeting all other requirements  Future Appointments  No visits were found meeting these conditions.   Showing future appointments within next 150 days with a meds authorizing provider and meeting all other requirements     3/15/2023

## 2023-08-23 ENCOUNTER — TELEPHONE (OUTPATIENT)
Dept: CASE MANAGEMENT | Age: 48
End: 2023-08-23

## 2023-08-23 NOTE — TELEPHONE ENCOUNTER
Kwabena Hamlin, APRN-CNP-  Patient had a PET scan 2/13/23. You recommended a f/u CT Chest in 6 months. This is due now. If you would please place an order, I can follow up with the patient and get it scheduled.       Thank you,   Kathia Byrne, RN  Lung Navigator  [facility]  [address]  [phone]

## 2023-08-24 DIAGNOSIS — F17.200 TOBACCO USE DISORDER, MODERATE, DEPENDENCE: Primary | ICD-10-CM

## 2023-08-26 DIAGNOSIS — I10 ESSENTIAL HYPERTENSION: ICD-10-CM

## 2023-08-28 RX ORDER — OMEPRAZOLE 20 MG/1
CAPSULE, DELAYED RELEASE ORAL
Qty: 120 CAPSULE | Refills: 0 | Status: SHIPPED | OUTPATIENT
Start: 2023-08-28

## 2023-08-28 RX ORDER — LISINOPRIL 10 MG/1
TABLET ORAL
Qty: 90 TABLET | Refills: 0 | Status: SHIPPED | OUTPATIENT
Start: 2023-08-28

## 2023-08-28 NOTE — TELEPHONE ENCOUNTER
Recent Visits  Date Type Provider Dept   03/15/23 Office Visit Noah Dquue, APRN - CNP [facility]   01/18/23 Office Visit Noah Duque, APRN - CNP [facility]   11/16/22 Office Visit Noah Duque, APRN - CNP [facility]   09/14/22 Office Visit Noah Duque, APRN - CNP [facility]   08/15/22 Office Visit Mode Frances MD [facility]   07/25/22 Office Visit Noah Duque, APRN - CNP [facility]   06/27/22 Office Visit Noah Duque, APRN - CNP [facility]   06/13/22 Office Visit Noah Duque, APRN - CNP [facility]   03/09/22 Office Visit Noah Duque, APRN - CNP [facility]   Showing recent visits within past 540 days with a meds authorizing provider and meeting all other requirements  Future Appointments  No visits were found meeting these conditions.   Showing future appointments within next 150 days with a meds authorizing provider and meeting all other requirements     3/15/2023

## 2023-09-01 ENCOUNTER — TELEPHONE (OUTPATIENT)
Dept: CASE MANAGEMENT | Age: 48
End: 2023-09-01

## 2023-09-05 DIAGNOSIS — E78.2 MIXED HYPERLIPIDEMIA: ICD-10-CM

## 2023-09-06 RX ORDER — SIMVASTATIN 20 MG
TABLET ORAL
Qty: 90 TABLET | Refills: 0 | Status: SHIPPED | OUTPATIENT
Start: 2023-09-06

## 2023-09-06 NOTE — TELEPHONE ENCOUNTER
Recent Visits  Date Type Provider Dept   03/15/23 Office Visit Mirlande Delgado, APRN - CNP [facility]   01/18/23 Office Visit Mirlande Delgado, APRN - CNP [facility]   11/16/22 Office Visit Mirlande Delgado, APRN - CNP [facility]   09/14/22 Office Visit Mirlande Delgado, APRN - CNP [facility]   08/15/22 Office Visit Shery Oppenheim, MD [facility]   07/25/22 Office Visit Mirlande Delgado, APRN - CNP [facility]   06/27/22 Office Visit Mirlande Delgado, APRN - CNP [facility]   06/13/22 Office Visit Mirlande Delgado, APRN - CNP [facility]   Showing recent visits within past 540 days with a meds authorizing provider and meeting all other requirements  Future Appointments  No visits were found meeting these conditions.   Showing future appointments within next 150 days with a meds authorizing provider and meeting all other requirements     3/15/2023

## 2023-09-12 ENCOUNTER — TELEPHONE (OUTPATIENT)
Dept: CASE MANAGEMENT | Age: 48
End: 2023-09-12

## 2023-09-29 DIAGNOSIS — E87.1 HYPONATREMIA: ICD-10-CM

## 2023-09-29 NOTE — TELEPHONE ENCOUNTER
Recent Visits  Date Type Provider Dept   03/15/23 Office Visit Flores Harveykathleen, APRN - CNP [facility]   01/18/23 Office Visit Flores Urbina, APRN - CNP [facility]   11/16/22 Office Visit [place] Pelkathleen, APRN - CNP [facility]   09/14/22 Office Visit Flores Urbina, APRN - CNP [facility]   08/15/22 Office Visit Safia Alvarado MD [facility]   07/25/22 Office Visit Flores Ciara, APRN - CNP [facility]   06/27/22 Office Visit Flores Urbina, APRN - CNP [facility]   06/13/22 Office Visit Flores Harveykathleen, APRN - CNP [facility]   Showing recent visits within past 540 days with a meds authorizing provider and meeting all other requirements  Future Appointments  No visits were found meeting these conditions.   Showing future appointments within next 150 days with a meds authorizing provider and meeting all other requirements     3/15/2023

## 2023-10-02 RX ORDER — SODIUM BICARBONATE 325 MG/1
325 TABLET ORAL 2 TIMES DAILY
Qty: 60 TABLET | Refills: 1 | Status: SHIPPED | OUTPATIENT
Start: 2023-10-02

## 2023-10-17 ENCOUNTER — TELEPHONE (OUTPATIENT)
Dept: CASE MANAGEMENT | Age: 48
End: 2023-10-17

## 2023-10-17 NOTE — TELEPHONE ENCOUNTER
Patient has recommendations for a f/u CT Chest to monitor the pulmonary nodule per PET scan. I have not been able to get her to schedule it. I have sent multiple reminders.     Thank you,   Surya Padilla, RN  Lung Navigator  [facility]  [address], [employer]  [phone]

## 2023-10-27 DIAGNOSIS — J30.89 ENVIRONMENTAL AND SEASONAL ALLERGIES: ICD-10-CM

## 2023-10-27 NOTE — TELEPHONE ENCOUNTER
Recent Visits  Date Type Provider Dept   03/15/23 Office Visit HARRISON Hart CNP [facility]   01/18/23 Office Visit HARRISON Hart CNP [facility]   11/16/22 Office Visit HARRISON Hart - CNP [facility]   09/14/22 Office Visit HARRISON Hart CNP [facility]   08/15/22 Office Visit Kirill Light MD [facility]   07/25/22 Office Visit HARRISON Hart CNP [facility]   06/27/22 Office Visit HARRISON Hart CNP [facility]   06/13/22 Office Visit HARRISON Hart - CNP [facility]   Showing recent visits within past 540 days with a meds authorizing provider and meeting all other requirements  Future Appointments  No visits were found meeting these conditions.   Showing future appointments within next 150 days with a meds authorizing provider and meeting all other requirements     3/15/2023

## 2023-10-30 RX ORDER — RISPERIDONE 4 MG/1
TABLET ORAL
Qty: 90 TABLET | Refills: 0 | Status: SHIPPED | OUTPATIENT
Start: 2023-10-30

## 2023-10-30 RX ORDER — MELOXICAM 15 MG/1
TABLET ORAL
Qty: 30 TABLET | Refills: 0 | OUTPATIENT
Start: 2023-10-30

## 2023-10-30 RX ORDER — ARIPIPRAZOLE 5 MG/1
5 TABLET ORAL DAILY
Qty: 90 TABLET | Refills: 0 | Status: SHIPPED | OUTPATIENT
Start: 2023-10-30

## 2023-10-30 RX ORDER — CETIRIZINE HYDROCHLORIDE 10 MG/1
TABLET ORAL
Qty: 90 TABLET | Refills: 0 | Status: SHIPPED | OUTPATIENT
Start: 2023-10-30

## 2023-10-30 NOTE — TELEPHONE ENCOUNTER
Requested Prescriptions     Pending Prescriptions Disp Refills    meloxicam (MOBIC) 15 MG tablet [Pharmacy Med Name: Meloxicam Oral Tablet 15 MG] 30 tablet 0     Sig: TAKE 1 TABLET BY MOUTH EVERY DAY     Last OV: 12/12/22  Last filled: 7/28/23    Assessment: Patient is a 49 y.o. female with mild right hip osteoarthritis and greater trochanteric pain syndrome. This is resolving following a cortisone injection on 12/05/2022. Impression:  Visit Diagnoses           Codes     Primary osteoarthritis of right hip    -  Primary M16.11     Trochanteric bursitis of right hip     M70.61     Hip pain     M25.559                Office Procedures:  Encounter Medications    No orders of the defined types were placed in this encounter. No orders of the defined types were placed in this encounter. Plan:  Dulcie Baumgarten is doing well. I recommend she continue with home exercises for range of motion and strengthening. I will see her back as needed going forward. There is always an option for a repeat intra-articular cortisone injection of her right hip with interventional radiology. All the patient's questions were answered while in the clinic. The patient is understanding of all instructions and agrees with the plan. Approximately 25 minutes was spent on patient education and coordinating care. Follow up in: Return if symptoms worsen or fail to improve.

## 2023-10-30 NOTE — TELEPHONE ENCOUNTER
Medication:   Requested Prescriptions     Pending Prescriptions Disp Refills    ARIPiprazole (ABILIFY) 5 MG tablet [Pharmacy Med Name: Aripiprazole 5mg Tablet] 90 tablet 0     Sig: Take 1 tablet by mouth daily.        Last Filled:  5/11/23    Patient Phone Number: [phone] (home)     Last appt: 3/15/2023   Next appt: Visit date not found

## 2023-10-31 RX ORDER — MELOXICAM 15 MG/1
TABLET ORAL
Qty: 30 TABLET | Refills: 0 | OUTPATIENT
Start: 2023-10-31

## 2023-11-06 DIAGNOSIS — E55.9 VITAMIN D DEFICIENCY: ICD-10-CM

## 2023-11-06 RX ORDER — ERGOCALCIFEROL 1.25 MG/1
CAPSULE ORAL
Qty: 12 CAPSULE | Refills: 2 | Status: SHIPPED | OUTPATIENT
Start: 2023-11-06

## 2023-11-06 RX ORDER — MELOXICAM 15 MG/1
TABLET ORAL
Qty: 30 TABLET | Refills: 0 | OUTPATIENT
Start: 2023-11-06

## 2023-11-06 NOTE — TELEPHONE ENCOUNTER
Recent Visits  Date Type Provider Dept   03/15/23 Office Visit Praveena Santoyo, APRN - CNP [facility]   01/18/23 Office Visit Praveena Santoyo, APRN - CNP [facility]   11/16/22 Office Visit Praveena Santoyo, APRN - CNP [facility]   09/14/22 Office Visit Praveena Santoyo APRN - CNP [facility]   08/15/22 Office Visit Tanja Caro MD [facility]   07/25/22 Office Visit Praveena Santoyo APRN - CNP [facility]   06/27/22 Office Visit Praveena Santoyo APRN - CNP [facility]   06/13/22 Office Visit Praveena Santoyo APRN - CNP [facility]   Showing recent visits within past 540 days with a meds authorizing provider and meeting all other requirements  Future Appointments  No visits were found meeting these conditions.  Showing future appointments within next 150 days with a meds authorizing provider and meeting all other requirements     3/15/2023

## 2023-11-08 NOTE — TELEPHONE ENCOUNTER
Patient requesting a refill of meloxicam (MOBIC) 15 MG that was originally prescribed by ortho. Patient is no longer seeing ortho due to lack of insurance benefits. Patient said her hip is really hurting and she would like to get a refill as soon as possible.     Last Fill  07/28/23   Last OV  03/15/23  Next OV  not yet scheduled/pending new insurance

## 2023-11-13 RX ORDER — MELOXICAM 15 MG/1
15 TABLET ORAL DAILY
Qty: 30 TABLET | Refills: 2 | Status: SHIPPED | OUTPATIENT
Start: 2023-11-13

## 2023-11-22 NOTE — TELEPHONE ENCOUNTER
Recent Visits  Date Type Provider Dept   03/15/23 Office Visit HARRISON Villanueva CNP [facility] [place]   01/18/23 Office Visit HARRISON Villanueva CNP [facility] [place] [facility]   11/16/22 Office Visit HARRISON Villanueva CNP [facility] [place] [facility]   09/14/22 Office Visit HARRISON Villanueva CNP [facility] [place] [facility]   08/15/22 Office Visit Shoaib Wooten MD [facility]   07/25/22 Office Visit HARRISON Villanueva CNP [facility] [place] [facility]   06/27/22 Office Visit HARRISON Villanueva CNP [facility] [place] [place] [facility]   06/13/22 Office Visit HARRISON Villanueva CNP [facility] [place] [facility]   Showing recent visits within past 540 days with a meds authorizing provider and meeting all other requirements  Future Appointments  No visits were found meeting these conditions.   Showing future appointments within next 150 days with a meds authorizing provider and meeting all other requirements     3/15/2023

## 2023-11-23 DIAGNOSIS — I10 ESSENTIAL HYPERTENSION: ICD-10-CM

## 2023-11-27 DIAGNOSIS — I10 ESSENTIAL HYPERTENSION: ICD-10-CM

## 2023-11-27 RX ORDER — LISINOPRIL 10 MG/1
TABLET ORAL
Qty: 90 TABLET | Refills: 0 | Status: SHIPPED | OUTPATIENT
Start: 2023-11-27 | End: 2023-11-27

## 2023-11-27 RX ORDER — OXCARBAZEPINE 600 MG/1
TABLET, FILM COATED ORAL
Qty: 180 TABLET | Refills: 0 | Status: SHIPPED | OUTPATIENT
Start: 2023-11-27 | End: 2023-11-27 | Stop reason: SDUPTHER

## 2023-11-27 RX ORDER — LISINOPRIL 10 MG/1
TABLET ORAL
Qty: 90 TABLET | Refills: 0 | Status: SHIPPED | OUTPATIENT
Start: 2023-11-27

## 2023-11-27 RX ORDER — OXCARBAZEPINE 600 MG/1
600 TABLET, FILM COATED ORAL 2 TIMES DAILY
Qty: 180 TABLET | Refills: 0 | Status: SHIPPED | OUTPATIENT
Start: 2023-11-27

## 2023-11-27 NOTE — TELEPHONE ENCOUNTER
Recent Visits  Date Type Provider Dept   03/15/23 Office Visit Tracy Ca APRN - CNP [facility]   01/18/23 Office Visit Tracy Ca APRN - CNP [facility]   11/16/22 Office Visit Tracy Ca APRN - CNP [facility]   09/14/22 Office Visit Tracy Ca APRN - CNP [facility]   08/15/22 Office Visit Kvng Slaughter MD [facility]   07/25/22 Office Visit Tracy Ca APRN - CNP [facility]   06/27/22 Office Visit Tracy Ca APRN - CNP [facility]   06/13/22 Office Visit Tracy Ca APRN - CNP [facility]   Showing recent visits within past 540 days with a meds authorizing provider and meeting all other requirements  Future Appointments  No visits were found meeting these conditions.   Showing future appointments within next 150 days with a meds authorizing provider and meeting all other requirements     3/15/2023

## 2023-11-27 NOTE — TELEPHONE ENCOUNTER
OXcarbazepine (TRILEPTAL) 600 MG tablet  Date: 11/27/2023 Department: [facility] Ordering/Authorizing: Marvetta Brunner, APRN - CNP     Outpatient Medication Detail     Disp Refills Start End    OXcarbazepine (TRILEPTAL) 600 MG tablet 180 tablet 0 11/27/2023     Sig: Take 1 tablet by mouth twice daily.     Sent to pharmacy as: OXcarbazepine 600 MG Oral Tablet (TRILEPTAL)        Pt only have 2 left    Goes to Emily    Thank you

## 2023-11-27 NOTE — TELEPHONE ENCOUNTER
lisinopril (PRINIVIL;ZESTRIL) 10 MG tablet  Date: 11/27/2023 [facility]  [facility] Ordering/Authorizing: HARRISON John CNP     Outpatient Medication Detail     Disp Refills Start End    lisinopril (PRINIVIL;ZESTRIL) 10 MG tablet 90 tablet 0 11/27/2023     Sig: TAKE 1 TABLET BY MOUTH EVERY DAY    Sent to pharmacy as: Lisinopril 10 MG Oral Tablet (PRINIVIL;ZESTRIL)      Send to [facility] to [facility]

## 2023-12-01 DIAGNOSIS — E87.1 HYPONATREMIA: ICD-10-CM

## 2023-12-01 NOTE — TELEPHONE ENCOUNTER
Recent Visits  Date Type Provider Dept   03/15/23 Office Visit Duane Carnes, APRN - CNP [facility]   01/18/23 Office Visit Duane Carnes, APRN - CNP [facility]   11/16/22 Office Visit Duane Carnes, APRN - CNP [facility]   09/14/22 Office Visit Duane Carnes, APRN - CNP [facility]   08/15/22 Office Visit Jareth Wood MD [facility]   07/25/22 Office Visit Duane Carnes, APRN - CNP [facility]   06/27/22 Office Visit Duane Carnes, APRN - CNP [facility]   06/13/22 Office Visit Duane Carnes, APRN - CNP [facility]   Showing recent visits within past 540 days with a meds authorizing provider and meeting all other requirements  Future Appointments  No visits were found meeting these conditions.   Showing future appointments within next 150 days with a meds authorizing provider and meeting all other requirements     3/15/2023

## 2023-12-05 RX ORDER — SODIUM BICARBONATE 325 MG/1
325 TABLET ORAL 2 TIMES DAILY
Qty: 60 TABLET | Refills: 0 | Status: SHIPPED | OUTPATIENT
Start: 2023-12-05

## 2023-12-06 DIAGNOSIS — E78.2 MIXED HYPERLIPIDEMIA: ICD-10-CM

## 2023-12-06 RX ORDER — SIMVASTATIN 20 MG
TABLET ORAL
Qty: 90 TABLET | Refills: 0 | Status: SHIPPED | OUTPATIENT
Start: 2023-12-06

## 2023-12-06 NOTE — TELEPHONE ENCOUNTER
Recent Visits  Date Type Provider Dept   03/15/23 Office Visit HARRISON Waterman - [facility]   01/18/23 Office Visit HARRISON [facility]   11/16/22 Office Visit HARRISON Waterman - [facility]   09/14/22 Office Visit [facility]ARRISON [facility]   08/15/22 Office Visit Ronit Asencio MD [facility]   07/25/22 Office Visit HARRISON [facility]erman [facility]   06/27/22 Office Visit HARRISON Waterman - CNP [facility]   Showing recent visits within past 540 days with a meds authorizing provider and meeting all other requirements  Future Appointments  No visits were found meeting these conditions.   Showing future appointments within next 150 days with a meds authorizing provider and meeting all other requirements     3/15/2023

## 2023-12-27 RX ORDER — OMEPRAZOLE 20 MG/1
CAPSULE, DELAYED RELEASE ORAL
Qty: 120 CAPSULE | Refills: 0 | Status: SHIPPED | OUTPATIENT
Start: 2023-12-27

## 2023-12-27 NOTE — TELEPHONE ENCOUNTER
Recent Visits  Date Type Provider Dept   03/15/23 Office Visit HARRISON Otero CNP [facility]   01/18/23 Office Visit HARRISON [place]tero CNP [facility]   11/16/22 Office Visit HARRISON Otero CNP [facility]   09/14/22 Office Visit HARRISON Otero - CNP [facility]   08/15/22 Office Visit Naila Belcher MD [facility]   07/25/22 Office Visit HARRISON Otero - CNP [facility]   Showing recent visits within past 540 days with a meds authorizing provider and meeting all other requirements  Future Appointments  No visits were found meeting these conditions.   Showing future appointments within next 150 days with a meds authorizing provider and meeting all other requirements     3/15/2023

## 2024-01-05 DIAGNOSIS — E87.1 HYPONATREMIA: ICD-10-CM

## 2024-01-08 RX ORDER — SODIUM BICARBONATE 325 MG/1
325 TABLET ORAL 2 TIMES DAILY
Qty: 60 TABLET | Refills: 0 | Status: SHIPPED | OUTPATIENT
Start: 2024-01-08

## 2024-01-08 RX ORDER — ARIPIPRAZOLE 5 MG/1
5 TABLET ORAL DAILY
Qty: 90 TABLET | Refills: 0 | Status: SHIPPED | OUTPATIENT
Start: 2024-01-08

## 2024-01-08 NOTE — TELEPHONE ENCOUNTER
Recent Visits  Date Type Provider Dept   03/15/23 Office Visit Praveena Santoyo APRN - CNP [facility]   01/18/23 Office Visit Praveena Santoyo APRN - CNP [facility]   11/16/22 Office Visit Praveena Santoyo APRN - CNP [facility]   09/14/22 Office Visit Praveena Santoyo APRN - CNP [facility]   08/15/22 Office Visit Tanja Caro MD [facility]   07/25/22 Office Visit Praveena Santoyo APRN - CNP [facility]   Showing recent visits within past 540 days with a meds authorizing provider and meeting all other requirements  Future Appointments  No visits were found meeting these conditions.  Showing future appointments within next 150 days with a meds authorizing provider and meeting all other requirements     3/15/2023

## 2024-01-15 ENCOUNTER — OFFICE VISIT (OUTPATIENT)
Age: 49
End: 2024-01-15

## 2024-01-15 VITALS
DIASTOLIC BLOOD PRESSURE: 100 MMHG | TEMPERATURE: 98.2 F | HEIGHT: 63 IN | WEIGHT: 293 LBS | HEART RATE: 89 BPM | SYSTOLIC BLOOD PRESSURE: 169 MMHG | OXYGEN SATURATION: 93 % | BODY MASS INDEX: 51.91 KG/M2

## 2024-01-15 DIAGNOSIS — M25.562 ACUTE PAIN OF LEFT KNEE: Primary | ICD-10-CM

## 2024-01-15 RX ORDER — METHYLPREDNISOLONE 4 MG/1
TABLET ORAL
Qty: 1 KIT | Refills: 0 | Status: SHIPPED | OUTPATIENT
Start: 2024-01-15

## 2024-01-15 NOTE — PROGRESS NOTES
Veronica Zepeda (:  1975) is a 48 y.o. female, here for evaluation of the following chief complaint(s):  Knee Pain (Pt here for left knee pain started yesterday, no fall, no injury.)      ASSESSMENT/PLAN:  Visit Diagnoses and Associated Orders       Acute pain of left knee    -  Primary    La Napoles MD, Orthopedics and Sports Medicine (Hip; Knee; Shoulder), [facility] [REF62 Custom]           ORDERS WITHOUT AN ASSOCIATED DIAGNOSIS    methylPREDNISolone (MEDROL DOSEPACK) 4 MG tablet [4991]      Elastic Bandages & Supports (KNEE BRACE/HINGED BARS 2XL) MISC [[id number]]                 Summary    - I had a discussion about the patient's blood pressure and informed her of the risks of having elevated blood pressure and not getting treated for such.  I recommended that she follow up with an internist as soon as possible.  - RICE protocol.  - A referral for ortho was placed which she will be following up with.  She will continue to use her cane and obtain the knee brace as instructed.     Differentials: Fracture, Effusion, Meniscus Tear (most likely), Osteoarthritis.      SUBJECTIVE/OBJECTIVE:  BENNY Martin presents to the clinic with left knee pain which occurred yesterday without injury. She tells me that her knee will buckle if she tries to walk.  She is using a cane at the moment but she usually doesn't.     Vitals:    01/15/24 1016   BP: (!) 169/100   Pulse: 89   Temp: 98.2 °F (36.8 °C)   TempSrc: Oral   SpO2: 93%   Weight: 134.4 kg (296 lb 3.2 oz)   Height: 1.588 m (5' 2.5\")       No results found for this visit on 01/15/24.     No orders to display       Review of Systems      Physical Exam  Vitals reviewed.   Constitutional:       Appearance: She is obese.   HENT:      Head: Normocephalic.      Mouth/Throat:      Mouth: Mucous membranes are moist.   Eyes:      Pupils: Pupils are equal, round, and reactive to light.   Pulmonary:      Effort: Pulmonary effort is normal.   Abdominal:      
None

## 2024-01-15 NOTE — PATIENT INSTRUCTIONS
- Steroids will cause your glucose (blood sugar) levels to rise.  If you are diabetic, please monitor your glucose levels carefully. Watch your food intake and take caution with foods high in sugar and carbohydrates as weight gain is another side effect of steroid use. Elevated heart rate is another common finding with steroids. Take this medication with food as it can irritate your stomach to the same degree that ibuprofen would.        Veronica,    It was an absolute pleasure taking care of you today.  I'm hoping you'll start feeling better as soon as possible. Please call me if you have any questions or concerns about what we talked about today.  Feel free stop back in if anything changes or things seem to be getting worse.  If for some reason you develop any serious or potentially life-threatening issues, call 911 or proceed to the nearest emergency department.  Hopefully it will never come to that.  Otherwise, it was very nice to meet you Veronica.      Thanks,     Kirk Gomez

## 2024-01-17 ENCOUNTER — OFFICE VISIT (OUTPATIENT)
Dept: INTERNAL MEDICINE CLINIC | Age: 49
End: 2024-01-17

## 2024-01-17 VITALS
OXYGEN SATURATION: 97 % | BODY MASS INDEX: 52.74 KG/M2 | WEIGHT: 293 LBS | SYSTOLIC BLOOD PRESSURE: 132 MMHG | HEART RATE: 74 BPM | DIASTOLIC BLOOD PRESSURE: 74 MMHG

## 2024-01-17 DIAGNOSIS — I10 ESSENTIAL HYPERTENSION: ICD-10-CM

## 2024-01-17 DIAGNOSIS — J43.2 CENTRILOBULAR EMPHYSEMA (HCC): ICD-10-CM

## 2024-01-17 DIAGNOSIS — E78.2 MIXED HYPERLIPIDEMIA: ICD-10-CM

## 2024-01-17 DIAGNOSIS — E87.1 HYPONATREMIA: ICD-10-CM

## 2024-01-17 DIAGNOSIS — Z23 FLU VACCINE NEED: Primary | ICD-10-CM

## 2024-01-17 DIAGNOSIS — J30.89 ENVIRONMENTAL AND SEASONAL ALLERGIES: ICD-10-CM

## 2024-01-17 DIAGNOSIS — G43.001 MIGRAINE WITHOUT AURA AND WITH STATUS MIGRAINOSUS, NOT INTRACTABLE: ICD-10-CM

## 2024-01-17 RX ORDER — ALBUTEROL SULFATE 90 UG/1
2 AEROSOL, METERED RESPIRATORY (INHALATION) EVERY 6 HOURS PRN
Qty: 18 G | Refills: 3 | Status: SHIPPED | OUTPATIENT
Start: 2024-01-17

## 2024-01-17 RX ORDER — SODIUM BICARBONATE 325 MG/1
325 TABLET ORAL 2 TIMES DAILY
Qty: 60 TABLET | Refills: 0 | Status: SHIPPED | OUTPATIENT
Start: 2024-01-17

## 2024-01-17 RX ORDER — MELOXICAM 15 MG/1
15 TABLET ORAL DAILY
Qty: 30 TABLET | Refills: 2 | Status: SHIPPED | OUTPATIENT
Start: 2024-01-17

## 2024-01-17 RX ORDER — LISINOPRIL 10 MG/1
10 TABLET ORAL DAILY
Qty: 90 TABLET | Refills: 0 | Status: SHIPPED | OUTPATIENT
Start: 2024-01-17

## 2024-01-17 RX ORDER — OXCARBAZEPINE 600 MG/1
600 TABLET, FILM COATED ORAL 2 TIMES DAILY
Qty: 180 TABLET | Refills: 0 | Status: SHIPPED | OUTPATIENT
Start: 2024-01-17

## 2024-01-17 RX ORDER — OMEPRAZOLE 20 MG/1
20 CAPSULE, DELAYED RELEASE ORAL DAILY
Qty: 90 CAPSULE | Refills: 1 | Status: SHIPPED | OUTPATIENT
Start: 2024-01-17

## 2024-01-17 RX ORDER — SIMVASTATIN 20 MG
20 TABLET ORAL DAILY
Qty: 90 TABLET | Refills: 0 | Status: SHIPPED | OUTPATIENT
Start: 2024-01-17

## 2024-01-17 RX ORDER — CETIRIZINE HYDROCHLORIDE 10 MG/1
10 TABLET ORAL DAILY
Qty: 90 TABLET | Refills: 1 | Status: SHIPPED | OUTPATIENT
Start: 2024-01-17

## 2024-01-17 RX ORDER — RISPERIDONE 4 MG/1
4 TABLET ORAL NIGHTLY
Qty: 90 TABLET | Refills: 0 | Status: SHIPPED | OUTPATIENT
Start: 2024-01-17

## 2024-01-17 ASSESSMENT — PATIENT HEALTH QUESTIONNAIRE - PHQ9
1. LITTLE INTEREST OR PLEASURE IN DOING THINGS: 1
SUM OF ALL RESPONSES TO PHQ QUESTIONS 1-9: 2
SUM OF ALL RESPONSES TO PHQ QUESTIONS 1-9: 2
2. FEELING DOWN, DEPRESSED OR HOPELESS: 1
SUM OF ALL RESPONSES TO PHQ QUESTIONS 1-9: 2
SUM OF ALL RESPONSES TO PHQ QUESTIONS 1-9: 2
SUM OF ALL RESPONSES TO PHQ9 QUESTIONS 1 & 2: 2

## 2024-01-17 NOTE — PROGRESS NOTES
Veronica Zepeda (:  1975) is a 48 y.o. female,Established patient, here for evaluation of the following chief complaint(s):  Forms (FMLA)         ASSESSMENT/PLAN:      Veronica was seen today for forms.    Diagnoses and all orders for this visit:    Flu vaccine need  -     Influenza, FLUCELVAX, (age 6 mo+), IM, PF, 0.5 mL    Migraine without aura and with status migrainosus, not intractable  -     Ubrogepant 100 MG TABS; Take 1 tablet by mouth daily as needed (migraine)    Hyponatremia  -     sodium bicarbonate 325 MG tablet; Take 1 tablet by mouth 2 times daily    Mixed hyperlipidemia  -     simvastatin (ZOCOR) 20 MG tablet; Take 1 tablet by mouth daily    Essential hypertension  -     lisinopril (PRINIVIL;ZESTRIL) 10 MG tablet; Take 1 tablet by mouth daily    Environmental and seasonal allergies  -     ceDo exercise for seniors 20-30 minutes  Continue to drink plenty of water  Stop smokingtirizine (ZYRTEC) 10 MG tablet; Take 1 tablet by mouth daily    Centrilobular emphysema (HCC)  -     albuterol sulfate HFA (PROVENTIL HFA) 108 (90 Base) MCG/ACT inhaler; Inhale 2 puffs into the lungs every 6 hours as needed for Wheezing  -     Pneumococcal, PCV20, PREVNAR 20, (age 6w+), IM, PF    Other orders  -     risperiDONE (RISPERDAL) 4 MG tablet; Take 1 tablet by mouth nightly  -     omeprazole (PRILOSEC) 20 MG delayed release capsule; Take 1 capsule by mouth daily  -     OXcarbazepine (TRILEPTAL) 600 MG tablet; Take 1 tablet by mouth 2 times daily  -     meloxicam (MOBIC) 15 MG tablet; Take 1 tablet by mouth daily              Subjective   SUBJECTIVE/OBJECTIVE:  HPI Presents today for obesity and tobacco abuse.  Seeking to have FMLA forms completed because she states she has improved in her attendance but last couple months has left work early because she did not feel well.    Review of Systems   Constitutional:  Positive for fatigue.   HENT: Negative.     Eyes: Negative.    Respiratory:  Positive for cough and

## 2024-01-18 ENCOUNTER — OFFICE VISIT (OUTPATIENT)
Dept: ORTHOPEDIC SURGERY | Age: 49
End: 2024-01-18
Payer: COMMERCIAL

## 2024-01-18 VITALS — HEIGHT: 63 IN | WEIGHT: 293 LBS | BODY MASS INDEX: 51.91 KG/M2

## 2024-01-18 DIAGNOSIS — M25.562 LEFT KNEE PAIN, UNSPECIFIED CHRONICITY: ICD-10-CM

## 2024-01-18 DIAGNOSIS — M17.12 PRIMARY OSTEOARTHRITIS OF LEFT KNEE: Primary | ICD-10-CM

## 2024-01-18 PROCEDURE — 99213 OFFICE O/P EST LOW 20 MIN: CPT

## 2024-01-18 SDOH — HEALTH STABILITY: PHYSICAL HEALTH: ON AVERAGE, HOW MANY MINUTES DO YOU ENGAGE IN EXERCISE AT THIS LEVEL?: 0 MIN

## 2024-01-18 SDOH — HEALTH STABILITY: PHYSICAL HEALTH: ON AVERAGE, HOW MANY DAYS PER WEEK DO YOU ENGAGE IN MODERATE TO STRENUOUS EXERCISE (LIKE A BRISK WALK)?: 0 DAYS

## 2024-01-18 ASSESSMENT — ENCOUNTER SYMPTOMS
GASTROINTESTINAL NEGATIVE: 1
SHORTNESS OF BREATH: 1
COUGH: 1
ALLERGIC/IMMUNOLOGIC NEGATIVE: 1
EYES NEGATIVE: 1
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
## 2024-01-25 ENCOUNTER — HOSPITAL ENCOUNTER (OUTPATIENT)
Dept: PHYSICAL THERAPY | Age: 49
Setting detail: THERAPIES SERIES
Discharge: HOME | End: 2024-01-25
Attending: ORTHOPAEDIC SURGERY

## 2024-02-07 ENCOUNTER — OFFICE VISIT (OUTPATIENT)
Dept: INTERNAL MEDICINE CLINIC | Age: 49
End: 2024-02-07
Payer: COMMERCIAL

## 2024-02-07 VITALS — OXYGEN SATURATION: 96 % | DIASTOLIC BLOOD PRESSURE: 72 MMHG | SYSTOLIC BLOOD PRESSURE: 126 MMHG | HEART RATE: 74 BPM

## 2024-02-07 DIAGNOSIS — E87.1 HYPONATREMIA: ICD-10-CM

## 2024-02-07 DIAGNOSIS — G43.009 MIGRAINE WITHOUT AURA AND WITHOUT STATUS MIGRAINOSUS, NOT INTRACTABLE: ICD-10-CM

## 2024-02-07 DIAGNOSIS — I10 ESSENTIAL HYPERTENSION: ICD-10-CM

## 2024-02-07 DIAGNOSIS — Z72.0 TOBACCO ABUSE DISORDER: Primary | ICD-10-CM

## 2024-02-07 DIAGNOSIS — E78.2 MIXED HYPERLIPIDEMIA: ICD-10-CM

## 2024-02-07 DIAGNOSIS — R91.1 PULMONARY NODULE, RIGHT: ICD-10-CM

## 2024-02-07 PROBLEM — D72.829 LEUKOCYTOSIS: Status: RESOLVED | Noted: 2021-01-21 | Resolved: 2024-02-07

## 2024-02-07 PROBLEM — R04.0 EPISTAXIS: Status: RESOLVED | Noted: 2020-12-11 | Resolved: 2024-02-07

## 2024-02-07 PROCEDURE — 3074F SYST BP LT 130 MM HG: CPT | Performed by: NURSE PRACTITIONER

## 2024-02-07 PROCEDURE — 99214 OFFICE O/P EST MOD 30 MIN: CPT | Performed by: NURSE PRACTITIONER

## 2024-02-07 PROCEDURE — 3078F DIAST BP <80 MM HG: CPT | Performed by: NURSE PRACTITIONER

## 2024-02-07 RX ORDER — RIMEGEPANT SULFATE 75 MG/75MG
75 TABLET, ORALLY DISINTEGRATING ORAL EVERY OTHER DAY
Qty: 16 TABLET | Refills: 1 | Status: SHIPPED | OUTPATIENT
Start: 2024-02-07 | End: 2024-03-08

## 2024-02-07 RX ORDER — LISINOPRIL 10 MG/1
10 TABLET ORAL DAILY
Qty: 90 TABLET | Refills: 0 | Status: SHIPPED | OUTPATIENT
Start: 2024-02-07

## 2024-02-07 RX ORDER — RISPERIDONE 4 MG/1
4 TABLET ORAL NIGHTLY
Qty: 90 TABLET | Refills: 1 | Status: SHIPPED | OUTPATIENT
Start: 2024-02-07

## 2024-02-07 RX ORDER — SIMVASTATIN 20 MG
20 TABLET ORAL DAILY
Qty: 90 TABLET | Refills: 0 | Status: SHIPPED | OUTPATIENT
Start: 2024-02-07

## 2024-02-07 RX ORDER — SIMVASTATIN 20 MG
20 TABLET ORAL DAILY
Qty: 90 TABLET | Refills: 0 | Status: CANCELLED | OUTPATIENT
Start: 2024-02-07

## 2024-02-07 RX ORDER — SODIUM BICARBONATE 650 MG/1
650 TABLET ORAL 2 TIMES DAILY
Qty: 90 TABLET | Refills: 1 | Status: SHIPPED | OUTPATIENT
Start: 2024-02-07

## 2024-02-07 ASSESSMENT — PATIENT HEALTH QUESTIONNAIRE - PHQ9
SUM OF ALL RESPONSES TO PHQ QUESTIONS 1-9: 12
10. IF YOU CHECKED OFF ANY PROBLEMS, HOW DIFFICULT HAVE THESE PROBLEMS MADE IT FOR YOU TO DO YOUR WORK, TAKE CARE OF THINGS AT HOME, OR GET ALONG WITH OTHER PEOPLE: 1
6. FEELING BAD ABOUT YOURSELF - OR THAT YOU ARE A FAILURE OR HAVE LET YOURSELF OR YOUR FAMILY DOWN: 2
1. LITTLE INTEREST OR PLEASURE IN DOING THINGS: 1
9. THOUGHTS THAT YOU WOULD BE BETTER OFF DEAD, OR OF HURTING YOURSELF: 0
2. FEELING DOWN, DEPRESSED OR HOPELESS: 1
5. POOR APPETITE OR OVEREATING: 1
3. TROUBLE FALLING OR STAYING ASLEEP: 3
4. FEELING TIRED OR HAVING LITTLE ENERGY: 3
8. MOVING OR SPEAKING SO SLOWLY THAT OTHER PEOPLE COULD HAVE NOTICED. OR THE OPPOSITE, BEING SO FIGETY OR RESTLESS THAT YOU HAVE BEEN MOVING AROUND A LOT MORE THAN USUAL: 0
7. TROUBLE CONCENTRATING ON THINGS, SUCH AS READING THE NEWSPAPER OR WATCHING TELEVISION: 1
SUM OF ALL RESPONSES TO PHQ QUESTIONS 1-9: 12
SUM OF ALL RESPONSES TO PHQ9 QUESTIONS 1 & 2: 2

## 2024-02-07 ASSESSMENT — ANXIETY QUESTIONNAIRES
IF YOU CHECKED OFF ANY PROBLEMS ON THIS QUESTIONNAIRE, HOW DIFFICULT HAVE THESE PROBLEMS MADE IT FOR YOU TO DO YOUR WORK, TAKE CARE OF THINGS AT HOME, OR GET ALONG WITH OTHER PEOPLE: SOMEWHAT DIFFICULT
2. NOT BEING ABLE TO STOP OR CONTROL WORRYING: 1
GAD7 TOTAL SCORE: 4
3. WORRYING TOO MUCH ABOUT DIFFERENT THINGS: 1
6. BECOMING EASILY ANNOYED OR IRRITABLE: 0
1. FEELING NERVOUS, ANXIOUS, OR ON EDGE: 1
7. FEELING AFRAID AS IF SOMETHING AWFUL MIGHT HAPPEN: 0
4. TROUBLE RELAXING: 1
5. BEING SO RESTLESS THAT IT IS HARD TO SIT STILL: 0

## 2024-02-07 NOTE — PATIENT INSTRUCTIONS
Call if Nurtec works for headaches, take every other day  Nicoderm daily for one week, then increase to 14 mg, Decrease number of cigarettes smoked as decrease Nicoderm  Clean office of all cigarette smoke  Continue to [id number]

## 2024-02-07 NOTE — PROGRESS NOTES
Veronica Zepeda (:  1975) is a 48 y.o. female,Established patient, here for evaluation of the following chief complaint(s):  Depression and discuss disability         ASSESSMENT/PLAN:    Veronica was seen today for depression and discuss disability.    Diagnoses and all orders for this visit:    Tobacco abuse disorder  -     nicotine (NICODERM CQ) 7 MG/24HR; Place 1 patch onto the skin every 24 hours For one week, then increase to 14 mg    Hyponatremia  -     sodium bicarbonate 650 MG tablet; Take 1 tablet by mouth 2 times daily    Essential hypertension  -     lisinopril (PRINIVIL;ZESTRIL) 10 MG tablet; Take 1 tablet by mouth daily    Mixed hyperlipidemia  -     simvastatin (ZOCOR) 20 MG tablet; Take 1 tablet by mouth daily    Pulmonary nodule, right  -     PET CT LIMITED IMAGING INITIAL; Future    Migraine without aura and without status migrainosus, not intractable  -     Rimegepant Sulfate (NURTEC) 75 MG TBDP; Take 75 mg by mouth every other day    Other orders  -     risperiDONE (RISPERDAL) 4 MG tablet; Take 1 tablet by mouth nightly         No follow-ups on file.         Subjective   SUBJECTIVE/OBJECTIVE:  HPI presents today with depression, tobacco abuse disorder, migraine headache, and desire to be declared disabled.  States that she has not been walking, states in her house in 1 room and smokes while she is working.  States she is trying to change her lifestyle but we discussed this before she does have an active plan.    Review of Systems   Constitutional:  Positive for fatigue.   HENT: Negative.     Eyes: Negative.    Respiratory: Negative.     Cardiovascular: Negative.    Gastrointestinal: Negative.    Endocrine: Negative.    Genitourinary: Negative.    Musculoskeletal: Negative.    Allergic/Immunologic: Negative.    Neurological:  Positive for headaches.   Psychiatric/Behavioral:  The patient is nervous/anxious.      Vitals:    24 1416   BP: 126/72   Pulse: 74   SpO2: 96%      BP Readings

## 2024-02-12 ENCOUNTER — TELEPHONE (OUTPATIENT)
Dept: INTERNAL MEDICINE CLINIC | Age: 49
End: 2024-02-12

## 2024-02-12 RX ORDER — NICOTINE 21 MG/24HR
1 PATCH, TRANSDERMAL 24 HOURS TRANSDERMAL EVERY 24 HOURS
Qty: 30 PATCH | Refills: 3 | Status: SHIPPED | OUTPATIENT
Start: 2024-02-12 | End: 2025-02-11

## 2024-02-12 NOTE — TELEPHONE ENCOUNTER
Pharmacy states that for the nicoderm they need two prescriptions, one for each dose of the patch.

## 2024-02-19 ENCOUNTER — TELEPHONE (OUTPATIENT)
Dept: INTERNAL MEDICINE CLINIC | Age: 49
End: 2024-02-19

## 2024-02-19 NOTE — TELEPHONE ENCOUNTER
Pt wanted to let DARNELL Santoyo know that Nurtec seems to be helping, takes it every other day.

## 2024-03-04 ENCOUNTER — OFFICE VISIT (OUTPATIENT)
Dept: ORTHOPEDIC SURGERY | Age: 49
End: 2024-03-04

## 2024-03-04 VITALS — RESPIRATION RATE: 12 BRPM | BODY MASS INDEX: 51.91 KG/M2 | HEIGHT: 63 IN | WEIGHT: 293 LBS

## 2024-03-04 DIAGNOSIS — M25.551 PAIN OF RIGHT HIP: Primary | ICD-10-CM

## 2024-03-04 RX ORDER — CELECOXIB 100 MG/1
100 CAPSULE ORAL DAILY
Qty: 60 CAPSULE | Refills: 1 | Status: SHIPPED | OUTPATIENT
Start: 2024-03-04
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
## 2024-03-07 ENCOUNTER — TELEPHONE (OUTPATIENT)
Dept: INTERNAL MEDICINE CLINIC | Age: 49
End: 2024-03-07

## 2024-03-20 ENCOUNTER — OFFICE VISIT (OUTPATIENT)
Dept: SLEEP MEDICINE | Age: 49
End: 2024-03-20

## 2024-03-20 VITALS
HEIGHT: 63 IN | TEMPERATURE: 98 F | RESPIRATION RATE: 18 BRPM | WEIGHT: 293 LBS | DIASTOLIC BLOOD PRESSURE: 78 MMHG | OXYGEN SATURATION: 96 % | SYSTOLIC BLOOD PRESSURE: 130 MMHG | HEART RATE: 80 BPM | BODY MASS INDEX: 51.91 KG/M2

## 2024-03-20 DIAGNOSIS — I10 HYPERTENSION, UNSPECIFIED TYPE: ICD-10-CM

## 2024-03-20 DIAGNOSIS — G47.33 OVERLAP SYNDROME OF OBSTRUCTIVE SLEEP APNEA AND CHRONIC OBSTRUCTIVE PULMONARY DISEASE (HCC): ICD-10-CM

## 2024-03-20 DIAGNOSIS — J44.9 OVERLAP SYNDROME OF OBSTRUCTIVE SLEEP APNEA AND CHRONIC OBSTRUCTIVE PULMONARY DISEASE (HCC): ICD-10-CM

## 2024-03-20 DIAGNOSIS — E66.01 CLASS 3 SEVERE OBESITY DUE TO EXCESS CALORIES WITH SERIOUS COMORBIDITY AND BODY MASS INDEX (BMI) OF 50.0 TO 59.9 IN ADULT (HCC): ICD-10-CM

## 2024-03-20 DIAGNOSIS — G47.33 OSA (OBSTRUCTIVE SLEEP APNEA): Primary | ICD-10-CM

## 2024-03-20 DIAGNOSIS — R09.02 HYPOXEMIA: ICD-10-CM

## 2024-03-20 PROCEDURE — 99214 OFFICE O/P EST MOD 30 MIN: CPT | Performed by: PSYCHIATRY & NEUROLOGY

## 2024-03-20 PROCEDURE — 3078F DIAST BP <80 MM HG: CPT | Performed by: PSYCHIATRY & NEUROLOGY

## 2024-03-20 PROCEDURE — 3075F SYST BP GE 130 - 139MM HG: CPT | Performed by: PSYCHIATRY & NEUROLOGY

## 2024-03-20 ASSESSMENT — SLEEP AND FATIGUE QUESTIONNAIRES
HOW LIKELY ARE YOU TO NOD OFF OR FALL ASLEEP WHILE SITTING AND TALKING TO SOMEONE: SLIGHT CHANCE OF DOZING
HOW LIKELY ARE YOU TO NOD OFF OR FALL ASLEEP WHILE SITTING QUIETLY AFTER LUNCH WITHOUT ALCOHOL: SLIGHT CHANCE OF DOZING
ESS TOTAL SCORE: 10
HOW LIKELY ARE YOU TO NOD OFF OR FALL ASLEEP WHILE SITTING INACTIVE IN A PUBLIC PLACE: SLIGHT CHANCE OF DOZING
HOW LIKELY ARE YOU TO NOD OFF OR FALL ASLEEP WHEN YOU ARE A PASSENGER IN A CAR FOR AN HOUR WITHOUT A BREAK: MODERATE CHANCE OF DOZING
HOW LIKELY ARE YOU TO NOD OFF OR FALL ASLEEP IN A CAR, WHILE STOPPED FOR A FEW MINUTES IN TRAFFIC: SLIGHT CHANCE OF DOZING
HOW LIKELY ARE YOU TO NOD OFF OR FALL ASLEEP WHILE LYING DOWN TO REST IN THE AFTERNOON WHEN CIRCUMSTANCES PERMIT: HIGH CHANCE OF DOZING
HOW LIKELY ARE YOU TO NOD OFF OR FALL ASLEEP WHILE WATCHING TV: WOULD NEVER DOZE
HOW LIKELY ARE YOU TO NOD OFF OR FALL ASLEEP WHILE SITTING AND READING: SLIGHT CHANCE OF DOZING
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
## 2024-03-21 ENCOUNTER — HOSPITAL ENCOUNTER (OUTPATIENT)
Dept: PHYSICAL THERAPY | Age: 49
Setting detail: THERAPIES SERIES
Discharge: HOME | End: 2024-03-21
Attending: ORTHOPAEDIC SURGERY
Payer: COMMERCIAL

## 2024-03-21 DIAGNOSIS — R26.9 GAIT ABNORMALITY: Primary | ICD-10-CM

## 2024-03-21 DIAGNOSIS — M25.651 DECREASED RANGE OF RIGHT HIP MOVEMENT: ICD-10-CM

## 2024-03-21 PROCEDURE — 97161 PT EVAL LOW COMPLEX 20 MIN: CPT

## 2024-03-21 PROCEDURE — 97530 THERAPEUTIC ACTIVITIES: CPT

## 2024-03-21 NOTE — PLAN OF CARE
full function and prevent re-injury.    Status: [] Progressing: [] Met: [] Not Met: [] Adjusted  Patient will have a decrease in pain to 3/10 to help facilitate improvement in movement, function, and ADLs as indicated by functional deficits.   Status: [] Progressing: [] Met: [] Not Met: [] Adjusted    Long Term Goals: To be achieved in: 6 weeks  Disability index score of 40% or less for the LEFS to assist with return top prior level of function.   Status: [] Progressing: [] Met: [] Not Met: [] Adjusted  Improve hip AROM to flexion to 90 ER to 30, IR to 15  degrees or  better to allow for proper joint functioning as indicated by patients functional deficits.  Status: [] Progressing: [] Met: [] Not Met: [] Adjusted  Pt to improve strength to 4+/5 or better of proximal hip to allow for proper muscle and joint use in functional mobility, ADLs and prior level of function   Status: [] Progressing: [] Met: [] Not Met: [] Adjusted  Patient will return to  Usual work, housework or activities, squatting, light home activities, and walk 2 blocks  without increased symptoms or restriction to work towards return to prior level of function.        Status: [] Progressing: [] Met: [] Not Met: [] Adjusted  Patient will resume normal work/leisure activities without pain.            Status: [] Progressing: [] Met: [] Not Met: [] Adjusted    TREATMENT PLAN     Frequency/Duration: 1-2x/week for 6 weeks for the following treatment interventions: AQUATIC PT     Interventions:  [x] Therapeutic exercise including: strength training, ROM, including postural re-education.   [x] NMR activation and proprioception, including postural re-education.    [x] Manual therapy as indicated to include: PROM, Gr I-IV mobilizations, and STM  [x] Modalities as needed that may include: Cryotherapy  [x] Patient education on joint protection, postural re-education, activity modification, progression of HEP.        [x] Aquatic Therapy    Plan: POC initiated as

## 2024-03-28 ENCOUNTER — HOSPITAL ENCOUNTER (OUTPATIENT)
Dept: PHYSICAL THERAPY | Age: 49
Setting detail: THERAPIES SERIES
Discharge: HOME | End: 2024-03-28
Attending: ORTHOPAEDIC SURGERY
Payer: COMMERCIAL

## 2024-03-28 PROCEDURE — 97113 AQUATIC THERAPY/EXERCISES: CPT

## 2024-03-28 NOTE — FLOWSHEET NOTE
[facility] - Outpatient Rehabilitation and Therapy [address], [place] [address] office: [phone] fax: [phone]       Physical Therapy: TREATMENT/PROGRESS NOTE   Patient: Veronica Zepeda (49 y.o. female)   Examination Date: 2024   :  1975 MRN: [id number]   Visit #:   Insurance Allowable Auth Needed   mn []Yes    [x]No    Insurance: Payor: GENERIC COMMERCIAL / Plan: GENERIC COMMERCIAL / Product Type: Indemnity /   Insurance ID: [id number] - (Commercial)  Secondary Insurance (if applicable):    Treatment Diagnosis:     ICD-10-CM    1. Gait abnormality  R26.9       2. Decreased range of right hip movement  M25.651          Medical Diagnosis:  No admission diagnoses are documented for this encounter.   Referring Physician: La Oliva MD  PCP: Praveena Santoyo APRN - CNP       Plan of care signed (Y/N):     Date of Patient follow up with Physician:      Progress Report/POC: NO  POC update due: (10 visits /OR AUTH LIMITS, whichever is less)  2024                                             Precautions/ Contra-indications:           Latex allergy:  NO  Pacemaker:    NO  Contraindications for Manipulation: None  Date of Surgery:   Other:    Red Flags:  None    C-SSRS Triggered by Intake questionnaire:   [x] No, Questionnaire did not trigger screening.   [] Yes, Patient intake triggered further evaluation      [] C-SSRS Screening completed  [] PCP notified via Plan of Care  [] Emergency services notified     Preferred Language for Healthcare:   [x] English       [] other:    SUBJECTIVE EXAMINATION     Patient stated complaint: Pt reports she is about a 4/10 today although all week it has been about 8/10. Has really had trouble sleeping this week.        Test used Initial score  3/21/24 2024   Pain Summary VAS 5-8 4/10   Functional questionnaire LEFS 19    Other:                OBJECTIVE EXAMINATION     3/21/24  ROM/Strength: (Blank cells denote

## 2024-04-03 ENCOUNTER — APPOINTMENT (OUTPATIENT)
Dept: PHYSICAL THERAPY | Age: 49
End: 2024-04-03
Attending: ORTHOPAEDIC SURGERY
Payer: COMMERCIAL

## 2024-04-05 ENCOUNTER — HOSPITAL ENCOUNTER (OUTPATIENT)
Dept: PHYSICAL THERAPY | Age: 49
Setting detail: THERAPIES SERIES
Discharge: HOME | End: 2024-04-05
Attending: ORTHOPAEDIC SURGERY
Payer: COMMERCIAL

## 2024-04-05 PROCEDURE — 97150 GROUP THERAPEUTIC PROCEDURES: CPT

## 2024-04-05 PROCEDURE — 97113 AQUATIC THERAPY/EXERCISES: CPT

## 2024-04-05 NOTE — FLOWSHEET NOTE
training, ROM, including postural re-education.   [x] NMR activation and proprioception, including postural re-education.    [x] Manual therapy as indicated to include: PROM, Gr I-IV mobilizations, and STM  [x] Modalities as needed that may include: Cryotherapy  [x] Patient education on joint protection, postural re-education, activity modification, progression of HEP.        [x] Aquatic Therapy    Plan: Cont POC- Continue emphasis/focus on exercise progression and increasing ROM. Next visit plan to progress reps     Electronically Signed by Elisa Frederick , PTA [id number]  Date: 04/05/2024     Note: Portions of this note have been templated and/or copied from initial evaluation, reassessments and prior notes for documentation efficiency.

## 2024-04-22 ENCOUNTER — HOSPITAL ENCOUNTER (OUTPATIENT)
Dept: PHYSICAL THERAPY | Age: 49
Setting detail: THERAPIES SERIES
Discharge: HOME | End: 2024-04-22
Attending: ORTHOPAEDIC SURGERY
Payer: COMMERCIAL

## 2024-04-22 PROCEDURE — 97113 AQUATIC THERAPY/EXERCISES: CPT

## 2024-04-22 NOTE — FLOWSHEET NOTE
or  better to allow for proper joint functioning as indicated by patients functional deficits.  Status: [] Progressing: [] Met: [] Not Met: [] Adjusted  Pt to improve strength to 4+/5 or better of proximal hip to allow for proper muscle and joint use in functional mobility, ADLs and prior level of function   Status: [] Progressing: [] Met: [] Not Met: [] Adjusted  Patient will return to  Usual work, housework or activities, squatting, light home activities, and walk 2 blocks  without increased symptoms or restriction to work towards return to prior level of function.        Status: [] Progressing: [] Met: [] Not Met: [] Adjusted  Patient will resume normal work/leisure activities without pain.            Status: [] Progressing: [] Met: [] Not Met: [] Adjusted    TREATMENT PLAN     Frequency/Duration: 1-2x/week for 6 weeks for the following treatment interventions: AQUATIC PT     Interventions:  [x] Therapeutic exercise including: strength training, ROM, including postural re-education.   [x] NMR activation and proprioception, including postural re-education.    [x] Manual therapy as indicated to include: PROM, Gr I-IV mobilizations, and STM  [x] Modalities as needed that may include: Cryotherapy  [x] Patient education on joint protection, postural re-education, activity modification, progression of HEP.        [x] Aquatic Therapy    Plan: Cont POC- Continue emphasis/focus on exercise progression and increasing ROM. Next visit plan to progress reps     Electronically Signed by Malini Lord PT , DPT  Date: 04/22/2024     Note: Portions of this note have been templated and/or copied from initial evaluation, reassessments and prior notes for documentation efficiency.

## 2024-04-26 ENCOUNTER — HOSPITAL ENCOUNTER (OUTPATIENT)
Dept: PHYSICAL THERAPY | Age: 49
Setting detail: THERAPIES SERIES
Discharge: HOME | End: 2024-04-26
Attending: ORTHOPAEDIC SURGERY
Payer: COMMERCIAL

## 2024-04-26 PROCEDURE — 97113 AQUATIC THERAPY/EXERCISES: CPT

## 2024-04-26 PROCEDURE — 97150 GROUP THERAPEUTIC PROCEDURES: CPT

## 2024-04-26 NOTE — FLOWSHEET NOTE
[facility] - Outpatient Rehabilitation and Therapy [address], [place] [address] office: [phone] fax: [phone]       Physical Therapy: TREATMENT/PROGRESS NOTE   Patient: Veronica Zepeda (49 y.o. female)   Examination Date: 2024   :  1975 MRN: [id number]   Visit #:   Insurance Allowable Auth Needed   mn []Yes    [x]No    Insurance: Payor: GENERIC COMMERCIAL / Plan: GENERIC COMMERCIAL / Product Type: Indemnity /   Insurance ID: [id number] - (Commercial)  Secondary Insurance (if applicable):    Treatment Diagnosis:     ICD-10-CM    1. Gait abnormality  R26.9       2. Decreased range of right hip movement  M25.651          Medical Diagnosis:  Pain of right hip [M25.551]   Referring Physician: La Oliva MD  PCP: Praveena Santoyo APRN - CNP       Plan of care signed (Y/N):     Date of Patient follow up with Physician:      Progress Report/POC: NO  POC update due: (10 visits /OR AUTH LIMITS, whichever is less)  2024                                             Precautions/ Contra-indications:           Latex allergy:  NO  Pacemaker:    NO  Contraindications for Manipulation: None  Date of Surgery:   Other:    Red Flags:  None    C-SSRS Triggered by Intake questionnaire:   [x] No, Questionnaire did not trigger screening.   [] Yes, Patient intake triggered further evaluation      [] C-SSRS Screening completed  [] PCP notified via Plan of Care  [] Emergency services notified     Preferred Language for Healthcare:   [x] English       [] other:    SUBJECTIVE EXAMINATION     Patient stated complaint: Felt better with modifications from last visit.  Has really only been sore, not really painful. Sleeping has improved as well as \"pain in general\".        Test used Initial score  3/21/24 2024   Pain Summary VAS 5-8 4/10   Functional questionnaire LEFS 19    Other:                OBJECTIVE EXAMINATION     3/21/24  ROM/Strength: (Blank cells denote NT)

## 2024-04-29 ENCOUNTER — HOSPITAL ENCOUNTER (OUTPATIENT)
Dept: PHYSICAL THERAPY | Age: 49
Setting detail: THERAPIES SERIES
Discharge: HOME | End: 2024-04-29
Attending: ORTHOPAEDIC SURGERY
Payer: COMMERCIAL

## 2024-04-29 PROCEDURE — 97150 GROUP THERAPEUTIC PROCEDURES: CPT

## 2024-04-29 PROCEDURE — 97113 AQUATIC THERAPY/EXERCISES: CPT

## 2024-04-29 NOTE — FLOWSHEET NOTE
[facility] - Outpatient Rehabilitation and Therapy [address], [place] [address] office: [phone] fax: [phone]       Physical Therapy: TREATMENT/PROGRESS NOTE   Patient: Veronica Zepeda (49 y.o. female)   Examination Date: 2024   :  1975 MRN: [id number]   Visit #:   Insurance Allowable Auth Needed   mn []Yes    [x]No    Insurance: Payor: GENERIC COMMERCIAL / Plan: GENERIC COMMERCIAL / Product Type: Indemnity /   Insurance ID: [id number] - (Commercial)  Secondary Insurance (if applicable):    Treatment Diagnosis:     ICD-10-CM    1. Gait abnormality  R26.9       2. Decreased range of right hip movement  M25.651          Medical Diagnosis:  Pain of right hip [M25.551]   Referring Physician: La Oliva MD  PCP: Praveena Santoyo APRN - CNP       Plan of care signed (Y/N):     Date of Patient follow up with Physician:      Progress Report/POC: NO  POC update due: (10 visits /OR AUTH LIMITS, whichever is less)  2024                                             Precautions/ Contra-indications:           Latex allergy:  NO  Pacemaker:    NO  Contraindications for Manipulation: None  Date of Surgery:   Other:    Red Flags:  None    C-SSRS Triggered by Intake questionnaire:   [x] No, Questionnaire did not trigger screening.   [] Yes, Patient intake triggered further evaluation      [] C-SSRS Screening completed  [] PCP notified via Plan of Care  [] Emergency services notified     Preferred Language for Healthcare:   [x] English       [] other:    SUBJECTIVE EXAMINATION     Patient stated complaint: Continues to feel better each time after pool.  States she really has just been \"achy\" at a 3/10.     Test used Initial score  3/21/24 2024   Pain Summary VAS 5-8 3/10   Functional questionnaire LEFS 19    Other:                OBJECTIVE EXAMINATION     3/21/24  ROM/Strength: (Blank cells denote NT)      Mvmt (norm) AROM L AROM R Notes

## 2024-05-03 ENCOUNTER — HOSPITAL ENCOUNTER (OUTPATIENT)
Dept: PHYSICAL THERAPY | Age: 49
Setting detail: THERAPIES SERIES
Discharge: HOME | End: 2024-05-03
Attending: ORTHOPAEDIC SURGERY
Payer: COMMERCIAL

## 2024-05-03 PROCEDURE — 97113 AQUATIC THERAPY/EXERCISES: CPT

## 2024-05-03 PROCEDURE — 97150 GROUP THERAPEUTIC PROCEDURES: CPT

## 2024-05-03 NOTE — FLOWSHEET NOTE
[facility] - Outpatient Rehabilitation and Therapy [address], [place] [address] office: [phone] fax: [phone]       Physical Therapy: TREATMENT/PROGRESS NOTE   Patient: Veronica Zepeda (49 y.o. female)   Examination Date: 2024   :  1975 MRN: [id number]   Visit #:   Insurance Allowable Auth Needed   mn []Yes    [x]No    Insurance: Payor: GENERIC COMMERCIAL / Plan: GENERIC COMMERCIAL / Product Type: Indemnity /   Insurance ID: [id number] - (Commercial)  Secondary Insurance (if applicable):    Treatment Diagnosis:     ICD-10-CM    1. Gait abnormality  R26.9       2. Decreased range of right hip movement  M25.651          Medical Diagnosis:  Pain of right hip [M25.551]   Referring Physician: La Oliva MD  PCP: Praveena Santoyo APRN - CNP       Plan of care signed (Y/N):     Date of Patient follow up with Physician:      Progress Report/POC: NO  POC update due: (10 visits /OR AUTH LIMITS, whichever is less)  2024                                             Precautions/ Contra-indications:           Latex allergy:  NO  Pacemaker:    NO  Contraindications for Manipulation: None  Date of Surgery:   Other:    Red Flags:  None    C-SSRS Triggered by Intake questionnaire:   [x] No, Questionnaire did not trigger screening.   [] Yes, Patient intake triggered further evaluation      [] C-SSRS Screening completed  [] PCP notified via Plan of Care  [] Emergency services notified     Preferred Language for Healthcare:   [x] English       [] other:    SUBJECTIVE EXAMINATION     Patient stated complaint: States for the last 2 days since last PT session she has had some more sharp pains in her back region, but hey are starting to calm down now.     Test used Initial score  3/21/24 2024   Pain Summary VAS 5-8 4/10   Functional questionnaire LEFS 19    Other:                OBJECTIVE EXAMINATION     3/21/24  ROM/Strength: (Blank cells denote NT)

## 2024-05-06 ENCOUNTER — HOSPITAL ENCOUNTER (OUTPATIENT)
Dept: PHYSICAL THERAPY | Age: 49
Setting detail: THERAPIES SERIES
Discharge: HOME | End: 2024-05-06
Attending: ORTHOPAEDIC SURGERY
Payer: COMMERCIAL

## 2024-05-06 PROCEDURE — 97113 AQUATIC THERAPY/EXERCISES: CPT

## 2024-05-06 PROCEDURE — 97150 GROUP THERAPEUTIC PROCEDURES: CPT

## 2024-05-06 NOTE — FLOWSHEET NOTE
[facility] - Outpatient Rehabilitation and Therapy [address], [place] [address] office: [phone] fax: [phone]       Physical Therapy: TREATMENT/PROGRESS NOTE   Patient: Veronica Zepeda (49 y.o. female)   Examination Date: 2024   :  1975 MRN: [id number]   Visit #:   Insurance Allowable Auth Needed   mn []Yes    [x]No    Insurance: Payor: GENERIC COMMERCIAL / Plan: GENERIC COMMERCIAL / Product Type: Indemnity /   Insurance ID: [id number] - (Commercial)  Secondary Insurance (if applicable):    Treatment Diagnosis:     ICD-10-CM    1. Gait abnormality  R26.9       2. Decreased range of right hip movement  M25.651          Medical Diagnosis:  Pain of right hip [M25.551]   Referring Physician: La Oliva MD  PCP: Praveena Santoyo APRN - CNP       Plan of care signed (Y/N):     Date of Patient follow up with Physician:      Progress Report/POC: NO  POC update due: (10 visits /OR AUTH LIMITS, whichever is less)  2024                                             Precautions/ Contra-indications:           Latex allergy:  NO  Pacemaker:    NO  Contraindications for Manipulation: None  Date of Surgery:   Other:    Red Flags:  None    C-SSRS Triggered by Intake questionnaire:   [x] No, Questionnaire did not trigger screening.   [] Yes, Patient intake triggered further evaluation      [] C-SSRS Screening completed  [] PCP notified via Plan of Care  [] Emergency services notified     Preferred Language for Healthcare:   [x] English       [] other:    SUBJECTIVE EXAMINATION     Patient stated complaint: States she did okay after the last session and pain was a little less  but has been hurting the last 3-4 days maybe due to the rainy weather we are experiencing.   Test used Initial score  3/21/24 2024   Pain Summary VAS 5-8 5/10   Functional questionnaire LEFS 19    Other:                OBJECTIVE EXAMINATION     3/21/24  ROM/Strength: (Blank cells

## 2024-05-10 ENCOUNTER — HOSPITAL ENCOUNTER (OUTPATIENT)
Dept: PHYSICAL THERAPY | Age: 49
Setting detail: THERAPIES SERIES
Discharge: HOME | End: 2024-05-10
Attending: ORTHOPAEDIC SURGERY
Payer: COMMERCIAL

## 2024-05-10 PROCEDURE — 97150 GROUP THERAPEUTIC PROCEDURES: CPT

## 2024-05-10 PROCEDURE — 97113 AQUATIC THERAPY/EXERCISES: CPT

## 2024-05-10 NOTE — FLOWSHEET NOTE
intervention: [x] Yes  [] No      CHARGE CAPTURE     PT CHARGE GRID   CPT Code (TIMED) minutes # CPT Code (UNTIMED) #     Therex (33576)     EVAL:LOW (09772 - Typically 20 minutes face-to-face)     Neuromusc. Re-ed (33894)    Re-Eval (13145)     Manual (69611)    Estim Unattended (12310)     Ther. Act (05866)    Mech. Traction (55251)     Gait (39743)    Dry Needle 1-2 muscle (27181)     Aquatic Therex (46141) 25 2  Dry Needle 3+ muscle (20561)     Iontophoresis (60648)    VASO (17545)     Ultrasound (05892)    Group Therapy (69163) 1    Estim Attended (44870)    Canalith Repositioning (24458)     Other:    Other:    Total Timed Code Tx Minutes 25 2  1     Total Treatment Minutes 40        Charge Justification:  (83122) AQUATIC THERAPY - Therapeutic procedure, 1 or more areas, each 15 minutes. Provided verbal/tactile cueing in aquatic environment for activities related to strengthening, flexibility, endurance, ROM performed to prevent loss of range of motion, maintain or improve muscular strength or increase flexibility, following either an injury or surgery  (03572) GROUP - Provided skilled therapy interventions with constant attendance of 2 or more patients simultaneously, but not providing any significant amount of measurable one-on-one time to either patient      GOALS     Patient stated goal: get pain down , walk better   Status: [] Progressing: [] Met: [] Not Met: [] Adjusted    Therapist goals for Patient:   Short Term Goals: To be achieved in: 2 weeks  Independent in HEP and progression per patient tolerance, in order to progress toward full function and prevent re-injury.    Status: [] Progressing: [] Met: [] Not Met: [] Adjusted  Patient will have a decrease in pain to 3/10 to help facilitate improvement in movement, function, and ADLs as indicated by functional deficits.   Status: [] Progressing: [] Met: [] Not Met: [] Adjusted    Long Term Goals: To be achieved in: 6 weeks  Disability index score of 40%

## 2024-05-12 DIAGNOSIS — G43.009 MIGRAINE WITHOUT AURA AND WITHOUT STATUS MIGRAINOSUS, NOT INTRACTABLE: ICD-10-CM

## 2024-05-13 RX ORDER — RIMEGEPANT SULFATE 75 MG/75MG
TABLET, ORALLY DISINTEGRATING ORAL
Qty: 16 TABLET | Refills: 1 | Status: SHIPPED | OUTPATIENT
Start: 2024-05-13

## 2024-05-13 RX ORDER — OXCARBAZEPINE 600 MG/1
600 TABLET, FILM COATED ORAL 2 TIMES DAILY
Qty: 180 TABLET | Refills: 0 | Status: SHIPPED | OUTPATIENT
Start: 2024-05-13

## 2024-05-13 RX ORDER — ARIPIPRAZOLE 5 MG/1
5 TABLET ORAL DAILY
Qty: 90 TABLET | Refills: 0 | Status: SHIPPED | OUTPATIENT
Start: 2024-05-13

## 2024-05-13 NOTE — TELEPHONE ENCOUNTER
Recent Visits  Date Type Provider Dept   02/07/24 Office Visit Praveena Santoyo, HARRISON - CNP [facility]   01/17/24 Office Visit Praveena Santoyo APRN - CNP [facility]   03/15/23 Office Visit Praveena Santoyo, HARRISON - CNP [facility]   01/18/23 Office Visit Praveena Santoyo APRN - CNP [facility]   Showing recent visits within past 540 days with a meds authorizing provider and meeting all other requirements  Future Appointments  No visits were found meeting these conditions.  Showing future appointments within next 150 days with a meds authorizing provider and meeting all other requirements     2/7/2024

## 2024-05-14 ENCOUNTER — HOSPITAL ENCOUNTER (OUTPATIENT)
Dept: PHYSICAL THERAPY | Age: 49
Setting detail: THERAPIES SERIES
Discharge: HOME | End: 2024-05-14
Attending: ORTHOPAEDIC SURGERY
Payer: COMMERCIAL

## 2024-05-14 PROCEDURE — 97530 THERAPEUTIC ACTIVITIES: CPT

## 2024-05-14 NOTE — PLAN OF CARE
[facility] - Outpatient Rehabilitation and Therapy [address], [place] [address] office: [phone] fax: [phone]       Physical Therapy: TREATMENT/PROGRESS NOTE   Patient: Veronica Zepeda (49 y.o. female)   Examination Date: 2024   :  1975 MRN: [id number]   Visit #: 10 /12  + 0/8   Insurance Allowable Auth Needed   mn []Yes    [x]No    Insurance: Payor: GENERIC COMMERCIAL / Plan: GENERIC COMMERCIAL / Product Type: Indemnity /   Insurance ID: [id number] - (Commercial)  Secondary Insurance (if applicable):    Treatment Diagnosis:     ICD-10-CM    1. Gait abnormality  R26.9       2. Decreased range of right hip movement  M25.651          Medical Diagnosis:  Pain of right hip [M25.551]   Referring Physician: La Oliva MD  PCP: Praveena Santoyo APRN - CNP       Plan of care signed (Y/N):  Yes, cosigned     Date of Patient follow up with Physician:      Progress Report/POC: YES, Date Range for this report: 3/21 to   POC update due: (10 visits /OR AUTH LIMITS, whichever is less)  2024                                             Precautions/ Contra-indications:           Latex allergy:  NO  Pacemaker:    NO  Contraindications for Manipulation: None  Date of Surgery:   Other:    Red Flags:  None    C-SSRS Triggered by Intake questionnaire:   [x] No, Questionnaire did not trigger screening.   [] Yes, Patient intake triggered further evaluation      [] C-SSRS Screening completed  [] PCP notified via Plan of Care  [] Emergency services notified     Preferred Language for Healthcare:   [x] English       [] other:    SUBJECTIVE EXAMINATION     Patient stated complaint: Pt notes that the aquatic therapy is very beneficial.  Feels a little stronger overall.  Her pain level has gone down more consistently. For the last 2 weeks, her pain level has been more in the 2/10 aching pain, as opposed to the sharp pain. Feels that walking has improved a little bit with

## 2024-05-17 ENCOUNTER — HOSPITAL ENCOUNTER (OUTPATIENT)
Dept: PHYSICAL THERAPY | Age: 49
Setting detail: THERAPIES SERIES
Discharge: HOME | End: 2024-05-17
Attending: ORTHOPAEDIC SURGERY
Payer: COMMERCIAL

## 2024-05-17 PROCEDURE — 97113 AQUATIC THERAPY/EXERCISES: CPT

## 2024-05-17 PROCEDURE — 97150 GROUP THERAPEUTIC PROCEDURES: CPT

## 2024-05-17 NOTE — PLAN OF CARE
[facility] - Outpatient Rehabilitation and Therapy [address], [place] [address] office: [phone] fax: [phone]       Physical Therapy: TREATMENT/PROGRESS NOTE   Patient: Veronica Zepeda (49 y.o. female)   Examination Date: 2024   :  1975 MRN: [id number]   Visit #:   + 0/8   Insurance Allowable Auth Needed   mn []Yes    [x]No    Insurance: Payor: GENERIC COMMERCIAL / Plan: GENERIC COMMERCIAL / Product Type: Indemnity /   Insurance ID: [id number] - (Commercial)  Secondary Insurance (if applicable):    Treatment Diagnosis:     ICD-10-CM    1. Gait abnormality  R26.9       2. Decreased range of right hip movement  M25.651          Medical Diagnosis:  Pain of right hip [M25.551]   Referring Physician: La Oliva MD  PCP: Praveena Santoyo APRN - CNP       Plan of care signed (Y/N):  Yes, cosigned     Date of Patient follow up with Physician:      Progress Report/POC: YES, Date Range for this report: 3/21 to   POC update due: (10 visits /OR AUTH LIMITS, whichever is less)  2024                                             Precautions/ Contra-indications:           Latex allergy:  NO  Pacemaker:    NO  Contraindications for Manipulation: None  Date of Surgery:   Other:    Red Flags:  None    C-SSRS Triggered by Intake questionnaire:   [x] No, Questionnaire did not trigger screening.   [] Yes, Patient intake triggered further evaluation      [] C-SSRS Screening completed  [] PCP notified via Plan of Care  [] Emergency services notified     Preferred Language for Healthcare:   [x] English       [] other:    SUBJECTIVE EXAMINATION     Patient stated complaint: Pt reports that she waa=s a little sore after last session but overall doing well today considering it is raining.  Pain is 3/10.         Test used Initial score  3/21/24 5/14/24 2024   Pain Summary VAS 5-8 3/10, today 5.5/10     Functional questionnaire LEFS 19 29    Other:

## 2024-05-27 DIAGNOSIS — I10 ESSENTIAL HYPERTENSION: ICD-10-CM

## 2024-05-28 RX ORDER — LISINOPRIL 10 MG/1
10 TABLET ORAL DAILY
Qty: 90 TABLET | Refills: 0 | Status: SHIPPED | OUTPATIENT
Start: 2024-05-28

## 2024-05-28 NOTE — TELEPHONE ENCOUNTER
Recent Visits  Date Type Provider Dept   02/07/24 Office Visit Praveena Santoyo, HARRISON - CNP [facility]   01/17/24 Office Visit Praveena Santoyo APRN - CNP [facility]   03/15/23 Office Visit Praveena Santoyo, HARRISON - CNP [facility]   01/18/23 Office Visit Praveena Santoyo APRN - CNP [facility]   Showing recent visits within past 540 days with a meds authorizing provider and meeting all other requirements  Future Appointments  No visits were found meeting these conditions.  Showing future appointments within next 150 days with a meds authorizing provider and meeting all other requirements     2/7/2024

## 2024-06-03 ENCOUNTER — HOSPITAL ENCOUNTER (OUTPATIENT)
Dept: PHYSICAL THERAPY | Age: 49
Setting detail: THERAPIES SERIES
Discharge: HOME | End: 2024-06-03
Attending: ORTHOPAEDIC SURGERY
Payer: COMMERCIAL

## 2024-06-03 PROCEDURE — 97150 GROUP THERAPEUTIC PROCEDURES: CPT

## 2024-06-03 PROCEDURE — 97113 AQUATIC THERAPY/EXERCISES: CPT

## 2024-06-03 NOTE — FLOWSHEET NOTE
[facility] - Outpatient Rehabilitation and Therapy [address], [place] [address] office: [phone] fax: [phone]       Physical Therapy: TREATMENT/PROGRESS NOTE   Patient: Veronica Zepeda (49 y.o. female)   Examination Date: 2024   :  1975 MRN: [id number]   Visit #:   + 0/8   Insurance Allowable Auth Needed   mn []Yes    [x]No    Insurance: Payor: GENERIC COMMERCIAL / Plan: GENERIC COMMERCIAL / Product Type: Indemnity /   Insurance ID: [id number] - (Commercial)  Secondary Insurance (if applicable):    Treatment Diagnosis:     ICD-10-CM    1. Gait abnormality  R26.9       2. Decreased range of right hip movement  M25.651          Medical Diagnosis:  Pain of right hip [M25.551]   Referring Physician: La Oliva MD  PCP: Praveena Santoyo APRN - CNP       Plan of care signed (Y/N):  Yes, cosigned     Date of Patient follow up with Physician:      Progress Report/POC: YES, Date Range for this report: 3/21 to   POC update due: (10 visits /OR AUTH LIMITS, whichever is less)  2024                                             Precautions/ Contra-indications:           Latex allergy:  NO  Pacemaker:    NO  Contraindications for Manipulation: None  Date of Surgery:   Other:    Red Flags:  None    C-SSRS Triggered by Intake questionnaire:   [x] No, Questionnaire did not trigger screening.   [] Yes, Patient intake triggered further evaluation      [] C-SSRS Screening completed  [] PCP notified via Plan of Care  [] Emergency services notified     Preferred Language for Healthcare:   [x] English       [] other:    SUBJECTIVE EXAMINATION     Patient stated complaint: Pt reports that she woke 2 x last night and thinks it is because she hasn't had therapy due the pool being closed. Is expecting to feel better soon.        Test used Initial score  3/21/24 5/14/24 2024   Pain Summary VAS 5-8 3/10, today 5.5/10  510   Functional questionnaire LEFS 19

## 2024-06-04 DIAGNOSIS — E87.1 HYPONATREMIA: ICD-10-CM

## 2024-06-04 RX ORDER — SODIUM BICARBONATE 650 MG/1
650 TABLET ORAL 2 TIMES DAILY
Qty: 90 TABLET | Refills: 0 | Status: SHIPPED | OUTPATIENT
Start: 2024-06-04

## 2024-06-04 NOTE — TELEPHONE ENCOUNTER
Recent Visits  Date Type Provider Dept   02/07/24 Office Visit Praveena Santoyo, HARRISON - CNP [facility]   01/17/24 Office Visit Praveena Santoyo APRN - CNP [facility]   03/15/23 Office Visit Praveena Santoyo, HARRISON - CNP [facility]   01/18/23 Office Visit Praveena Santoyo APRN - CNP [facility]   Showing recent visits within past 540 days with a meds authorizing provider and meeting all other requirements  Future Appointments  No visits were found meeting these conditions.  Showing future appointments within next 150 days with a meds authorizing provider and meeting all other requirements     2/7/2024

## 2024-06-07 ENCOUNTER — HOSPITAL ENCOUNTER (OUTPATIENT)
Dept: PHYSICAL THERAPY | Age: 49
Setting detail: THERAPIES SERIES
Discharge: HOME | End: 2024-06-07
Attending: ORTHOPAEDIC SURGERY
Payer: COMMERCIAL

## 2024-06-07 PROCEDURE — 97150 GROUP THERAPEUTIC PROCEDURES: CPT

## 2024-06-07 PROCEDURE — 97113 AQUATIC THERAPY/EXERCISES: CPT

## 2024-06-07 NOTE — FLOWSHEET NOTE
[facility] - Outpatient Rehabilitation and Therapy [address], [place] [address] office: [phone] fax: [phone]       Physical Therapy: TREATMENT/PROGRESS NOTE   Patient: Veronica Zepeda (49 y.o. female)   Examination Date: 2024   :  1975 MRN: [id number]   Visit #:   + 0/8   Insurance Allowable Auth Needed   mn []Yes    [x]No    Insurance: Payor: GENERIC COMMERCIAL / Plan: GENERIC COMMERCIAL / Product Type: Indemnity /   Insurance ID: [id number] - (Commercial)  Secondary Insurance (if applicable):    Treatment Diagnosis:     ICD-10-CM    1. Gait abnormality  R26.9       2. Decreased range of right hip movement  M25.651          Medical Diagnosis:  Pain of right hip [M25.551]   Referring Physician: La Oliva MD  PCP: Praveena Santoyo APRN - CNP       Plan of care signed (Y/N):  Yes, cosigned     Date of Patient follow up with Physician:      Progress Report/POC: YES, Date Range for this report: 3/21 to   POC update due: (10 visits /OR AUTH LIMITS, whichever is less)  2024                                             Precautions/ Contra-indications:           Latex allergy:  NO  Pacemaker:    NO  Contraindications for Manipulation: None  Date of Surgery:   Other:    Red Flags:  None    C-SSRS Triggered by Intake questionnaire:   [x] No, Questionnaire did not trigger screening.   [] Yes, Patient intake triggered further evaluation      [] C-SSRS Screening completed  [] PCP notified via Plan of Care  [] Emergency services notified     Preferred Language for Healthcare:   [x] English       [] other:    SUBJECTIVE EXAMINATION     Patient stated complaint: Pt reports that she woke 2 x last night and thinks it is because she hasn't had therapy due the pool being closed. Is expecting to feel better soon.        Test used Initial score  3/21/24 5/14/24 2024   Pain Summary VAS 5-8 3/10, today 5.5/10  R 6-6.5/10   Functional questionnaire

## 2024-06-10 ENCOUNTER — HOSPITAL ENCOUNTER (OUTPATIENT)
Dept: PHYSICAL THERAPY | Age: 49
Setting detail: THERAPIES SERIES
Discharge: HOME | End: 2024-06-10
Attending: ORTHOPAEDIC SURGERY
Payer: COMMERCIAL

## 2024-06-10 PROCEDURE — 97113 AQUATIC THERAPY/EXERCISES: CPT

## 2024-06-10 PROCEDURE — 97150 GROUP THERAPEUTIC PROCEDURES: CPT

## 2024-06-10 NOTE — FLOWSHEET NOTE
Status: [] Progressing: [] Met: [] Not Met: [] Adjusted    Therapist goals for Patient:   Short Term Goals: To be achieved in: 2 weeks  Independent in HEP and progression per patient tolerance, in order to progress toward full function and prevent re-injury.    Status: [] Progressing: [x] Met: [] Not Met: [] Adjusted  Patient will have a decrease in pain to 3/10 to help facilitate improvement in movement, function, and ADLs as indicated by functional deficits.  MORE CONSISTENT, UNLESS WEATHER IS BAD    Status: [] Progressing: [x] Met: [] Not Met: [] Adjusted    Long Term Goals: To be achieved in: 6 weeks  Disability index score of 40% or less for the LEFS to assist with return top prior level of function.  Improved from 19-29   Status: [x] Progressing: [] Met: [] Not Met: [] Adjusted  Improve hip AROM to flexion to 90 ER to 30, IR to 15  degrees or  better to allow for proper joint functioning as indicated by patients functional deficits.  Status: [x] Progressing: [] Met: [] Not Met: [] Adjusted  _ Improved with RLE, but still has pain with hip flexion   Pt to improve strength to 4+/5 or better of proximal hip to allow for proper muscle and joint use in functional mobility, ADLs and prior level of function   Status: [x] Progressing: [] Met: [] Not Met: [] Adjusted  Patient will return to  Usual work, housework or activities, squatting, light home activities, and walk 2 blocks  without increased symptoms or restriction to work towards return to prior level of function.        Status: [x] Progressing: [] Met: [] Not Met: [] Adjusted  Patient will resume normal work/leisure activities without pain.            Status: [x] Progressing: [] Met: [] Not Met: [] Adjusted    TREATMENT PLAN     Frequency/Duration: 1-2x/week for 6 weeks for the following treatment interventions: AQUATIC PT     Interventions:  [x] Therapeutic exercise including: strength training, ROM, including postural re-education.   [x] NMR activation and

## 2024-06-11 RX ORDER — ARIPIPRAZOLE 5 MG/1
5 TABLET ORAL DAILY
Qty: 90 TABLET | Refills: 0 | Status: SHIPPED | OUTPATIENT
Start: 2024-06-11

## 2024-06-11 NOTE — TELEPHONE ENCOUNTER
Recent Visits  Date Type Provider Dept   02/07/24 Office Visit Praveena Santoyo, HARRISON - CNP [facility]   01/17/24 Office Visit Praveena Santoyo APRN - CNP [facility]   03/15/23 Office Visit Praveena Santoyo, HARRISON - CNP [facility]   01/18/23 Office Visit Praveena Santoyo APRN - CNP [facility]   Showing recent visits within past 540 days with a meds authorizing provider and meeting all other requirements  Future Appointments  No visits were found meeting these conditions.  Showing future appointments within next 150 days with a meds authorizing provider and meeting all other requirements     2/7/2024

## 2024-06-14 ENCOUNTER — HOSPITAL ENCOUNTER (OUTPATIENT)
Dept: PHYSICAL THERAPY | Age: 49
Setting detail: THERAPIES SERIES
Discharge: HOME | End: 2024-06-14
Attending: ORTHOPAEDIC SURGERY
Payer: COMMERCIAL

## 2024-06-14 PROCEDURE — 97113 AQUATIC THERAPY/EXERCISES: CPT

## 2024-06-14 PROCEDURE — 97150 GROUP THERAPEUTIC PROCEDURES: CPT

## 2024-06-14 NOTE — FLOWSHEET NOTE
[facility] - Outpatient Rehabilitation and Therapy [address], [place] [address] office: [phone] fax: [phone]       Physical Therapy: TREATMENT/PROGRESS NOTE   Patient: Veronica Zepeda (49 y.o. female)   Examination Date: 2024   :  1975 MRN: [id number]   Visit #: 15 /12  + 0/8   Insurance Allowable Auth Needed   mn []Yes    [x]No    Insurance: Payor: GENERIC COMMERCIAL / Plan: GENERIC COMMERCIAL / Product Type: Indemnity /   Insurance ID: [id number] - (Commercial)  Secondary Insurance (if applicable):    Treatment Diagnosis:     ICD-10-CM    1. Gait abnormality  R26.9       2. Decreased range of right hip movement  M25.651          Medical Diagnosis:  Pain of right hip [M25.551]   Referring Physician: La Oliva MD  PCP: Praveena Santoyo APRN - CNP       Plan of care signed (Y/N):  Yes, cosigned     Date of Patient follow up with Physician:      Progress Report/POC: NO  POC update due: (10 visits /OR AUTH LIMITS, whichever is less) 2024                                             Precautions/ Contra-indications:           Latex allergy:  NO  Pacemaker:    NO  Contraindications for Manipulation: None  Date of Surgery:   Other:    Red Flags:  None    C-SSRS Triggered by Intake questionnaire:   [x] No, Questionnaire did not trigger screening.   [] Yes, Patient intake triggered further evaluation      [] C-SSRS Screening completed  [] PCP notified via Plan of Care  [] Emergency services notified     Preferred Language for Healthcare:   [x] English       [] other:    SUBJECTIVE EXAMINATION     Patient stated complaint: Pt reports that she continues to feel much better since last Friday when taking reps down to 12.  She states her pain level is down to 2.5/10 now.     Test used Initial score  3/21/24 5/14/24 2024   Pain Summary VAS 5-8 3/10, today 5.5/10  310   Functional questionnaire LEFS 19 29    Other:                  OBJECTIVE

## 2024-06-17 ENCOUNTER — HOSPITAL ENCOUNTER (OUTPATIENT)
Dept: PHYSICAL THERAPY | Age: 49
Setting detail: THERAPIES SERIES
Discharge: HOME | End: 2024-06-17
Attending: ORTHOPAEDIC SURGERY
Payer: COMMERCIAL

## 2024-06-17 PROCEDURE — 97150 GROUP THERAPEUTIC PROCEDURES: CPT

## 2024-06-17 PROCEDURE — 97113 AQUATIC THERAPY/EXERCISES: CPT

## 2024-06-17 NOTE — FLOWSHEET NOTE
activities without pain.            Status: [x] Progressing: [] Met: [] Not Met: [] Adjusted    TREATMENT PLAN     Frequency/Duration: 1-2x/week for 6 weeks for the following treatment interventions: AQUATIC PT     Interventions:  [x] Therapeutic exercise including: strength training, ROM, including postural re-education.   [x] NMR activation and proprioception, including postural re-education.    [x] Manual therapy as indicated to include: PROM, Gr I-IV mobilizations, and STM  [x] Modalities as needed that may include: Cryotherapy  [x] Patient education on joint protection, postural re-education, activity modification, progression of HEP.        [x] Aquatic Therapy    Plan: Cont POC- Continue emphasis/focus on exercise progression and increasing ROM. Next visit plan to progress reps  with aquatics.  Continue 2x/week x 4 weeks     Electronically Signed by BRENT OCHOA PT ,  MSPT  Date: 06/17/2024     Note: Portions of this note have been templated and/or copied from initial evaluation, reassessments and prior notes for documentation efficiency.

## 2024-06-21 ENCOUNTER — HOSPITAL ENCOUNTER (OUTPATIENT)
Dept: PHYSICAL THERAPY | Age: 49
Setting detail: THERAPIES SERIES
Discharge: HOME | End: 2024-06-21
Attending: ORTHOPAEDIC SURGERY
Payer: COMMERCIAL

## 2024-06-21 PROCEDURE — 97150 GROUP THERAPEUTIC PROCEDURES: CPT

## 2024-06-21 PROCEDURE — 97113 AQUATIC THERAPY/EXERCISES: CPT

## 2024-06-21 NOTE — FLOWSHEET NOTE
no increase in pain post session  .      Medical Necessity Documentation:  I certify that this patient meets the below criteria necessary for medical necessity for care and/or justification of therapy services:  The patient has functional impairments and/or activity limitations and would benefit from continued outpatient therapy services to address the deficits outlined in the patients goals    Return to Play: NA    Prognosis for POC: [x] Good [] Fair  [] Poor    Patient requires continued skilled intervention: [x] Yes  [] No      CHARGE CAPTURE     PT CHARGE GRID   CPT Code (TIMED) minutes # CPT Code (UNTIMED) #     Therex (83692)     EVAL:LOW (98296 - Typically 20 minutes face-to-face)     Neuromusc. Re-ed (48762)    Re-Eval (46231)     Manual (37757)    Estim Unattended (25314)     Ther. Act (28650)    Mech. Traction (73909)     Gait (96423)    Dry Needle 1-2 muscle (48144)     Aquatic Therex (08200) 26 2  Dry Needle 3+ muscle (20561)     Iontophoresis (08972)    VASO (39025)     Ultrasound (55054)    Group Therapy (92760) 1    Estim Attended (63593)    Canalith Repositioning (99652)     Other:    Other:    Total Timed Code Tx Minutes 26 2  1     Total Treatment Minutes 45        Charge Justification:  (43771) AQUATIC THERAPY - Therapeutic procedure, 1 or more areas, each 15 minutes. Provided verbal/tactile cueing in aquatic environment for activities related to strengthening, flexibility, endurance, ROM performed to prevent loss of range of motion, maintain or improve muscular strength or increase flexibility, following either an injury or surgery  (59406) GROUP - Provided skilled therapy interventions with constant attendance of 2 or more patients simultaneously, but not providing any significant amount of measurable one-on-one time to either patient      GOALS     Patient stated goal: get pain down , walk better   Status: [] Progressing: [] Met: [] Not Met: [] Adjusted    Therapist goals for Patient:   Short

## 2024-06-24 ENCOUNTER — HOSPITAL ENCOUNTER (OUTPATIENT)
Dept: PHYSICAL THERAPY | Age: 49
Setting detail: THERAPIES SERIES
Discharge: HOME | End: 2024-06-24
Attending: ORTHOPAEDIC SURGERY
Payer: COMMERCIAL

## 2024-06-24 PROCEDURE — 97150 GROUP THERAPEUTIC PROCEDURES: CPT

## 2024-06-24 PROCEDURE — 97113 AQUATIC THERAPY/EXERCISES: CPT

## 2024-06-24 NOTE — FLOWSHEET NOTE
better   Status: [] Progressing: [] Met: [] Not Met: [] Adjusted    Therapist goals for Patient:   Short Term Goals: To be achieved in: 2 weeks  Independent in HEP and progression per patient tolerance, in order to progress toward full function and prevent re-injury.    Status: [] Progressing: [x] Met: [] Not Met: [] Adjusted  Patient will have a decrease in pain to 3/10 to help facilitate improvement in movement, function, and ADLs as indicated by functional deficits.  MORE CONSISTENT, UNLESS WEATHER IS BAD    Status: [] Progressing: [x] Met: [] Not Met: [] Adjusted    Long Term Goals: To be achieved in: 6 weeks  Disability index score of 40% or less for the LEFS to assist with return top prior level of function.  Improved from 19-29   Status: [x] Progressing: [] Met: [] Not Met: [] Adjusted  Improve hip AROM to flexion to 90 ER to 30, IR to 15  degrees or  better to allow for proper joint functioning as indicated by patients functional deficits.  Status: [x] Progressing: [] Met: [] Not Met: [] Adjusted  _ Improved with RLE, but still has pain with hip flexion   Pt to improve strength to 4+/5 or better of proximal hip to allow for proper muscle and joint use in functional mobility, ADLs and prior level of function   Status: [x] Progressing: [] Met: [] Not Met: [] Adjusted  Patient will return to  Usual work, housework or activities, squatting, light home activities, and walk 2 blocks  without increased symptoms or restriction to work towards return to prior level of function.        Status: [x] Progressing: [] Met: [] Not Met: [] Adjusted  Patient will resume normal work/leisure activities without pain.            Status: [x] Progressing: [] Met: [] Not Met: [] Adjusted    TREATMENT PLAN     Frequency/Duration: 1-2x/week for 6 weeks for the following treatment interventions: AQUATIC PT     Interventions:  [x] Therapeutic exercise including: strength training, ROM, including postural re-education.   [x] NMR

## 2024-06-26 ENCOUNTER — HOSPITAL ENCOUNTER (OUTPATIENT)
Dept: PHYSICAL THERAPY | Age: 49
Setting detail: THERAPIES SERIES
Discharge: HOME | End: 2024-06-26
Attending: ORTHOPAEDIC SURGERY
Payer: COMMERCIAL

## 2024-06-26 PROCEDURE — 97530 THERAPEUTIC ACTIVITIES: CPT

## 2024-06-26 NOTE — PLAN OF CARE
Tilts      Multifidi Walk outs c paddle      PNF Chop/Lifts                          Aquatic Abbreviation Key  B= Belt DB= Dumbells T= Theratube   H= Hydrotone N= Noodles W= Weights   P= Paddles S= Speedo equipment K= Kickboard       Modalities:    No modalities applied this session    Education/Home Exercise Program: Not enough time for HEP instruction this visit.  Plan to address at follow up.  3/21- pt edu on dx/prog, aquatic program, activity modification, pain management, low impact activities - 24' at [facility]     6/26  Issued handout of entire UE/LE aquatic program      ASSESSMENT     Today's Assessment: TRANSITION TO HEP/DC FORMAL PT: Patient is currently independent with symptom management and home exercise program. Patient reports understanding of the importance of continued compliance with home exercise program after discharge.  Patient has reached 1/5 long term goals and should reach all additional goals with compliance to home exercise program upon discharge.    She is pleased with current functional level.  Goals difficult to fully achieve due to limited hip mobility, but she notes great improvement in ambulation tolerance in terms of decreased pain.  She will continue with aquatics with a 30 day [facility] pass, and then discussed doing aquatics at the local [facility] that is offered by her insurance benefits.  Will D/C at this time.     Medical Necessity Documentation:  I certify that this patient meets the below criteria necessary for medical necessity for care and/or justification of therapy services:  The patient has functional impairments and/or activity limitations and would benefit from continued outpatient therapy services to address the deficits outlined in the patients goals    Return to Play: NA    Prognosis for POC: [x] Good [] Fair  [] Poor    Patient requires continued skilled intervention: [] Yes  [x] No      CHARGE CAPTURE     PT CHARGE GRID   CPT Code (TIMED) minutes # CPT Code (UNTIMED) #

## 2024-07-15 NOTE — TELEPHONE ENCOUNTER
Patient last seen 03/04/2024 and medication last filled 03/04/2024:      Assessment: Patient is a 48 y.o. female with recurrent right hip pain related to clinical diagnosis of hip spine syndrome. She does have moderate OA on xray imaging and significant abductor weakness on exam.      Impression:  Visit Diagnoses           Codes     Pain of right hip    -  Primary M25.551                Office Procedures:  Encounter Medications        Orders Placed This Encounter   Medications    celecoxib (CELEBREX) 100 MG capsule       Sig: Take 1 capsule by mouth daily       Dispense:  60 capsule       Refill:  1               Orders Placed This Encounter   Procedures    XR HIP 3-4 VW W PELVIS BILATERAL       ROOM 1     TRUE RIGHT HIP       Standing Status:   Future       Number of Occurrences:   1       Standing Expiration Date:   3/4/2025    [facility] - Mike Hua MD, Pain Management, [facility]       Referral Priority:   Routine       Referral Type:   Eval and Treat       Referral Reason:   Specialty Services Required       Referred to Provider:   Mike Hua MD       Requested Specialty:   Anesthesiology Pain Medicine       Number of Visits Requested:   1    Ambulatory referral to Physical Therapy       Referral Priority:   Routine       Referral Type:   Eval and Treat       Referral Reason:   Specialty Services Required       Requested Specialty:   Physical Therapist       Number of Visits Requested:   1         Plan:  Pertinent imaging was reviewed. The etiology, natural history, and treatment options for the disorder were discussed.  The roles of activity modification, antiinflammatory medicine, injections, bracing, physical therapy, and surgical interventions were all described to the patient and questions were answered.     We believe patient is a candidate for Physical Therapy discussed and ordered. She would like to begin abductor strengthening and pelvic conditioning program at our [place] location. She will

## 2024-07-16 RX ORDER — CELECOXIB 100 MG/1
100 CAPSULE ORAL DAILY
Qty: 60 CAPSULE | Refills: 0 | Status: SHIPPED | OUTPATIENT
Start: 2024-07-16

## 2024-07-26 DIAGNOSIS — E87.1 HYPONATREMIA: ICD-10-CM

## 2024-07-26 NOTE — TELEPHONE ENCOUNTER
Recent Visits  Date Type Provider Dept   02/07/24 Office Visit Praveena Santoyo APRN - CNP [facility]   01/17/24 Office Visit Praveena Santoyo APRN - CNP [facility]   03/15/23 Office Visit Praveena Santoyo APRN - CNP [facility]   Showing recent visits within past 540 days with a meds authorizing provider and meeting all other requirements  Future Appointments  No visits were found meeting these conditions.  Showing future appointments within next 150 days with a meds authorizing provider and meeting all other requirements     2/7/2024     Requested Prescriptions     Pending Prescriptions Disp Refills    sodium bicarbonate 650 MG tablet [Pharmacy Med Name: Sodium Bicarbonate Oral Tablet 650 MG] 90 tablet 0     Sig: TAKE 1 TABLET BY MOUTH 2 TIMES A DAY

## 2024-07-29 RX ORDER — SODIUM BICARBONATE 650 MG/1
650 TABLET ORAL 2 TIMES DAILY
Qty: 90 TABLET | Refills: 0 | Status: SHIPPED | OUTPATIENT
Start: 2024-07-29

## 2024-08-30 DIAGNOSIS — I10 ESSENTIAL HYPERTENSION: ICD-10-CM

## 2024-08-30 NOTE — TELEPHONE ENCOUNTER
Recent Visits  Date Type Provider Dept   02/07/24 Office Visit Praveena Santoyo APRN - CNP [facility]   01/17/24 Office Visit Praveena Santoyo APRN - CNP [facility]   03/15/23 Office Visit Praveena Santoyo APRN - CNP [facility]   Showing recent visits within past 540 days with a meds authorizing provider and meeting all other requirements  Future Appointments  No visits were found meeting these conditions.  Showing future appointments within next 150 days with a meds authorizing provider and meeting all other requirements     2/7/2024

## 2024-09-01 DIAGNOSIS — E55.9 VITAMIN D DEFICIENCY: ICD-10-CM

## 2024-09-01 NOTE — TELEPHONE ENCOUNTER
Recent Visits  Date Type Provider Dept   02/07/24 Office Visit Praveena Santoyo APRN - CNP [facility]   01/17/24 Office Visit Praveena Santoyo APRN - CNP [facility]   03/15/23 Office Visit Praveena Santoyo APRN - CNP [facility]   Showing recent visits within past 540 days with a meds authorizing provider and meeting all other requirements  Future Appointments  No visits were found meeting these conditions.  Showing future appointments within next 150 days with a meds authorizing provider and meeting all other requirements     2/7/2024

## 2024-09-02 RX ORDER — ERGOCALCIFEROL 1.25 MG/1
CAPSULE, LIQUID FILLED ORAL
Qty: 12 CAPSULE | Refills: 0 | Status: SHIPPED | OUTPATIENT
Start: 2024-09-02

## 2024-09-02 RX ORDER — LISINOPRIL 10 MG/1
10 TABLET ORAL DAILY
Qty: 90 TABLET | Refills: 0 | Status: SHIPPED | OUTPATIENT
Start: 2024-09-02

## 2024-09-13 DIAGNOSIS — E87.1 HYPONATREMIA: ICD-10-CM

## 2024-09-13 DIAGNOSIS — E78.2 MIXED HYPERLIPIDEMIA: ICD-10-CM

## 2024-09-13 RX ORDER — CELECOXIB 100 MG/1
100 CAPSULE ORAL DAILY
Qty: 60 CAPSULE | Refills: 0 | Status: SHIPPED | OUTPATIENT
Start: 2024-09-13 | End: 2024-11-08

## 2024-09-13 NOTE — TELEPHONE ENCOUNTER
Requested Prescriptions     Pending Prescriptions Disp Refills    celecoxib (CELEBREX) 100 MG capsule [Pharmacy Med Name: Celecoxib Oral Capsule 100 MG] 60 capsule 0     Sig: Take 1 capsule by mouth daily     Last OV: 3.4.24  Last filled: 7.16.24    Assessment: Patient is a 48 y.o. female with recurrent right hip pain related to clinical diagnosis of hip spine syndrome. She does have moderate OA on xray imaging and significant abductor weakness on exam.      Impression:  Visit Diagnoses           Codes     Pain of right hip    -  Primary M25.551                Office Procedures:  Encounter Medications        Orders Placed This Encounter   Medications    celecoxib (CELEBREX) 100 MG capsule       Sig: Take 1 capsule by mouth daily       Dispense:  60 capsule       Refill:  1               Orders Placed This Encounter   Procedures    XR HIP 3-4 VW W PELVIS BILATERAL       ROOM 1     TRUE RIGHT HIP       Standing Status:   Future       Number of Occurrences:   1       Standing Expiration Date:   3/4/2025    [facility] - Mike Hua MD, Pain Management, [facility]       Referral Priority:   Routine       Referral Type:   Eval and Treat       Referral Reason:   Specialty Services Required       Referred to Provider:   Mike Hua MD       Requested Specialty:   Anesthesiology Pain Medicine       Number of Visits Requested:   1    Ambulatory referral to Physical Therapy       Referral Priority:   Routine       Referral Type:   Eval and Treat       Referral Reason:   Specialty Services Required       Requested Specialty:   Physical Therapist       Number of Visits Requested:   1         Plan:  Pertinent imaging was reviewed. The etiology, natural history, and treatment options for the disorder were discussed.  The roles of activity modification, antiinflammatory medicine, injections, bracing, physical therapy, and surgical interventions were all described to the patient and questions were answered.     We believe patient

## 2024-09-14 DIAGNOSIS — E87.1 HYPONATREMIA: Primary | ICD-10-CM

## 2024-09-14 RX ORDER — SIMVASTATIN 20 MG
20 TABLET ORAL DAILY
Qty: 90 TABLET | Refills: 0 | Status: SHIPPED | OUTPATIENT
Start: 2024-09-14

## 2024-09-20 DIAGNOSIS — E87.1 HYPONATREMIA: ICD-10-CM

## 2024-09-21 LAB — SODIUM SERPL-SCNC: 133 MMOL/L (ref 136–145)

## 2024-09-21 RX ORDER — SODIUM BICARBONATE 650 MG/1
650 TABLET ORAL 2 TIMES DAILY
Qty: 180 TABLET | Refills: 0 | Status: SHIPPED | OUTPATIENT
Start: 2024-09-21

## 2024-09-30 RX ORDER — OXCARBAZEPINE 600 MG/1
600 TABLET, FILM COATED ORAL 2 TIMES DAILY
Qty: 180 TABLET | Refills: 0 | Status: SHIPPED | OUTPATIENT
Start: 2024-09-30

## 2024-09-30 NOTE — TELEPHONE ENCOUNTER
Recent Visits  Date Type Provider Dept   02/07/24 Office Visit Praveena Santoyo APRN - CNP [facility]   01/17/24 Office Visit Praveena Santoyo APRN - CNP [facility]   Showing recent visits within past 540 days with a meds authorizing provider and meeting all other requirements  Future Appointments  No visits were found meeting these conditions.  Showing future appointments within next 150 days with a meds authorizing provider and meeting all other requirements     2/7/2024

## 2024-10-24 NOTE — TELEPHONE ENCOUNTER
Recent Visits  Date Type Provider Dept   02/07/24 Office Visit Praveena Santoyo APRN - CNP [facility]   01/17/24 Office Visit Praveena Santoyo APRN - CNP [facility]   Showing recent visits within past 540 days with a meds authorizing provider and meeting all other requirements  Future Appointments  No visits were found meeting these conditions.  Showing future appointments within next 150 days with a meds authorizing provider and meeting all other requirements     2/7/2024     Patient needs an appointment

## 2024-10-28 RX ORDER — RISPERIDONE 4 MG/1
4 TABLET ORAL NIGHTLY
Qty: 90 TABLET | Refills: 0 | Status: SHIPPED | OUTPATIENT
Start: 2024-10-28 | End: 2024-10-31 | Stop reason: SDUPTHER

## 2024-10-30 SDOH — ECONOMIC STABILITY: INCOME INSECURITY: HOW HARD IS IT FOR YOU TO PAY FOR THE VERY BASICS LIKE FOOD, HOUSING, MEDICAL CARE, AND HEATING?: SOMEWHAT HARD

## 2024-10-30 SDOH — ECONOMIC STABILITY: FOOD INSECURITY: WITHIN THE PAST 12 MONTHS, YOU WORRIED THAT YOUR FOOD WOULD RUN OUT BEFORE YOU GOT MONEY TO BUY MORE.: NEVER TRUE

## 2024-10-30 SDOH — ECONOMIC STABILITY: FOOD INSECURITY: WITHIN THE PAST 12 MONTHS, THE FOOD YOU BOUGHT JUST DIDN'T LAST AND YOU DIDN'T HAVE MONEY TO GET MORE.: NEVER TRUE

## 2024-10-30 SDOH — ECONOMIC STABILITY: TRANSPORTATION INSECURITY
IN THE PAST 12 MONTHS, HAS LACK OF TRANSPORTATION KEPT YOU FROM MEETINGS, WORK, OR FROM GETTING THINGS NEEDED FOR DAILY LIVING?: NO

## 2024-10-31 ENCOUNTER — OFFICE VISIT (OUTPATIENT)
Dept: INTERNAL MEDICINE CLINIC | Age: 49
End: 2024-10-31

## 2024-10-31 VITALS
WEIGHT: 293 LBS | SYSTOLIC BLOOD PRESSURE: 118 MMHG | BODY MASS INDEX: 53.64 KG/M2 | HEART RATE: 86 BPM | OXYGEN SATURATION: 98 % | DIASTOLIC BLOOD PRESSURE: 60 MMHG

## 2024-10-31 DIAGNOSIS — G43.009 MIGRAINE WITHOUT AURA AND WITHOUT STATUS MIGRAINOSUS, NOT INTRACTABLE: ICD-10-CM

## 2024-10-31 DIAGNOSIS — E78.2 MIXED HYPERLIPIDEMIA: ICD-10-CM

## 2024-10-31 DIAGNOSIS — Z12.31 ENCOUNTER FOR SCREENING MAMMOGRAM FOR MALIGNANT NEOPLASM OF BREAST: ICD-10-CM

## 2024-10-31 DIAGNOSIS — Z87.891 PERSONAL HISTORY OF TOBACCO USE: ICD-10-CM

## 2024-10-31 DIAGNOSIS — F17.219 CIGARETTE NICOTINE DEPENDENCE WITH NICOTINE-INDUCED DISORDER: ICD-10-CM

## 2024-10-31 DIAGNOSIS — R06.02 SHORTNESS OF BREATH: ICD-10-CM

## 2024-10-31 DIAGNOSIS — R60.0 LOCALIZED EDEMA: Primary | ICD-10-CM

## 2024-10-31 DIAGNOSIS — I10 ESSENTIAL HYPERTENSION: ICD-10-CM

## 2024-10-31 DIAGNOSIS — Z23 FLU VACCINE NEED: ICD-10-CM

## 2024-10-31 RX ORDER — RISPERIDONE 4 MG/1
4 TABLET ORAL NIGHTLY
Qty: 90 TABLET | Refills: 0 | OUTPATIENT
Start: 2024-10-31

## 2024-10-31 RX ORDER — RISPERIDONE 4 MG/1
4 TABLET ORAL NIGHTLY
Qty: 90 TABLET | Refills: 0 | Status: SHIPPED | OUTPATIENT
Start: 2024-10-31

## 2024-10-31 RX ORDER — OXCARBAZEPINE 600 MG/1
600 TABLET, FILM COATED ORAL 2 TIMES DAILY
Qty: 180 TABLET | Refills: 0 | Status: SHIPPED | OUTPATIENT
Start: 2024-10-31

## 2024-10-31 RX ORDER — FUROSEMIDE 20 MG/1
TABLET ORAL
Qty: 60 TABLET | Refills: 1 | Status: SHIPPED | OUTPATIENT
Start: 2024-10-31

## 2024-10-31 RX ORDER — RIMEGEPANT SULFATE 75 MG/75MG
1 TABLET, ORALLY DISINTEGRATING ORAL EVERY OTHER DAY
Qty: 16 TABLET | Refills: 1 | Status: SHIPPED | OUTPATIENT
Start: 2024-10-31

## 2024-10-31 RX ORDER — SIMVASTATIN 20 MG
20 TABLET ORAL DAILY
Qty: 90 TABLET | Refills: 0 | Status: SHIPPED | OUTPATIENT
Start: 2024-10-31

## 2024-10-31 RX ORDER — IPRATROPIUM BROMIDE AND ALBUTEROL SULFATE 2.5; .5 MG/3ML; MG/3ML
1 SOLUTION RESPIRATORY (INHALATION) ONCE
Status: COMPLETED | OUTPATIENT
Start: 2024-10-31 | End: 2024-10-31

## 2024-10-31 RX ORDER — LISINOPRIL 10 MG/1
10 TABLET ORAL DAILY
Qty: 90 TABLET | Refills: 0 | Status: SHIPPED | OUTPATIENT
Start: 2024-10-31

## 2024-10-31 RX ADMIN — IPRATROPIUM BROMIDE AND ALBUTEROL SULFATE 1 DOSE: 2.5; .5 SOLUTION RESPIRATORY (INHALATION) at 10:01

## 2024-10-31 ASSESSMENT — ANXIETY QUESTIONNAIRES
2. NOT BEING ABLE TO STOP OR CONTROL WORRYING: NOT AT ALL
6. BECOMING EASILY ANNOYED OR IRRITABLE: NOT AT ALL
5. BEING SO RESTLESS THAT IT IS HARD TO SIT STILL: NOT AT ALL
GAD7 TOTAL SCORE: 0
4. TROUBLE RELAXING: NOT AT ALL
1. FEELING NERVOUS, ANXIOUS, OR ON EDGE: NOT AT ALL
7. FEELING AFRAID AS IF SOMETHING AWFUL MIGHT HAPPEN: NOT AT ALL
IF YOU CHECKED OFF ANY PROBLEMS ON THIS QUESTIONNAIRE, HOW DIFFICULT HAVE THESE PROBLEMS MADE IT FOR YOU TO DO YOUR WORK, TAKE CARE OF THINGS AT HOME, OR GET ALONG WITH OTHER PEOPLE: NOT DIFFICULT AT ALL
3. WORRYING TOO MUCH ABOUT DIFFERENT THINGS: NOT AT ALL

## 2024-10-31 ASSESSMENT — PATIENT HEALTH QUESTIONNAIRE - PHQ9
SUM OF ALL RESPONSES TO PHQ QUESTIONS 1-9: 4
SUM OF ALL RESPONSES TO PHQ QUESTIONS 1-9: 4
4. FEELING TIRED OR HAVING LITTLE ENERGY: MORE THAN HALF THE DAYS
10. IF YOU CHECKED OFF ANY PROBLEMS, HOW DIFFICULT HAVE THESE PROBLEMS MADE IT FOR YOU TO DO YOUR WORK, TAKE CARE OF THINGS AT HOME, OR GET ALONG WITH OTHER PEOPLE: SOMEWHAT DIFFICULT
7. TROUBLE CONCENTRATING ON THINGS, SUCH AS READING THE NEWSPAPER OR WATCHING TELEVISION: NOT AT ALL
SUM OF ALL RESPONSES TO PHQ QUESTIONS 1-9: 4
8. MOVING OR SPEAKING SO SLOWLY THAT OTHER PEOPLE COULD HAVE NOTICED. OR THE OPPOSITE, BEING SO FIGETY OR RESTLESS THAT YOU HAVE BEEN MOVING AROUND A LOT MORE THAN USUAL: NOT AT ALL
3. TROUBLE FALLING OR STAYING ASLEEP: NOT AT ALL
5. POOR APPETITE OR OVEREATING: NOT AT ALL
6. FEELING BAD ABOUT YOURSELF - OR THAT YOU ARE A FAILURE OR HAVE LET YOURSELF OR YOUR FAMILY DOWN: NOT AT ALL
SUM OF ALL RESPONSES TO PHQ QUESTIONS 1-9: 4
SUM OF ALL RESPONSES TO PHQ9 QUESTIONS 1 & 2: 2
2. FEELING DOWN, DEPRESSED OR HOPELESS: SEVERAL DAYS
9. THOUGHTS THAT YOU WOULD BE BETTER OFF DEAD, OR OF HURTING YOURSELF: NOT AT ALL
1. LITTLE INTEREST OR PLEASURE IN DOING THINGS: SEVERAL DAYS

## 2024-10-31 ASSESSMENT — VISUAL ACUITY: OU: 1

## 2024-10-31 ASSESSMENT — ENCOUNTER SYMPTOMS
ALLERGIC/IMMUNOLOGIC NEGATIVE: 1
EYES NEGATIVE: 1
COUGH: 1
GASTROINTESTINAL NEGATIVE: 1

## 2024-10-31 NOTE — PATIENT INSTRUCTIONS
could benefit from screening, first find out if you are at high risk for lung cancer. Your doctor can help you decide your lung cancer risk.  What are the risks of screening?  CT screening for lung cancer isn't perfect. It can show an abnormal result when it turns out there wasn't any cancer. This is called a false-positive result. This means you may need more tests to make sure you don't have cancer. These tests can be harmful and cause a lot of worry.  These tests may include more CT scans and invasive testing like a lung biopsy. In a biopsy, the doctor takes a sample of tissue from inside your lung so it can be looked at under a microscope. A biopsy is the only way to tell if you have lung cancer. If the biopsy finds cancer, you and your doctor will have to decide how or whether to treat it.  Some lung cancers found on CT scans are harmless and would not have caused a problem if they had not been found through screening. But because doctors can't tell which ones will turn out to be harmless, most will be treated. This means that you may get treatment--including surgery, radiation, or chemotherapy--that you don't need.  There is a risk of damage to cells or tissue from being exposed to radiation, including the small amounts used in CTs, X-rays, and other medical tests. Over time, exposure to radiation may cause cancer and other health problems. But in most cases, the risk of getting cancer from being exposed to small amounts of radiation is low. It's not a reason to avoid these tests for most people.  What are the benefits of screening?  Your scan may be normal (negative).  For some people who are at higher risk, screening lowers the chance of dying of lung cancer. How much and how long you smoked helps to determine your risk level. Screening can find some cancers early, when treatment may be more likely to work.  What happens after screening?  The results of your CT scan will be sent to your doctor. Someone from

## 2024-10-31 NOTE — PROGRESS NOTES
aligned appropriately.   Cardiovascular:      Rate and Rhythm: Normal rate and regular rhythm.      Heart sounds: Normal heart sounds.   Pulmonary:      Breath sounds: Decreased air movement present. Decreased breath sounds present.      Comments: Decreased breath sounds noted in bases bilateral, with albuterol treatment, breath sounds returned clear without wheezing  Musculoskeletal:      Right lower leg: Edema present.      Left lower leg: Edema present.   Neurological:      Mental Status: She is alert.   Psychiatric:         Mood and Affect: Mood is anxious and depressed.      Comments:   Continues to smoke a pack and half per day, states that she smokes in the house.  Afraid that she may have heart trouble because her father  at age 59.  Extremely depressed and anxious.            On this date 10/31/2024 I have spent 25 minutes reviewing previous notes, test results and face to face with the patient discussing the diagnosis and importance of compliance with the treatment plan as well as documenting on the day of the visit.      An electronic signature was used to authenticate this note.    --HARRISON Vergara - CNP Discussed with the patient the current USPSTF guidelines released 2021 for screening for lung cancer.    For adults aged 50 to 80 years who have a 20 pack-year smoking history and currently smoke or have quit within the past 15 years the grade B recommendation is to:  Screen for lung cancer with low-dose computed tomography (LDCT) every year.  Stop screening once a person has not smoked for 15 years or has a health problem that limits life expectancy or the ability to have lung surgery.    The patient  reports that she has been smoking cigarettes. She started smoking about 37 years ago. She has a 37.8 pack-year smoking history. She has been exposed to tobacco smoke. She has never used smokeless tobacco.. Discussed with patient the risks and benefits of screening, including

## 2024-11-04 DIAGNOSIS — I10 ESSENTIAL HYPERTENSION: ICD-10-CM

## 2024-11-04 LAB
ALBUMIN SERPL-MCNC: 4.3 G/DL (ref 3.4–5)
ALBUMIN/GLOB SERPL: 1.9 {RATIO} (ref 1.1–2.2)
ALP SERPL-CCNC: 107 U/L (ref 40–129)
ALT SERPL-CCNC: 26 U/L (ref 10–40)
ANION GAP SERPL CALCULATED.3IONS-SCNC: 12 MMOL/L (ref 3–16)
AST SERPL-CCNC: 21 U/L (ref 15–37)
BILIRUB SERPL-MCNC: 0.3 MG/DL (ref 0–1)
BUN SERPL-MCNC: 7 MG/DL (ref 7–20)
CALCIUM SERPL-MCNC: 9.7 MG/DL (ref 8.3–10.6)
CHLORIDE SERPL-SCNC: 95 MMOL/L (ref 99–110)
CHOLEST SERPL-MCNC: 173 MG/DL (ref 0–199)
CO2 SERPL-SCNC: 25 MMOL/L (ref 21–32)
CREAT SERPL-MCNC: 0.5 MG/DL (ref 0.6–1.1)
GFR SERPLBLD CREATININE-BSD FMLA CKD-EPI: >90 ML/MIN/{1.73_M2}
GLUCOSE SERPL-MCNC: 95 MG/DL (ref 70–99)
HDLC SERPL-MCNC: 57 MG/DL (ref 40–60)
LDL CHOLESTEROL: 96 MG/DL
POTASSIUM SERPL-SCNC: 4.5 MMOL/L (ref 3.5–5.1)
PROT SERPL-MCNC: 6.6 G/DL (ref 6.4–8.2)
SODIUM SERPL-SCNC: 132 MMOL/L (ref 136–145)
TRIGL SERPL-MCNC: 98 MG/DL (ref 0–150)
VLDLC SERPL CALC-MCNC: 20 MG/DL

## 2024-11-06 DIAGNOSIS — E87.1 HYPONATREMIA: ICD-10-CM

## 2024-11-06 LAB — LPA SERPL-MCNC: <6 MG/DL

## 2024-11-06 RX ORDER — SODIUM BICARBONATE 650 MG/1
650 TABLET ORAL 2 TIMES DAILY
Qty: 120 TABLET | Refills: 1 | Status: SHIPPED | OUTPATIENT
Start: 2024-11-06

## 2024-11-06 NOTE — TELEPHONE ENCOUNTER
Recent Visits  Date Type Provider Dept   10/31/24 Office Visit Praveena Santoyo APRN - CNP [facility]   02/07/24 Office Visit Praveena Santoyo APRN - CNP [facility]   01/17/24 Office Visit Praveena Santoyo APRN - CNP [facility]   Showing recent visits within past 540 days with a meds authorizing provider and meeting all other requirements  Future Appointments  Date Type Provider Dept   01/02/25 Appointment Praveena Santoyo APRN - CNP [facility]   Showing future appointments within next 150 days with a meds authorizing provider and meeting all other requirements     10/31/2024

## 2024-11-07 ENCOUNTER — HOSPITAL ENCOUNTER (OUTPATIENT)
Dept: SLEEP CENTER | Age: 49
Discharge: HOME | End: 2024-11-07
Payer: COMMERCIAL

## 2024-11-07 DIAGNOSIS — R09.02 HYPOXEMIA: ICD-10-CM

## 2024-11-07 DIAGNOSIS — G47.33 OSA (OBSTRUCTIVE SLEEP APNEA): ICD-10-CM

## 2024-11-07 PROCEDURE — 95811 POLYSOM 6/>YRS CPAP 4/> PARM: CPT

## 2024-11-07 NOTE — TELEPHONE ENCOUNTER
Requested Prescriptions     Pending Prescriptions Disp Refills    celecoxib (CELEBREX) 100 MG capsule [Pharmacy Med Name: Celecoxib Oral Capsule 100 MG] 60 capsule 0     Sig: TAKE 1 CAPSULE BY MOUTH EVERY DAY     Last OV:3.4.24  Last filled: 9.13.24    Assessment: Patient is a 48 y.o. female with recurrent right hip pain related to clinical diagnosis of hip spine syndrome. She does have moderate OA on xray imaging and significant abductor weakness on exam.      Impression:  Visit Diagnoses           Codes     Pain of right hip    -  Primary M25.551                Office Procedures:  Encounter Medications        Orders Placed This Encounter   Medications    celecoxib (CELEBREX) 100 MG capsule       Sig: Take 1 capsule by mouth daily       Dispense:  60 capsule       Refill:  1               Orders Placed This Encounter   Procedures    XR HIP 3-4 VW W PELVIS BILATERAL       ROOM 1     TRUE RIGHT HIP       Standing Status:   Future       Number of Occurrences:   1       Standing Expiration Date:   3/4/2025    [facility] - Mike Hua MD, Pain Management, [facility]       Referral Priority:   Routine       Referral Type:   Eval and Treat       Referral Reason:   Specialty Services Required       Referred to Provider:   Mike Hua MD       Requested Specialty:   Anesthesiology Pain Medicine       Number of Visits Requested:   1    Ambulatory referral to Physical Therapy       Referral Priority:   Routine       Referral Type:   Eval and Treat       Referral Reason:   Specialty Services Required       Requested Specialty:   Physical Therapist       Number of Visits Requested:   1         Plan:  Pertinent imaging was reviewed. The etiology, natural history, and treatment options for the disorder were discussed.  The roles of activity modification, antiinflammatory medicine, injections, bracing, physical therapy, and surgical interventions were all described to the patient and questions were answered.     We believe

## 2024-11-08 RX ORDER — CELECOXIB 100 MG/1
CAPSULE ORAL DAILY
Qty: 60 CAPSULE | Refills: 0 | Status: SHIPPED | OUTPATIENT
Start: 2024-11-08

## 2024-11-11 NOTE — PROGRESS NOTES
Veronica Zepeda         : 1975  [] [facility]     [] [facility]      [] [facility]     [facility]    [] [facility]  [] [facility]  [] [facility]   [] [facility] [] Other:  Diagnosis: [x] ARYA (G47.33) [] CSA (G47.31) [] Apnea (G47.30)   Length of Need: [] 12 Months [x] 99 Months [] Other:    Machine (KATHARINE!):  [x] ResMed AirSense     Auto [] Other:     [x]  CPAP () [] Bilevel ()   Mode: [x] Auto [] Spontaneous    Mode: [] Auto [] Spontaneous                          20/13 cm     Comfort Settings:       - Ramp Pressure: 7cmH2O                                        - Ramp time: 15 min                                     -  Flex/EPR - 3 full time  If the order is for BiLevel:                                    - For ResMed Bilevel (TiMax-4 sec   TiMin- 0.2 sec)     Humidifier: [x] Heated ()        [x] Water chamber replacement ()/ 1 per 6 months        Mask:  Please always start with the mask the patient used during the titraion    [x] Full Face () /1 per 3 months    [x] Patient Choice - Size and fit mask    [] Dispense: medium Airfit F20     [x] Headgear () / 1 per 3 months    [x] Interface Replacement ()/1 per month            Tubing: [x] Heated ()/1 per 3 months    [] Standard ()/1 per 3 months [] Other:           Filters: [x] Non-disposable ()/1 per 6 months     [x] Ultra-Fine, Disposable ()/2 per month        Miscellaneous: [x] Chin Strap ()/ 1 per 6 months [] O2 bleed-in:       LPM   [] Oximetry on CPAP/Bilevel []  Other:          Start Order Date: 24    MEDICAL JUSTIFICATION:  I, the undersigned, certify that the above prescribed supplies are medically necessary for this patient’s wellbeing.  In my opinion, the supplies are both reasonable and necessary in reference to accepted standards of medicalpractice in treatment of this patient’s condition.    Hanh Loaiza MD      NPI: [id number]       Order Signed Date:

## 2024-11-13 ENCOUNTER — HOSPITAL ENCOUNTER (OUTPATIENT)
Dept: CT IMAGING | Age: 49
Discharge: HOME | End: 2024-11-13
Payer: COMMERCIAL

## 2024-11-13 DIAGNOSIS — Z87.891 PERSONAL HISTORY OF TOBACCO USE: ICD-10-CM

## 2024-11-13 PROCEDURE — 71271 CT THORAX LUNG CANCER SCR C-: CPT

## 2024-11-14 ENCOUNTER — HOSPITAL ENCOUNTER (OUTPATIENT)
Dept: WOMENS IMAGING | Age: 49
Discharge: HOME | End: 2024-11-14
Payer: COMMERCIAL

## 2024-11-14 ENCOUNTER — TELEPHONE (OUTPATIENT)
Dept: PULMONOLOGY | Age: 49
End: 2024-11-14

## 2024-11-14 VITALS — BODY MASS INDEX: 54.5 KG/M2 | HEIGHT: 62 IN

## 2024-11-14 DIAGNOSIS — Z12.31 ENCOUNTER FOR SCREENING MAMMOGRAM FOR MALIGNANT NEOPLASM OF BREAST: ICD-10-CM

## 2024-11-14 PROCEDURE — 77063 BREAST TOMOSYNTHESIS BI: CPT

## 2024-11-14 NOTE — TELEPHONE ENCOUNTER
PSG with BiPAP  Sleep study showed very severe ARYA (on a scale of mild, moderate and severe).  AHI was 101.1  per hr. (Average times per hr breathing was obstructed).  O2 Desaturations to 76% (lowest o2)   Dr Recommends:    Follow up with the patient's sleep physician to discuss results      Avoid sedatives, alcohol and caffeinated drinks at bedtime.    Recommend:  BiPAP 21/13         Avoid driving while sleepy.       3/20/90360:  Lost her insurance last year, therefore she had to [place] [facility] [place]     LMOM to call

## 2024-11-15 ENCOUNTER — PATIENT MESSAGE (OUTPATIENT)
Dept: PULMONOLOGY | Age: 49
End: 2024-11-15

## 2024-11-15 ENCOUNTER — OFFICE VISIT (OUTPATIENT)
Dept: CARDIOLOGY CLINIC | Age: 49
End: 2024-11-15
Payer: COMMERCIAL

## 2024-11-15 VITALS
WEIGHT: 293 LBS | HEART RATE: 80 BPM | BODY MASS INDEX: 53.92 KG/M2 | SYSTOLIC BLOOD PRESSURE: 126 MMHG | DIASTOLIC BLOOD PRESSURE: 74 MMHG | OXYGEN SATURATION: 97 % | HEIGHT: 62 IN

## 2024-11-15 DIAGNOSIS — R06.02 SHORTNESS OF BREATH: ICD-10-CM

## 2024-11-15 DIAGNOSIS — R07.9 CHEST PAIN, UNSPECIFIED TYPE: ICD-10-CM

## 2024-11-15 DIAGNOSIS — G47.33 OSA (OBSTRUCTIVE SLEEP APNEA): ICD-10-CM

## 2024-11-15 DIAGNOSIS — I10 PRIMARY HYPERTENSION: ICD-10-CM

## 2024-11-15 DIAGNOSIS — R07.89 CHEST PRESSURE: ICD-10-CM

## 2024-11-15 DIAGNOSIS — R60.0 LOCALIZED EDEMA: ICD-10-CM

## 2024-11-15 DIAGNOSIS — Z72.0 TOBACCO ABUSE DISORDER: ICD-10-CM

## 2024-11-15 DIAGNOSIS — R09.89 CAROTID BRUIT, UNSPECIFIED LATERALITY: ICD-10-CM

## 2024-11-15 DIAGNOSIS — R06.02 SOB (SHORTNESS OF BREATH): Primary | ICD-10-CM

## 2024-11-15 DIAGNOSIS — E87.1 HYPONATREMIA: ICD-10-CM

## 2024-11-15 DIAGNOSIS — E78.00 HYPERCHOLESTEROLEMIA: ICD-10-CM

## 2024-11-15 PROCEDURE — 93000 ELECTROCARDIOGRAM COMPLETE: CPT | Performed by: INTERNAL MEDICINE

## 2024-11-15 PROCEDURE — 3078F DIAST BP <80 MM HG: CPT | Performed by: INTERNAL MEDICINE

## 2024-11-15 PROCEDURE — 3074F SYST BP LT 130 MM HG: CPT | Performed by: INTERNAL MEDICINE

## 2024-11-15 PROCEDURE — 99204 OFFICE O/P NEW MOD 45 MIN: CPT | Performed by: INTERNAL MEDICINE

## 2024-11-15 RX ORDER — ARIPIPRAZOLE 5 MG/1
5 TABLET ORAL DAILY
Qty: 90 TABLET | Refills: 0 | Status: SHIPPED | OUTPATIENT
Start: 2024-11-15

## 2024-11-15 NOTE — ASSESSMENT & PLAN NOTE
Chronic bilateral leg edema by report with worsening over the past 2 weeks  Suspect etiology is multifactorial, in part related to use of salt tabs for hyponatremia recently  Examination also demonstrates lymphedema, recommend referral to PT lymphedema clinic  Given recent metabolic derangement on basic metabolic panel, recommend repeating BMP before adjusting diuretic dose  Advised patient to elevate legs is much as possible when seated for prolonged periods  Limited ambulation make also contribute to lower extremity edema, advised moving is much as possible  Encourage compliance with sleep apnea treatment for very severe sleep apnea  Weight loss also discussed

## 2024-11-15 NOTE — TELEPHONE ENCOUNTER
Recent Visits  Date Type Provider Dept   10/31/24 Office Visit Praveena Santoyo APRN - CNP [facility]   02/07/24 Office Visit Praveena Santoyo APRN - CNP [facility]   01/17/24 Office Visit Praveena Santoyo APRN - CNP [facility]   Showing recent visits within past 540 days with a meds authorizing provider and meeting all other requirements  Future Appointments  Date Type Provider Dept   01/02/25 Appointment Praveena Santoyo APRN - CNP [facility]   Showing future appointments within next 150 days with a meds authorizing provider and meeting all other requirements     10/31/2024

## 2024-11-15 NOTE — ASSESSMENT & PLAN NOTE
Very severe sleep apnea by recent sleep study  Per documentation, patient was not wearing mask last year due to insurance issues  Patient states she is currently wearing her mask compliantly

## 2024-11-15 NOTE — ASSESSMENT & PLAN NOTE
Atypical chest tightness not made worse with positional changes, deep breathing or exertion  Risk factors for CAD  Offered patient option of being seen in the ED as she describes persistent active symptoms  Patient declines going to the ED, no acute distress  ECG in office today nonacute  Offered stress testing for ischemic evaluation, patient would like to proceed  Seek emergent medical care for severe worsening discomfort  Consider low-dose aspirin 81 mg daily, continue statin

## 2024-11-15 NOTE — TELEPHONE ENCOUNTER
The patient has been notified of this information and all questions answered.    DME list sent in [employer]

## 2024-11-15 NOTE — PROGRESS NOTES
GENERAL CARDIOLOGY    REFERRING PROVIDER  Praveena Santoyo APRN - CNP     CHIEF COMPLAINT  SOB and Localized edema       HPI    Veronica Zepeda is a 49 y.o. female presents to the clinic for referral for shortness of breath and new localized edema.     Medical history reviewed, significant for hypertension, hyperlipidemia, ARYA, GERD, and is a smoker.     Today, the patient reports:  Bilateral lower extremity edema has been chronic, worse over the past 2 weeks  Recent laboratories with low serum sodium, started salt tablets and has been salting food over the past 2 weeks  States she sits a lot, offers limited mobility due to leg pain, is not wheelchair-bound or requiring ambulatory assistance  Reports several week history of chest tightness located on the bilateral lateral aspects of the chest not made worse with deep breathing, positional changes or exertion  Does not offer symptoms of palpitations, presyncope, syncope  She is compliant with sleep apnea mask use    ASSESSMENT AND PLAN  SOB (shortness of breath)  Etiology of dyspnea probably multifactorial  Chronic smoker, recommend PFTs  Echocardiogram to evaluate for function and structure  Pharmacologic stress test for ischemic evaluation        Localized edema  Chronic bilateral leg edema by report with worsening over the past 2 weeks  Suspect etiology is multifactorial, in part related to use of salt tabs for hyponatremia recently  Examination also demonstrates lymphedema, recommend referral to PT lymphedema clinic  Given recent metabolic derangement on basic metabolic panel, recommend repeating BMP before adjusting diuretic dose  Advised patient to elevate legs is much as possible when seated for prolonged periods  Limited ambulation make also contribute to lower extremity edema, advised moving is much as possible  Encourage compliance with sleep apnea treatment for very severe sleep apnea  Weight loss also discussed    Primary hypertension  BP

## 2024-11-15 NOTE — PATIENT INSTRUCTIONS
Echocardiogram (Ultrasound of heart)    Stress test (Chemical)    Referral to Lymphedema clinic    PFT (lung testing)    Carotid US (neck arteries)    Labs  BMP

## 2024-11-15 NOTE — ASSESSMENT & PLAN NOTE
Etiology of dyspnea probably multifactorial  Chronic smoker, recommend PFTs  Echocardiogram to evaluate for function and structure  Pharmacologic stress test for ischemic evaluation

## 2024-11-18 ENCOUNTER — TELEPHONE (OUTPATIENT)
Dept: INTERNAL MEDICINE CLINIC | Age: 49
End: 2024-11-18

## 2024-11-18 DIAGNOSIS — I10 PRIMARY HYPERTENSION: ICD-10-CM

## 2024-11-18 NOTE — TELEPHONE ENCOUNTER
Patient says Rhiannon Johnson needs an order faxed in for a PET Scan. The last order was from Feb. 2023.   Patient advises she has been to the Cardiologist and is moving around more.

## 2024-11-19 DIAGNOSIS — R60.0 LOCALIZED EDEMA: Primary | ICD-10-CM

## 2024-11-19 LAB
ANION GAP SERPL CALCULATED.3IONS-SCNC: 15 MMOL/L (ref 3–16)
BUN SERPL-MCNC: 6 MG/DL (ref 7–20)
CALCIUM SERPL-MCNC: 9.7 MG/DL (ref 8.3–10.6)
CHLORIDE SERPL-SCNC: 96 MMOL/L (ref 99–110)
CO2 SERPL-SCNC: 23 MMOL/L (ref 21–32)
CREAT SERPL-MCNC: 0.5 MG/DL (ref 0.6–1.1)
GFR SERPLBLD CREATININE-BSD FMLA CKD-EPI: >90 ML/MIN/{1.73_M2}
GLUCOSE SERPL-MCNC: 83 MG/DL (ref 70–99)
POTASSIUM SERPL-SCNC: 4.7 MMOL/L (ref 3.5–5.1)
SODIUM SERPL-SCNC: 134 MMOL/L (ref 136–145)

## 2024-11-19 RX ORDER — FUROSEMIDE 20 MG/1
20 TABLET ORAL DAILY
Qty: 90 TABLET | Refills: 1 | Status: SHIPPED | OUTPATIENT
Start: 2024-11-19

## 2024-11-19 NOTE — RESULT ENCOUNTER NOTE
I spoke with Veronica, I informed he of her recent  lab results.  I instructed that Dr. Camacho is recommending increasing the Lasix to 20mg daily instead of every other day.   I also informed her that she will need repeat lab work in 2 weeks after increasing the Lasix.   She is agreeable . Orders placed and Rx sent.

## 2024-11-20 DIAGNOSIS — R91.1 PULMONARY NODULE, RIGHT: Primary | ICD-10-CM

## 2024-11-22 RX ORDER — ALBUTEROL SULFATE 90 UG/1
4 INHALANT RESPIRATORY (INHALATION) ONCE
Status: CANCELLED | OUTPATIENT
Start: 2024-11-22

## 2024-11-22 NOTE — TELEPHONE ENCOUNTER
Recent Visits  Date Type Provider Dept   10/31/24 Office Visit Praveena Santoyo APRN - CNP [facility]   02/07/24 Office Visit Praveena Santoyo APRN - CNP [facility]   01/17/24 Office Visit Praveena Santoyo APRN - CNP [facility]   Showing recent visits within past 540 days with a meds authorizing provider and meeting all other requirements  Future Appointments  Date Type Provider Dept   01/02/25 Appointment Praveena Santoyo APRN - CNP [facility]   Showing future appointments within next 150 days with a meds authorizing provider and meeting all other requirements     10/31/2024

## 2024-11-25 ENCOUNTER — HOSPITAL ENCOUNTER (OUTPATIENT)
Dept: PULMONOLOGY | Age: 49
Discharge: HOME | End: 2024-11-25
Attending: INTERNAL MEDICINE
Payer: COMMERCIAL

## 2024-11-25 ENCOUNTER — HOSPITAL ENCOUNTER (OUTPATIENT)
Dept: VASCULAR LAB | Age: 49
Discharge: HOME | End: 2024-11-27
Attending: INTERNAL MEDICINE
Payer: COMMERCIAL

## 2024-11-25 DIAGNOSIS — R06.02 SOB (SHORTNESS OF BREATH): ICD-10-CM

## 2024-11-25 DIAGNOSIS — R09.89 CAROTID BRUIT, UNSPECIFIED LATERALITY: ICD-10-CM

## 2024-11-25 LAB
DLCO %PRED: 74 %
DLCO PRED: NORMAL
DLCO/VA %PRED: NORMAL
DLCO/VA PRED: NORMAL
DLCO/VA: NORMAL
DLCO: NORMAL
EXPIRATORY TIME-POST: NORMAL
EXPIRATORY TIME: NORMAL
FEF 25-75 %CHNG: NORMAL
FEF 25-75 POST %PRED: NORMAL
FEF 25-75% %PRED-PRE: NORMAL
FEF 25-75% PRED: NORMAL
FEF 25-75-POST: NORMAL
FEF 25-75-PRE: NORMAL
FEV1 %PRED-POST: NORMAL
FEV1 %PRED-PRE: 83 %
FEV1 PRED: NORMAL
FEV1-POST: NORMAL
FEV1-PRE: NORMAL
FEV1/FVC %PRED-POST: NORMAL
FEV1/FVC %PRED-PRE: NORMAL
FEV1/FVC PRED: NORMAL
FEV1/FVC-POST: NORMAL
FEV1/FVC-PRE: 93 %
FVC %PRED-POST: NORMAL
FVC %PRED-PRE: NORMAL
FVC PRED: NORMAL
FVC-POST: NORMAL
FVC-PRE: NORMAL
GAW %PRED: NORMAL
GAW PRED: NORMAL
GAW: NORMAL
IC PRE %PRED: NORMAL
IC PRED: NORMAL
IC: NORMAL
MEP: NORMAL
MIP: NORMAL
MVV %PRED-PRE: NORMAL
MVV PRED: NORMAL
MVV-PRE: NORMAL
PEF %PRED-POST: NORMAL
PEF %PRED-PRE: NORMAL
PEF PRED: NORMAL
PEF%CHNG: NORMAL
PEF-POST: NORMAL
PEF-PRE: NORMAL
RAW %PRED: NORMAL
RAW PRED: NORMAL
RAW: NORMAL
RV PRE %PRED: NORMAL
RV PRED: NORMAL
RV: NORMAL
SVC %PRED: NORMAL
SVC PRED: NORMAL
SVC: NORMAL
TLC PRE %PRED: 124 %
TLC PRED: NORMAL
TLC: NORMAL
VA %PRED: NORMAL
VA PRED: NORMAL
VA: NORMAL
VAS LEFT ARM BP: 122 MMHG
VAS LEFT CCA DIST EDV: 22.5 CM/S
VAS LEFT CCA DIST PSV: 77.1 CM/S
VAS LEFT CCA MID EDV: 32.9 CM/S
VAS LEFT CCA MID PSV: 123 CM/S
VAS LEFT CCA PROX EDV: 19.5 CM/S
VAS LEFT CCA PROX PSV: 143 CM/S
VAS LEFT ECA EDV: 10.6 CM/S
VAS LEFT ECA PSV: 101 CM/S
VAS LEFT ICA DIST EDV: 34.2 CM/S
VAS LEFT ICA DIST PSV: 85.1 CM/S
VAS LEFT ICA MID EDV: 37.9 CM/S
VAS LEFT ICA MID PSV: 95.1 CM/S
VAS LEFT ICA PROX EDV: 28.6 CM/S
VAS LEFT ICA PROX PSV: 73.9 CM/S
VAS LEFT ICA/CCA PSV: 0.96
VAS LEFT SUBCLAVIAN PROX PSV: 196 CM/S
VAS LEFT VERTEBRAL EDV: 14 CM/S
VAS LEFT VERTEBRAL PSV: 51.8 CM/S
VAS RIGHT ARM BP: 126 MMHG
VAS RIGHT CCA DIST EDV: 19.3 CM/S
VAS RIGHT CCA DIST PSV: 74.6 CM/S
VAS RIGHT CCA MID EDV: 19.9 CM/S
VAS RIGHT CCA MID PSV: 108 CM/S
VAS RIGHT CCA PROX EDV: 16.8 CM/S
VAS RIGHT CCA PROX PSV: 116 CM/S
VAS RIGHT ECA EDV: 13 CM/S
VAS RIGHT ECA PSV: 116 CM/S
VAS RIGHT ICA DIST EDV: 31.1 CM/S
VAS RIGHT ICA DIST PSV: 72.7 CM/S
VAS RIGHT ICA MID EDV: 31.7 CM/S
VAS RIGHT ICA MID PSV: 95.1 CM/S
VAS RIGHT ICA PROX EDV: 23.6 CM/S
VAS RIGHT ICA PROX PSV: 76.4 CM/S
VAS RIGHT ICA/CCA PSV: 1.02
VAS RIGHT SUBCLAVIAN PROX PSV: 122 CM/S
VAS RIGHT VERTEBRAL EDV: 10.6 CM/S
VAS RIGHT VERTEBRAL PSV: 72.7 CM/S
VTG %PRED: NORMAL
VTG PRED: NORMAL
VTG: NORMAL

## 2024-11-25 PROCEDURE — 94010 BREATHING CAPACITY TEST: CPT

## 2024-11-25 PROCEDURE — 94760 N-INVAS EAR/PLS OXIMETRY 1: CPT

## 2024-11-25 PROCEDURE — 94729 DIFFUSING CAPACITY: CPT

## 2024-11-25 PROCEDURE — 93880 EXTRACRANIAL BILAT STUDY: CPT | Performed by: INTERNAL MEDICINE

## 2024-11-25 PROCEDURE — 94726 PLETHYSMOGRAPHY LUNG VOLUMES: CPT

## 2024-11-25 PROCEDURE — 93880 EXTRACRANIAL BILAT STUDY: CPT

## 2024-11-25 ASSESSMENT — PULMONARY FUNCTION TESTS
FEV1_PERCENT_PREDICTED_PRE: 83
FEV1/FVC_PRE: 93

## 2024-11-26 ENCOUNTER — TELEPHONE (OUTPATIENT)
Dept: CARDIOLOGY CLINIC | Age: 49
End: 2024-11-26

## 2024-11-26 NOTE — TELEPHONE ENCOUNTER
----- Message from Dr. Sheri Camacho DO sent at 11/25/2024  6:48 PM EST -----  No blockage, let patient know   Thanks

## 2024-11-26 NOTE — PROCEDURES
Pulmonary Function Testing      Patient name:  Veronica Zepeda      Unit #:   [id number]   Date of test:  11/25/2024   Date of interpretation:   11/26/2024    Ms. Veronica Zepeda is a 49 y.o. year-old current smoker. The spirometry data were acceptable and reproducible.     Spirometry:  Flow volume loops were normal. The FEV-1/FVC ratio was normal. The pre-bronchodilator FEV-1 was 2.03 liters (83% of predicted), which was normal. The FVC was 2.67 liters (90% of predicted), which was normal. Response to inhaled bronchodilators (albuterol) was not performed.    Lung volumes:  Lung volumes were tested by plethysmography. The total lung capacity was 5.9 liters (124% of predicted), which was increased. The residual volume was 3.51 liters (206% of predicted), which was increased. The ratio of residual volume to total lung capacity (RV/TLC) was 59, which was increased.     Diffusion capacity was found to be 74% predicted which is Mildly decreased.      Interpretation:  Signs of significant hyperinflation noted. Clinical correlation is recommended.    Comments:

## 2024-11-26 NOTE — TELEPHONE ENCOUNTER
LMOM for pt with carotid doppler results per [facility]. Advised to call back with questions or concerns.

## 2024-12-03 DIAGNOSIS — R91.1 PULMONARY NODULE, RIGHT: ICD-10-CM

## 2024-12-07 DIAGNOSIS — E55.9 VITAMIN D DEFICIENCY: ICD-10-CM

## 2024-12-09 RX ORDER — ERGOCALCIFEROL 1.25 MG/1
CAPSULE, LIQUID FILLED ORAL
Qty: 12 CAPSULE | Refills: 0 | Status: SHIPPED | OUTPATIENT
Start: 2024-12-09

## 2024-12-09 NOTE — TELEPHONE ENCOUNTER
Recent Visits  Date Type Provider Dept   10/31/24 Office Visit Praveena Santoyo APRN - CNP [facility]   02/07/24 Office Visit Praveena Santoyo APRN - CNP [facility]   01/17/24 Office Visit Praveena Santoyo APRN - CNP [facility]   Showing recent visits within past 540 days with a meds authorizing provider and meeting all other requirements  Future Appointments  Date Type Provider Dept   01/02/25 Appointment Praveena Santoyo APRN - CNP [facility]   Showing future appointments within next 150 days with a meds authorizing provider and meeting all other requirements     10/31/2024

## 2024-12-12 ENCOUNTER — HOSPITAL ENCOUNTER (OUTPATIENT)
Age: 49
Discharge: HOME | End: 2024-12-14
Attending: INTERNAL MEDICINE
Payer: COMMERCIAL

## 2024-12-12 VITALS — HEIGHT: 62 IN | WEIGHT: 293 LBS | BODY MASS INDEX: 53.92 KG/M2

## 2024-12-12 VITALS — HEIGHT: 62 IN | BODY MASS INDEX: 53.92 KG/M2 | WEIGHT: 293 LBS

## 2024-12-12 VITALS — SYSTOLIC BLOOD PRESSURE: 149 MMHG | HEART RATE: 71 BPM | DIASTOLIC BLOOD PRESSURE: 91 MMHG

## 2024-12-12 DIAGNOSIS — R06.02 SHORTNESS OF BREATH: ICD-10-CM

## 2024-12-12 DIAGNOSIS — R07.9 CHEST PAIN, UNSPECIFIED TYPE: ICD-10-CM

## 2024-12-12 LAB
ECHO BSA: 2.41 M2
ECHO BSA: 2.41 M2
ECHO LV EJECTION FRACTION BIPLANE: 60 % (ref 55–100)
NUC STRESS EJECTION FRACTION: 65 %
NUC STRESS LV EDV: 107 ML (ref 56–104)
NUC STRESS LV ESV: 37 ML (ref 19–49)
NUC STRESS LV MASS: 137 G
STRESS BASELINE DIAS BP: 91 MMHG
STRESS BASELINE HR: 72 BPM
STRESS BASELINE SYS BP: 149 MMHG
STRESS ESTIMATED WORKLOAD: 1 METS
STRESS O2 SAT PEAK: 96 %
STRESS O2 SAT REST: 94 %
STRESS PEAK DIAS BP: 98 MMHG
STRESS PEAK SYS BP: 198 MMHG
STRESS PERCENT HR ACHIEVED: 56 %
STRESS POST PEAK HR: 96 BPM
STRESS RATE PRESSURE PRODUCT: ABNORMAL BPM*MMHG
STRESS ST DEPRESSION: 0 MM
STRESS TARGET HR: 170 BPM
TID: 1.05

## 2024-12-12 PROCEDURE — C8929 TTE W OR WO FOL WCON,DOPPLER: HCPCS

## 2024-12-12 PROCEDURE — 93018 CV STRESS TEST I&R ONLY: CPT | Performed by: INTERNAL MEDICINE

## 2024-12-12 PROCEDURE — 6360000002 HC RX W HCPCS: Performed by: INTERNAL MEDICINE

## 2024-12-12 PROCEDURE — 78452 HT MUSCLE IMAGE SPECT MULT: CPT

## 2024-12-12 PROCEDURE — 3430000000 HC RX DIAGNOSTIC RADIOPHARMACEUTICAL: Performed by: INTERNAL MEDICINE

## 2024-12-12 PROCEDURE — A9502 TC99M TETROFOSMIN: HCPCS | Performed by: INTERNAL MEDICINE

## 2024-12-12 PROCEDURE — 6360000004 HC RX CONTRAST MEDICATION: Performed by: INTERNAL MEDICINE

## 2024-12-12 PROCEDURE — 93306 TTE W/DOPPLER COMPLETE: CPT | Performed by: INTERNAL MEDICINE

## 2024-12-12 PROCEDURE — 93017 CV STRESS TEST TRACING ONLY: CPT

## 2024-12-12 PROCEDURE — 78452 HT MUSCLE IMAGE SPECT MULT: CPT | Performed by: INTERNAL MEDICINE

## 2024-12-12 PROCEDURE — 93016 CV STRESS TEST SUPVJ ONLY: CPT | Performed by: INTERNAL MEDICINE

## 2024-12-12 RX ORDER — REGADENOSON 0.08 MG/ML
0.4 INJECTION, SOLUTION INTRAVENOUS
Status: COMPLETED | OUTPATIENT
Start: 2024-12-12 | End: 2024-12-12

## 2024-12-12 RX ADMIN — TETROFOSMIN 12.4 MILLICURIE: 1.38 INJECTION, POWDER, LYOPHILIZED, FOR SOLUTION INTRAVENOUS at 07:38

## 2024-12-12 RX ADMIN — SULFUR HEXAFLUORIDE 2 ML: 60.7; .19; .19 INJECTION, POWDER, LYOPHILIZED, FOR SUSPENSION INTRAVENOUS; INTRAVESICAL at 09:40

## 2024-12-12 RX ADMIN — REGADENOSON 0.4 MG: 0.08 INJECTION, SOLUTION INTRAVENOUS at 08:49

## 2024-12-12 RX ADMIN — TETROFOSMIN 33.4 MILLICURIE: 1.38 INJECTION, POWDER, LYOPHILIZED, FOR SOLUTION INTRAVENOUS at 08:52

## 2024-12-12 NOTE — RESULT ENCOUNTER NOTE
I called osvaldo Martin regarding recent stress test results. Left return phone number for questions or concern.

## 2024-12-13 ENCOUNTER — TELEPHONE (OUTPATIENT)
Dept: CARDIOLOGY CLINIC | Age: 49
End: 2024-12-13

## 2024-12-13 NOTE — TELEPHONE ENCOUNTER
----- Message from Dr. Sheri Camacho DO sent at 12/13/2024  2:20 PM EST -----  Let patient know study shows normal findings for age  Can discuss at upcoming visit  Thanks

## 2024-12-13 NOTE — TELEPHONE ENCOUNTER
Call placed to patient. Sent to voicemail. Left detailed message per HIPAA in regards to results. Advised to call office prior to appointment if she has any further questions or concerns.

## 2024-12-17 DIAGNOSIS — R60.0 LOCALIZED EDEMA: ICD-10-CM

## 2024-12-17 LAB
ANION GAP SERPL CALCULATED.3IONS-SCNC: 13 MMOL/L (ref 3–16)
BUN SERPL-MCNC: 6 MG/DL (ref 7–20)
CALCIUM SERPL-MCNC: 9.6 MG/DL (ref 8.3–10.6)
CHLORIDE SERPL-SCNC: 98 MMOL/L (ref 99–110)
CO2 SERPL-SCNC: 25 MMOL/L (ref 21–32)
CREAT SERPL-MCNC: 0.5 MG/DL (ref 0.6–1.1)
GFR SERPLBLD CREATININE-BSD FMLA CKD-EPI: >90 ML/MIN/{1.73_M2}
GLUCOSE SERPL-MCNC: 90 MG/DL (ref 70–99)
POTASSIUM SERPL-SCNC: 4.4 MMOL/L (ref 3.5–5.1)
SODIUM SERPL-SCNC: 136 MMOL/L (ref 136–145)

## 2024-12-18 NOTE — ASSESSMENT & PLAN NOTE
, HDL 57,LDL 96, TG 98 (11/2024)  LPa <6  A1C 5.5  Maintained on Zocor for statin therapy, consider optimization next visit

## 2024-12-18 NOTE — ASSESSMENT & PLAN NOTE
Chronic bilateral leg edema unchanged, likely multifactorial in part LV diastolic dysfunction  Examination also demonstrates lymphedema, recommend referral to PT lymphedema clinic  Recent metabolic derangement on basic metabolic panel has normalized  Recommend increasing Lasix to 40 mg daily, BMP 3 weeks  Advised patient to elevate legs is much as possible when seated for prolonged periods  Limited ambulation make also contribute to lower extremity edema, advised moving is much as possible  Encourage compliance with sleep apnea treatment for very severe sleep apnea  Weight loss also discussed

## 2024-12-18 NOTE — PROGRESS NOTES
criteria.  Heart size within  normal limits.  Coronary artery calcification present.    Lungs/Pleura:  No consolidation, pneumothorax or pleural effusion.  Mild  underlying emphysema.  Central airways are patent.    No change and 11 mm right lower lobe nodule.  No new or suspicious nodule has  developed.    Upper Abdomen:  Limited visualization upper abdomen demonstrates no acute  findings.    Soft Tissues/Bones: Spinal degenerative changes with no acute findings.    Impression  Stable zamarripa mm right lung nodule.  No new or suspicious nodule.    LUNG RADS:  Lung-RADS 2 - Benign (v2022)    Management:  12 month screening LDCT    Carotid US  11/25/2024  The right internal carotid artery appears to have a <50% diameter reducing stenosis based on velocity criteria.  The right vertebral artery demonstrates normal antegrade flow.  The right subclavian artery is visualized and demonstrates multiphasic flow.     The left internal carotid artery appears to have a <50% diameter reducing stenosis based on velocity criteria.  The left vertebral artery demonstrates normal antegrade flow.  The left subclavian artery is visualized and demonstrates multiphasic flow.        Echo  12/12/2024    Left Ventricle: Normal left ventricular systolic function with a visually estimated EF of 60 - 65%. Left ventricle size is normal. Normal wall thickness. Normal wall motion. Grade I diastolic dysfunction with normal LAP.    Right Ventricle: Right ventricle size is normal. Normal systolic function.    Tricuspid Valve: Unable to assess RVSP due to inadequate or insignificant tricuspid regurgitation.    Image quality is technically difficult. Contrast used: Lumason.      Echo  2017  Conclusions  Summary  Global ejection fraction is normal and estimated at 55 %.  Trivial mitral regurgitation is present.  Trivial tricuspid regurgitation with RVSP estimated at 23 mmHg.    PFT  11/26/2024  Spirometry:  Flow volume loops were normal. The FEV-1/FVC

## 2024-12-18 NOTE — ASSESSMENT & PLAN NOTE
Etiology of dyspnea probably multifactorial in part due to physical deconditioning, obesity  Chronic smoker, abnormal PFTs, recommend referral to Pulmonary  Echocardiogram with preserved LVEF and RV systolic function, stress testing negative

## 2024-12-18 NOTE — ASSESSMENT & PLAN NOTE
Atypical chest tightness not made worse with positional changes, deep breathing or exertion  Symptoms persist since last visit without worsening  Stress testing reviewed with patient, negative for ischemia, LVEF normal  Incidentally seen CAC on CT scan  Discussed options for management, including consideration for LHC given persistent sx's and RF  Patient did not commit to a decision today  Advised to seek emergent medical care for severe worsening discomfort  Recomend low-dose aspirin 81 mg daily, continue statin

## 2024-12-18 NOTE — ASSESSMENT & PLAN NOTE
BP today is 112/82, normotensive  Recommend decreasing Lisinopril to 5 mg daily, increase Lasix to 40 mg daily

## 2024-12-19 ENCOUNTER — OFFICE VISIT (OUTPATIENT)
Dept: CARDIOLOGY CLINIC | Age: 49
End: 2024-12-19
Payer: COMMERCIAL

## 2024-12-19 VITALS
WEIGHT: 293 LBS | BODY MASS INDEX: 53.92 KG/M2 | DIASTOLIC BLOOD PRESSURE: 82 MMHG | OXYGEN SATURATION: 97 % | SYSTOLIC BLOOD PRESSURE: 112 MMHG | HEART RATE: 88 BPM | HEIGHT: 62 IN

## 2024-12-19 DIAGNOSIS — G47.33 OSA (OBSTRUCTIVE SLEEP APNEA): ICD-10-CM

## 2024-12-19 DIAGNOSIS — E66.01 MORBID OBESITY: ICD-10-CM

## 2024-12-19 DIAGNOSIS — Z72.0 TOBACCO ABUSE DISORDER: ICD-10-CM

## 2024-12-19 DIAGNOSIS — R06.02 SOB (SHORTNESS OF BREATH): ICD-10-CM

## 2024-12-19 DIAGNOSIS — R60.0 LOCALIZED EDEMA: Primary | ICD-10-CM

## 2024-12-19 DIAGNOSIS — R09.89 BILATERAL CAROTID BRUITS: ICD-10-CM

## 2024-12-19 DIAGNOSIS — I25.10 CORONARY ARTERY CALCIFICATION: ICD-10-CM

## 2024-12-19 DIAGNOSIS — I10 PRIMARY HYPERTENSION: ICD-10-CM

## 2024-12-19 DIAGNOSIS — R07.89 CHEST PRESSURE: ICD-10-CM

## 2024-12-19 DIAGNOSIS — I10 ESSENTIAL HYPERTENSION: ICD-10-CM

## 2024-12-19 DIAGNOSIS — E78.00 HYPERCHOLESTEROLEMIA: ICD-10-CM

## 2024-12-19 PROCEDURE — 3079F DIAST BP 80-89 MM HG: CPT | Performed by: INTERNAL MEDICINE

## 2024-12-19 PROCEDURE — 99214 OFFICE O/P EST MOD 30 MIN: CPT | Performed by: INTERNAL MEDICINE

## 2024-12-19 PROCEDURE — 3074F SYST BP LT 130 MM HG: CPT | Performed by: INTERNAL MEDICINE

## 2024-12-19 RX ORDER — LISINOPRIL 10 MG/1
5 TABLET ORAL DAILY
Qty: 90 TABLET | Refills: 1 | Status: SHIPPED | OUTPATIENT
Start: 2024-12-19

## 2024-12-19 RX ORDER — FUROSEMIDE 20 MG/1
40 TABLET ORAL DAILY
Qty: 90 TABLET | Refills: 1 | Status: SHIPPED | OUTPATIENT
Start: 2024-12-19

## 2024-12-19 NOTE — ASSESSMENT & PLAN NOTE
Found on LDCT 11/13/2024  Stress test negative for ischemia  Recommend starting aspirin, 81mg  Continue statin

## 2024-12-19 NOTE — PATIENT INSTRUCTIONS
DAMIÁN Avila cancelled her follow up visit with Tomás Hopkins.   She saw a GI provider today and plans to try a medication with that specialist.   She will contact our office if she decides to pursue pulmonology again.   She can be reached at [phone] if you have questions.   Referral to pulmonary  (Lung doctor)      Referral to PT/OT for the lymphedema    You will increase your lasix to 40mg daily    Lisinopril, you will decrease to 5mg daily    Lab work  BMP in 3 weeks    Start Aspirin 81mg daily take with food    Follow up in 1 month

## 2024-12-19 NOTE — ASSESSMENT & PLAN NOTE
Morbid obesity with BMI 54  Discussed healthy lifestyle habits, dietary intervention  Consider weight loss pharmacotherapy, gastric bypass

## 2024-12-23 ENCOUNTER — TELEPHONE (OUTPATIENT)
Dept: CARDIOLOGY CLINIC | Age: 49
End: 2024-12-23

## 2024-12-23 DIAGNOSIS — I10 PRIMARY HYPERTENSION: ICD-10-CM

## 2024-12-23 DIAGNOSIS — I10 ESSENTIAL HYPERTENSION: ICD-10-CM

## 2024-12-23 DIAGNOSIS — R60.0 LOCALIZED EDEMA: Primary | ICD-10-CM

## 2024-12-23 RX ORDER — LISINOPRIL 10 MG/1
10 TABLET ORAL DAILY
Qty: 90 TABLET | Refills: 0 | OUTPATIENT
Start: 2024-12-23

## 2024-12-23 RX ORDER — LISINOPRIL 10 MG/1
10 TABLET ORAL DAILY
Qty: 90 TABLET | Refills: 1 | Status: SHIPPED | OUTPATIENT
Start: 2024-12-23 | End: 2025-01-02

## 2024-12-23 RX ORDER — FUROSEMIDE 40 MG/1
40 TABLET ORAL DAILY
Qty: 90 TABLET | Refills: 1 | Status: SHIPPED | OUTPATIENT
Start: 2024-12-23

## 2024-12-23 NOTE — TELEPHONE ENCOUNTER
Did not look like the pharmacy confirmed medications were received.     Pt said order for PT was entered as OT.

## 2024-12-23 NOTE — TELEPHONE ENCOUNTER
Pt states therapy called her and states that the order was ordered wrong. It should be physical therapy and not occupational therapy in order to process it. Pt also states she went to  scripts @ [facility] this morning and was told they didn't get the script orders from last Thursday. Please resend. Please advise pt.

## 2024-12-30 ENCOUNTER — TELEPHONE (OUTPATIENT)
Dept: PULMONOLOGY | Age: 49
End: 2024-12-30

## 2024-12-30 NOTE — TELEPHONE ENCOUNTER
There was financial hold on patients acct. I just talke to argenis and she will reach out to patient to get her scheduled. Patient was not happy that its been taking so long to get her cpap.

## 2025-01-02 ENCOUNTER — OFFICE VISIT (OUTPATIENT)
Dept: INTERNAL MEDICINE CLINIC | Age: 50
End: 2025-01-02

## 2025-01-02 VITALS
SYSTOLIC BLOOD PRESSURE: 128 MMHG | BODY MASS INDEX: 54.5 KG/M2 | WEIGHT: 293 LBS | OXYGEN SATURATION: 98 % | HEART RATE: 65 BPM | DIASTOLIC BLOOD PRESSURE: 64 MMHG

## 2025-01-02 DIAGNOSIS — Z23 NEED FOR ZOSTAVAX ADMINISTRATION: Primary | ICD-10-CM

## 2025-01-02 DIAGNOSIS — R60.0 LOCALIZED EDEMA: ICD-10-CM

## 2025-01-02 DIAGNOSIS — E66.01 MORBID OBESITY WITH BMI OF 40.0-44.9, ADULT: ICD-10-CM

## 2025-01-02 DIAGNOSIS — I10 ESSENTIAL HYPERTENSION: ICD-10-CM

## 2025-01-02 DIAGNOSIS — Z76.89 ENCOUNTER FOR WEIGHT MANAGEMENT: ICD-10-CM

## 2025-01-02 RX ORDER — LISINOPRIL 5 MG/1
5 TABLET ORAL DAILY
Qty: 30 TABLET | Refills: 5 | Status: SHIPPED | OUTPATIENT
Start: 2025-01-02

## 2025-01-02 RX ORDER — TOPIRAMATE 100 MG/1
100 TABLET, FILM COATED ORAL DAILY
Qty: 60 TABLET | Refills: 3 | Status: SHIPPED | OUTPATIENT
Start: 2025-01-02

## 2025-01-02 SDOH — ECONOMIC STABILITY: FOOD INSECURITY: WITHIN THE PAST 12 MONTHS, THE FOOD YOU BOUGHT JUST DIDN'T LAST AND YOU DIDN'T HAVE MONEY TO GET MORE.: NEVER TRUE

## 2025-01-02 SDOH — ECONOMIC STABILITY: FOOD INSECURITY: WITHIN THE PAST 12 MONTHS, YOU WORRIED THAT YOUR FOOD WOULD RUN OUT BEFORE YOU GOT MONEY TO BUY MORE.: NEVER TRUE

## 2025-01-02 SDOH — ECONOMIC STABILITY: INCOME INSECURITY: HOW HARD IS IT FOR YOU TO PAY FOR THE VERY BASICS LIKE FOOD, HOUSING, MEDICAL CARE, AND HEATING?: SOMEWHAT HARD

## 2025-01-02 ASSESSMENT — PATIENT HEALTH QUESTIONNAIRE - PHQ9
3. TROUBLE FALLING OR STAYING ASLEEP: SEVERAL DAYS
4. FEELING TIRED OR HAVING LITTLE ENERGY: NOT AT ALL
8. MOVING OR SPEAKING SO SLOWLY THAT OTHER PEOPLE COULD HAVE NOTICED. OR THE OPPOSITE, BEING SO FIGETY OR RESTLESS THAT YOU HAVE BEEN MOVING AROUND A LOT MORE THAN USUAL: NOT AT ALL
2. FEELING DOWN, DEPRESSED OR HOPELESS: SEVERAL DAYS
5. POOR APPETITE OR OVEREATING: NOT AT ALL
SUM OF ALL RESPONSES TO PHQ QUESTIONS 1-9: 4
SUM OF ALL RESPONSES TO PHQ QUESTIONS 1-9: 4
7. TROUBLE CONCENTRATING ON THINGS, SUCH AS READING THE NEWSPAPER OR WATCHING TELEVISION: SEVERAL DAYS
SUM OF ALL RESPONSES TO PHQ QUESTIONS 1-9: 4
9. THOUGHTS THAT YOU WOULD BE BETTER OFF DEAD, OR OF HURTING YOURSELF: NOT AT ALL
SUM OF ALL RESPONSES TO PHQ QUESTIONS 1-9: 4
1. LITTLE INTEREST OR PLEASURE IN DOING THINGS: SEVERAL DAYS
6. FEELING BAD ABOUT YOURSELF - OR THAT YOU ARE A FAILURE OR HAVE LET YOURSELF OR YOUR FAMILY DOWN: NOT AT ALL
SUM OF ALL RESPONSES TO PHQ9 QUESTIONS 1 & 2: 2
10. IF YOU CHECKED OFF ANY PROBLEMS, HOW DIFFICULT HAVE THESE PROBLEMS MADE IT FOR YOU TO DO YOUR WORK, TAKE CARE OF THINGS AT HOME, OR GET ALONG WITH OTHER PEOPLE: SOMEWHAT DIFFICULT

## 2025-01-02 ASSESSMENT — ENCOUNTER SYMPTOMS
ALLERGIC/IMMUNOLOGIC NEGATIVE: 1
GASTROINTESTINAL NEGATIVE: 1
EYES NEGATIVE: 1
RESPIRATORY NEGATIVE: 1

## 2025-01-02 ASSESSMENT — ANXIETY QUESTIONNAIRES
IF YOU CHECKED OFF ANY PROBLEMS ON THIS QUESTIONNAIRE, HOW DIFFICULT HAVE THESE PROBLEMS MADE IT FOR YOU TO DO YOUR WORK, TAKE CARE OF THINGS AT HOME, OR GET ALONG WITH OTHER PEOPLE: SOMEWHAT DIFFICULT
2. NOT BEING ABLE TO STOP OR CONTROL WORRYING: NOT AT ALL
GAD7 TOTAL SCORE: 5
3. WORRYING TOO MUCH ABOUT DIFFERENT THINGS: SEVERAL DAYS
6. BECOMING EASILY ANNOYED OR IRRITABLE: SEVERAL DAYS
7. FEELING AFRAID AS IF SOMETHING AWFUL MIGHT HAPPEN: NOT AT ALL
4. TROUBLE RELAXING: SEVERAL DAYS
5. BEING SO RESTLESS THAT IT IS HARD TO SIT STILL: SEVERAL DAYS
1. FEELING NERVOUS, ANXIOUS, OR ON EDGE: SEVERAL DAYS

## 2025-01-02 NOTE — PROGRESS NOTES
sounds and air entry.      Comments: Continues to smoke, voices she understands health implications of continued cigarette smoking. States she does not smoke in the home, but her  does.  Musculoskeletal:      Right lower leg: Edema present.      Left lower leg: Edema present.      Right ankle: Swelling present.      Left ankle: Swelling present.   Neurological:      Mental Status: She is alert.   Psychiatric:         Mood and Affect: Mood is anxious.         Behavior: Behavior is hyperactive.         Thought Content: Thought content normal.      Comments:   Asking to see dietitian to aid with weight management agreeable to start exercising          Vitals:    01/02/25 0951   BP: 128/64   Pulse: 65   SpO2: 98%      BP Readings from Last 3 Encounters:   01/02/25 128/64   12/19/24 112/82   12/12/24 (!) 149/91      Wt Readings from Last 3 Encounters:   01/02/25 135.2 kg (298 lb)   12/19/24 136.1 kg (300 lb)   12/12/24 132.9 kg (293 lb)            An electronic signature was used to authenticate this note.    --HARRISON Vergara - CNP

## 2025-01-02 NOTE — ASSESSMENT & PLAN NOTE
Chronic, not at goal (unstable), changes made today: wear compression hose all day and remove at night    Orders:    Compression Stockings MISC; by Does not apply route With 30-40 mm Hg

## 2025-01-02 NOTE — PATIENT INSTRUCTIONS
Sit with feet elevated during work  Stop smoking  Decrease coffee to 3 cups per day  Wear compression hose all day every day and remove at night  Avoid fast fast food  Increase vegetable intake  When you become full stop eating

## 2025-01-02 NOTE — ASSESSMENT & PLAN NOTE
Chronic, not at goal (unstable), Avoid fast fast food and lifestyle modifications recommended    Orders:    Non [facility] - External Referral to Dietitian

## 2025-01-09 ENCOUNTER — HOSPITAL ENCOUNTER (OUTPATIENT)
Dept: PHYSICAL THERAPY | Age: 50
Setting detail: THERAPIES SERIES
Discharge: HOME | End: 2025-01-09
Payer: COMMERCIAL

## 2025-01-09 DIAGNOSIS — R68.89 DECREASED STRENGTH, ENDURANCE, AND MOBILITY: ICD-10-CM

## 2025-01-09 DIAGNOSIS — I89.0 LYMPHEDEMA: Primary | ICD-10-CM

## 2025-01-09 DIAGNOSIS — Z74.09 DECREASED STRENGTH, ENDURANCE, AND MOBILITY: ICD-10-CM

## 2025-01-09 DIAGNOSIS — R53.1 DECREASED STRENGTH, ENDURANCE, AND MOBILITY: ICD-10-CM

## 2025-01-09 PROCEDURE — 97161 PT EVAL LOW COMPLEX 20 MIN: CPT

## 2025-01-09 PROCEDURE — 97110 THERAPEUTIC EXERCISES: CPT

## 2025-01-09 PROCEDURE — 97530 THERAPEUTIC ACTIVITIES: CPT

## 2025-01-09 NOTE — PLAN OF CARE
deficits.   Status: [] Progressing: [] Met: [] Not Met: [] Adjusted  Pt will demonstrate independence with skin care.     Status: [] Progressing: [] Met: [] Not Met: [] Adjusted    Long Term Goals: To be achieved in: 6 weeks  Disability index score of 20% or less for the LLIS to assist with reaching prior level of function with activities such as  house hold ambulation .  [] Progressing: [] Met: [] Not Met: [] Adjusted  Patient will demonstrate increased AROM of  B LE  to LE without pain to allow for proper joint functioning to enable patient to  get B LE on/off stool for elevation to assist swelling .   [] Progressing: [] Met: [] Not Met: [] Adjusted  Patient will demonstrate increased Strength of  B LE  to demonstrate improved muscle activation/motor control to allow for proper functional mobility to enable patient to return to  up/down the stairs up to 1x/day with 1 HR safely .   [] Progressing: [] Met: [] Not Met: [] Adjusted  Patient will return to  walk 2 blocks  without increased symptoms or restriction to work towards return to prior level of function.       Status: [] Progressing: [] Met: [] Not Met: [] Adjusted  Decrease total girth of B LE by  10 + cm so that pt can return to functional activities including  putting shoes/socks on  without increased symptoms or restriction.    Status: [] Progressing: [] Met: [] Not Met: [] Adjusted    Pt will demonstrate independence with donning/doffing compression.          Status: [] Progressing: [] Met: [] Not Met: [] Adjusted    Overall Progression Towards Functional goals/ Treatment Progress Update:  [] Patient is progressing as expected towards functional goals listed.    [] Progression is slowed due to complexities/Impairments listed.  [] Progression has been slowed due to co-morbidities.  [x] Plan just implemented, too soon (<30days) to assess goals progression   [] Goals require adjustment due to lack of progress  [] Patient is not progressing as expected and

## 2025-01-14 ENCOUNTER — TELEPHONE (OUTPATIENT)
Dept: PULMONOLOGY | Age: 50
End: 2025-01-14

## 2025-01-14 NOTE — TELEPHONE ENCOUNTER
Francine followed up on her biPAP. She says [facility] is giving her nothing but problems. If they don't ship her biPAP today, she will call her insurance company tomorrow and get another DME

## 2025-01-17 RX ORDER — CELECOXIB 100 MG/1
CAPSULE ORAL DAILY
Qty: 60 CAPSULE | Refills: 0 | Status: SHIPPED | OUTPATIENT
Start: 2025-01-17

## 2025-01-17 NOTE — TELEPHONE ENCOUNTER
Requested Prescriptions     Pending Prescriptions Disp Refills    celecoxib (CELEBREX) 100 MG capsule [Pharmacy Med Name: Celecoxib Oral Capsule 100 MG] 60 capsule 0     Sig: TAKE 1 CAPSULE BY MOUTH EVERY DAY     Last OV: 3.4.24  Last filled: 11.8.24    Assessment: Patient is a 48 y.o. female with recurrent right hip pain related to clinical diagnosis of hip spine syndrome. She does have moderate OA on xray imaging and significant abductor weakness on exam.      Impression:  Visit Diagnoses           Codes     Pain of right hip    -  Primary M25.551                Office Procedures:  Encounter Medications        Orders Placed This Encounter   Medications    celecoxib (CELEBREX) 100 MG capsule       Sig: Take 1 capsule by mouth daily       Dispense:  60 capsule       Refill:  1               Orders Placed This Encounter   Procedures    XR HIP 3-4 VW W PELVIS BILATERAL       ROOM 1     TRUE RIGHT HIP       Standing Status:   Future       Number of Occurrences:   1       Standing Expiration Date:   3/4/2025    [facility] - Mike Hua MD, Pain Management, [facility]       Referral Priority:   Routine       Referral Type:   Eval and Treat       Referral Reason:   Specialty Services Required       Referred to Provider:   Mike Hua MD       Requested Specialty:   Anesthesiology Pain Medicine       Number of Visits Requested:   1    Ambulatory referral to Physical Therapy       Referral Priority:   Routine       Referral Type:   Eval and Treat       Referral Reason:   Specialty Services Required       Requested Specialty:   Physical Therapist       Number of Visits Requested:   1         Plan:  Pertinent imaging was reviewed. The etiology, natural history, and treatment options for the disorder were discussed.  The roles of activity modification, antiinflammatory medicine, injections, bracing, physical therapy, and surgical interventions were all described to the patient and questions were answered.     We believe  Statement Selected

## 2025-01-20 NOTE — PROGRESS NOTES
GENERAL CARDIOLOGY    REFERRING PROVIDER  Praveena Santoyo APRN - CNP     CHIEF COMPLAINT  Follow up       HPI    Veronica Zepeda is a 49 y.o. female presents to the clinic for follow up from OV 12/19/2024. Initial referral for shortness of breath and new localized edema.     Medical history reviewed, significant for hypertension, hyperlipidemia, ARYA, GERD, and is a smoker.     Today, the patient reports:  She reports that she is doing fine, she is down 7 pounds, she is taking lasix 20mg daily  No new cardiac complaints    ASSESSMENT AND PLAN.  SOB (shortness of breath)  Etiology of dyspnea probably multifactorial in part due to physical deconditioning, obesity  Chronic smoker, abnormal PFTs, referred to Pulmonary  Echocardiogram with preserved LVEF and RV systolic function, stress testing negative      Localized edema  Chronic bilateral leg edema is stable   Suspect etiology is multifactorial, in part related to use of salt tabs for hyponatremia which were stopped  Examination also demonstrates lymphedema, referred to PT lymphedema clinic  Given history of metabolic derangement on BMP Lab, recommend repeating BMP before adjusting diuretic dose  Advised patient to elevate legs is much as possible when seated for prolonged periods  Limited ambulation make also contribute to lower extremity edema, advised moving is much as possible  Encourage compliance with sleep apnea treatment for very severe sleep apnea  Weight loss also discussed    Primary hypertension  BP today is 120/60, normotensive  Continue current medical therapy    Hypercholesterolemia  , HDL 57,LDL 96, TG 98 (11/2024)  LPa <6  A1C 5.5  Maintained on Zocor for statin therapy, consider optimization   Check Lipids      ARYA (obstructive sleep apnea)  Very severe sleep apnea by recent sleep study  Per documentation, patient was not wearing mask last year due to insurance issues  Patient states she is currently wearing her mask

## 2025-01-20 NOTE — ASSESSMENT & PLAN NOTE
Smoking cessation encouraged  \"I am trying\"  Encouraged her to decrease her cigarettes by 1 each day

## 2025-01-20 NOTE — ASSESSMENT & PLAN NOTE
Etiology of dyspnea probably multifactorial in part due to physical deconditioning, obesity  Chronic smoker, abnormal PFTs, referred to Pulmonary  Echocardiogram with preserved LVEF and RV systolic function, stress testing negative

## 2025-01-20 NOTE — ASSESSMENT & PLAN NOTE
Chronic bilateral leg edema is stable   Suspect etiology is multifactorial, in part related to use of salt tabs for hyponatremia which were stopped  Examination also demonstrates lymphedema, referred to PT lymphedema clinic  Given history of metabolic derangement on BMP Lab, recommend repeating BMP before adjusting diuretic dose  Advised patient to elevate legs is much as possible when seated for prolonged periods  Limited ambulation make also contribute to lower extremity edema, advised moving is much as possible  Encourage compliance with sleep apnea treatment for very severe sleep apnea  Weight loss also discussed

## 2025-01-20 NOTE — ASSESSMENT & PLAN NOTE
No recurrent symptoms  Stress testing reviewed with patient, negative for ischemia, LVEF normal  Incidentally seen CAC on CT scan  Advised to seek emergent medical care for recurrent discomfort  Recommend low-dose aspirin 81 mg daily, continue statin

## 2025-01-20 NOTE — ASSESSMENT & PLAN NOTE
, HDL 57,LDL 96, TG 98 (11/2024)  LPa <6  A1C 5.5  Maintained on Zocor for statin therapy, consider optimization   Check Lipids

## 2025-01-23 ENCOUNTER — HOSPITAL ENCOUNTER (OUTPATIENT)
Dept: PHYSICAL THERAPY | Age: 50
Setting detail: THERAPIES SERIES
Discharge: HOME | End: 2025-01-23
Payer: COMMERCIAL

## 2025-01-23 PROCEDURE — 97140 MANUAL THERAPY 1/> REGIONS: CPT

## 2025-01-23 PROCEDURE — 97530 THERAPEUTIC ACTIVITIES: CPT

## 2025-01-23 RX ORDER — RISPERIDONE 4 MG/1
4 TABLET ORAL DAILY
Qty: 90 TABLET | Refills: 1 | Status: SHIPPED | OUTPATIENT
Start: 2025-01-23

## 2025-01-23 NOTE — TELEPHONE ENCOUNTER
Recent Visits  Date Type Provider Dept   01/02/25 Office Visit Praveena Santoyo APRN - CNP [facility]   10/31/24 Office Visit Praveena Santoyo APRN - CNP [facility]   02/07/24 Office Visit Praveena Santoyo APRN - CNP [facility]   01/17/24 Office Visit Praveena Santoyo APRN - CNP [facility]   Showing recent visits within past 540 days with a meds authorizing provider and meeting all other requirements  Future Appointments  Date Type Provider Dept   04/03/25 Appointment Praveena Santoyo APRN - CNP [facility]   Showing future appointments within next 150 days with a meds authorizing provider and meeting all other requirements     1/2/2025

## 2025-01-23 NOTE — FLOWSHEET NOTE
walking, bending, lifting, catching, throwing, pushing, pulling, jumping.)  Direct, one on one contact, billed in 15-minute increments.  (58976) MANUAL THERAPY -  Manual therapy techniques, 1 or more regions, each 15 minutes (Mobilization/manipulation, manual lymphatic drainage, manual traction) for the purpose of modulating pain, promoting relaxation,  increasing ROM, reducing/eliminating soft tissue swelling/inflammation/restriction, improving soft tissue extensibility and allowing for proper ROM for normal function with self care, mobility, lifting and ambulation    GOALS     Patient stated goal:  less swelling   Status: [] Progressing: [] Met: [] Not Met: [] Adjusted    Therapist goals for Patient:   Short Term Goals: To be achieved in: 2 weeks  Independent in HEP and progression per patient tolerance, in order to progress toward full function and prevent re-injury.    Status: [] Progressing: [] Met: [] Not Met: [] Adjusted  Patient will have a decrease in pain to 0/10 to help facilitate improvement in movement, function, and ADLs as indicated by functional deficits.   Status: [] Progressing: [] Met: [] Not Met: [] Adjusted  Pt will demonstrate independence with skin care.     Status: [] Progressing: [] Met: [] Not Met: [] Adjusted    Long Term Goals: To be achieved in: 6 weeks  Disability index score of 20% or less for the LLIS to assist with reaching prior level of function with activities such as  house hold ambulation .  [] Progressing: [] Met: [] Not Met: [] Adjusted  Patient will demonstrate increased AROM of  B LE  to LE without pain to allow for proper joint functioning to enable patient to  get B LE on/off stool for elevation to assist swelling .   [] Progressing: [] Met: [] Not Met: [] Adjusted  Patient will demonstrate increased Strength of  B LE  to demonstrate improved muscle activation/motor control to allow for proper functional mobility to enable patient to return to  up/down the stairs up to

## 2025-01-27 ENCOUNTER — HOSPITAL ENCOUNTER (OUTPATIENT)
Dept: PHYSICAL THERAPY | Age: 50
Setting detail: THERAPIES SERIES
Discharge: HOME | End: 2025-01-27
Payer: COMMERCIAL

## 2025-01-27 PROCEDURE — 97140 MANUAL THERAPY 1/> REGIONS: CPT

## 2025-01-27 PROCEDURE — 97530 THERAPEUTIC ACTIVITIES: CPT

## 2025-01-27 NOTE — FLOWSHEET NOTE
Iontophoresis (46221)    VASO (27123)     Ultrasound (33716)    Group Therapy (61878)     Estim Attended (66448)    Canalith Repositioning (15160)     Physical Performance Test (88906)    Custom orthotic ()     Other:    Other:    Total Timed Code Tx Minutes 53 4       Total Treatment Minutes 53        Charge Justification:  (56244) THERAPEUTIC ACTIVITY - use of dynamic activities to improve functional performance. (Ex include squatting, ascending/descending stairs, walking, bending, lifting, catching, throwing, pushing, pulling, jumping.)  Direct, one on one contact, billed in 15-minute increments.  (04300) MANUAL THERAPY -  Manual therapy techniques, 1 or more regions, each 15 minutes (Mobilization/manipulation, manual lymphatic drainage, manual traction) for the purpose of modulating pain, promoting relaxation,  increasing ROM, reducing/eliminating soft tissue swelling/inflammation/restriction, improving soft tissue extensibility and allowing for proper ROM for normal function with self care, mobility, lifting and ambulation    GOALS     Patient stated goal:  less swelling   Status: [] Progressing: [] Met: [] Not Met: [] Adjusted    Therapist goals for Patient:   Short Term Goals: To be achieved in: 2 weeks  Independent in HEP and progression per patient tolerance, in order to progress toward full function and prevent re-injury.    Status: [] Progressing: [] Met: [] Not Met: [] Adjusted  Patient will have a decrease in pain to 0/10 to help facilitate improvement in movement, function, and ADLs as indicated by functional deficits.   Status: [] Progressing: [] Met: [] Not Met: [] Adjusted  Pt will demonstrate independence with skin care.     Status: [] Progressing: [] Met: [] Not Met: [] Adjusted    Long Term Goals: To be achieved in: 6 weeks  Disability index score of 20% or less for the LLIS to assist with reaching prior level of function with activities such as  house hold ambulation .  [] Progressing: []

## 2025-01-28 ENCOUNTER — OFFICE VISIT (OUTPATIENT)
Dept: PULMONOLOGY | Age: 50
End: 2025-01-28
Payer: COMMERCIAL

## 2025-01-28 VITALS
HEIGHT: 63 IN | TEMPERATURE: 97.9 F | DIASTOLIC BLOOD PRESSURE: 70 MMHG | HEART RATE: 79 BPM | BODY MASS INDEX: 51.91 KG/M2 | WEIGHT: 293 LBS | OXYGEN SATURATION: 96 % | SYSTOLIC BLOOD PRESSURE: 118 MMHG | RESPIRATION RATE: 18 BRPM

## 2025-01-28 DIAGNOSIS — J41.0 SIMPLE CHRONIC BRONCHITIS (HCC): ICD-10-CM

## 2025-01-28 DIAGNOSIS — R91.1 LUNG NODULE: Primary | ICD-10-CM

## 2025-01-28 PROCEDURE — 3074F SYST BP LT 130 MM HG: CPT | Performed by: INTERNAL MEDICINE

## 2025-01-28 PROCEDURE — 3078F DIAST BP <80 MM HG: CPT | Performed by: INTERNAL MEDICINE

## 2025-01-28 PROCEDURE — G2211 COMPLEX E/M VISIT ADD ON: HCPCS | Performed by: INTERNAL MEDICINE

## 2025-01-28 PROCEDURE — 99214 OFFICE O/P EST MOD 30 MIN: CPT | Performed by: INTERNAL MEDICINE

## 2025-01-28 RX ORDER — ALBUTEROL SULFATE 90 UG/1
2 INHALANT RESPIRATORY (INHALATION) 4 TIMES DAILY PRN
Qty: 18 G | Refills: 5 | Status: SHIPPED | OUTPATIENT
Start: 2025-01-28 | End: 2025-01-30

## 2025-01-28 NOTE — PROGRESS NOTES
Genitourinary: Negative for hematuria, no dysuria    Musculoskeletal: Negative for arthralgias, no joint swelling   Skin: Negative for rash  LE: no edema   Neurological: Negative for syncope, no tremor, no focal weakness or dysarthria   Hematological: Negative for adenopathy, or bleeding   Psychiatric/Behavorial: Negative for anxiety,    Objective:   PHYSICAL EXAM:  Blood pressure 118/70, pulse 79, temperature 97.9 °F (36.6 °C), resp. rate 18, height 1.588 m (5' 2.5\"), weight 135.9 kg (299 lb 9.6 oz), SpO2 96%, not currently breastfeeding.'  Gen: No acute distress  Eyes: PERRL. No sclera icterus. No conjunctival injection.   ENT: No discharge. Pharynx clear. External appearance of ears and nose normal.  Neck: Trachea midline. No obvious mass.    Resp: Diminished bilaterally scattered rhonchi no wheezing  CV: Regular rate. Regular rhythm. No murmur or rub. + edema.   GI: Non-tender. Non-distended. No hernia.   Skin: Warm, dry, normal texture and turgor. No nodule on exposed extremities.   Lymph: No cervical LAD.   M/S: No cyanosis. No clubbing. No joint deformity.    LE:  no edema   Neuro: no tremor, no focal deficit, awake and alert   Psych: intact judgement and insight.    Current Outpatient Medications   Medication Sig Dispense Refill    albuterol sulfate HFA (VENTOLIN HFA) 108 (90 Base) MCG/ACT inhaler Inhale 2 puffs into the lungs 4 times daily as needed for Wheezing 18 g 5    risperiDONE (RISPERDAL) 4 MG tablet Take 1 tablet by mouth daily 90 tablet 1    celecoxib (CELEBREX) 100 MG capsule TAKE 1 CAPSULE BY MOUTH EVERY DAY 60 capsule 0    Compression Stockings MISC by Does not apply route With 30-40 mm Hg 1 each 0    lisinopril (PRINIVIL;ZESTRIL) 5 MG tablet Take 1 tablet by mouth daily 30 tablet 5    topiramate (TOPAMAX) 100 MG tablet Take 1 tablet by mouth daily 60 tablet 3    furosemide (LASIX) 40 MG tablet Take 1 tablet by mouth daily 90 tablet 1    Omega 3-6-9 Fatty Acids (OMEGA 3-6-9 PO) Take by mouth

## 2025-01-30 ENCOUNTER — OFFICE VISIT (OUTPATIENT)
Dept: CARDIOLOGY CLINIC | Age: 50
End: 2025-01-30
Payer: COMMERCIAL

## 2025-01-30 VITALS
HEIGHT: 67 IN | WEIGHT: 292 LBS | SYSTOLIC BLOOD PRESSURE: 120 MMHG | HEART RATE: 95 BPM | DIASTOLIC BLOOD PRESSURE: 60 MMHG | OXYGEN SATURATION: 95 % | BODY MASS INDEX: 45.83 KG/M2

## 2025-01-30 DIAGNOSIS — R06.02 SOB (SHORTNESS OF BREATH): Primary | ICD-10-CM

## 2025-01-30 DIAGNOSIS — R60.0 LOCALIZED EDEMA: ICD-10-CM

## 2025-01-30 DIAGNOSIS — E78.00 HYPERCHOLESTEROLEMIA: ICD-10-CM

## 2025-01-30 DIAGNOSIS — R07.89 CHEST PRESSURE: ICD-10-CM

## 2025-01-30 DIAGNOSIS — Z72.0 TOBACCO ABUSE DISORDER: ICD-10-CM

## 2025-01-30 DIAGNOSIS — G47.33 OSA (OBSTRUCTIVE SLEEP APNEA): ICD-10-CM

## 2025-01-30 DIAGNOSIS — I10 PRIMARY HYPERTENSION: ICD-10-CM

## 2025-01-30 PROCEDURE — 3078F DIAST BP <80 MM HG: CPT | Performed by: INTERNAL MEDICINE

## 2025-01-30 PROCEDURE — 99214 OFFICE O/P EST MOD 30 MIN: CPT | Performed by: INTERNAL MEDICINE

## 2025-01-30 PROCEDURE — 3074F SYST BP LT 130 MM HG: CPT | Performed by: INTERNAL MEDICINE

## 2025-01-31 ENCOUNTER — HOSPITAL ENCOUNTER (OUTPATIENT)
Dept: PHYSICAL THERAPY | Age: 50
Setting detail: THERAPIES SERIES
Discharge: HOME | End: 2025-01-31
Payer: COMMERCIAL

## 2025-01-31 DIAGNOSIS — R60.0 LOCALIZED EDEMA: ICD-10-CM

## 2025-01-31 DIAGNOSIS — E78.00 HYPERCHOLESTEROLEMIA: ICD-10-CM

## 2025-01-31 LAB
ANION GAP SERPL CALCULATED.3IONS-SCNC: 13 MMOL/L (ref 3–16)
BUN SERPL-MCNC: 6 MG/DL (ref 7–20)
CALCIUM SERPL-MCNC: 9.7 MG/DL (ref 8.3–10.6)
CHLORIDE SERPL-SCNC: 99 MMOL/L (ref 99–110)
CHOLEST SERPL-MCNC: 185 MG/DL (ref 0–199)
CO2 SERPL-SCNC: 25 MMOL/L (ref 21–32)
CREAT SERPL-MCNC: 0.6 MG/DL (ref 0.6–1.1)
GFR SERPLBLD CREATININE-BSD FMLA CKD-EPI: >90 ML/MIN/{1.73_M2}
GLUCOSE SERPL-MCNC: 92 MG/DL (ref 70–99)
HDLC SERPL-MCNC: 60 MG/DL (ref 40–60)
LDLC SERPL CALC-MCNC: 99 MG/DL
POTASSIUM SERPL-SCNC: 4.2 MMOL/L (ref 3.5–5.1)
SODIUM SERPL-SCNC: 137 MMOL/L (ref 136–145)
TRIGL SERPL-MCNC: 129 MG/DL (ref 0–150)
VLDLC SERPL CALC-MCNC: 26 MG/DL

## 2025-01-31 PROCEDURE — 97140 MANUAL THERAPY 1/> REGIONS: CPT

## 2025-01-31 PROCEDURE — 97530 THERAPEUTIC ACTIVITIES: CPT
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
## 2025-01-31 NOTE — ASSESSMENT & PLAN NOTE
BP today is 120/60, normotensive  Continue current medical therapy   TRC to reschedule 11/21/2024 appt.

## 2025-02-04 ENCOUNTER — HOSPITAL ENCOUNTER (OUTPATIENT)
Dept: PHYSICAL THERAPY | Age: 50
Setting detail: THERAPIES SERIES
Discharge: HOME | End: 2025-02-04
Payer: COMMERCIAL

## 2025-02-04 DIAGNOSIS — E78.00 HYPERCHOLESTEROLEMIA: ICD-10-CM

## 2025-02-04 DIAGNOSIS — R60.0 LOCALIZED EDEMA: ICD-10-CM

## 2025-02-04 DIAGNOSIS — R60.0 LOCALIZED EDEMA: Primary | ICD-10-CM

## 2025-02-04 PROCEDURE — 97530 THERAPEUTIC ACTIVITIES: CPT

## 2025-02-04 PROCEDURE — 97140 MANUAL THERAPY 1/> REGIONS: CPT

## 2025-02-04 RX ORDER — FUROSEMIDE 80 MG/1
80 TABLET ORAL DAILY
Qty: 90 TABLET | Refills: 1
Start: 2025-02-04

## 2025-02-04 RX ORDER — FUROSEMIDE 80 MG/1
80 TABLET ORAL DAILY
Qty: 90 TABLET | Refills: 1 | Status: SHIPPED | OUTPATIENT
Start: 2025-02-04 | End: 2025-02-04 | Stop reason: SDUPTHER

## 2025-02-04 RX ORDER — ROSUVASTATIN CALCIUM 10 MG/1
10 TABLET, COATED ORAL DAILY
Qty: 90 TABLET | Refills: 1 | Status: SHIPPED | OUTPATIENT
Start: 2025-02-04
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
## 2025-02-04 NOTE — RESULT ENCOUNTER NOTE
I spoke with Veronica , I informed her of her recent lab results. I Instructed hr that she needs to stop the Zocor and start Crestor 10mg daily. She is to repeat her lipids in 6 weeks after starting the Crestor.    Veronica asked about the \"water pill adjustment\", after reviewing with Dr. Camacho, Veronica reports that her weight is down 7 pounds, I asked her if she has a way to weigh her self, she reports \"that is not happening\" She reports that her swelling is unchanged \"but needs to get this water off me\"  I instructed her to take Lasix 80mg on Monday, Wednesday, and Friday    I also instructed hr that she needs to repeat labs, BMP in 3 weeks and Lipids in 6 weeks  She verbalizes understanding of all

## 2025-02-07 ENCOUNTER — HOSPITAL ENCOUNTER (OUTPATIENT)
Dept: PHYSICAL THERAPY | Age: 50
Setting detail: THERAPIES SERIES
Discharge: HOME | End: 2025-02-07
Payer: COMMERCIAL

## 2025-02-07 PROCEDURE — 97140 MANUAL THERAPY 1/> REGIONS: CPT

## 2025-02-07 PROCEDURE — 97530 THERAPEUTIC ACTIVITIES: CPT

## 2025-02-07 NOTE — PLAN OF CARE
each 15 minutes (Mobilization/manipulation, manual lymphatic drainage, manual traction) for the purpose of modulating pain, promoting relaxation,  increasing ROM, reducing/eliminating soft tissue swelling/inflammation/restriction, improving soft tissue extensibility and allowing for proper ROM for normal function with self care, mobility, lifting and ambulation    GOALS     Patient stated goal:  less swelling   Status: [x] Progressing: [] Met: [] Not Met: [] Adjusted    Therapist goals for Patient:   Short Term Goals: To be achieved in: 2 weeks  Independent in HEP and progression per patient tolerance, in order to progress toward full function and prevent re-injury.    Status: [x] Progressing: [] Met: [] Not Met: [] Adjusted  Patient will have a decrease in pain to 0/10 to help facilitate improvement in movement, function, and ADLs as indicated by functional deficits.   Status: [x] Progressing: [] Met: [] Not Met: [] Adjusted  Pt will demonstrate independence with skin care.     Status: [x] Progressing: [] Met: [] Not Met: [] Adjusted    Long Term Goals: To be achieved in: 6 weeks  Disability index score of 20% or less for the LLIS to assist with reaching prior level of function with activities such as  house hold ambulation .  [x] Progressing: [] Met: [] Not Met: [] Adjusted  Patient will demonstrate increased AROM of  B LE  to LE without pain to allow for proper joint functioning to enable patient to  get B LE on/off stool for elevation to assist swelling .   [] Progressing: [x] Met:  pt reports ability to get on off/stool without limitations [] Not Met: [] Adjusted  Patient will demonstrate increased Strength of  B LE  to demonstrate improved muscle activation/motor control to allow for proper functional mobility to enable patient to return to  up/down the stairs up to 1x/day with 1 HR safely .   [] Progressing: [x] Met: reports she prefers two HR but able to complete with 1  [] Not Met: [] Adjusted  Patient will

## 2025-02-11 ENCOUNTER — APPOINTMENT (OUTPATIENT)
Dept: PHYSICAL THERAPY | Age: 50
End: 2025-02-11
Payer: COMMERCIAL

## 2025-02-17 ENCOUNTER — HOSPITAL ENCOUNTER (OUTPATIENT)
Dept: PHYSICAL THERAPY | Age: 50
Setting detail: THERAPIES SERIES
Discharge: HOME | End: 2025-02-17
Payer: COMMERCIAL

## 2025-02-17 PROCEDURE — 97530 THERAPEUTIC ACTIVITIES: CPT

## 2025-02-17 RX ORDER — ARIPIPRAZOLE 5 MG/1
5 TABLET ORAL DAILY
Qty: 90 TABLET | Refills: 0 | Status: SHIPPED | OUTPATIENT
Start: 2025-02-17

## 2025-02-17 RX ORDER — OXCARBAZEPINE 600 MG/1
TABLET, FILM COATED ORAL
Qty: 180 TABLET | Refills: 0 | Status: SHIPPED | OUTPATIENT
Start: 2025-02-17

## 2025-02-17 NOTE — TELEPHONE ENCOUNTER
Recent Visits  Date Type Provider Dept   01/02/25 Office Visit Praveena Santoyo APRN - CNP [facility]   10/31/24 Office Visit Praveena Santoyo APRN - CNP [facility]   02/07/24 Office Visit Praveena Santoyo APRN - CNP [facility]   01/17/24 Office Visit Praveena Santoyo APRN - CNP [facility]   Showing recent visits within past 540 days with a meds authorizing provider and meeting all other requirements  Future Appointments  Date Type Provider Dept   04/03/25 Appointment Praveena Santoyo APRN - CNP [facility]   Showing future appointments within next 150 days with a meds authorizing provider and meeting all other requirements     1/2/2025

## 2025-02-17 NOTE — FLOWSHEET NOTE
does not return for scheduled/recommended follow up visits, this note will serve as a discharge from care along with the most recent update on progress.    Lymphedema Evaluation

## 2025-02-20 ENCOUNTER — HOSPITAL ENCOUNTER (OUTPATIENT)
Dept: PHYSICAL THERAPY | Age: 50
Setting detail: THERAPIES SERIES
Discharge: HOME | End: 2025-02-20
Payer: COMMERCIAL

## 2025-02-20 PROCEDURE — 97140 MANUAL THERAPY 1/> REGIONS: CPT
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
## 2025-02-24 ENCOUNTER — HOSPITAL ENCOUNTER (OUTPATIENT)
Dept: PHYSICAL THERAPY | Age: 50
Setting detail: THERAPIES SERIES
Discharge: HOME | End: 2025-02-24
Payer: COMMERCIAL

## 2025-02-24 PROCEDURE — 97140 MANUAL THERAPY 1/> REGIONS: CPT

## 2025-02-24 PROCEDURE — 97530 THERAPEUTIC ACTIVITIES: CPT

## 2025-02-24 NOTE — FLOWSHEET NOTE
ROM for normal function with self care, mobility, lifting and ambulation    GOALS     Patient stated goal:  less swelling   Status: [x] Progressing: [] Met: [] Not Met: [] Adjusted    Therapist goals for Patient:   Short Term Goals: To be achieved in: 2 weeks  Independent in HEP and progression per patient tolerance, in order to progress toward full function and prevent re-injury.    Status: [x] Progressing: [] Met: [] Not Met: [] Adjusted  Patient will have a decrease in pain to 0/10 to help facilitate improvement in movement, function, and ADLs as indicated by functional deficits.   Status: [x] Progressing: [] Met: [] Not Met: [] Adjusted  Pt will demonstrate independence with skin care.     Status: [x] Progressing: [] Met: [] Not Met: [] Adjusted    Long Term Goals: To be achieved in: 6 weeks  Disability index score of 20% or less for the LLIS to assist with reaching prior level of function with activities such as  house hold ambulation .  [x] Progressing: [] Met: [] Not Met: [] Adjusted  Patient will demonstrate increased AROM of  B LE  to LE without pain to allow for proper joint functioning to enable patient to  get B LE on/off stool for elevation to assist swelling .   [] Progressing: [x] Met:  pt reports ability to get on off/stool without limitations [] Not Met: [] Adjusted  Patient will demonstrate increased Strength of  B LE  to demonstrate improved muscle activation/motor control to allow for proper functional mobility to enable patient to return to  up/down the stairs up to 1x/day with 1 HR safely .   [] Progressing: [x] Met: reports she prefers two HR but able to complete with 1  [] Not Met: [] Adjusted  Patient will return to  walk 2 blocks  without increased symptoms or restriction to work towards return to prior level of function.       Status: [x] Progressing: [] Met: [] Not Met: [] Adjusted  Decrease total girth of R LE by  additional 10 + cm (compared to 2/7 measurements) so that pt can return to

## 2025-02-27 ENCOUNTER — HOSPITAL ENCOUNTER (OUTPATIENT)
Dept: PHYSICAL THERAPY | Age: 50
Setting detail: THERAPIES SERIES
Discharge: HOME | End: 2025-02-27
Payer: COMMERCIAL

## 2025-02-27 PROCEDURE — 97530 THERAPEUTIC ACTIVITIES: CPT

## 2025-02-27 PROCEDURE — 97140 MANUAL THERAPY 1/> REGIONS: CPT

## 2025-02-27 NOTE — PLAN OF CARE
[facility] - Outpatient Rehabilitation and Therapy [address], [place], [place] [address] office: [phone] fax: [phone]    Physical Therapy Re-Certification Plan of Care    Dear Dr. Sheri Camacho, DO  ,    We had the pleasure of treating the following patient for physical therapy services at [facility]. A summary of our findings can be found in the updated assessment below.  This includes our plan of care.  If you have any questions or concerns regarding these findings, please do not hesitate to contact me at the office phone number checked above.  Thank you for the referral.     Physician Signature:________________________________Date:__________________  By signing above (or electronic signature), therapist's plan is approved by physician      Total Visits: 10     Overall Response to Treatment:  POC completed this date. Pt presents with significant improvement in B LE limb volume. Continues with increased volume at B feet and ankles. Pt is complaint with compression and exercise, and will have her  start applying lotion prior to donning circiads. Due to pts 20 visit hard max, we will reduce frequency to 1x/wk with focus on home program. Plan to complete 2-3 wks of circaids and then will transition to compression garments for maintenance phase.     Recommendation:    [x] Continue PT 1x / wk for 4 weeks - due to insurance restrictions   [] Hold PT, pending MD visit   [] Discharge to [facility]. Follow up with PT or MD PRN.      Physical Therapy: TREATMENT/PROGRESS NOTE   Patient: Veronica Zepeda (49 y.o. female)   Examination Date: 2025   :  1975 MRN: [id number]   Visit #: 10 /14  Insurance Allowable Auth Needed   20 visits []Yes     [x]No    Insurance: Payor: [facility] / Plan: [employer] / Product Type: *No Product type* /   Insurance ID: [id number] - (Commercial)  Secondary Insurance (if applicable):    Treatment Diagnosis:

## 2025-03-03 ENCOUNTER — TELEPHONE (OUTPATIENT)
Dept: INTERNAL MEDICINE CLINIC | Age: 50
End: 2025-03-03

## 2025-03-03 NOTE — TELEPHONE ENCOUNTER
Left message to call office at [phone] or use [employer] to request an appointment with the Nutritionist. Advised Nutritionist is only here on Tuesdays from 9:00 am - 2:00 pm

## 2025-03-04 ENCOUNTER — HOSPITAL ENCOUNTER (OUTPATIENT)
Dept: PHYSICAL THERAPY | Age: 50
Setting detail: THERAPIES SERIES
Discharge: HOME | End: 2025-03-04
Payer: COMMERCIAL

## 2025-03-04 PROCEDURE — 97530 THERAPEUTIC ACTIVITIES: CPT

## 2025-03-04 PROCEDURE — 97140 MANUAL THERAPY 1/> REGIONS: CPT

## 2025-03-04 NOTE — FLOWSHEET NOTE
Re-education (71046) activation and proprioception, including postural re-education.    Gait Training (01532) for normalization of ambulation patterns and AD training.   Manual Therapy (33595) as indicated to include: Manual Lymph Drainage  Modalities as needed that may include: Cryotherapy and Thermal Agents  Aquatic Therapy (12764)    Plan:  transition to circaids, continue with MLD, skin care, compression, and exercise     Electronically Signed by Malini Lord PT DPT, CLSHARLA-BEKA, CNS  Date: 03/04/2025     Note: Portions of this note have been templated and/or copied from initial evaluation, reassessments and prior notes for documentation efficiency.    Note: If patient does not return for scheduled/recommended follow up visits, this note will serve as a discharge from care along with the most recent update on progress.    Lymphedema Evaluation

## 2025-03-06 DIAGNOSIS — E55.9 VITAMIN D DEFICIENCY: ICD-10-CM

## 2025-03-06 DIAGNOSIS — E87.1 HYPONATREMIA: ICD-10-CM

## 2025-03-06 RX ORDER — SODIUM BICARBONATE 650 MG/1
650 TABLET ORAL 2 TIMES DAILY
Qty: 120 TABLET | Refills: 1 | Status: SHIPPED | OUTPATIENT
Start: 2025-03-06

## 2025-03-06 RX ORDER — ERGOCALCIFEROL 1.25 MG/1
CAPSULE, LIQUID FILLED ORAL
Qty: 12 CAPSULE | Refills: 1 | Status: SHIPPED | OUTPATIENT
Start: 2025-03-06

## 2025-03-06 NOTE — TELEPHONE ENCOUNTER
Recent Visits  Date Type Provider Dept   01/02/25 Office Visit AyushsamuelPraveena APRN - CNP [facility]   10/31/24 Office Visit AyushsamuelPraveena APRN - CNP [facility]   02/07/24 Office Visit AyushsamuelPraveena APRN - CNP [facility]   01/17/24 Office Visit Praveena Santoyo APRN - CNP [facility]   Showing recent visits within past 540 days with a meds authorizing provider and meeting all other requirements  Future Appointments  Date Type Provider Dept   04/03/25 Appointment Praveena Santoyo APRN - CNP [facility]   Showing future appointments within next 150 days with a meds authorizing provider and meeting all other requirements     1/2/2025     Requested Prescriptions     Pending Prescriptions Disp Refills    vitamin D (ERGOCALCIFEROL) 1.25 MG (43650 UT) CAPS capsule [Pharmacy Med Name: Vitamin D (Ergocalciferol) Oral Capsule 1.25 MG (44146 UT)] 12 capsule 0     Sig: TAKE 1 CAPSULE BY MOUTH ONE TIME A WEEK

## 2025-03-06 NOTE — TELEPHONE ENCOUNTER
Recent Visits  Date Type Provider Dept   01/02/25 Office Visit Praveena Santoyo APRN - CNP [facility]   10/31/24 Office Visit Praveena Santoyo APRN - CNP [facility]   02/07/24 Office Visit Praveena Santoyo APRN - CNP [facility]   01/17/24 Office Visit Pravenea Santoyo APRN - CNP [facility]   Showing recent visits within past 540 days with a meds authorizing provider and meeting all other requirements  Future Appointments  Date Type Provider Dept   04/03/25 Appointment Praveena Santoyo APRN - CNP [facility]   Showing future appointments within next 150 days with a meds authorizing provider and meeting all other requirements     Requested Prescriptions     Pending Prescriptions Disp Refills    sodium bicarbonate 650 MG tablet [Pharmacy Med Name: Sodium Bicarbonate Oral Tablet 650 MG] 120 tablet 0     Sig: TAKE 1 TABLET BY MOUTH 2 TIMES A DAY

## 2025-03-11 ENCOUNTER — APPOINTMENT (OUTPATIENT)
Dept: PHYSICAL THERAPY | Age: 50
End: 2025-03-11
Payer: COMMERCIAL

## 2025-03-13 ENCOUNTER — HOSPITAL ENCOUNTER (OUTPATIENT)
Dept: PHYSICAL THERAPY | Age: 50
Setting detail: THERAPIES SERIES
Discharge: HOME | End: 2025-03-13
Payer: COMMERCIAL

## 2025-03-13 PROCEDURE — 97530 THERAPEUTIC ACTIVITIES: CPT

## 2025-03-13 PROCEDURE — 97140 MANUAL THERAPY 1/> REGIONS: CPT
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
## 2025-03-14 NOTE — TELEPHONE ENCOUNTER
patient is a candidate for Physical Therapy discussed and ordered. She would like to begin abductor strengthening and pelvic conditioning program at our [place] location. She will follow up in 6 weeks at which time she may be a candidate for MRI to assess for abductor tear     We have additionally placed referral for Dr. Hua assessment and possible right SI joint injection.

## 2025-03-17 RX ORDER — CELECOXIB 100 MG/1
CAPSULE ORAL DAILY
Qty: 60 CAPSULE | Refills: 0 | Status: SHIPPED | OUTPATIENT
Start: 2025-03-17

## 2025-03-18 ENCOUNTER — APPOINTMENT (OUTPATIENT)
Dept: PHYSICAL THERAPY | Age: 50
End: 2025-03-18
Payer: COMMERCIAL

## 2025-03-20 ENCOUNTER — HOSPITAL ENCOUNTER (OUTPATIENT)
Dept: PHYSICAL THERAPY | Age: 50
Setting detail: THERAPIES SERIES
Discharge: HOME | End: 2025-03-20
Payer: COMMERCIAL

## 2025-03-20 ENCOUNTER — OFFICE VISIT (OUTPATIENT)
Dept: PULMONOLOGY | Age: 50
End: 2025-03-20
Payer: COMMERCIAL

## 2025-03-20 VITALS
TEMPERATURE: 97.9 F | DIASTOLIC BLOOD PRESSURE: 72 MMHG | RESPIRATION RATE: 18 BRPM | WEIGHT: 293 LBS | BODY MASS INDEX: 45.99 KG/M2 | HEART RATE: 74 BPM | HEIGHT: 67 IN | OXYGEN SATURATION: 96 % | SYSTOLIC BLOOD PRESSURE: 122 MMHG

## 2025-03-20 DIAGNOSIS — R91.1 LUNG NODULE: ICD-10-CM

## 2025-03-20 DIAGNOSIS — G47.33 OSA (OBSTRUCTIVE SLEEP APNEA): ICD-10-CM

## 2025-03-20 DIAGNOSIS — J41.0 SIMPLE CHRONIC BRONCHITIS (HCC): Primary | ICD-10-CM

## 2025-03-20 PROCEDURE — 97530 THERAPEUTIC ACTIVITIES: CPT

## 2025-03-20 PROCEDURE — G2211 COMPLEX E/M VISIT ADD ON: HCPCS | Performed by: INTERNAL MEDICINE

## 2025-03-20 PROCEDURE — 3074F SYST BP LT 130 MM HG: CPT | Performed by: INTERNAL MEDICINE

## 2025-03-20 PROCEDURE — 3078F DIAST BP <80 MM HG: CPT | Performed by: INTERNAL MEDICINE

## 2025-03-20 PROCEDURE — 97140 MANUAL THERAPY 1/> REGIONS: CPT

## 2025-03-20 PROCEDURE — 99214 OFFICE O/P EST MOD 30 MIN: CPT | Performed by: INTERNAL MEDICINE

## 2025-03-20 NOTE — PROGRESS NOTES
01/31/2025 10:31 AM    K 3.5 01/21/2021 04:44 AM    CL 99 01/31/2025 10:31 AM    CO2 25 01/31/2025 10:31 AM    CO2 25 12/17/2024 10:10 AM    CO2 23 11/18/2024 09:16 AM    BUN 6 01/31/2025 10:31 AM    CREATININE 0.6 01/31/2025 10:31 AM    GLUCOSE 92 01/31/2025 10:31 AM    CALCIUM 9.7 01/31/2025 10:31 AM       Radiology Review:  Pertinent images / reports were reviewed as a part of this visit.        CXR PA/LAT: Results for orders placed during the hospital encounter of 05/22/17    XR Chest Standard TWO VW    Narrative  EXAMINATION:  TWO VIEWS OF THE CHEST    5/22/2017 3:16 pm    COMPARISON:  05/12/2017    HISTORY:  ORDERING PHYSICIAN PROVIDED HISTORY: History of pneumonia  TECHNOLOGIST PROVIDED HISTORY:  Technologist Provided Reason for Exam: f/u pneumonia  Acuity: Acute  Type of Encounter: Subsequent/Follow-up    FINDINGS:  The lungs are without acute focal process.  There is no effusion or  pneumothorax. The cardiomediastinal silhouette is without acute process. The  osseous structures are without acute process.    Impression  No acute process.        Pulmonary function testing  Mild obstruction with moderate reduction in DLCO         This note was transcribed using Dragon Dictation software. Please disregard any translational errors.    Remi Howard MD  [facility] Pulmonary, Sleep and Critical Care

## 2025-03-20 NOTE — FLOWSHEET NOTE
progress  [] Patient is not progressing as expected and requires additional follow up with physician  [] Other:     TREATMENT PLAN     Frequency/Duration: 2x/week for 6 weeks for the following treatment interventions:    Interventions:  Therapeutic Exercise (24392) including: strength training, ROM, and functional mobility  Therapeutic Activities (26765) including: functional mobility training and education.  Neuromuscular Re-education (03846) activation and proprioception, including postural re-education.    Gait Training (16999) for normalization of ambulation patterns and AD training.   Manual Therapy (83174) as indicated to include: Manual Lymph Drainage  Modalities as needed that may include: Cryotherapy and Thermal Agents  Aquatic Therapy (05030)    Plan:  transition to circaids, continue with MLD, skin care, compression, and exercise     Electronically Signed by Malini Lord PT DPT, PIERCE-BEKA, JAMARI  Date: 03/20/2025     Note: Portions of this note have been templated and/or copied from initial evaluation, reassessments and prior notes for documentation efficiency.    Note: If patient does not return for scheduled/recommended follow up visits, this note will serve as a discharge from care along with the most recent update on progress.    Lymphedema Evaluation

## 2025-03-25 ENCOUNTER — APPOINTMENT (OUTPATIENT)
Dept: PHYSICAL THERAPY | Age: 50
End: 2025-03-25
Payer: COMMERCIAL

## 2025-03-26 ENCOUNTER — TELEPHONE (OUTPATIENT)
Dept: CARDIOLOGY CLINIC | Age: 50
End: 2025-03-26

## 2025-03-26 NOTE — TELEPHONE ENCOUNTER
Clayton called to check if the office has re: the script for compression device which was sent on 03/14.  Ruy is re-sending the script now and is wanting [facility] to look it over, sign it and fax back to:  [phone]. Thank you

## 2025-03-26 NOTE — ASSESSMENT & PLAN NOTE
Chronic bilateral leg edema has improved  Suspect etiology is multifactorial, in part related to use of salt tabs for hyponatremia which were stopped  Examination also demonstrates lymphedema, referred to PT lymphedema clinic, has almost completed  Advised patient to elevate legs is much as possible when seated for prolonged periods  Limited ambulation make also contribute to lower extremity edema, advised moving is much as possible  Encourage compliance with sleep apnea treatment for very severe sleep apnea  Weight loss also discussed  Recent serum Na 135, advised to have BMP completed as previously ordered

## 2025-03-26 NOTE — PROGRESS NOTES
GENERAL CARDIOLOGY    REFERRING PROVIDER  Praveena Santoyo APRN - CNP     CHIEF COMPLAINT  Follow up       HPI    Veronica Zepeda is a 50 y.o. female presents to the clinic for follow up from OV 1/30/2025. Initial referral 12/19/2024 for shortness of breath and new localized edema.     Medical history reviewed, significant for hypertension, hyperlipidemia, ARYA, GERD, and is a smoker.     Today, the patient reports:  She reports that she has sinus infection, otherwise doing okay  She is taking 80mg Lasix M-W-F  States leg swelling is doing exceptionally well  She has almost completed Lymphedema PT and is wearing compression socks    ASSESSMENT AND PLAN.  SOB (shortness of breath)  Etiology of dyspnea probably multifactorial in part due to physical deconditioning, obesity  Chronic smoker, abnormal PFTs, referred to Pulmonary  Echocardiogram with preserved LVEF and RV systolic function, stress testing negative      Localized edema  Chronic bilateral leg edema has improved  Suspect etiology is multifactorial, in part related to use of salt tabs for hyponatremia which were stopped  Examination also demonstrates lymphedema, referred to PT lymphedema clinic, has almost completed  Advised patient to elevate legs is much as possible when seated for prolonged periods  Limited ambulation make also contribute to lower extremity edema, advised moving is much as possible  Encourage compliance with sleep apnea treatment for very severe sleep apnea  Weight loss also discussed  Recent serum Na 135, advised to have BMP completed as previously ordered      Primary hypertension  BP today is 126/84 normotensive  Continue current medical therapy    Hypercholesterolemia  , HDL 57,LDL 96, TG 98 (11/2024)  LPa <6  A1C 5.5  Zocor stopped, Crestor started (see Lipid panel 1/31/2025)  Check Lipids    Lab Results   Component Value Date    CHOL 185 01/31/2025    TRIG 129 01/31/2025    HDL 60 01/31/2025    LDL 99 01/31/2025

## 2025-03-26 NOTE — ASSESSMENT & PLAN NOTE
, HDL 57,LDL 96, TG 98 (11/2024)  LPa <6  A1C 5.5  Zocor stopped, Crestor started (see Lipid panel 1/31/2025)  Check Lipids    Lab Results   Component Value Date    CHOL 185 01/31/2025    TRIG 129 01/31/2025    HDL 60 01/31/2025    LDL 99 01/31/2025    VLDL 26 01/31/2025

## 2025-03-26 NOTE — ASSESSMENT & PLAN NOTE
Mild bilateral carotid disease by CUS 11/2024  Recommend low dose aspirin, continue statin  Routine surveillance

## 2025-03-27 ENCOUNTER — APPOINTMENT (OUTPATIENT)
Dept: PHYSICAL THERAPY | Age: 50
End: 2025-03-27
Payer: COMMERCIAL

## 2025-03-31 ENCOUNTER — TELEPHONE (OUTPATIENT)
Dept: CARDIOLOGY CLINIC | Age: 50
End: 2025-03-31

## 2025-03-31 NOTE — TELEPHONE ENCOUNTER
Received Rx for pnuematic compression device flexitouch.   Signed per Dr. Camacho and order faxed to    [phone] with receipt confirmation.

## 2025-04-04 ENCOUNTER — HOSPITAL ENCOUNTER (OUTPATIENT)
Dept: PHYSICAL THERAPY | Age: 50
Setting detail: THERAPIES SERIES
End: 2025-04-04
Payer: COMMERCIAL

## 2025-04-09 ENCOUNTER — OFFICE VISIT (OUTPATIENT)
Dept: INTERNAL MEDICINE CLINIC | Age: 50
End: 2025-04-09
Payer: COMMERCIAL

## 2025-04-09 VITALS
HEART RATE: 74 BPM | DIASTOLIC BLOOD PRESSURE: 66 MMHG | WEIGHT: 293 LBS | SYSTOLIC BLOOD PRESSURE: 128 MMHG | OXYGEN SATURATION: 97 % | BODY MASS INDEX: 45.89 KG/M2

## 2025-04-09 DIAGNOSIS — E87.1 HYPONATREMIA: ICD-10-CM

## 2025-04-09 DIAGNOSIS — F17.200 CURRENT SMOKER: ICD-10-CM

## 2025-04-09 DIAGNOSIS — J32.0 CHRONIC MAXILLARY SINUSITIS: Primary | Chronic | ICD-10-CM

## 2025-04-09 DIAGNOSIS — E66.01 MORBID OBESITY: ICD-10-CM

## 2025-04-09 LAB — SODIUM SERPL-SCNC: 135 MMOL/L (ref 136–145)

## 2025-04-09 PROCEDURE — 3078F DIAST BP <80 MM HG: CPT | Performed by: NURSE PRACTITIONER

## 2025-04-09 PROCEDURE — 3074F SYST BP LT 130 MM HG: CPT | Performed by: NURSE PRACTITIONER

## 2025-04-09 PROCEDURE — 99214 OFFICE O/P EST MOD 30 MIN: CPT | Performed by: NURSE PRACTITIONER

## 2025-04-09 RX ORDER — SULFAMETHOXAZOLE AND TRIMETHOPRIM 800; 160 MG/1; MG/1
1 TABLET ORAL 2 TIMES DAILY
Qty: 6 TABLET | Refills: 0 | Status: SHIPPED | OUTPATIENT
Start: 2025-04-09 | End: 2025-04-12

## 2025-04-09 RX ORDER — FLUTICASONE PROPIONATE 50 MCG
1 SPRAY, SUSPENSION (ML) NASAL DAILY
Qty: 32 G | Refills: 1 | Status: SHIPPED | OUTPATIENT
Start: 2025-04-09

## 2025-04-09 SDOH — ECONOMIC STABILITY: FOOD INSECURITY: WITHIN THE PAST 12 MONTHS, YOU WORRIED THAT YOUR FOOD WOULD RUN OUT BEFORE YOU GOT MONEY TO BUY MORE.: NEVER TRUE

## 2025-04-09 SDOH — ECONOMIC STABILITY: FOOD INSECURITY: WITHIN THE PAST 12 MONTHS, THE FOOD YOU BOUGHT JUST DIDN'T LAST AND YOU DIDN'T HAVE MONEY TO GET MORE.: NEVER TRUE

## 2025-04-09 ASSESSMENT — ENCOUNTER SYMPTOMS
ALLERGIC/IMMUNOLOGIC NEGATIVE: 1
EYES NEGATIVE: 1
COUGH: 1

## 2025-04-09 NOTE — ASSESSMENT & PLAN NOTE
Chronic, not at goal (unstable), will continue to stressed the need to decrease smoking activities.

## 2025-04-09 NOTE — ASSESSMENT & PLAN NOTE
Chronic, not at goal (unstable), has not lost pounds, but needs to exercise as well as modify dietary intake    Orders:    Hemoglobin A1C

## 2025-04-09 NOTE — ASSESSMENT & PLAN NOTE
Acute condition, recurrent, Supportive care with appropriate antipyretics and fluids.    Orders:    fluticasone (FLONASE) 50 MCG/ACT nasal spray; 1 spray by Each Nostril route daily    sulfamethoxazole-trimethoprim (BACTRIM DS;SEPTRA DS) 800-160 MG per tablet; Take 1 tablet by mouth 2 times daily for 3 days

## 2025-04-09 NOTE — PROGRESS NOTES
Veronica Zepeda (:  1975) is a 50 y.o. female,Established patient, here for evaluation of the following chief complaint(s):  Ear Pain (Bilateral/ x 2 weeks), Lightheadedness, Cough, and Congestion         Assessment & Plan  Chronic maxillary sinusitis   Acute condition, recurrent, Supportive care with appropriate antipyretics and fluids.    Orders:    fluticasone (FLONASE) 50 MCG/ACT nasal spray; 1 spray by Each Nostril route daily    sulfamethoxazole-trimethoprim (BACTRIM DS;SEPTRA DS) 800-160 MG per tablet; Take 1 tablet by mouth 2 times daily for 3 days    Morbid obesity   Chronic, not at goal (unstable), has not lost pounds, but needs to exercise as well as modify dietary intake    Orders:    Hemoglobin A1C    Hyponatremia   Chronic, at goal (stable), continue current plan pending work up below    Orders:    Sodium    Current smoker   Chronic, not at goal (unstable), will continue to stressed the need to decrease smoking activities.           No follow-ups on file.       Subjective   HPI Presents today for lightheadedness, cough and congestion    Review of Systems   Constitutional: Negative.    HENT:  Positive for congestion.    Eyes: Negative.    Respiratory:  Positive for cough.    Endocrine: Negative.    Genitourinary: Negative.    Musculoskeletal: Negative.    Allergic/Immunologic: Negative.    Neurological:  Positive for light-headedness.   Hematological: Negative.    Psychiatric/Behavioral:  The patient is nervous/anxious.           Objective   Physical Exam  Constitutional:       Appearance: Normal appearance. She is obese.   Cardiovascular:      Rate and Rhythm: Normal rate and regular rhythm.   Pulmonary:      Effort: Pulmonary effort is normal.      Breath sounds: Examination of the right-lower field reveals decreased breath sounds. Examination of the left-lower field reveals decreased breath sounds. Decreased breath sounds present.   Skin:     General: Skin is warm and dry.

## 2025-04-09 NOTE — PATIENT INSTRUCTIONS
Take antibiotic with food  Continue to drink plenty of water  Stop smoking  Take Cetrizine at night  Become more active

## 2025-04-10 ENCOUNTER — OFFICE VISIT (OUTPATIENT)
Dept: CARDIOLOGY CLINIC | Age: 50
End: 2025-04-10
Payer: COMMERCIAL

## 2025-04-10 ENCOUNTER — HOSPITAL ENCOUNTER (OUTPATIENT)
Dept: PHYSICAL THERAPY | Age: 50
Setting detail: THERAPIES SERIES
Discharge: HOME | End: 2025-04-10
Payer: COMMERCIAL

## 2025-04-10 ENCOUNTER — RESULTS FOLLOW-UP (OUTPATIENT)
Dept: INTERNAL MEDICINE CLINIC | Age: 50
End: 2025-04-10

## 2025-04-10 VITALS
SYSTOLIC BLOOD PRESSURE: 126 MMHG | BODY MASS INDEX: 45.83 KG/M2 | WEIGHT: 292 LBS | HEIGHT: 67 IN | DIASTOLIC BLOOD PRESSURE: 84 MMHG | OXYGEN SATURATION: 97 % | HEART RATE: 93 BPM

## 2025-04-10 DIAGNOSIS — R60.0 LOCALIZED EDEMA: ICD-10-CM

## 2025-04-10 DIAGNOSIS — G47.33 OSA (OBSTRUCTIVE SLEEP APNEA): ICD-10-CM

## 2025-04-10 DIAGNOSIS — E78.00 HYPERCHOLESTEROLEMIA: ICD-10-CM

## 2025-04-10 DIAGNOSIS — I10 PRIMARY HYPERTENSION: ICD-10-CM

## 2025-04-10 DIAGNOSIS — R09.89 BILATERAL CAROTID BRUITS: ICD-10-CM

## 2025-04-10 DIAGNOSIS — Z72.0 TOBACCO ABUSE DISORDER: ICD-10-CM

## 2025-04-10 DIAGNOSIS — R07.89 CHEST PRESSURE: ICD-10-CM

## 2025-04-10 DIAGNOSIS — R06.02 SOB (SHORTNESS OF BREATH): Primary | ICD-10-CM

## 2025-04-10 LAB
EST. AVERAGE GLUCOSE BLD GHB EST-MCNC: 111.2 MG/DL
HBA1C MFR BLD: 5.5 %

## 2025-04-10 PROCEDURE — 97530 THERAPEUTIC ACTIVITIES: CPT

## 2025-04-10 PROCEDURE — G2211 COMPLEX E/M VISIT ADD ON: HCPCS | Performed by: INTERNAL MEDICINE

## 2025-04-10 PROCEDURE — 3074F SYST BP LT 130 MM HG: CPT | Performed by: INTERNAL MEDICINE

## 2025-04-10 PROCEDURE — 99214 OFFICE O/P EST MOD 30 MIN: CPT | Performed by: INTERNAL MEDICINE

## 2025-04-10 PROCEDURE — 3079F DIAST BP 80-89 MM HG: CPT | Performed by: INTERNAL MEDICINE

## 2025-04-10 NOTE — PLAN OF CARE
[facility] - Outpatient Rehabilitation and Therapy [address], [place] [address] office: [phone] fax: [phone]     Physical Therapy Re-Certification Plan of Care    Dear Dr. Sheri Camacho, DO  ,    We had the pleasure of treating the following patient for physical therapy services at [facility] Outpatient Physical Therapy. A summary of our findings can be found in the updated assessment below.  This includes our plan of care.  If you have any questions or concerns regarding these findings, please do not hesitate to contact me at the office phone number checked above.  Thank you for the referral.     Physician Signature:________________________________Date:__________________  By signing above (or electronic signature), therapist's plan is approved by physician      Total Visits: 14     Overall Response to Treatment:  Pt d/c from OP PT this date. Pt presents with significant reduction in B LE limb volume and improved skin health. Pt now has flat knit garments for B LE for maintenance phase of CDT. Discussed re-ordering every 4-6 months/multiple pairs once she meets insurance deductible. Pt is still in process of getting home lymphedema pump and finding a gym to start back with aquatic exercise. Pt to continue with daily compression and exercise and follow up with PT or referring practitioner with questions or need for therapy in the future.     Recommendation:    [] Continue PT x / wk for  weeks.   [] Hold PT, pending MD visit   [x] Discharge to [facility]. Follow up with PT or MD PRN.     Physical Therapy: TREATMENT/PROGRESS NOTE   Patient: Veronica Zepeda (50 y.o. female)   Examination Date: 04/10/2025   :  1975 MRN: [id number]   Visit #:   Insurance Allowable Auth Needed   20 visits []Yes     [x]No    Insurance: Payor: [employer] / Plan: [employer] / Product Type: *No Product type* /   Insurance ID: [id number] - (Commercial)  Secondary Insurance (if applicable):

## 2025-04-15 ENCOUNTER — NUTRITION (OUTPATIENT)
Dept: INTERNAL MEDICINE CLINIC | Age: 50
End: 2025-04-15

## 2025-04-15 ENCOUNTER — OFFICE VISIT (OUTPATIENT)
Dept: INTERNAL MEDICINE CLINIC | Age: 50
End: 2025-04-15
Payer: COMMERCIAL

## 2025-04-15 ENCOUNTER — TELEPHONE (OUTPATIENT)
Dept: CARDIOLOGY CLINIC | Age: 50
End: 2025-04-15

## 2025-04-15 VITALS
BODY MASS INDEX: 46.05 KG/M2 | OXYGEN SATURATION: 98 % | DIASTOLIC BLOOD PRESSURE: 72 MMHG | SYSTOLIC BLOOD PRESSURE: 118 MMHG | HEART RATE: 77 BPM | WEIGHT: 293 LBS

## 2025-04-15 DIAGNOSIS — Z72.0 TOBACCO ABUSE DISORDER: ICD-10-CM

## 2025-04-15 DIAGNOSIS — J32.0 CHRONIC MAXILLARY SINUSITIS: ICD-10-CM

## 2025-04-15 DIAGNOSIS — R60.0 LOCALIZED EDEMA: ICD-10-CM

## 2025-04-15 DIAGNOSIS — Z76.89 ENCOUNTER FOR WEIGHT MANAGEMENT: Primary | ICD-10-CM

## 2025-04-15 LAB
ANION GAP SERPL CALCULATED.3IONS-SCNC: 11 MMOL/L (ref 3–16)
BUN SERPL-MCNC: 6 MG/DL (ref 7–20)
CALCIUM SERPL-MCNC: 9.3 MG/DL (ref 8.3–10.6)
CHLORIDE SERPL-SCNC: 95 MMOL/L (ref 99–110)
CHOLEST SERPL-MCNC: 152 MG/DL (ref 0–199)
CO2 SERPL-SCNC: 25 MMOL/L (ref 21–32)
CREAT SERPL-MCNC: 0.6 MG/DL (ref 0.6–1.1)
GFR SERPLBLD CREATININE-BSD FMLA CKD-EPI: >90 ML/MIN/{1.73_M2}
GLUCOSE SERPL-MCNC: 101 MG/DL (ref 70–99)
HDLC SERPL-MCNC: 60 MG/DL (ref 40–60)
LDLC SERPL CALC-MCNC: 74 MG/DL
POTASSIUM SERPL-SCNC: 4.1 MMOL/L (ref 3.5–5.1)
SODIUM SERPL-SCNC: 131 MMOL/L (ref 136–145)
TRIGL SERPL-MCNC: 89 MG/DL (ref 0–150)
VLDLC SERPL CALC-MCNC: 18 MG/DL

## 2025-04-15 PROCEDURE — 3074F SYST BP LT 130 MM HG: CPT | Performed by: NURSE PRACTITIONER

## 2025-04-15 PROCEDURE — 3078F DIAST BP <80 MM HG: CPT | Performed by: NURSE PRACTITIONER

## 2025-04-15 PROCEDURE — 99214 OFFICE O/P EST MOD 30 MIN: CPT | Performed by: NURSE PRACTITIONER

## 2025-04-15 RX ORDER — SULFAMETHOXAZOLE AND TRIMETHOPRIM 800; 160 MG/1; MG/1
1 TABLET ORAL 2 TIMES DAILY
Qty: 10 TABLET | Refills: 0 | Status: SHIPPED | OUTPATIENT
Start: 2025-04-15 | End: 2025-04-20

## 2025-04-15 ASSESSMENT — ENCOUNTER SYMPTOMS
SINUS PRESSURE: 1
RESPIRATORY NEGATIVE: 1
ALLERGIC/IMMUNOLOGIC NEGATIVE: 1
EYES NEGATIVE: 1
GASTROINTESTINAL NEGATIVE: 1
RHINORRHEA: 1

## 2025-04-15 NOTE — ASSESSMENT & PLAN NOTE
Acute condition, new, Supportive care with appropriate antipyretics and fluids.    Orders:    sulfamethoxazole-trimethoprim (BACTRIM DS;SEPTRA DS) 800-160 MG per tablet; Take 1 tablet by mouth 2 times daily for 5 days

## 2025-04-15 NOTE — TELEPHONE ENCOUNTER
Margarita from [facility], Nurse Reviewer - is calling to update on the status of the prior auth submitted for \"Neumatic Compresion Device\" ordered by BIJAL , stockings are approved, but she is not able to approve the trunk garment \"\".  She states this may require a peer- to peer review and wanted to give us a heads up.    Auth# [id number].  She is submitting it to the next level, and if this is needed they will contact us back.

## 2025-04-15 NOTE — PATIENT INSTRUCTIONS
Start walking in the home more  Stop smoking in the house  Continue to drink plenty of water  Take bactrim with food

## 2025-04-15 NOTE — ASSESSMENT & PLAN NOTE
Chronic, not at goal (unstable), educated on the connection between cigarette smoke,, causing exposure, and allergies as well as sinus issues and breathing problems and lifestyle modifications recommended

## 2025-04-15 NOTE — PROGRESS NOTES
Veronica Zepeda (:  1975) is a 50 y.o. female,Established patient, here for evaluation of the following chief complaint(s):  Obesity         Assessment & Plan  Chronic maxillary sinusitis   Acute condition, new, Supportive care with appropriate antipyretics and fluids.    Orders:    sulfamethoxazole-trimethoprim (BACTRIM DS;SEPTRA DS) 800-160 MG per tablet; Take 1 tablet by mouth 2 times daily for 5 days    Encounter for weight management   Chronic, not at goal (unstable), tolerating medication well, needs to exercise which she has not started         Tobacco abuse disorder   Chronic, not at goal (unstable), educated on the connection between cigarette smoke,, causing exposure, and allergies as well as sinus issues and breathing problems and lifestyle modifications recommended           No follow-ups on file.       Subjective   HPI Presents today weight management, states she does not eat that much; however, she is not moving    Review of Systems   Constitutional: Negative.    HENT:  Positive for rhinorrhea and sinus pressure.    Eyes: Negative.    Respiratory: Negative.     Cardiovascular: Negative.    Gastrointestinal: Negative.    Endocrine: Negative.    Genitourinary: Negative.    Musculoskeletal: Negative.    Skin: Negative.    Allergic/Immunologic: Negative.    Neurological: Negative.    Hematological: Negative.    Psychiatric/Behavioral:  The patient is nervous/anxious.           Objective   Physical Exam  HENT:      Head: Normocephalic.   Cardiovascular:      Rate and Rhythm: Normal rate and regular rhythm.      Heart sounds: Normal heart sounds.   Pulmonary:      Effort: Pulmonary effort is normal.      Breath sounds: Examination of the right-middle field reveals wheezing. Examination of the right-lower field reveals wheezing. Examination of the left-lower field reveals wheezing. Wheezing present.      Comments: Use inhaler every 6 hours as needed for wheezing, cough, or SOB  Abdominal:

## 2025-04-15 NOTE — PROGRESS NOTES
Lab Results   Component Value Date/Time    LABA1C 5.5 04/09/2025 12:24 PM     Medications:   Lasix, crestor    Anthropometric Measures:   Height:   5' 7\" (pt states 5' 2\", all EMR documentation stated 5' 7\")  Current Weight:   294 lb/133 kg  Ideal Body Weight: 135# /61 kg    There is no height or weight on file to calculate BMI.    >or equal to 40 Obese Class III  Wt Readings from Last 50 Encounters:   04/15/25 133.4 kg (294 lb)   04/10/25 132.5 kg (292 lb)   04/09/25 132.9 kg (293 lb)   03/20/25 133.4 kg (294 lb 3.2 oz)   01/30/25 132.5 kg (292 lb)   01/28/25 135.9 kg (299 lb 9.6 oz)   01/02/25 135.2 kg (298 lb)   12/19/24 136.1 kg (300 lb)   12/12/24 132.9 kg (293 lb)   12/12/24 132.9 kg (293 lb)   11/15/24 135.6 kg (299 lb)   10/31/24 135.2 kg (298 lb)   03/20/24 133.2 kg (293 lb 9.6 oz)   03/04/24 132.9 kg (293 lb)     Comparative Standards:   5537-2482 (1800 kcal)  80-92 (90 gm)    Nutrition-focused Physical Findings:           Bowel Pattern: No issues noted  Skin: No issues noted  Chewing / Swallowing: No issues reported    Food/Nutrition-Related History:  Home Diet: Regular  Home Supplements / Herbals:  MVI, Omega 3/6/9, tumeric  Food Restrictions / Cultural Requests:   N/A  Food Allergies: N/A  Smoker: Yes  Consumes Alcohol: One glass of wine daily    Physical Activity:  Pt just completed lymphedema PT and reports limited ability to exercise however would like to begin aqua-therapy or simple aquatic exercises, likely at the [facility]    Diet Recall  Breakfast:  Coffee with half and half and either a turnover or two [employer] cookies  Lunch:  Skips  Dinner:  Crab cakes or BBQ Chicken (leftover from husbands work)  Snacks:  Popcorn, pretzels, chips  Beverages:  Water, hot tea with splenda    NUTRITION DIAGNOSIS and GOAL  P: Food and nutrition related knowledge deficit  E related to Knowledge deficit  S: as evidenced by Diet Recall    Nutrition Intervention:  - Nutrition Education: Patient educated on My

## 2025-04-16 ENCOUNTER — RESULTS FOLLOW-UP (OUTPATIENT)
Age: 50
End: 2025-04-16

## 2025-04-17 DIAGNOSIS — R60.0 LOCALIZED EDEMA: Primary | ICD-10-CM

## 2025-04-17 NOTE — TELEPHONE ENCOUNTER
Carolyn with medical review asking for a call back to set up a time for PEER to PEER. Call [phone] ext [phone] using case # [id number]

## 2025-04-17 NOTE — RESULT ENCOUNTER NOTE
I spoke with Veronica, I informed her of her recent labs, Instructed her to decrease her Lasix to 40mg daily and repeat her labs , BMP in 2 weeks.  She verbalizes understanding  Orders placed.

## 2025-04-21 ENCOUNTER — TELEPHONE (OUTPATIENT)
Dept: CARDIOLOGY CLINIC | Age: 50
End: 2025-04-21

## 2025-04-22 NOTE — TELEPHONE ENCOUNTER
Per Telephone Encounter on 4/15/25 - No to peer to peer, will just continue the approved compression stockings.      I tried calling Dr. Hooper to notify.  No answer with a generalized voicemail greeting so I did not leave a voicemail.

## 2025-04-23 NOTE — TELEPHONE ENCOUNTER
Kailyn ROSAS is calling to see if the request for the entire Neumatic Compression Device has been cancelled. She was told that the [id number] had been declined for the leg pump.    Please call [phone] ext [phone]

## 2025-04-23 NOTE — TELEPHONE ENCOUNTER
Spoke to Kailyn and she said she would withdraw the request.  She states if at a later time we would like to submit the request again, please call the Intake Department at [phone] .

## 2025-04-24 RX ORDER — OMEPRAZOLE 20 MG/1
20 CAPSULE, DELAYED RELEASE ORAL DAILY
Qty: 90 CAPSULE | Refills: 0 | Status: SHIPPED | OUTPATIENT
Start: 2025-04-24

## 2025-04-24 NOTE — TELEPHONE ENCOUNTER
Recent Visits  Date Type Provider Dept   04/15/25 Office Visit Praveena Santoyo APRN - CNP [facility]   04/09/25 Office Visit Praveena Santoyo APRN - CNP [facility]   01/02/25 Office Visit Praveena Santoyo, APRN - CNP [facility]   10/31/24 Office Visit Praveena Santoyo APRN - CNP [facility]   02/07/24 Office Visit Praveena Santoyo APRN - CNP [facility]   01/17/24 Office Visit Praveena Santoyo APRN - CNP [facility]   Showing recent visits within past 540 days with a meds authorizing provider and meeting all other requirements  Future Appointments  Date Type Provider Dept   07/10/25 Appointment Praveena Santoyo APRN - CNP [facility]   Showing future appointments within next 150 days with a meds authorizing provider and meeting all other requirements     4/15/2025

## 2025-05-01 ENCOUNTER — PATIENT MESSAGE (OUTPATIENT)
Dept: CARDIOLOGY CLINIC | Age: 50
End: 2025-05-01

## 2025-05-02 DIAGNOSIS — R60.0 LOCALIZED EDEMA: ICD-10-CM

## 2025-05-02 LAB
ANION GAP SERPL CALCULATED.3IONS-SCNC: 13 MMOL/L (ref 3–16)
BUN SERPL-MCNC: 6 MG/DL (ref 7–20)
CALCIUM SERPL-MCNC: 9.6 MG/DL (ref 8.3–10.6)
CHLORIDE SERPL-SCNC: 97 MMOL/L (ref 99–110)
CO2 SERPL-SCNC: 25 MMOL/L (ref 21–32)
CREAT SERPL-MCNC: 0.6 MG/DL (ref 0.6–1.1)
GFR SERPLBLD CREATININE-BSD FMLA CKD-EPI: >90 ML/MIN/{1.73_M2}
GLUCOSE SERPL-MCNC: 93 MG/DL (ref 70–99)
POTASSIUM SERPL-SCNC: 4 MMOL/L (ref 3.5–5.1)
SODIUM SERPL-SCNC: 135 MMOL/L (ref 136–145)

## 2025-05-05 ENCOUNTER — RESULTS FOLLOW-UP (OUTPATIENT)
Age: 50
End: 2025-05-05

## 2025-05-05 NOTE — RESULT ENCOUNTER NOTE
Spoke with Veronica, informed her of her recent lab results, informed her that she is to continue her current dose of Lasix. Potassium ok but sodium is improved but still borderline.  Instructed her to continue to wrap her legs as best she can and elevate as much as possible.   She verbalizes understanding.

## 2025-05-15 DIAGNOSIS — R60.0 LOCALIZED EDEMA: ICD-10-CM

## 2025-05-15 DIAGNOSIS — I10 PRIMARY HYPERTENSION: ICD-10-CM

## 2025-05-16 RX ORDER — FUROSEMIDE 40 MG/1
40 TABLET ORAL DAILY
Qty: 90 TABLET | Refills: 0 | Status: SHIPPED | OUTPATIENT
Start: 2025-05-16

## 2025-05-16 RX ORDER — ARIPIPRAZOLE 5 MG/1
5 TABLET ORAL DAILY
Qty: 90 TABLET | Refills: 1 | Status: SHIPPED | OUTPATIENT
Start: 2025-05-16

## 2025-05-16 NOTE — TELEPHONE ENCOUNTER
Received refill request for Furosemide Oral Tablet 40 MG  from [facility] pharmacy.     Last OV: 4/15/25    Next OV: none    Last Labs: bmp 5/2/25    Last Filled: 12/23/24

## 2025-05-16 NOTE — TELEPHONE ENCOUNTER
Recent Visits  Date Type Provider Dept   04/15/25 Office Visit YarelisPraveena APRN - CNP [facility]   04/09/25 Office Visit YarelisPraveena APRN - CNP [facility]   01/02/25 Office Visit YarelisPraveena APRN - CNP [facility]   10/31/24 Office Visit AyushsamuelPraveena APRN - CNP [facility]   02/07/24 Office Visit AyushsamuelPraveena APRN - CNP [facility]   01/17/24 Office Visit YarelisPraveena APRN - CNP [facility]   Showing recent visits within past 540 days with a meds authorizing provider and meeting all other requirements  Future Appointments  Date Type Provider Dept   07/10/25 Appointment YarelisPraveena APRN - CNP [facility]   Showing future appointments within next 150 days with a meds authorizing provider and meeting all other requirements              Requested Prescriptions     Pending Prescriptions Disp Refills    ARIPiprazole (ABILIFY) 5 MG tablet [Pharmacy Med Name: ARIPiprazole Oral Tablet 5 MG] 90 tablet 0     Sig: TAKE 1 TABLET BY MOUTH EVERY DAY

## 2025-05-20 RX ORDER — CELECOXIB 100 MG/1
CAPSULE ORAL DAILY
Qty: 60 CAPSULE | Refills: 0 | Status: SHIPPED | OUTPATIENT
Start: 2025-05-20

## 2025-05-20 NOTE — TELEPHONE ENCOUNTER
Requested Prescriptions     Pending Prescriptions Disp Refills    celecoxib (CELEBREX) 100 MG capsule [Pharmacy Med Name: Celecoxib Oral Capsule 100 MG] 60 capsule 0     Sig: TAKE 1 CAPSULE BY MOUTH EVERY DAY     Patient last seen 3.4.24 and medication last filled 3.17.25:     Assessment: Patient is a 48 y.o. female with recurrent right hip pain related to clinical diagnosis of hip spine syndrome. She does have moderate OA on xray imaging and significant abductor weakness on exam.      Impression:  Visit Diagnoses           Codes     Pain of right hip    -  Primary M25.551                Office Procedures:  Encounter Medications        Orders Placed This Encounter   Medications    celecoxib (CELEBREX) 100 MG capsule       Sig: Take 1 capsule by mouth daily       Dispense:  60 capsule       Refill:  1               Orders Placed This Encounter   Procedures    XR HIP 3-4 VW W PELVIS BILATERAL       ROOM 1     TRUE RIGHT HIP       Standing Status:   Future       Number of Occurrences:   1       Standing Expiration Date:   3/4/2025    Formerly [facility] - Mike Hua MD, Pain Management, [facility]       Referral Priority:   Routine       Referral Type:   Eval and Treat       Referral Reason:   Specialty Services Required       Referred to Provider:   Mike Hua MD       Requested Specialty:   Anesthesiology Pain Medicine       Number of Visits Requested:   1    Ambulatory referral to Physical Therapy       Referral Priority:   Routine       Referral Type:   Eval and Treat       Referral Reason:   Specialty Services Required       Requested Specialty:   Physical Therapist       Number of Visits Requested:   1         Plan:  Pertinent imaging was reviewed. The etiology, natural history, and treatment options for the disorder were discussed.  The roles of activity modification, antiinflammatory medicine, injections, bracing, physical therapy, and surgical interventions were all described to the patient and questions were

## 2025-06-12 ENCOUNTER — OFFICE VISIT (OUTPATIENT)
Dept: PULMONOLOGY | Age: 50
End: 2025-06-12
Payer: COMMERCIAL

## 2025-06-12 VITALS
HEIGHT: 67 IN | BODY MASS INDEX: 44.64 KG/M2 | TEMPERATURE: 97.8 F | DIASTOLIC BLOOD PRESSURE: 70 MMHG | HEART RATE: 80 BPM | SYSTOLIC BLOOD PRESSURE: 120 MMHG | RESPIRATION RATE: 18 BRPM | OXYGEN SATURATION: 95 % | WEIGHT: 284.4 LBS

## 2025-06-12 DIAGNOSIS — J41.0 SIMPLE CHRONIC BRONCHITIS (HCC): Primary | ICD-10-CM

## 2025-06-12 DIAGNOSIS — G47.33 OSA (OBSTRUCTIVE SLEEP APNEA): ICD-10-CM

## 2025-06-12 DIAGNOSIS — R91.1 LUNG NODULE: ICD-10-CM

## 2025-06-12 PROCEDURE — 99214 OFFICE O/P EST MOD 30 MIN: CPT | Performed by: INTERNAL MEDICINE

## 2025-06-12 PROCEDURE — 3074F SYST BP LT 130 MM HG: CPT | Performed by: INTERNAL MEDICINE

## 2025-06-12 PROCEDURE — 3078F DIAST BP <80 MM HG: CPT | Performed by: INTERNAL MEDICINE

## 2025-06-12 PROCEDURE — G2211 COMPLEX E/M VISIT ADD ON: HCPCS | Performed by: INTERNAL MEDICINE

## 2025-06-16 NOTE — PROGRESS NOTES
90 capsule 0    fluticasone (FLONASE) 50 MCG/ACT nasal spray 1 spray by Each Nostril route daily 32 g 1    sodium bicarbonate 650 MG tablet TAKE 1 TABLET BY MOUTH 2 TIMES A  tablet 1    vitamin D (ERGOCALCIFEROL) 1.25 MG (81523 UT) CAPS capsule TAKE 1 CAPSULE BY MOUTH ONE TIME A WEEK 12 capsule 1    OXcarbazepine (TRILEPTAL) 600 MG tablet TAKE 1 TABLET BY MOUTH 2 TIMES A DAILY 180 tablet 0    rosuvastatin (CRESTOR) 10 MG tablet Take 1 tablet by mouth daily 90 tablet 1    risperiDONE (RISPERDAL) 4 MG tablet Take 1 tablet by mouth daily 90 tablet 1    Compression Stockings MISC by Does not apply route With 30-40 mm Hg 1 each 0    lisinopril (PRINIVIL;ZESTRIL) 5 MG tablet Take 1 tablet by mouth daily 30 tablet 5    topiramate (TOPAMAX) 100 MG tablet Take 1 tablet by mouth daily 60 tablet 3    Omega 3-6-9 Fatty Acids (OMEGA 3-6-9 PO) Take by mouth      Misc Natural Products (GLUCOSAMINE CHOND CMP ADVANCED PO) Take by mouth      rimegepant sulfate (NURTEC) 75 MG TBDP Take 1 tablet by mouth every other day 16 tablet 1    cetirizine (ZYRTEC) 10 MG tablet Take 1 tablet by mouth daily 90 tablet 1    albuterol sulfate HFA (PROVENTIL HFA) 108 (90 Base) MCG/ACT inhaler Inhale 2 puffs into the lungs every 6 hours as needed for Wheezing 18 g 3    triamcinolone (KENALOG) 0.025 % ointment Apply 1 cm topically 2 times daily 80 g 1    turmeric 500 MG CAPS Take by mouth daily      TART CHERRY PO Take by mouth      Handicap Placard MISC by Does not apply route 3 years 1 each 0    Multiple Vitamins-Minerals (THERAPEUTIC MULTIVITAMIN-MINERALS) tablet Take 1 tablet by mouth daily       No current facility-administered medications for this visit.       Data Reviewed:   CBC and Renal reviewed  Last CBC  Lab Results   Component Value Date/Time    WBC 11.3 08/20/2022 05:00 PM    RBC 4.87 08/20/2022 05:00 PM    HGB 15.1 08/20/2022 05:00 PM    MCV 92.1 08/20/2022 05:00 PM     08/20/2022 05:00 PM     Last Renal  Lab Results

## 2025-06-20 NOTE — TELEPHONE ENCOUNTER
Recent Visits  Date Type Provider Dept   04/15/25 Office Visit Praveena Santoyo APRN - CNP [facility]   04/09/25 Office Visit Praveena Santoyo APRN - CNP [facility]   01/02/25 Office Visit Praveena Santoyo APRN - CNP [facility]   10/31/24 Office Visit Praveena Santoyo APRN - CNP [facility]   02/07/24 Office Visit Praveena Santoyo APRN - CNP [facility]   01/17/24 Office Visit Praveena Santoyo APRN - CNP [facility]   Showing recent visits within past 540 days with a meds authorizing provider and meeting all other requirements  Future Appointments  Date Type Provider Dept   07/10/25 Appointment Praveena Santoyo APRN - CNP [facility]   Showing future appointments within next 150 days with a meds authorizing provider and meeting all other requirements              Requested Prescriptions     Pending Prescriptions Disp Refills    OXcarbazepine (TRILEPTAL) 600 MG tablet [Pharmacy Med Name: OXcarbazepine Oral Tablet 600 MG] 180 tablet 0     Sig: TAKE 1 TABLET BY MOUTH 2 TIMES A DAILY

## 2025-06-25 RX ORDER — OXCARBAZEPINE 600 MG/1
TABLET, FILM COATED ORAL
Qty: 180 TABLET | Refills: 0 | Status: SHIPPED | OUTPATIENT
Start: 2025-06-25

## 2025-07-03 DIAGNOSIS — E87.1 HYPONATREMIA: ICD-10-CM

## 2025-07-03 NOTE — TELEPHONE ENCOUNTER
Recent Visits  Date Type Provider Dept   04/15/25 Office Visit Praveena Santoyo APRN - CNP [facility]   04/09/25 Office Visit Praveena Santoyo APRN - CNP [facility]   01/02/25 Office Visit Praveena Santoyo APRN - CNP [facility]   10/31/24 Office Visit Praveena Santoyo APRN - CNP [facility]   02/07/24 Office Visit Praveena Santoyo APRN - CNP [facility]   01/17/24 Office Visit Praveena Santoyo APRN - CNP [facility]   Showing recent visits within past 540 days with a meds authorizing provider and meeting all other requirements  Future Appointments  Date Type Provider Dept   07/10/25 Appointment Praveena Santoyo APRN - CNP [facility]   Showing future appointments within next 150 days with a meds authorizing provider and meeting all other requirements              Requested Prescriptions     Pending Prescriptions Disp Refills    sodium bicarbonate 650 MG tablet [Pharmacy Med Name: Sodium Bicarbonate Oral Tablet 650 MG] 120 tablet 0     Sig: TAKE 1 TABLET BY MOUTH 2 TIMES A DAY

## 2025-07-05 RX ORDER — SODIUM BICARBONATE 650 MG/1
650 TABLET ORAL 2 TIMES DAILY
Qty: 120 TABLET | Refills: 0 | Status: SHIPPED | OUTPATIENT
Start: 2025-07-05

## 2025-07-10 ENCOUNTER — OFFICE VISIT (OUTPATIENT)
Dept: INTERNAL MEDICINE CLINIC | Age: 50
End: 2025-07-10
Payer: COMMERCIAL

## 2025-07-10 ENCOUNTER — OFFICE VISIT (OUTPATIENT)
Dept: CARDIOLOGY CLINIC | Age: 50
End: 2025-07-10
Payer: COMMERCIAL

## 2025-07-10 VITALS
BODY MASS INDEX: 44.17 KG/M2 | OXYGEN SATURATION: 98 % | WEIGHT: 282 LBS | DIASTOLIC BLOOD PRESSURE: 66 MMHG | HEART RATE: 89 BPM | SYSTOLIC BLOOD PRESSURE: 126 MMHG

## 2025-07-10 VITALS
BODY MASS INDEX: 44.04 KG/M2 | DIASTOLIC BLOOD PRESSURE: 72 MMHG | SYSTOLIC BLOOD PRESSURE: 128 MMHG | HEART RATE: 77 BPM | OXYGEN SATURATION: 94 % | WEIGHT: 280.6 LBS | HEIGHT: 67 IN

## 2025-07-10 DIAGNOSIS — E87.1 HYPONATREMIA: ICD-10-CM

## 2025-07-10 DIAGNOSIS — I10 PRIMARY HYPERTENSION: ICD-10-CM

## 2025-07-10 DIAGNOSIS — R60.0 LOCALIZED EDEMA: ICD-10-CM

## 2025-07-10 DIAGNOSIS — L20.82 FLEXURAL ECZEMA: ICD-10-CM

## 2025-07-10 DIAGNOSIS — R06.02 SOB (SHORTNESS OF BREATH): Primary | ICD-10-CM

## 2025-07-10 DIAGNOSIS — E78.00 HYPERCHOLESTEROLEMIA: ICD-10-CM

## 2025-07-10 DIAGNOSIS — Z76.89 ENCOUNTER FOR WEIGHT MANAGEMENT: ICD-10-CM

## 2025-07-10 DIAGNOSIS — Z72.0 TOBACCO ABUSE DISORDER: ICD-10-CM

## 2025-07-10 DIAGNOSIS — R42 LIGHTHEADED: ICD-10-CM

## 2025-07-10 DIAGNOSIS — G47.33 OSA (OBSTRUCTIVE SLEEP APNEA): ICD-10-CM

## 2025-07-10 DIAGNOSIS — R07.89 CHEST PRESSURE: ICD-10-CM

## 2025-07-10 DIAGNOSIS — I25.10 CORONARY ARTERY CALCIFICATION: ICD-10-CM

## 2025-07-10 PROCEDURE — 3078F DIAST BP <80 MM HG: CPT | Performed by: INTERNAL MEDICINE

## 2025-07-10 PROCEDURE — 3074F SYST BP LT 130 MM HG: CPT | Performed by: NURSE PRACTITIONER

## 2025-07-10 PROCEDURE — 3074F SYST BP LT 130 MM HG: CPT | Performed by: INTERNAL MEDICINE

## 2025-07-10 PROCEDURE — 99214 OFFICE O/P EST MOD 30 MIN: CPT | Performed by: INTERNAL MEDICINE

## 2025-07-10 PROCEDURE — 99213 OFFICE O/P EST LOW 20 MIN: CPT | Performed by: NURSE PRACTITIONER

## 2025-07-10 PROCEDURE — 3078F DIAST BP <80 MM HG: CPT | Performed by: NURSE PRACTITIONER

## 2025-07-10 RX ORDER — TRIAMCINOLONE ACETONIDE 0.25 MG/G
1 OINTMENT TOPICAL 2 TIMES DAILY
Qty: 80 G | Refills: 1 | Status: SHIPPED | OUTPATIENT
Start: 2025-07-10

## 2025-07-10 RX ORDER — TOPIRAMATE 100 MG/1
100 TABLET, FILM COATED ORAL DAILY
Qty: 60 TABLET | Refills: 3 | Status: SHIPPED | OUTPATIENT
Start: 2025-07-10

## 2025-07-10 NOTE — PROGRESS NOTES
right vertebral artery demonstrates normal antegrade flow.  The right subclavian artery is visualized and demonstrates multiphasic flow.     The left internal carotid artery appears to have a <50% diameter reducing stenosis based on velocity criteria.  The left vertebral artery demonstrates normal antegrade flow.  The left subclavian artery is visualized and demonstrates multiphasic flow.        Echo  12/12/2024    Left Ventricle: Normal left ventricular systolic function with a visually estimated EF of 60 - 65%. Left ventricle size is normal. Normal wall thickness. Normal wall motion. Grade I diastolic dysfunction with normal LAP.    Right Ventricle: Right ventricle size is normal. Normal systolic function.    Tricuspid Valve: Unable to assess RVSP due to inadequate or insignificant tricuspid regurgitation.    Image quality is technically difficult. Contrast used: Lumason.      Echo  2017  Conclusions  Summary  Global ejection fraction is normal and estimated at 55 %.  Trivial mitral regurgitation is present.  Trivial tricuspid regurgitation with RVSP estimated at 23 mmHg.    PFT  11/26/2024  Spirometry:  Flow volume loops were normal. The FEV-1/FVC ratio was normal. The pre-bronchodilator FEV-1 was 2.03 liters (83% of predicted), which was normal. The FVC was 2.67 liters (90% of predicted), which was normal. Response to inhaled bronchodilators (albuterol) was not performed.     Lung volumes:  Lung volumes were tested by plethysmography. The total lung capacity was 5.9 liters (124% of predicted), which was increased. The residual volume was 3.51 liters (206% of predicted), which was increased. The ratio of residual volume to total lung capacity (RV/TLC) was 59, which was increased.      Diffusion capacity was found to be 74% predicted which is Mildly decreased.       Interpretation:  Signs of significant hyperinflation noted. Clinical correlation is recommended.     2022  Interpretation:   Possible mild obstructive

## 2025-07-10 NOTE — ASSESSMENT & PLAN NOTE
Chronic bilateral leg edema has improved with weight loss  Suspect etiology is multifactorial, in part related to use of salt tabs for hyponatremia which were stopped  Examination also demonstrates lymphedema, referred to PT lymphedema clinic  Advised patient to elevate legs is much as possible when seated for prolonged periods  Limited ambulation make also contribute to lower extremity edema, advised moving is much as possible  Encourage compliance with sleep apnea treatment for very severe sleep apnea  Continue current dose of Lasix

## 2025-07-10 NOTE — ASSESSMENT & PLAN NOTE
, HDL 57,LDL 96, TG 98 (11/2024)  LPa <6  A1C 5.5  Zocor stopped, Crestor started (see Lipid panel 1/31/2025)  Recommend recheck Lipids, patient anticipates improvement with her weight loss    Lab Results   Component Value Date    CHOL 152 04/15/2025    TRIG 89 04/15/2025    HDL 60 04/15/2025    LDL 74 04/15/2025    VLDL 18 04/15/2025

## 2025-07-10 NOTE — ASSESSMENT & PLAN NOTE
Etiology of dyspnea probably multifactorial in part due to physical deconditioning, obesity  Chronic smoker, abnormal PFTs, following with Pulmonary  Echocardiogram with preserved LVEF and RV systolic function, stress testing negative  Patient reports improvement in breathing, no active concern to address today

## 2025-07-10 NOTE — ASSESSMENT & PLAN NOTE
Found on LDCT 11/13/2024  No anginal symptoms  Stress test negative for ischemia  Recommend starting aspirin, 81mg  Continue statin

## 2025-07-10 NOTE — PROGRESS NOTES
Veronica Zepeda (:  1975) is a 50 y.o. female,Established patient, here for evaluation of the following chief complaint(s):  Weight Management         Assessment & Plan  Flexural eczema   Chronic, at goal (stable), continue current treatment plan    Orders:    triamcinolone (KENALOG) 0.025 % ointment; Apply 1 cm topically 2 times daily    Encounter for weight management   Chronic, not at goal (unstable), slowly losing weight, unable to exercise because of painful knees    Orders:    topiramate (TOPAMAX) 100 MG tablet; Take 1 tablet by mouth daily      No follow-ups on file.       Subjective   HPI Presents today for weight management, States she is eating between 1400-160 tanmay a day    Review of Systems   Constitutional: Negative.    HENT: Negative.     Eyes: Negative.    Respiratory: Negative.     Cardiovascular: Negative.    Gastrointestinal: Negative.    Endocrine: Negative.    Genitourinary: Negative.    Musculoskeletal:  Positive for gait problem.   Skin:  Negative for rash.   Allergic/Immunologic: Negative.    Hematological: Negative.    Psychiatric/Behavioral:  The patient is nervous/anxious.           Objective   Physical Exam  Constitutional:       Appearance: She is obese.      Comments: dishelved   Cardiovascular:      Rate and Rhythm: Normal rate and regular rhythm.      Heart sounds: Heart sounds are distant.      Comments: Less less edematous, does not have support hose on today  Pulmonary:      Effort: Pulmonary effort is normal.      Breath sounds: Normal breath sounds.   Skin:     General: Skin is warm and dry.      Findings: Rash present.      Comments: History of eczema, will continue medication for sporatic exacerbations   Psychiatric:         Attention and Perception: Attention normal.         Mood and Affect: Mood is elated.      Comments: Making firm decisions regarding weight. States she needs to do this and then tackle her smoking issues. Has definitive times to eat and does some

## 2025-07-14 DIAGNOSIS — E78.00 HYPERCHOLESTEROLEMIA: ICD-10-CM

## 2025-07-14 DIAGNOSIS — I25.10 CORONARY ARTERY CALCIFICATION: ICD-10-CM

## 2025-07-14 DIAGNOSIS — R42 LIGHTHEADED: ICD-10-CM

## 2025-07-14 DIAGNOSIS — E87.1 HYPONATREMIA: ICD-10-CM

## 2025-07-14 DIAGNOSIS — R60.0 LOCALIZED EDEMA: ICD-10-CM

## 2025-07-14 LAB
ANION GAP SERPL CALCULATED.3IONS-SCNC: 13 MMOL/L (ref 3–16)
BUN SERPL-MCNC: 5 MG/DL (ref 7–20)
CALCIUM SERPL-MCNC: 9.5 MG/DL (ref 8.3–10.6)
CHLORIDE SERPL-SCNC: 96 MMOL/L (ref 99–110)
CHOLEST SERPL-MCNC: 164 MG/DL (ref 0–199)
CO2 SERPL-SCNC: 22 MMOL/L (ref 21–32)
CREAT SERPL-MCNC: 0.5 MG/DL (ref 0.6–1.1)
GFR SERPLBLD CREATININE-BSD FMLA CKD-EPI: >90 ML/MIN/{1.73_M2}
GLUCOSE SERPL-MCNC: 93 MG/DL (ref 70–99)
HDLC SERPL-MCNC: 57 MG/DL (ref 40–60)
LDL CHOLESTEROL: 76 MG/DL
POTASSIUM SERPL-SCNC: 3.9 MMOL/L (ref 3.5–5.1)
SODIUM SERPL-SCNC: 131 MMOL/L (ref 136–145)
TRIGL SERPL-MCNC: 154 MG/DL (ref 0–150)
VLDLC SERPL CALC-MCNC: 31 MG/DL

## 2025-07-15 DIAGNOSIS — E87.1 HYPONATREMIA: Primary | ICD-10-CM

## 2025-07-21 DIAGNOSIS — Z23 NEED FOR ZOSTAVAX ADMINISTRATION: ICD-10-CM

## 2025-07-21 RX ORDER — LISINOPRIL 5 MG/1
5 TABLET ORAL DAILY
Qty: 30 TABLET | Refills: 2 | Status: SHIPPED | OUTPATIENT
Start: 2025-07-21

## 2025-07-21 NOTE — TELEPHONE ENCOUNTER
Recent Visits  Date Type Provider Dept   07/10/25 Office Visit Praveena Santoyo APRN - CNP [facility]   04/15/25 Office Visit Praveena Santoyo APRN - CNP [facility]   04/09/25 Office Visit Praveena Santoyo, APRN - CNP [facility]   01/02/25 Office Visit Praveena Santoyo APRN - CNP [facility]   10/31/24 Office Visit Praveena Santoyo APRN - CNP [facility]   02/07/24 Office Visit Praveena Santoyo APRN - CNP [facility]   Showing recent visits within past 540 days with a meds authorizing provider and meeting all other requirements  Future Appointments  Date Type Provider Dept   10/16/25 Appointment Praveena Santoyo APRN - CNP [facility]   Showing future appointments within next 150 days with a meds authorizing provider and meeting all other requirements     7/10/2025

## 2025-07-24 NOTE — TELEPHONE ENCOUNTER
Recent Visits  Date Type Provider Dept   07/10/25 Office Visit Praveena Santoyo APRN - CNP [facility]   04/15/25 Office Visit Praveena Santoyo APRN - CNP [facility]   04/09/25 Office Visit Praveena Santoyo APRN - CNP [facility]   01/02/25 Office Visit Praveena Santoyo APRN - CNP [facility]   10/31/24 Office Visit Praveena Santoyo APRN - CNP [facility]   02/07/24 Office Visit Praveena Santoyo APRN - CNP [facility]   Showing recent visits within past 540 days with a meds authorizing provider and meeting all other requirements  Future Appointments  Date Type Provider Dept   10/16/25 Appointment Praveena Santoyo APRN - CNP [facility]   Showing future appointments within next 150 days with a meds authorizing provider and meeting all other requirements              Requested Prescriptions     Pending Prescriptions Disp Refills    risperiDONE (RISPERDAL) 4 MG tablet [Pharmacy Med Name: risperiDONE Oral Tablet 4 MG] 90 tablet 0     Sig: TAKE 1 TABLET BY MOUTH EVERY DAY

## 2025-07-26 RX ORDER — RISPERIDONE 4 MG/1
4 TABLET ORAL DAILY
Qty: 90 TABLET | Refills: 0 | Status: SHIPPED | OUTPATIENT
Start: 2025-07-26

## 2025-07-30 NOTE — TELEPHONE ENCOUNTER
Recent Visits  Date Type Provider Dept   07/10/25 Office Visit Praveena Santoyo APRN - CNP [facility]   04/15/25 Office Visit Praveena Santoyo APRN - CNP [facility]   04/09/25 Office Visit Praveena Santoyo, APRN - CNP [facility]   01/02/25 Office Visit Praveena Santoyo APRN - CNP [facility]   10/31/24 Office Visit Praveena Santoyo APRN - CNP [facility]   02/07/24 Office Visit Praveena Santoyo APRN - CNP [facility]   Showing recent visits within past 540 days with a meds authorizing provider and meeting all other requirements  Future Appointments  Date Type Provider Dept   10/16/25 Appointment Praveena Santoyo APRN - CNP [facility]   Showing future appointments within next 150 days with a meds authorizing provider and meeting all other requirements     7/10/2025

## 2025-07-31 RX ORDER — OMEPRAZOLE 20 MG/1
CAPSULE, DELAYED RELEASE ORAL DAILY
Qty: 90 CAPSULE | Refills: 0 | Status: SHIPPED | OUTPATIENT
Start: 2025-07-31

## 2025-08-03 DIAGNOSIS — E78.00 HYPERCHOLESTEROLEMIA: ICD-10-CM

## 2025-08-04 RX ORDER — ROSUVASTATIN CALCIUM 10 MG/1
10 TABLET, COATED ORAL DAILY
Qty: 90 TABLET | Refills: 0 | Status: SHIPPED | OUTPATIENT
Start: 2025-08-04

## 2025-08-18 RX ORDER — OXCARBAZEPINE 600 MG/1
TABLET, FILM COATED ORAL
Qty: 180 TABLET | Refills: 1 | Status: SHIPPED | OUTPATIENT
Start: 2025-08-18

## 2025-08-21 DIAGNOSIS — R60.0 LOCALIZED EDEMA: ICD-10-CM

## 2025-08-21 DIAGNOSIS — I10 PRIMARY HYPERTENSION: ICD-10-CM

## 2025-08-21 RX ORDER — FUROSEMIDE 40 MG/1
40 TABLET ORAL DAILY
Qty: 90 TABLET | Refills: 0 | Status: SHIPPED | OUTPATIENT
Start: 2025-08-21

## 2025-08-27 DIAGNOSIS — E87.1 HYPONATREMIA: ICD-10-CM

## 2025-08-29 RX ORDER — SODIUM BICARBONATE 650 MG/1
650 TABLET ORAL 2 TIMES DAILY
Qty: 120 TABLET | Refills: 0 | Status: SHIPPED | OUTPATIENT
Start: 2025-08-29

## (undated) DEVICE — BLANKET WRM W40.2XL55.9IN IORT LO BODY + MISTRAL AIR

## (undated) DEVICE — 6 ML SYRINGE LUER-LOCK TIP: Brand: MONOJECT

## (undated) DEVICE — ELECTRODE PT RET AD L9FT HI MOIST COND ADH HYDRGEL CORDED

## (undated) DEVICE — SUTURE MCRYL SZ 4-0 L27IN ABSRB UD L19MM PS-2 1/2 CIR PRIM Y426H

## (undated) DEVICE — GLOVE ORTHO 8   MSG9480

## (undated) DEVICE — GOWN SIRUS NONREIN XL W/TWL: Brand: MEDLINE INDUSTRIES, INC.

## (undated) DEVICE — GLOVE ORANGE PI 8   MSG9080

## (undated) DEVICE — Device: Brand: SPOT EX ENDOSCOPIC TATTOO

## (undated) DEVICE — NEEDLE 25GAX5.0MM INJ CARR LOCKE

## (undated) DEVICE — YANKAUER,BULB TIP,W/O VENT,RIGID,STERILE: Brand: MEDLINE

## (undated) DEVICE — DRAPE C ARM UNIV W41XL74IN CLR PLAS XR VELC CLSR POLY STRP

## (undated) DEVICE — STRIP,CLOSURE,WOUND,MEDI-STRIP,1/2X4: Brand: MEDLINE

## (undated) DEVICE — SHEET,DRAPE,53X77,STERILE: Brand: MEDLINE

## (undated) DEVICE — BASIC SINGLE BASIN 1-LF: Brand: MEDLINE INDUSTRIES, INC.

## (undated) DEVICE — GOWN SIRUS NONREIN LG W/TWL: Brand: MEDLINE INDUSTRIES, INC.

## (undated) DEVICE — SUTURE VCRL SZ 2-0 L18IN ABSRB UD CT-1 L36MM 1/2 CIR J839D

## (undated) DEVICE — MASC TURNOVER KIT: Brand: MEDLINE INDUSTRIES, INC.

## (undated) DEVICE — SPONGE LAP W18XL18IN WHT COT 4 PLY FLD STRUNG RADPQ DISP ST

## (undated) DEVICE — BOWL MED L 32OZ PLAS W/ MOLD GRAD EZ OPN PEEL PCH

## (undated) DEVICE — HYPODERMIC SAFETY NEEDLE: Brand: MAGELLAN

## (undated) DEVICE — LOOP,VESSEL,MAXI,BLUE,2/PK,STERILE: Brand: MEDLINE

## (undated) DEVICE — TORQUE LIMITER 2.5NM, AO FITTING: Brand: AXSOS

## (undated) DEVICE — GAUZE,SPONGE,4"X4",8PLY,STRL,LF,10/TRAY: Brand: MEDLINE

## (undated) DEVICE — CRADLE POS 3X5X24IN RASPBERRY ARM PRONE FOAM DISP

## (undated) DEVICE — DRAPE,U/ SHT,SPLIT,PLAS,STERIL: Brand: MEDLINE

## (undated) DEVICE — DRILL BIT AO FITTING

## (undated) DEVICE — INTENDED FOR TISSUE SEPARATION, AND OTHER PROCEDURES THAT REQUIRE A SHARP SURGICAL BLADE TO PUNCTURE OR CUT.: Brand: BARD-PARKER ® STAINLESS STEEL BLADES

## (undated) DEVICE — MASTISOL ADHESIVE LIQ 2/3ML

## (undated) DEVICE — NEEDLE HYPO 22GA L1.5IN BLK POLYPR HUB S STL REG BVL STR

## (undated) DEVICE — FRAME FIXATOR DIAM.3.0MM, AO FITTING: Brand: AXSOS

## (undated) DEVICE — BW-412T DISP COMBO CLEANING BRUSH: Brand: SINGLE USE COMBINATION CLEANING BRUSH

## (undated) DEVICE — MEDICINE CUP, GRADUATED, STER: Brand: MEDLINE

## (undated) DEVICE — T-MAX DISPOSABLE FACE MASK 8 PER BOX

## (undated) DEVICE — SYRINGE, LUER LOCK, 10ML: Brand: MEDLINE

## (undated) DEVICE — SUTURE VCRL SZ 3-0 L18IN ABSRB UD L26MM SH 1/2 CIR J864D

## (undated) DEVICE — 3M™ STERI-DRAPE™ INSTRUMENT POUCH 1018: Brand: STERI-DRAPE™

## (undated) DEVICE — LIGHT HANDLE: Brand: DEVON

## (undated) DEVICE — PACK PROCEDURE SURG SHLDR MFFOP CUST

## (undated) DEVICE — PENCIL ES ULT VAC W TELSCP NOSE EZ CLN BLDE 10FT

## (undated) DEVICE — PROCEDURE KIT ENDOSCP CUST

## (undated) DEVICE — TRAP SPEC RETRV CLR PLAS POLYP IN LN SUCT QUIK CTCH

## (undated) DEVICE — CHLORAPREP 26ML ORANGE

## (undated) DEVICE — DRILL BIT  TI LOCKING, MEDIUM,: Brand: AXSOS

## (undated) DEVICE — 3 ML SYRINGE LUER-LOCK TIP: Brand: MONOJECT

## (undated) DEVICE — TOWEL,OR,DSP,ST,BLUE,STD,4/PK,20PK/CS: Brand: MEDLINE

## (undated) DEVICE — DRESSING CNTCT 4X12IN IS THERABOND 3D

## (undated) DEVICE — Device

## (undated) DEVICE — DRILL BIT 3 TI NON-LOCKING, SHORT,: Brand: AXSOS

## (undated) DEVICE — SET VLV 3 PC AWS DISPOSABLE GRDIAN SCOPEVALET

## (undated) DEVICE — BLADE ES ELASTOMERIC COAT INSUL DURABLE BEND UPTO 90DEG

## (undated) DEVICE — SYRINGE IRRIG 60ML SFT PLIABLE BLB EZ TO GRP 1 HND USE W/

## (undated) DEVICE — TUBING, SUCTION, 1/4" X 10', STRAIGHT: Brand: MEDLINE

## (undated) DEVICE — SHOULDER STABILIZATION KIT,                                    DISPOSABLE 12 PER BOX

## (undated) DEVICE — UNTHREADED GUIDE WIRE: Brand: FIXOS

## (undated) DEVICE — SOLUTION IV IRRIG WATER 500ML POUR BRL ST 2F7113

## (undated) DEVICE — SOLUTION IV IRRIG 500ML 0.9% SODIUM CHL 2F7123